# Patient Record
Sex: MALE | Race: WHITE | NOT HISPANIC OR LATINO | Employment: OTHER | URBAN - METROPOLITAN AREA
[De-identification: names, ages, dates, MRNs, and addresses within clinical notes are randomized per-mention and may not be internally consistent; named-entity substitution may affect disease eponyms.]

---

## 2018-02-19 ENCOUNTER — OFFICE VISIT (OUTPATIENT)
Dept: CARDIOLOGY | Facility: CLINIC | Age: 73
End: 2018-02-19

## 2018-02-19 VITALS
HEART RATE: 68 BPM | WEIGHT: 249 LBS | DIASTOLIC BLOOD PRESSURE: 72 MMHG | HEIGHT: 69 IN | SYSTOLIC BLOOD PRESSURE: 132 MMHG | BODY MASS INDEX: 36.88 KG/M2

## 2018-02-19 DIAGNOSIS — I45.2 BIFASCICULAR BLOCK: Primary | ICD-10-CM

## 2018-02-19 DIAGNOSIS — E78.49 OTHER HYPERLIPIDEMIA: ICD-10-CM

## 2018-02-19 DIAGNOSIS — I10 ESSENTIAL HYPERTENSION: ICD-10-CM

## 2018-02-19 PROCEDURE — 99204 OFFICE O/P NEW MOD 45 MIN: CPT | Performed by: INTERNAL MEDICINE

## 2018-02-19 PROCEDURE — 93000 ELECTROCARDIOGRAM COMPLETE: CPT | Performed by: INTERNAL MEDICINE

## 2018-02-19 RX ORDER — QUINAPRIL 40 MG/1
40 TABLET ORAL NIGHTLY
COMMUNITY
End: 2019-04-03 | Stop reason: SDUPTHER

## 2018-02-19 RX ORDER — ALENDRONATE SODIUM 70 MG/1
70 TABLET ORAL
COMMUNITY
End: 2020-08-17

## 2018-02-19 RX ORDER — HYDROCHLOROTHIAZIDE 25 MG/1
25 TABLET ORAL DAILY
COMMUNITY
End: 2019-06-04 | Stop reason: SDUPTHER

## 2018-02-19 RX ORDER — METHYLPREDNISOLONE 4 MG/1
12 TABLET ORAL DAILY
COMMUNITY
End: 2019-06-26

## 2018-02-19 RX ORDER — AMLODIPINE BESYLATE 5 MG/1
5 TABLET ORAL DAILY
COMMUNITY
End: 2019-04-03 | Stop reason: SDUPTHER

## 2018-02-19 RX ORDER — ALPRAZOLAM 0.5 MG/1
0.5 TABLET ORAL 2 TIMES DAILY PRN
COMMUNITY
End: 2019-04-08 | Stop reason: SDUPTHER

## 2018-02-19 RX ORDER — ROSUVASTATIN CALCIUM 20 MG/1
20 TABLET, COATED ORAL DAILY
COMMUNITY
End: 2019-06-03 | Stop reason: SDUPTHER

## 2018-02-19 NOTE — PROGRESS NOTES
Subjective:     Encounter Date:02/19/2018      Patient ID: Jeremi Ugarte is a 72 y.o. male.    Chief Complaint: bifascicular block, HTN, hyperlipidemia    History of Present Illness    Dear Dr. Kearney:    I had the pleasure of seeing the patient in cardiac evaluation today.  As you well know, he is a jesusita, 72-year-old man with history of orthopedic problems.  He has had his left knee replaced twice and his right knee replaced once.  He has also had hip surgery, shoulder surgery, and cataract surgery.    He follows with Dr. Caldera for his orthopedic care.  He recently discussed possible replacement with Dr. Caldera.      Because of prior conduction abnormality, he was sent to see me.    He is being treated for hypertension.  He has hyperlipidemia.  He denies any complaints of angina.  He has a prior history of right bundle branch block.  Today, he has bifascicular block.  He denies any complaints of bradycardia.      He has started walking since he had knee problems and now he is up to walking two miles a day without much knee difficulty.  He has developed some heel pain.        Review of Systems   All other systems reviewed and are negative.    Family History   Problem Relation Age of Onset   • Cardiomyopathy Mother    • Hypertension Mother    • Hyperlipidemia Sister    • Hypertension Sister    • Coronary artery disease Brother    • Hyperlipidemia Brother    • Hypertension Brother      Social History   Substance Use Topics   • Smoking status: Former Smoker     Types: Cigars   • Smokeless tobacco: Former User     Types: Chew      Comment: FOR A SHORT TIME AS A YOUNG ADULT   • Alcohol use Yes      Comment: 9-14 per week         ECG 12 Lead  Date/Time: 2/19/2018 10:22 AM  Performed by: JEREMI PRIDE  Authorized by: JEREMI PRIDE   Previous ECG: no previous ECG available  Rhythm: sinus rhythm  BPM: 68  Conduction: right bundle branch block and LAFB               Objective:     Physical Exam   Constitutional: He is  oriented to person, place, and time. He appears well-developed and well-nourished.   HENT:   Head: Normocephalic and atraumatic.   Neck: Normal range of motion. Neck supple.   Cardiovascular: Normal rate, regular rhythm and normal heart sounds.    Pulmonary/Chest: Effort normal and breath sounds normal.   Abdominal: Soft. Bowel sounds are normal.   Musculoskeletal: Normal range of motion.   Neurological: He is alert and oriented to person, place, and time.   Skin: Skin is warm and dry.   Psychiatric: He has a normal mood and affect. His behavior is normal. Thought content normal.   Vitals reviewed.      Lab Review:       Assessment:          Diagnosis Plan   1. Bifascicular block     2. Essential hypertension     3. Other hyperlipidemia            Plan:       It was a pleasure to see your patient in cardiac evaluation today.  He is a jesusita, 72-year-old man with hypertension and hyperlipidemia.  He has a pretty active lifestyle and now can walk two miles without too much difficulty.  He has no bradycardia.  He has bifascicular block.  I will recommend a yearly electrocardiogram to follow his conduction abnormality.  I have also instructed him to contact me if he has any symptomatic bradycardia.    I see no problem with him proceeding with his knee surgery if necessary but, given his functional status, he may not need it.  He will see me again in one year or sooner if symptoms warrant.

## 2018-03-13 ENCOUNTER — APPOINTMENT (OUTPATIENT)
Dept: PREADMISSION TESTING | Facility: HOSPITAL | Age: 73
End: 2018-03-13

## 2019-01-09 ENCOUNTER — OFFICE VISIT (OUTPATIENT)
Dept: CARDIOLOGY | Facility: CLINIC | Age: 74
End: 2019-01-09

## 2019-01-09 VITALS
HEART RATE: 56 BPM | SYSTOLIC BLOOD PRESSURE: 122 MMHG | WEIGHT: 212 LBS | DIASTOLIC BLOOD PRESSURE: 78 MMHG | BODY MASS INDEX: 31.4 KG/M2 | OXYGEN SATURATION: 97 % | HEIGHT: 69 IN

## 2019-01-09 DIAGNOSIS — I45.2 BIFASCICULAR BLOCK: Primary | ICD-10-CM

## 2019-01-09 PROBLEM — G50.0 TRIGEMINAL NEURALGIA OF RIGHT SIDE OF FACE: Status: ACTIVE | Noted: 2018-11-08

## 2019-01-09 PROCEDURE — 99213 OFFICE O/P EST LOW 20 MIN: CPT | Performed by: PHYSICIAN ASSISTANT

## 2019-01-09 PROCEDURE — 93000 ELECTROCARDIOGRAM COMPLETE: CPT | Performed by: PHYSICIAN ASSISTANT

## 2019-01-09 RX ORDER — LANOLIN ALCOHOL/MO/W.PET/CERES
1000 CREAM (GRAM) TOPICAL DAILY
COMMUNITY

## 2019-01-09 RX ORDER — OXCARBAZEPINE 300 MG/1
300 TABLET, FILM COATED ORAL 3 TIMES DAILY PRN
Refills: 1 | COMMUNITY
Start: 2018-11-08

## 2019-01-09 RX ORDER — CALCIUM CARBONATE 200(500)MG
1 TABLET,CHEWABLE ORAL AS NEEDED
COMMUNITY
End: 2020-09-01

## 2019-01-09 RX ORDER — CHLORAL HYDRATE 500 MG
1200 CAPSULE ORAL
COMMUNITY
End: 2023-01-12

## 2019-01-09 NOTE — PROGRESS NOTES
"  Date of Office Visit: 2019  Encounter Provider: GERSON Carpenter  Place of Service: [facility] CARDIOLOG[facility]  Patient Name: Jeremi Ugarte  :1945    Chief Complaint   Patient presents with   • Surgical Clearance     1 year follow up   • Hypertension   :     HPI: Jeremi Ugarte is a 73 y.o. male, new to me, who presents today for follow-up.  Old records have been obtained and reviewed by me.  He is a patient with a past medical history significant for multiple orthopedic issues and cataracts.  He was first evaluated by Dr. Gonzalez on 2018.  He has a history of right bundle branch block, and at his visit back in 2018 he had a bifascicular block.  He denied any symptoms of bradycardia.  At that visit he was facing upcoming knee surgery, and Dr. Gonzalez felt that he was an acceptable risk.  He recommended yearly monitoring of his ECG for his conduction abnormality.  No changes were made to his medical regimen, and it was recommended that he follow-up in one year.  He is going to be having microvascular decompression of his trigeminal nerve and possible internal neurolysis via a right retrosigmoid craniectomy by Dr. Aldridge.   Overall he's been doing really well over the past year.  He has intentionally lost almost 30 pounds with diet and exercise.  He walks a mile every day and a fast pace and also with some weights.  He can do this without any difficulty.  He denies any chest pain, shortness of breath, palpitations, edema, dizziness, or syncope.  He states that he was evidently missed diagnosed as having giant cell arteritis and treated with steroids.  He was having these terrible headaches that made him feel \"as if he could not go on\".  He then saw a neurologist and was diagnosed with trigeminal neuralgia.  He was started on a medication for it which helped significantly, however when he increased the medication he developed a rash.  He saw a neurosurgeon, and evidently his " trigeminal nerve is sitting very close to an artery and the plan is to go and put a spacer in between the nerve and artery to help relieve his headaches.      Past Medical History:   Diagnosis Date   • Anemia    • Anxiety    • Arthritis    • Depression    • GCA (giant cell arteritis) (CMS/HCC)    • Gout of foot    • High risk medication use    • Hyperlipidemia    • Hypotension    • Macrocytosis    • Muscle weakness    • Myalgia    • Myositis    • Osteoarthritis    • Polymyalgia rheumatica (CMS/HCC)    • S/P revision of total knee, left    • S/P revision of total knee, right    • Wrist pain, acute, left        Past Surgical History:   Procedure Laterality Date   • BACK SURGERY      lower back surg   • CATARACT EXTRACTION     • EXCISION BAKERS CYST KNEE  2008   • HIP SURGERY  12/08/2016    total right hip replacement   • REPLACEMENT TOTAL KNEE      1996 and 1999   • SHOULDER SURGERY     • TOTAL KNEE ARTHROPLASTY REVISION  2009    left       Social History     Socioeconomic History   • Marital status:      Spouse name: Not on file   • Number of children: Not on file   • Years of education: Not on file   • Highest education level: Not on file   Social Needs   • Financial resource strain: Not on file   • Food insecurity - worry: Not on file   • Food insecurity - inability: Not on file   • Transportation needs - medical: Not on file   • Transportation needs - non-medical: Not on file   Occupational History   • Not on file   Tobacco Use   • Smoking status: Former Smoker     Types: Cigars   • Smokeless tobacco: Former User     Types: Chew   • Tobacco comment: FOR A SHORT TIME AS A YOUNG ADULT   Substance and Sexual Activity   • Alcohol use: Yes     Alcohol/week: 0.6 oz     Types: 1 Cans of beer per week     Comment: 9-14 per week   • Drug use: No   • Sexual activity: Defer   Other Topics Concern   • Not on file   Social History Narrative   • Not on file       Family History   Problem Relation Age of Onset   •  Cardiomyopathy Mother    • Hypertension Mother    • Hyperlipidemia Sister    • Hypertension Sister    • Coronary artery disease Brother    • Hyperlipidemia Brother    • Hypertension Brother    • No Known Problems Father        Review of Systems   Constitution: Negative for chills, fever and malaise/fatigue.   Cardiovascular: Negative for chest pain, dyspnea on exertion, leg swelling, near-syncope, orthopnea, palpitations, paroxysmal nocturnal dyspnea and syncope.   Respiratory: Negative for cough and shortness of breath.    Musculoskeletal: Negative for joint pain, joint swelling and myalgias.   Gastrointestinal: Negative for abdominal pain, diarrhea, melena, nausea and vomiting.   Genitourinary: Negative for frequency and hematuria.   Neurological: Positive for headaches. Negative for light-headedness, numbness, paresthesias and seizures.   Allergic/Immunologic: Negative.    All other systems reviewed and are negative.      No Known Allergies      Current Outpatient Medications:   •  alendronate (FOSAMAX) 70 MG tablet, Take 70 mg by mouth Every 7 (Seven) Days., Disp: , Rfl:   •  ALPRAZolam (XANAX) 0.5 MG tablet, Take 0.5 mg by mouth 2 (Two) Times a Day As Needed for Anxiety., Disp: , Rfl:   •  amLODIPine (NORVASC) 5 MG tablet, Take 5 mg by mouth Daily., Disp: , Rfl:   •  calcium carbonate (TUMS) 500 MG chewable tablet, Chew 1 tablet Daily., Disp: , Rfl:   •  hydrochlorothiazide (HYDRODIURIL) 25 MG tablet, Take 25 mg by mouth Daily., Disp: , Rfl:   •  methylPREDNISolone (MEDROL) 4 MG tablet, Take 12 mg by mouth Daily., Disp: , Rfl:   •  Multiple Vitamins-Minerals (MULTIVITAMIN ADULTS 50+ PO), Take 1 tablet by mouth Daily., Disp: , Rfl:   •  Multiple Vitamins-Minerals (OCUVITE EYE HEALTH FORMULA PO), Take 1 tablet by mouth Daily., Disp: , Rfl:   •  Omega-3 Fatty Acids (FISH OIL) 1000 MG capsule capsule, Take 1,200 mg by mouth Daily With Breakfast., Disp: , Rfl:   •  OXcarbazepine (TRILEPTAL) 300 MG tablet, Takes 525  "mg daily, Disp: , Rfl: 1  •  quinapril (ACCUPRIL) 40 MG tablet, Take 40 mg by mouth Every Night., Disp: , Rfl:   •  rosuvastatin (CRESTOR) 20 MG tablet, Take 20 mg by mouth Daily., Disp: , Rfl:   •  vitamin B-12 (CYANOCOBALAMIN) 1000 MCG tablet, Take 1,000 mcg by mouth Daily., Disp: , Rfl:       Objective:     Vitals:    01/09/19 1244 01/09/19 1305   BP: 112/68 122/78   BP Location: Right arm Left arm   Pulse: 56    SpO2: 97%    Weight: 96.2 kg (212 lb)    Height: 175.3 cm (69\")      Body mass index is 31.31 kg/m².    PHYSICAL EXAM:    Physical Exam   Constitutional: He is oriented to person, place, and time. He appears well-developed and well-nourished. No distress.   HENT:   Head: Normocephalic and atraumatic.   Eyes: Pupils are equal, round, and reactive to light.   Neck: No JVD present. No thyromegaly present.   Cardiovascular: Normal rate, regular rhythm, normal heart sounds and intact distal pulses.   No murmur heard.  Pulmonary/Chest: Effort normal and breath sounds normal. No respiratory distress.   Abdominal: Soft. Bowel sounds are normal. He exhibits no distension. There is no splenomegaly or hepatomegaly. There is no tenderness.   Musculoskeletal: Normal range of motion. He exhibits no edema.   Neurological: He is alert and oriented to person, place, and time.   Skin: Skin is warm and dry. He is not diaphoretic. No erythema.   Psychiatric: He has a normal mood and affect. His behavior is normal. Judgment normal.         ECG 12 Lead  Date/Time: 1/9/2019 1:14 PM  Performed by: Alicia Mancini PA  Authorized by: Alicia Mancini PA   Comparison: compared with previous ECG from 2/19/2018  Similar to previous ECG  Rhythm: sinus rhythm  BPM: 83  Conduction: right bundle branch block and LAFB  Clinical impression: abnormal ECG  Comments: Indication:  Bifascicular block              Assessment:       Diagnosis Plan   1. Bifascicular block  ECG 12 Lead     Orders Placed This Encounter   Procedures   • ECG 12 " Lead     This order was created via procedure documentation          Plan:       Overall he's doing extremely well from a cardiac standpoint.  He is exercising and eating a good diet and has subsequently lost 30 pounds intentionally over the past year.  He can do all these activities without any difficulty.  He has no complaint of angina or heart failure.  He has bifascicular block which is unchanged.  From a cardiac standpoint, he is at an acceptable risk to undergo his proposed surgery.  According to the [employer]'s Revised Cardiac Risk Index, he is at a very low risk of 0.4% of adverse cardiac event during his surgery.  My only recommendation would be to avoid AV rajni blocking agents in the presence of his bifascicular block.  I'm not going to make any changes to his medical regimen, and he will follow-up with Dr. Gonzalez in 1 year or sooner if needed.    As always, it has been a pleasure to participate in your patient's care.      Sincerely,         Alicia Mancini PA-C

## 2019-04-03 RX ORDER — AMLODIPINE BESYLATE 5 MG/1
5 TABLET ORAL DAILY
Qty: 90 TABLET | Refills: 2 | Status: SHIPPED | OUTPATIENT
Start: 2019-04-03 | End: 2019-06-26 | Stop reason: SDUPTHER

## 2019-04-03 RX ORDER — QUINAPRIL 40 MG/1
40 TABLET ORAL NIGHTLY
Qty: 90 TABLET | Refills: 2 | Status: SHIPPED | OUTPATIENT
Start: 2019-04-03 | End: 2019-04-22 | Stop reason: SDUPTHER

## 2019-04-08 RX ORDER — ALPRAZOLAM 0.5 MG/1
0.5 TABLET ORAL 3 TIMES DAILY PRN
Qty: 90 TABLET | Refills: 0 | Status: SHIPPED | OUTPATIENT
Start: 2019-04-08 | End: 2019-05-06 | Stop reason: SDUPTHER

## 2019-04-19 ENCOUNTER — TELEPHONE (OUTPATIENT)
Dept: INTERNAL MEDICINE | Facility: CLINIC | Age: 74
End: 2019-04-19

## 2019-04-19 NOTE — TELEPHONE ENCOUNTER
Pt wife called and stated he is taking quinapril 40 mg. He has been taking it BID, but when it was refilled last the instructions were changed to take it once a day. Did you authorize this or it a mistake. Pt states he needs both pills.

## 2019-04-22 RX ORDER — QUINAPRIL 40 MG/1
40 TABLET ORAL 2 TIMES DAILY
Qty: 180 TABLET | Refills: 2 | Status: SHIPPED | OUTPATIENT
Start: 2019-04-22 | End: 2019-11-11 | Stop reason: SDUPTHER

## 2019-04-26 RX ORDER — ALPRAZOLAM 0.5 MG/1
TABLET ORAL
Qty: 90 TABLET | Refills: 0 | Status: CANCELLED | OUTPATIENT
Start: 2019-04-26

## 2019-05-06 RX ORDER — ALPRAZOLAM 0.5 MG/1
0.5 TABLET ORAL 3 TIMES DAILY PRN
Qty: 90 TABLET | Refills: 0 | Status: SHIPPED | OUTPATIENT
Start: 2019-05-06 | End: 2019-06-05 | Stop reason: SDUPTHER

## 2019-05-16 RX ORDER — ROSUVASTATIN CALCIUM 40 MG/1
TABLET, COATED ORAL
Qty: 90 TABLET | Refills: 0 | Status: SHIPPED | OUTPATIENT
Start: 2019-05-16 | End: 2019-06-03 | Stop reason: SDUPTHER

## 2019-06-03 RX ORDER — ROSUVASTATIN CALCIUM 40 MG/1
40 TABLET, COATED ORAL DAILY
Qty: 90 TABLET | Refills: 0 | Status: SHIPPED | OUTPATIENT
Start: 2019-06-03 | End: 2019-07-25 | Stop reason: SDUPTHER

## 2019-06-04 RX ORDER — HYDROCHLOROTHIAZIDE 25 MG/1
25 TABLET ORAL DAILY
Qty: 90 TABLET | Refills: 2 | Status: SHIPPED | OUTPATIENT
Start: 2019-06-04 | End: 2019-12-18 | Stop reason: SDUPTHER

## 2019-06-05 ENCOUNTER — TELEPHONE (OUTPATIENT)
Dept: INTERNAL MEDICINE | Facility: CLINIC | Age: 74
End: 2019-06-05

## 2019-06-05 RX ORDER — ALPRAZOLAM 0.5 MG/1
TABLET ORAL
Qty: 90 TABLET | Refills: 0 | Status: SHIPPED | OUTPATIENT
Start: 2019-06-05 | End: 2019-06-26 | Stop reason: SDUPTHER

## 2019-06-05 NOTE — TELEPHONE ENCOUNTER
Patient Hydrochlorothiazide 25 mg 90 day supply. [facility] [phone]. He is completely out and has none for today. Thanks

## 2019-06-26 ENCOUNTER — OFFICE VISIT (OUTPATIENT)
Dept: INTERNAL MEDICINE | Facility: CLINIC | Age: 74
End: 2019-06-26

## 2019-06-26 ENCOUNTER — RESULTS ENCOUNTER (OUTPATIENT)
Dept: INTERNAL MEDICINE | Facility: CLINIC | Age: 74
End: 2019-06-26

## 2019-06-26 VITALS
HEART RATE: 88 BPM | BODY MASS INDEX: 30.96 KG/M2 | WEIGHT: 209 LBS | RESPIRATION RATE: 16 BRPM | OXYGEN SATURATION: 98 % | HEIGHT: 69 IN

## 2019-06-26 DIAGNOSIS — F41.9 ANXIETY: Primary | ICD-10-CM

## 2019-06-26 DIAGNOSIS — E78.5 DYSLIPIDEMIA: ICD-10-CM

## 2019-06-26 DIAGNOSIS — G50.0 TRIGEMINAL NEURALGIA OF RIGHT SIDE OF FACE: ICD-10-CM

## 2019-06-26 DIAGNOSIS — I10 BENIGN ESSENTIAL HTN: ICD-10-CM

## 2019-06-26 DIAGNOSIS — Z12.11 COLON CANCER SCREENING: ICD-10-CM

## 2019-06-26 DIAGNOSIS — E53.8 VITAMIN B12 DEFICIENCY: ICD-10-CM

## 2019-06-26 PROCEDURE — 99213 OFFICE O/P EST LOW 20 MIN: CPT | Performed by: INTERNAL MEDICINE

## 2019-06-26 RX ORDER — AMLODIPINE BESYLATE 5 MG/1
5 TABLET ORAL DAILY
Qty: 90 TABLET | Refills: 2 | Status: SHIPPED | OUTPATIENT
Start: 2019-06-26 | End: 2019-12-18 | Stop reason: SDUPTHER

## 2019-06-26 RX ORDER — ALPRAZOLAM 0.5 MG/1
0.5 TABLET ORAL 3 TIMES DAILY PRN
Qty: 90 TABLET | Refills: 5 | Status: SHIPPED | OUTPATIENT
Start: 2019-06-26 | End: 2019-07-15 | Stop reason: SDUPTHER

## 2019-06-26 NOTE — PROGRESS NOTES
Assessment and Plan  Jeremi was seen today for hyperlipidemia, hypotension, osteoarthritis and med refill.    Diagnoses and all orders for this visit:    Anxiety  Comments:  stable use of alprazolam- refilled today    Dyslipidemia  Comments:  check labs  Orders:  -     Comprehensive Metabolic Panel  -     Lipid Panel With LDL / HDL Ratio    Benign essential HTN  Comments:  controlled.  Should monitor on occ.  Orders:  -     amLODIPine (NORVASC) 5 MG tablet; Take 1 tablet by mouth Daily.    Trigeminal neuralgia of right side of face  Comments:  doing great since surgery.    Vitamin B12 deficiency  -     CBC & Differential  -     Vitamin B12  -     Folate    Colon cancer screening  -     Cologuard - Stool, Per Rectum; Future    Other orders  -     ALPRAZolam (XANAX) 0.5 MG tablet; Take 1 tablet by mouth 3 (Three) Times a Day As Needed for Anxiety. for anxiety      F/U and Patient Instructions    Return in about 6 months (around 12/26/2019).  There are no Patient Instructions on file for this visit.    Subjective    Jeremi Ugarte is a 74 y.o. male being seen in our office today for Hyperlipidemia (6 MO FU); Hypotension; Osteoarthritis; and Med Refill (amalodapine, xanax, )     History of the Present Illness  HPI  He has been doing well since his brain surgery- has been released by Dr. Aldridge.  Has an occ pain in the back of his head but neurologist did not think it was related. He decided not to take the trileptal.  Anxiety still his biggest issue is anxiety- over many things, including pain coming back.  He takes alprazolam BID and only rarely 3x daily.        Patient History        Significant Past History  The following portions of the patient's history were reviewed and updated as appropriate:PMHroutine: Social history , Allergies, Current Medications, Active Problem List and Health Maintenance              Social History  He  reports that he has quit smoking. His smoking use included cigars. He has quit using  smokeless tobacco. His smokeless tobacco use included chew. He reports that he drinks about 0.6 oz of alcohol per week. He reports that he does not use drugs.                         Review of Symptoms  Review of Systems   Constitution: Negative.   Cardiovascular: Negative.    Respiratory: Negative.    Skin: Negative.    Musculoskeletal: Negative.    Gastrointestinal: Negative.    Genitourinary: Negative.    Psychiatric/Behavioral: Negative.      Objective  Vital Signs         BP Readings from Last 1 Encounters:   01/09/19 122/78     Wt Readings from Last 3 Encounters:   06/26/19 94.8 kg (209 lb)   01/09/19 96.2 kg (212 lb)   02/19/18 113 kg (249 lb)   Body mass index is 30.86 kg/m².          Physical Exam   Physical Exam   Constitutional: No distress.   Cardiovascular: Normal rate and regular rhythm.   Pulmonary/Chest: Effort normal and breath sounds normal.   Musculoskeletal: He exhibits no edema.   Psychiatric: He has a normal mood and affect. His behavior is normal.     Data Reviewed    No results found for this or any previous visit (from the past 2016 hour(s)).

## 2019-06-27 LAB
ALBUMIN SERPL-MCNC: 4.3 G/DL (ref 3.5–5.2)
ALBUMIN/GLOB SERPL: 1.6 G/DL
ALP SERPL-CCNC: 51 U/L (ref 39–117)
ALT SERPL-CCNC: 13 U/L (ref 1–41)
AST SERPL-CCNC: 17 U/L (ref 1–40)
BASOPHILS # BLD AUTO: 0.02 10*3/MM3 (ref 0–0.2)
BASOPHILS NFR BLD AUTO: 0.2 % (ref 0–1.5)
BILIRUB SERPL-MCNC: 0.3 MG/DL (ref 0.2–1.2)
BUN SERPL-MCNC: 20 MG/DL (ref 8–23)
BUN/CREAT SERPL: 16.9 (ref 7–25)
CALCIUM SERPL-MCNC: 9.9 MG/DL (ref 8.6–10.5)
CHLORIDE SERPL-SCNC: 102 MMOL/L (ref 98–107)
CHOLEST SERPL-MCNC: 179 MG/DL (ref 0–200)
CO2 SERPL-SCNC: 26.9 MMOL/L (ref 22–29)
CREAT SERPL-MCNC: 1.18 MG/DL (ref 0.76–1.27)
EOSINOPHIL # BLD AUTO: 0.33 10*3/MM3 (ref 0–0.4)
EOSINOPHIL NFR BLD AUTO: 3.8 % (ref 0.3–6.2)
ERYTHROCYTE [DISTWIDTH] IN BLOOD BY AUTOMATED COUNT: 13.5 % (ref 12.3–15.4)
FOLATE SERPL-MCNC: 14.4 NG/ML (ref 4.78–24.2)
GLOBULIN SER CALC-MCNC: 2.7 GM/DL
GLUCOSE SERPL-MCNC: 89 MG/DL (ref 65–99)
HCT VFR BLD AUTO: 44.4 % (ref 37.5–51)
HDLC SERPL-MCNC: 59 MG/DL (ref 40–60)
HGB BLD-MCNC: 14.2 G/DL (ref 13–17.7)
IMM GRANULOCYTES # BLD AUTO: 0.02 10*3/MM3 (ref 0–0.05)
IMM GRANULOCYTES NFR BLD AUTO: 0.2 % (ref 0–0.5)
LDLC SERPL CALC-MCNC: 101 MG/DL (ref 0–100)
LDLC/HDLC SERPL: 1.71 {RATIO}
LYMPHOCYTES # BLD AUTO: 2.94 10*3/MM3 (ref 0.7–3.1)
LYMPHOCYTES NFR BLD AUTO: 33.5 % (ref 19.6–45.3)
MCH RBC QN AUTO: 32.7 PG (ref 26.6–33)
MCHC RBC AUTO-ENTMCNC: 32 G/DL (ref 31.5–35.7)
MCV RBC AUTO: 102.3 FL (ref 79–97)
MONOCYTES # BLD AUTO: 0.72 10*3/MM3 (ref 0.1–0.9)
MONOCYTES NFR BLD AUTO: 8.2 % (ref 5–12)
NEUTROPHILS # BLD AUTO: 4.74 10*3/MM3 (ref 1.7–7)
NEUTROPHILS NFR BLD AUTO: 54.1 % (ref 42.7–76)
PLATELET # BLD AUTO: 320 10*3/MM3 (ref 140–450)
POTASSIUM SERPL-SCNC: 4.6 MMOL/L (ref 3.5–5.2)
PROT SERPL-MCNC: 7 G/DL (ref 6–8.5)
RBC # BLD AUTO: 4.34 10*6/MM3 (ref 4.14–5.8)
SODIUM SERPL-SCNC: 141 MMOL/L (ref 136–145)
TRIGL SERPL-MCNC: 97 MG/DL (ref 0–150)
VIT B12 SERPL-MCNC: >2000 PG/ML (ref 211–946)
VLDLC SERPL CALC-MCNC: 19.4 MG/DL
WBC # BLD AUTO: 8.77 10*3/MM3 (ref 3.4–10.8)

## 2019-07-01 DIAGNOSIS — I10 HYPERTENSION, UNSPECIFIED TYPE: Primary | ICD-10-CM

## 2019-07-12 RX ORDER — ALPRAZOLAM 0.5 MG/1
0.5 TABLET ORAL 3 TIMES DAILY PRN
Qty: 90 TABLET | Refills: 0 | OUTPATIENT
Start: 2019-07-12

## 2019-07-15 RX ORDER — ALPRAZOLAM 0.5 MG/1
0.5 TABLET ORAL 3 TIMES DAILY PRN
Qty: 90 TABLET | Refills: 0 | Status: SHIPPED | OUTPATIENT
Start: 2019-07-15 | End: 2019-12-20

## 2019-07-25 RX ORDER — ROSUVASTATIN CALCIUM 20 MG/1
20 TABLET, COATED ORAL DAILY
Qty: 90 TABLET | Refills: 1 | Status: SHIPPED | OUTPATIENT
Start: 2019-07-25 | End: 2020-04-29

## 2019-11-11 RX ORDER — QUINAPRIL 40 MG/1
TABLET ORAL
Qty: 180 TABLET | Refills: 1 | Status: SHIPPED | OUTPATIENT
Start: 2019-11-11 | End: 2020-05-21

## 2019-12-18 DIAGNOSIS — I10 BENIGN ESSENTIAL HTN: ICD-10-CM

## 2019-12-18 RX ORDER — HYDROCHLOROTHIAZIDE 25 MG/1
25 TABLET ORAL DAILY
Qty: 90 TABLET | Refills: 1 | Status: SHIPPED | OUTPATIENT
Start: 2019-12-18 | End: 2020-05-21

## 2019-12-18 RX ORDER — AMLODIPINE BESYLATE 5 MG/1
5 TABLET ORAL DAILY
Qty: 90 TABLET | Refills: 1 | Status: SHIPPED | OUTPATIENT
Start: 2019-12-18 | End: 2020-05-21

## 2019-12-20 DIAGNOSIS — F41.9 ANXIETY: Primary | ICD-10-CM

## 2019-12-22 RX ORDER — ALPRAZOLAM 0.5 MG/1
TABLET ORAL
Qty: 90 TABLET | Refills: 0 | Status: SHIPPED | OUTPATIENT
Start: 2019-12-22 | End: 2020-01-28

## 2019-12-23 ENCOUNTER — RESULTS ENCOUNTER (OUTPATIENT)
Dept: INTERNAL MEDICINE | Facility: CLINIC | Age: 74
End: 2019-12-23

## 2019-12-23 DIAGNOSIS — I10 HYPERTENSION, UNSPECIFIED TYPE: ICD-10-CM

## 2019-12-24 LAB
ALBUMIN SERPL-MCNC: 4.3 G/DL (ref 3.5–4.8)
ALBUMIN/GLOB SERPL: 1.9 {RATIO} (ref 1.2–2.2)
ALP SERPL-CCNC: 78 IU/L (ref 39–117)
ALT SERPL-CCNC: 15 IU/L (ref 0–44)
AST SERPL-CCNC: 16 IU/L (ref 0–40)
BASOPHILS # BLD AUTO: NORMAL 10*3/UL
BILIRUB SERPL-MCNC: 0.4 MG/DL (ref 0–1.2)
BUN SERPL-MCNC: 20 MG/DL (ref 8–27)
BUN/CREAT SERPL: 22 (ref 10–24)
CALCIUM SERPL-MCNC: 9.1 MG/DL (ref 8.6–10.2)
CHLORIDE SERPL-SCNC: 103 MMOL/L (ref 96–106)
CHOLEST SERPL-MCNC: 136 MG/DL (ref 100–199)
CO2 SERPL-SCNC: 18 MMOL/L (ref 20–29)
CREAT SERPL-MCNC: 0.9 MG/DL (ref 0.76–1.27)
EOSINOPHIL # BLD AUTO: NORMAL 10*3/UL
EOSINOPHIL NFR BLD AUTO: NORMAL %
GLOBULIN SER CALC-MCNC: 2.3 G/DL (ref 1.5–4.5)
GLUCOSE SERPL-MCNC: 196 MG/DL (ref 65–99)
HCT VFR BLD AUTO: NORMAL %
HDLC SERPL-MCNC: 47 MG/DL
HGB BLD-MCNC: NORMAL G/DL
LDLC SERPL CALC-MCNC: 41 MG/DL (ref 0–99)
LYMPHOCYTES # BLD AUTO: NORMAL 10*3/UL
LYMPHOCYTES NFR BLD AUTO: NORMAL %
MONOCYTES NFR BLD AUTO: NORMAL %
NEUTROPHILS NFR BLD AUTO: NORMAL %
PLATELET # BLD AUTO: NORMAL 10*3/UL
POTASSIUM SERPL-SCNC: 4.7 MMOL/L (ref 3.5–5.2)
PROT SERPL-MCNC: 6.6 G/DL (ref 6–8.5)
RBC # BLD AUTO: NORMAL 10*6/UL
REQUEST PROBLEM: NORMAL
SODIUM SERPL-SCNC: 141 MMOL/L (ref 134–144)
TRIGL SERPL-MCNC: 239 MG/DL (ref 0–149)
VLDLC SERPL CALC-MCNC: 48 MG/DL (ref 5–40)
WBC # BLD AUTO: NORMAL X10E3/UL

## 2019-12-26 ENCOUNTER — OFFICE VISIT (OUTPATIENT)
Dept: INTERNAL MEDICINE | Facility: CLINIC | Age: 74
End: 2019-12-26

## 2019-12-26 VITALS
BODY MASS INDEX: 33.47 KG/M2 | HEIGHT: 69 IN | DIASTOLIC BLOOD PRESSURE: 68 MMHG | SYSTOLIC BLOOD PRESSURE: 130 MMHG | HEART RATE: 74 BPM | WEIGHT: 226 LBS

## 2019-12-26 DIAGNOSIS — E78.5 DYSLIPIDEMIA: ICD-10-CM

## 2019-12-26 DIAGNOSIS — R73.9 HYPERGLYCEMIA: Primary | ICD-10-CM

## 2019-12-26 DIAGNOSIS — I10 BENIGN ESSENTIAL HTN: ICD-10-CM

## 2019-12-26 DIAGNOSIS — M79.673 HEEL PAIN, UNSPECIFIED LATERALITY: ICD-10-CM

## 2019-12-26 PROCEDURE — 99214 OFFICE O/P EST MOD 30 MIN: CPT | Performed by: INTERNAL MEDICINE

## 2019-12-26 NOTE — PROGRESS NOTES
Assessment and Plan  Jeremi was seen today for anxiety.    Diagnoses and all orders for this visit:    Hyperglycemia  Comments:  new finding with elevated A1C- discussed weight loss, eliminating beer at night as best treatment initially- will recheck in 6 months.   Orders:  -     Comprehensive Metabolic Panel; Future  -     Hemoglobin A1c; Future    Benign essential HTN  Comments:  Controlled, no change.    Dyslipidemia  Comments:  Triglycerides up, as expected with the BS elevation.  No change in medication at this time.     Heel pain, unspecified laterality  Comments:  Seems better- increase activity.  If worsens, to podiatry for orthotic.      F/U and Patient Instructions    Return in about 6 months (around 6/26/2020).  There are no Patient Instructions on file for this visit.    Subjective    Jeremi Ugarte is a 74 y.o. male being seen in our office today for Anxiety (6 month follow up)     History of the Present Illness  HPI  Having about the same issues- he ha some heel pain on the L - able to walk 1-2 miles/day it just is really bothering him-  He has gained weight in 6 months, admits to being too liberal with his diet!  No other complaints    Patient History        Significant Past History  The following portions of the patient's history were reviewed and updated as appropriate:PMHroutine: Social history , Allergies, Current Medications, Active Problem List and Health Maintenance              Social History  He  reports that he has quit smoking. His smoking use included cigars. He has quit using smokeless tobacco.  His smokeless tobacco use included chew. He reports that he drinks about 1.0 standard drinks of alcohol per week. He reports that he does not use drugs.                         Review of Symptoms  Review of Systems   Constitution: Positive for weight gain.   HENT: Negative.    Cardiovascular: Negative.    Respiratory: Negative.    Genitourinary: Negative.    Psychiatric/Behavioral: The patient  is nervous/anxious.      Objective  Vital Signs         BP Readings from Last 1 Encounters:   12/26/19 130/68     Wt Readings from Last 3 Encounters:   12/26/19 103 kg (226 lb)   06/26/19 94.8 kg (209 lb)   01/09/19 96.2 kg (212 lb)   Body mass index is 33.36 kg/m².          Physical Exam   Physical Exam   Constitutional: No distress.   Cardiovascular: Normal rate.   Pulmonary/Chest: Effort normal.   Musculoskeletal:   No tenderness over heel, good ROM     Data Reviewed    Recent Results (from the past 2016 hour(s))   Comprehensive metabolic panel    Collection Time: 12/23/19  9:08 AM   Result Value Ref Range    Glucose 196 (H) 65 - 99 mg/dL    BUN 20 8 - 27 mg/dL    Creatinine 0.90 0.76 - 1.27 mg/dL    eGFR Non African Am 84 >59 mL/min/1.73    eGFR African Am 97 >59 mL/min/1.73    BUN/Creatinine Ratio 22 10 - 24    Sodium 141 134 - 144 mmol/L    Potassium 4.7 3.5 - 5.2 mmol/L    Chloride 103 96 - 106 mmol/L    Total CO2 18 (L) 20 - 29 mmol/L    Calcium 9.1 8.6 - 10.2 mg/dL    Total Protein 6.6 6.0 - 8.5 g/dL    Albumin 4.3 3.5 - 4.8 g/dL    Globulin 2.3 1.5 - 4.5 g/dL    A/G Ratio 1.9 1.2 - 2.2    Total Bilirubin 0.4 0.0 - 1.2 mg/dL    Alkaline Phosphatase 78 39 - 117 IU/L    AST (SGOT) 16 0 - 40 IU/L    ALT (SGPT) 15 0 - 44 IU/L   Lipid panel    Collection Time: 12/23/19  9:08 AM   Result Value Ref Range    Total Cholesterol 136 100 - 199 mg/dL    Triglycerides 239 (H) 0 - 149 mg/dL    HDL Cholesterol 47 >39 mg/dL    VLDL Cholesterol 48 (H) 5 - 40 mg/dL    LDL Cholesterol  41 0 - 99 mg/dL   CBC & Differential    Collection Time: 12/23/19  9:08 AM   Result Value Ref Range    WBC CANCELED x10E3/uL    RBC CANCELED     Hemoglobin CANCELED     Hematocrit CANCELED     Platelets CANCELED     Neutrophil Rel % CANCELED     Lymphocyte Rel % CANCELED     Monocyte Rel % CANCELED     Eosinophil Rel % CANCELED     Lymphocytes Absolute CANCELED     Eosinophils Absolute CANCELED     Basophils Absolute CANCELED    Request Problem     Collection Time: 12/23/19  9:08 AM   Result Value Ref Range    Request Problem CANCELED

## 2020-01-16 ENCOUNTER — OFFICE VISIT (OUTPATIENT)
Dept: CARDIOLOGY | Facility: CLINIC | Age: 75
End: 2020-01-16

## 2020-01-16 VITALS
WEIGHT: 218 LBS | DIASTOLIC BLOOD PRESSURE: 76 MMHG | BODY MASS INDEX: 32.29 KG/M2 | OXYGEN SATURATION: 98 % | SYSTOLIC BLOOD PRESSURE: 128 MMHG | HEIGHT: 69 IN | HEART RATE: 98 BPM

## 2020-01-16 DIAGNOSIS — I45.2 BIFASCICULAR BLOCK: ICD-10-CM

## 2020-01-16 DIAGNOSIS — E78.5 HYPERLIPIDEMIA LDL GOAL <100: ICD-10-CM

## 2020-01-16 DIAGNOSIS — I10 BENIGN ESSENTIAL HTN: Primary | ICD-10-CM

## 2020-01-16 PROCEDURE — 93000 ELECTROCARDIOGRAM COMPLETE: CPT | Performed by: INTERNAL MEDICINE

## 2020-01-16 PROCEDURE — 99214 OFFICE O/P EST MOD 30 MIN: CPT | Performed by: INTERNAL MEDICINE

## 2020-01-16 NOTE — PROGRESS NOTES
PATIENTINFORMATION    Date of Office Visit: 2020  Encounter Provider: Antonio Mesa MD  Place of Service: [facility] CARDIOLOGY  Patient Name: Jeremi Ugarte  : 1945    Subjective:     Encounter Date:2020      Patient ID: Jeremi Ugarte is a 74 y.o. male.    Chief Complaint   Patient presents with   • Follow-up     HPI  Mr. Ugarte is a 74 years old man with past medical history of bifascicular block, hypertension and hyperlipidemia came to cardiology clinic for yearly follow-up visit.  I have obtained and reviewed prior medical record recent visits and labs myself.  Since last visit he denied having any significant new symptoms.  Specifically he denied any exertional shortness of breath, chest discomfort, palpitations, presyncope or syncope which could suggest complete heart block.  He walks about 1-2 miles most days of the week without any significant symptoms and he is also pretty active at home doing house tasks.  He admits gaining some weight since last visit from drinking more beers to relieve pains of trigeminal neuralgia but he also got surgery that has significantly helped with pain.  He is determined to lose weight now.  He reports being compliant with his his home medications including blood pressure medications quinapril, amlodipine and hydrochlorothiazide and his blood pressure runs normal.  He also takes rosuvastatin for hyperlipidemia without any significant side effects.   He had prior bilateral knee, bilateral hip replacement and left shoulder repair but he is functionally good currently with no significant pains.      ROS   All systems reviewed.  He admits having occasional headaches and anxiety.  Otherwise negative review of systems.    Past Medical History:   Diagnosis Date   • Anemia    • Ankle arthritis    • Ankle pain    • Anxiety    • Arthritis    • Depression    • GCA (giant cell arteritis) (CMS/[facility]ly [facility])    • Gout of foot    • High risk  medication use    • Hyperlipidemia    • Hypertension    • Hyponatremia    • Macrocytosis    • Muscle weakness    • Myalgia    • Myositis    • Osteoarthritis    • Osteoarthritis of both hips    • Polymyalgia rheumatica (CMS/HCC)    • Renal insufficiency    • S/p bilateral revision of total hip replacement    • S/P foot surgery    • S/P revision of total knee, left    • S/P revision of total knee, right    • Trigeminal neuralgia    • Wrist pain, acute, left        Past Surgical History:   Procedure Laterality Date   • BACK SURGERY      lower back surg   • CATARACT EXTRACTION     • CRANIOTOMY Right     Dr. Aldridge, for trigeminal neuralgia   • EXCISION BAKERS CYST KNEE  2008   • HAND SURGERY Left 1971    Left hand and wrist 1971   • HIP SURGERY  12/08/2016    total right hip replacement   • KNEE ARTHROPLASTY Bilateral     LEFT - 1996  -  RIGHT -  1999    • REPLACEMENT TOTAL KNEE      1996 and 1999   • SHOULDER SURGERY     • TOTAL KNEE ARTHROPLASTY REVISION  2009    left       Social History     Socioeconomic History   • Marital status:      Spouse name: Not on file   • Number of children: Not on file   • Years of education: Not on file   • Highest education level: Not on file   Tobacco Use   • Smoking status: Former Smoker     Types: Cigars   • Smokeless tobacco: Former User     Types: Chew   • Tobacco comment: FOR A SHORT TIME AS A YOUNG ADULT   Substance and Sexual Activity   • Alcohol use: Yes     Alcohol/week: 1.0 standard drinks     Types: 1 Cans of beer per week     Comment: 9-14 per week   • Drug use: No   • Sexual activity: Defer       Family History   Problem Relation Age of Onset   • Cardiomyopathy Mother    • Hypertension Mother    • Hyperlipidemia Sister    • Hypertension Sister    • Coronary artery disease Brother    • Hyperlipidemia Brother    • Hypertension Brother    • No Known Problems Father            ECG 12 Lead  Date/Time: 1/16/2020 12:26 PM  Performed by: Antonio Mesa MD  Authorized  by: Antonio Mesa MD   Comparison: compared with previous ECG from 1/9/2019  Similar to previous ECG  Rhythm: sinus rhythm  Rate: normal  Conduction: right bundle branch block and left anterior fascicular block  ST Segments: ST segments normal  T Waves: T waves normal  QRS axis: left  Other findings: non-specific ST-T wave changes    Clinical impression: abnormal EKG               Objective:     There were no vitals taken for this visit. There is no height or weight on file to calculate BMI.     Physical Exam   Constitutional: He is oriented to person, place, and time. He appears well-developed and well-nourished. No distress.   HENT:   Head: Normocephalic and atraumatic.   Eyes: Pupils are equal, round, and reactive to light. EOM are normal.   Neck: Normal range of motion. Neck supple. No thyromegaly present.   Cardiovascular: Normal rate, regular rhythm, normal heart sounds and intact distal pulses. Exam reveals no gallop and no friction rub.   No murmur heard.  Pulmonary/Chest: Effort normal and breath sounds normal. No respiratory distress. He has no wheezes. He has no rales. He exhibits no tenderness.   Abdominal: Soft. Bowel sounds are normal. He exhibits no distension. There is no guarding.   Musculoskeletal: Normal range of motion. He exhibits no edema or deformity.   Neurological: He is alert and oriented to person, place, and time. He has normal reflexes. No cranial nerve deficit.   Skin: Skin is warm and dry. No rash noted. He is not diaphoretic.   Psychiatric: He has a normal mood and affect. Judgment normal.       Review Of Data: I have personally obtained and reviewed prior medical records including office notes, labs and EKGs.      Assessment/Plan:         Bifascicular block    Benign essential HTN    Hyperlipidemia LDL goal <100    Most recent labs revealed normal CMP and desired lipid profile.  Patient's blood pressure is well controlled on current medication.  Right bundle branch block and  left anterior fascicular block has remained stable for several years and patient denied any intermittent symptoms suggestive of complete heart block.  No change in current medication and will continue to follow patient annually and patient advised to call clinic with any with any new symptoms.    Diagnosis and plan of care discussed with patient and verbalized understanding.           Antonio Mesa MD  01/16/20  12:28 PM

## 2020-01-28 DIAGNOSIS — F41.9 ANXIETY: ICD-10-CM

## 2020-01-28 RX ORDER — ALPRAZOLAM 0.5 MG/1
TABLET ORAL
Qty: 90 TABLET | Refills: 1 | Status: SHIPPED | OUTPATIENT
Start: 2020-01-28 | End: 2020-02-19 | Stop reason: SDUPTHER

## 2020-02-12 ENCOUNTER — TELEPHONE (OUTPATIENT)
Dept: INTERNAL MEDICINE | Facility: CLINIC | Age: 75
End: 2020-02-12

## 2020-02-12 NOTE — TELEPHONE ENCOUNTER
Vidya Ugarte called and they are leaving town on 02/20/2020 and wants to know if they can get her 's Amprazolam 0.5 refilled on 02/19/2020.  He has one refill left but the pharmacist will only fill this prescription early if they get some something from Dr. Kearney stating that it will be okay to fill it early.  She can be reached at [phone].  She said she has left two messages and was getting concerned because she has not heard anything from you yet.  Thank you

## 2020-02-19 DIAGNOSIS — F41.9 ANXIETY: ICD-10-CM

## 2020-02-19 RX ORDER — ALPRAZOLAM 0.5 MG/1
0.5 TABLET ORAL 3 TIMES DAILY PRN
Qty: 90 TABLET | Refills: 1 | Status: SHIPPED | OUTPATIENT
Start: 2020-02-19 | End: 2020-05-13

## 2020-04-29 RX ORDER — ROSUVASTATIN CALCIUM 20 MG/1
20 TABLET, COATED ORAL DAILY
Qty: 90 TABLET | Refills: 1 | Status: SHIPPED | OUTPATIENT
Start: 2020-04-29 | End: 2020-09-09

## 2020-05-12 DIAGNOSIS — F41.9 ANXIETY: ICD-10-CM

## 2020-05-13 RX ORDER — ALPRAZOLAM 0.5 MG/1
TABLET ORAL
Qty: 90 TABLET | Refills: 1 | Status: SHIPPED | OUTPATIENT
Start: 2020-05-13 | End: 2020-07-01

## 2020-05-21 DIAGNOSIS — I10 BENIGN ESSENTIAL HTN: ICD-10-CM

## 2020-05-21 RX ORDER — QUINAPRIL 40 MG/1
TABLET ORAL
Qty: 180 TABLET | Refills: 1 | Status: SHIPPED | OUTPATIENT
Start: 2020-05-21 | End: 2020-08-18

## 2020-05-21 RX ORDER — HYDROCHLOROTHIAZIDE 25 MG/1
25 TABLET ORAL DAILY
Qty: 90 TABLET | Refills: 1 | Status: SHIPPED | OUTPATIENT
Start: 2020-05-21 | End: 2020-09-08

## 2020-05-21 RX ORDER — AMLODIPINE BESYLATE 5 MG/1
5 TABLET ORAL DAILY
Qty: 90 TABLET | Refills: 1 | Status: SHIPPED | OUTPATIENT
Start: 2020-05-21 | End: 2020-09-09

## 2020-06-10 ENCOUNTER — OFFICE VISIT (OUTPATIENT)
Dept: CARDIOLOGY | Facility: CLINIC | Age: 75
End: 2020-06-10

## 2020-06-10 VITALS
HEIGHT: 69 IN | DIASTOLIC BLOOD PRESSURE: 100 MMHG | SYSTOLIC BLOOD PRESSURE: 160 MMHG | HEART RATE: 75 BPM | WEIGHT: 219 LBS | BODY MASS INDEX: 32.44 KG/M2

## 2020-06-10 DIAGNOSIS — R07.89 CHEST PAIN, ATYPICAL: Primary | ICD-10-CM

## 2020-06-10 DIAGNOSIS — I10 BENIGN ESSENTIAL HTN: ICD-10-CM

## 2020-06-10 DIAGNOSIS — E78.5 HYPERLIPIDEMIA LDL GOAL <100: ICD-10-CM

## 2020-06-10 DIAGNOSIS — I45.2 BIFASCICULAR BLOCK: ICD-10-CM

## 2020-06-10 PROCEDURE — 93000 ELECTROCARDIOGRAM COMPLETE: CPT | Performed by: INTERNAL MEDICINE

## 2020-06-10 PROCEDURE — 99213 OFFICE O/P EST LOW 20 MIN: CPT | Performed by: INTERNAL MEDICINE

## 2020-06-10 NOTE — PROGRESS NOTES
PATIENTINFORMATION    Date of Office Visit: 06/10/2020  Encounter Provider: Antonio Mesa MD  Place of Service: [facility] CARDIOLOGY  Patient Name: Jeremi Ugarte  : 1945    Subjective:     Encounter Date:06/10/2020      Patient ID: Jeremi Ugarte is a 75 y.o. male.    Chief Complaint   Patient presents with   • Hyperlipidemia     FOLLOW UP      HPI  Mr. Ugarte 75 years old man man with past medical history of bifascicular block, hypertension and hyperlipidemia came to cardiology clinic for evaluation of chest pain.  He had a sudden onset sharp pain while working on his crease localized to the left shoulder area and was fleeting and exacerbated by shoulder movement.  He had a recurrence of the pain today.  He is pretty active physically and has been working a lot on his trees, lawn doing heavy manual labor with no chest discomfort or shortness of breath other than the sharp shoulder pains he mentioned above.  He has chronic left shoulder problems and had a surgery in the past.  He denied any palpitations, presyncope or syncope, orthopnea or PND.  He reports being compliant with his medications including statin and antihypertensives and he reports blood pressure usually being within normal range as long as he takes his Xanax.  Today's blood pressure in the office is 160s/100 and patient attributes that to being anxious with shoulder pain and also not taking his Xanax.  No other significant problems.    ROS   All systems reviewed and negative except as noted in HPI.    Past Medical History:   Diagnosis Date   • Anemia    • Ankle arthritis    • Ankle pain    • Anxiety    • Arthritis    • Depression    • GCA (giant cell arteritis) (CMS/HCC)    • Gout of foot    • High risk medication use    • Hyperlipidemia    • Hypertension    • Hyponatremia    • Macrocytosis    • Muscle weakness    • Myalgia    • Myositis    • Osteoarthritis    • Osteoarthritis of both hips    •  Polymyalgia rheumatica (CMS/HCC)    • Renal insufficiency    • S/p bilateral revision of total hip replacement    • S/P foot surgery    • S/P revision of total knee, left    • S/P revision of total knee, right    • Trigeminal neuralgia    • Wrist pain, acute, left        Past Surgical History:   Procedure Laterality Date   • BACK SURGERY      lower back surg   • CATARACT EXTRACTION     • CRANIOTOMY Right     Dr. Aldridge, for trigeminal neuralgia   • EXCISION BAKERS CYST KNEE  2008   • HAND SURGERY Left 1971    Left hand and wrist 1971   • HIP SURGERY  12/08/2016    total right hip replacement   • KNEE ARTHROPLASTY Bilateral     LEFT - 1996  -  RIGHT -  1999    • REPLACEMENT TOTAL KNEE      1996 and 1999   • SHOULDER SURGERY     • TOTAL KNEE ARTHROPLASTY REVISION  2009    left       Social History     Socioeconomic History   • Marital status:      Spouse name: Not on file   • Number of children: Not on file   • Years of education: Not on file   • Highest education level: Not on file   Tobacco Use   • Smoking status: Former Smoker     Types: Cigars   • Smokeless tobacco: Former User     Types: Chew   • Tobacco comment: FOR A SHORT TIME AS A YOUNG ADULT   Substance and Sexual Activity   • Alcohol use: Yes     Alcohol/week: 1.0 standard drinks     Types: 1 Cans of beer per week     Comment: 9-14 per week   • Drug use: No   • Sexual activity: Defer       Family History   Problem Relation Age of Onset   • Cardiomyopathy Mother    • Hypertension Mother    • Hyperlipidemia Sister    • Hypertension Sister    • Coronary artery disease Brother    • Hyperlipidemia Brother    • Hypertension Brother    • No Known Problems Father            ECG 12 Lead  Date/Time: 6/10/2020 10:49 AM  Performed by: Antonio Mesa MD  Authorized by: Antonio Mesa MD   Comparison: compared with previous ECG from 1/16/2020  Similar to previous ECG  Rhythm: sinus rhythm  Rate: normal  Conduction: right bundle branch block and left  "anterior fascicular block  ST Segments: ST segments normal  T Waves: T waves normal  QRS axis: normal  Other: no other findings    Clinical impression: abnormal EKG               Objective:     /100 (BP Location: Right arm, Patient Position: Sitting, Cuff Size: Adult)   Pulse 75   Ht 175.3 cm (69\")   Wt 99.3 kg (219 lb)   BMI 32.34 kg/m²  Body mass index is 32.34 kg/m².     Physical Exam   Constitutional: He is oriented to person, place, and time. He appears well-developed and well-nourished. No distress.   HENT:   Head: Normocephalic and atraumatic.   Eyes: Pupils are equal, round, and reactive to light. EOM are normal.   Neck: Normal range of motion. Neck supple. No thyromegaly present.   Cardiovascular: Normal rate, regular rhythm, normal heart sounds and intact distal pulses. Exam reveals no gallop and no friction rub.   No murmur heard.  Pulmonary/Chest: Effort normal and breath sounds normal. No respiratory distress. He has no wheezes. He has no rales. He exhibits no tenderness.   Abdominal: Soft. Bowel sounds are normal. He exhibits no distension. There is no guarding.   Musculoskeletal: Normal range of motion. He exhibits no edema or deformity.   Some limitation of range of motion of the left shoulder that reproduces patient's pain.   Neurological: He is alert and oriented to person, place, and time. He has normal reflexes. No cranial nerve deficit.   Skin: Skin is warm and dry. No rash noted. He is not diaphoretic.   Psychiatric: He has a normal mood and affect. Judgment normal.       Review Of Data:       Assessment/Plan:         Chest pain, atypical    Benign essential HTN    Bifascicular block    Hyperlipidemia LDL goal <100     Probably patient's pain is from musculoskeletal related to shoulder problems.  No pain during exertion or manual labor.  Blood pressure above goal today the patient attributes to pain.  I have advised patient to keep an eye on pain and call clinic or go to the ER with " any pain that comes during exertion.  Also advised to keep blood pressure logs at home and call back clinic in 1-2 weeks.  Continue current care for now    Diagnosis and plan of care discussed with patient and verbalized understanding.           Antonio Mesa MD  06/10/20  10:52

## 2020-06-15 DIAGNOSIS — R73.9 HYPERGLYCEMIA: ICD-10-CM

## 2020-06-15 LAB
ALBUMIN SERPL-MCNC: 4.3 G/DL (ref 3.5–5.2)
ALBUMIN/GLOB SERPL: 2 G/DL
ALP SERPL-CCNC: 68 U/L (ref 39–117)
ALT SERPL-CCNC: 16 U/L (ref 1–41)
AST SERPL-CCNC: 16 U/L (ref 1–40)
BILIRUB SERPL-MCNC: 0.4 MG/DL (ref 0.2–1.2)
BUN SERPL-MCNC: 31 MG/DL (ref 8–23)
BUN/CREAT SERPL: 24.2 (ref 7–25)
CALCIUM SERPL-MCNC: 9.3 MG/DL (ref 8.6–10.5)
CHLORIDE SERPL-SCNC: 105 MMOL/L (ref 98–107)
CO2 SERPL-SCNC: 25 MMOL/L (ref 22–29)
CREAT SERPL-MCNC: 1.28 MG/DL (ref 0.76–1.27)
GLOBULIN SER CALC-MCNC: 2.2 GM/DL
GLUCOSE SERPL-MCNC: 93 MG/DL (ref 65–99)
HBA1C MFR BLD: 5.3 % (ref 4.8–5.6)
POTASSIUM SERPL-SCNC: 4.7 MMOL/L (ref 3.5–5.2)
PROT SERPL-MCNC: 6.5 G/DL (ref 6–8.5)
SODIUM SERPL-SCNC: 140 MMOL/L (ref 136–145)

## 2020-07-01 DIAGNOSIS — F41.9 ANXIETY: ICD-10-CM

## 2020-07-01 RX ORDER — ALPRAZOLAM 0.5 MG/1
TABLET ORAL
Qty: 90 TABLET | Refills: 1 | Status: SHIPPED | OUTPATIENT
Start: 2020-07-01 | End: 2020-08-20 | Stop reason: SDUPTHER

## 2020-07-26 ENCOUNTER — PREP FOR SURGERY (OUTPATIENT)
Dept: OTHER | Facility: HOSPITAL | Age: 75
End: 2020-07-26

## 2020-07-26 DIAGNOSIS — T84.062A: Primary | ICD-10-CM

## 2020-07-26 DIAGNOSIS — T84.022A INSTABILITY OF INTERNAL RIGHT KNEE PROSTHESIS, INITIAL ENCOUNTER (HCC): ICD-10-CM

## 2020-07-26 RX ORDER — CEFAZOLIN SODIUM 2 G/100ML
2 INJECTION, SOLUTION INTRAVENOUS ONCE
Status: CANCELLED | OUTPATIENT
Start: 2020-09-15 | End: 2020-07-26

## 2020-07-26 RX ORDER — VANCOMYCIN HYDROCHLORIDE 1 G/200ML
1000 INJECTION, SOLUTION INTRAVENOUS ONCE
Status: CANCELLED | OUTPATIENT
Start: 2020-09-15 | End: 2020-07-26

## 2020-07-26 RX ORDER — ACETAMINOPHEN 500 MG
1000 TABLET ORAL ONCE
Status: CANCELLED | OUTPATIENT
Start: 2020-09-15 | End: 2020-07-26

## 2020-07-27 PROBLEM — T84.022A INSTABILITY OF INTERNAL RIGHT KNEE PROSTHESIS: Status: ACTIVE | Noted: 2020-07-27

## 2020-07-27 NOTE — H&P
BIANCA MENJIVAR is a 75-year-old male who comes into the office today with complaints of persistent and progressively worsening pain in his RIGHT knee.  he complains of difficulty in ambulating distances, swelling of the knee, feeling of instability.  His history is significant for a bilateral total knee replacements by Dr. Davide Holt in 1996 and 98. he states that the knee replacements were done following a work related injury.  The patient is known to me from his revision left total knee arthroplasty in 2008.  Subsequently, he has done well with the left knee replacement.  he is also known to me from his bilateral total hip replacements.  This was in 2016 and 2017.  He has a remote history of infection in the right knee when he underwent right total knee arthroplasty by Dr. Holt.  This was several years back.  Since then he has not had any other history of Staphylococcus infections.  But from what he  recalls he was on vancomycin with a PICC line for the right knee infection.  He denies any history of DVT.  he is accompanied by his family member to the office visit.  Review of Systems:  Positive for: Decreased Motion and Joint Pain.    Patient denies: Abdominal Pain, Bleeding, Chest Pain, Convulsions/Seizure, Depression, Difficulty Swallowing, Easy Bruisability, Emotional Disturbances, Eyes or Vision Problems, Fecal Incontinence, Fever/Chills, Headaches, Increased Thirst, Increased Hunger, Insomnia, Nausea/Vomiting, Night Sweats, Poor Balance, Persistent Cough, Rash, Shortness of Breath, Shortness of Breath While Lying down, Skin Problems, Urinary Retention and Weakness.  Allergies:  Morphine (critical)  * percocet (critical)  Medications:  * amlodipine   oxcarbazepine tablet (oxcarbazepine tabs)   crestor tablet (rosuvastatin calcium tabs)   alprazolam tablet (alprazolam tabs)   hydrochlorothiazide tablet (hydrochlorothiazide tabs)   quinapril hcl tablet (quinapril hcl tabs)   Patient History  of:  ARTHRITIS  ANXIETY DISORDER  HIGH CHOLESTEROL  BLOOD CLOTS/EMBOLISM - NEGATIVE  HYPERTENSION  Surgical History:  trigeminal neuralgia surgery-   bilateral Hip-   Tooth Extraction-[CPT-23624]   CYST REMOVED-   Cataract removal-[CPT-26133]   left Wrist-   LT ARM-   Rotator Cuff Repair-[CPT-46471]   Total Knee Arthroplasty-[CPT-36071]   Known Family History of:  hypertension mother  heart disease-mother  Social History:  Social history taken on 07/22/2020 states OTTO URENA is a  75 year old male.  He has never used tobacco products.      Past medical, social, family histories and ROS reviewed today with the patient and changes documented in the chart (07/22/2020).  PCP Dr. KT TSANG, ROMANA FIGUEROA    Physical Exam  Height:  69 in.    Weight:  210 lbs.     BMI:  31.12      Gait: normal                     Mental/HEENT/Cardio/Skin  The patient's general appearance is well developed, well nourished, no acute distress.  Orientation is alert and oriented x 3.  The patient's mood is normal.  A head exam reveals normocephalic/atraumatic.  An eye exam reveals pupils equal.  Pulmonary exam shows normal air exchange, no labored breathing, or shortness of breath.  A skin exam shows normal temperature and color in the area of examination.      Right Knee  There is a mature midline anterior scar. incision: Clean, dry, and intact.  No signs of infection.  Varus alignment.  There is no atrophy.  Effusion is 1+.  No warmth.  No erythema.  Range of motion of the knee is 0 to 120 degrees of flexion.  There is no tenderness in the knee.  Patella tracking is normal.  Patellar crepitation is not present.  Crepitus is not present.  Anterior drawer grade 1.  Posterior drawer grade 1.  Valgus grade 1.  Varus grade 1.      Left Knee        Imaging/Diagnostic Studies  X-rays of the Right Knee [standing AP;Lateral;Merchants] were ordered and reviewed today.      X-rays of the right kneeX-rays show alignment is 5-10 degrees varus.   The total knee arthroplasty shows wear and The total knee arthroplasty shows osteolysis.    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  The patient has reached a point of disability and has failed nonoperative management.  The patient is indicated for a revision RIGHT knee arthroplasty.  Likely,  Risks and benefits of the procedure including but not limited to infection, DVT, pulmonary embolism, future loosening of the implants, possibility of injury to nerves vessels and tendons, periprosthetic fractures have been discussed in detail.  Despite the risks involved,  The patient would like to proceed.  The patient  is being scheduled for a  revision RIGHT knee arthroplasty at [facility] on September 15, 2020.   I will request for Medical and cardiac clearance from Dr. Isabel Kearney MD and Dr Shae MD , Cardiology.  Postoperative DVT prophylaxis - Patient has no high risk factors  Plan for Aspirin.   Preoperative antibiotic prophylaxis - Plan for SCIP protocol with Cephazolin weight based.  Will give Vancomycin in addition due to increased BMI.   Surgery will be scheduled for   inpatient status due to medical comorbidities.   Patient does not use tobacco.      We discussed the benefits of surgical intervention, as well as alternative treatments.  Potential surgical risks and complications include but are not limited to DVT, infection, neurovascular injury, fracture, implant wear, failure, possible need for revision surgery, loss of motion, dislocation, limb length changes.  Sufficient opportunity was given to discuss the condition and treatment plan with the doctor, and all questions were answered for the  patient.  Nonsurgical measures such as injections, medications, or physical therapy may not offer significant relief to this patient.  The discussion lasted 30 minutes.  JEREMI agreed to proceed with the surgery.      Jeremi should follow up with NORMAN VILLARREAL MD post op.

## 2020-08-11 ENCOUNTER — TELEPHONE (OUTPATIENT)
Dept: CARDIOLOGY | Facility: CLINIC | Age: 75
End: 2020-08-11

## 2020-08-11 NOTE — TELEPHONE ENCOUNTER
Pt's wife called requesting a cardiac clearance for a knee replacement. Informed her that pt will need to come in or  A pre-op exam.       Please schedule pt for a surgical clearance. You can schedule with the wife at     Thank you

## 2020-08-17 ENCOUNTER — OFFICE VISIT (OUTPATIENT)
Dept: CARDIOLOGY | Facility: CLINIC | Age: 75
End: 2020-08-17

## 2020-08-17 ENCOUNTER — LAB (OUTPATIENT)
Dept: LAB | Facility: HOSPITAL | Age: 75
End: 2020-08-17

## 2020-08-17 VITALS
SYSTOLIC BLOOD PRESSURE: 110 MMHG | HEART RATE: 74 BPM | HEIGHT: 69 IN | OXYGEN SATURATION: 98 % | WEIGHT: 220 LBS | DIASTOLIC BLOOD PRESSURE: 74 MMHG | BODY MASS INDEX: 32.58 KG/M2

## 2020-08-17 DIAGNOSIS — I10 ESSENTIAL HYPERTENSION: Primary | ICD-10-CM

## 2020-08-17 DIAGNOSIS — I10 BENIGN ESSENTIAL HTN: ICD-10-CM

## 2020-08-17 DIAGNOSIS — E78.5 HYPERLIPIDEMIA LDL GOAL <100: ICD-10-CM

## 2020-08-17 DIAGNOSIS — I10 ESSENTIAL HYPERTENSION: ICD-10-CM

## 2020-08-17 DIAGNOSIS — Z01.810 PREOPERATIVE CARDIOVASCULAR EXAMINATION: ICD-10-CM

## 2020-08-17 DIAGNOSIS — I45.2 BIFASCICULAR BLOCK: ICD-10-CM

## 2020-08-17 LAB
ANION GAP SERPL CALCULATED.3IONS-SCNC: 12.6 MMOL/L (ref 5–15)
BUN SERPL-MCNC: 30 MG/DL (ref 8–23)
BUN/CREAT SERPL: 23.1 (ref 7–25)
CALCIUM SPEC-SCNC: 9.4 MG/DL (ref 8.6–10.5)
CHLORIDE SERPL-SCNC: 100 MMOL/L (ref 98–107)
CO2 SERPL-SCNC: 22.4 MMOL/L (ref 22–29)
CREAT SERPL-MCNC: 1.3 MG/DL (ref 0.76–1.27)
GFR SERPL CREATININE-BSD FRML MDRD: 54 ML/MIN/1.73
GLUCOSE SERPL-MCNC: 92 MG/DL (ref 65–99)
POTASSIUM SERPL-SCNC: 4.6 MMOL/L (ref 3.5–5.2)
SODIUM SERPL-SCNC: 135 MMOL/L (ref 136–145)

## 2020-08-17 PROCEDURE — 93000 ELECTROCARDIOGRAM COMPLETE: CPT | Performed by: INTERNAL MEDICINE

## 2020-08-17 PROCEDURE — 80048 BASIC METABOLIC PNL TOTAL CA: CPT

## 2020-08-17 PROCEDURE — 99214 OFFICE O/P EST MOD 30 MIN: CPT | Performed by: INTERNAL MEDICINE

## 2020-08-17 PROCEDURE — 36415 COLL VENOUS BLD VENIPUNCTURE: CPT

## 2020-08-17 NOTE — PROGRESS NOTES
PATIENTINFORMATION    Date of Office Visit: 2020  Encounter Provider: Antonio Mesa MD  Place of Service: [facility] CARDIOLOGY  Patient Name: Jeremi Ugarte  : 1945    Subjective:     Encounter Date:2020      Patient ID: Jeremi Ugarte is a 75 y.o. male.    Chief Complaint   Patient presents with   • Pre-op Exam     RIGHT TOTAL KNEE REOLACEMENT- REVISION      HPI  Mr. Ugarte is a 75 years old man with past medical history of hypertension, RBBB/LAFB, hyperlipidemia and chronic degenerative joint disease with history of bilateral knee replacement, anxiety came to cardiology clinic for preoperative cardiovascular examination.  Since last visit he denied any significant new symptoms other than knee problems for which he is going to have right knee revision scheduled for September 15, 2020.  He is reasonably active at home doing several tasks including yard work and repairs without any significant complaints.  He reports he could walk more than 2 blocks without any significant chest pain or shortness of breath other than his knee bothering him.  Otherwise denied any orthopnea, PND, extremity swelling, presyncope or syncope, stroke or diabetes.  He reports he has had good blood pressure control since last visit and compliant with medications.  He had coronary angiogram after abnormal stress test in the remote past and he was told his coronaries are 'clean'    ROS   All systems reviewed and negative except as noted in HPI.    Past Medical History:   Diagnosis Date   • Anemia    • Ankle arthritis    • Ankle pain    • Anxiety    • Arthritis    • Depression    • GCA (giant cell arteritis) (CMS/HCC)    • Gout of foot    • High risk medication use    • Hyperlipidemia    • Hypertension    • Hyponatremia    • Macrocytosis    • Muscle weakness    • Myalgia    • Myositis    • Osteoarthritis    • Osteoarthritis of both hips    • Polymyalgia rheumatica (CMS/HCC)    • Renal  insufficiency    • S/p bilateral revision of total hip replacement    • S/P foot surgery    • S/P revision of total knee, left    • S/P revision of total knee, right    • Trigeminal neuralgia    • Wrist pain, acute, left        Past Surgical History:   Procedure Laterality Date   • BACK SURGERY      lower back surg   • CATARACT EXTRACTION     • CRANIOTOMY Right     Dr. Aldridge, for trigeminal neuralgia   • EXCISION BAKERS CYST KNEE  2008   • HAND SURGERY Left 1971    Left hand and wrist 1971   • HIP SURGERY  12/08/2016    total right hip replacement   • KNEE ARTHROPLASTY Bilateral     LEFT - 1996  -  RIGHT -  1999    • REPLACEMENT TOTAL KNEE      1996 and 1999   • SHOULDER SURGERY     • TOTAL KNEE ARTHROPLASTY REVISION  2009    left       Social History     Socioeconomic History   • Marital status:      Spouse name: Not on file   • Number of children: Not on file   • Years of education: Not on file   • Highest education level: Not on file   Tobacco Use   • Smoking status: Former Smoker     Types: Cigars   • Smokeless tobacco: Former User     Types: Chew   • Tobacco comment: FOR A SHORT TIME AS A YOUNG ADULT   Substance and Sexual Activity   • Alcohol use: Yes     Alcohol/week: 1.0 standard drinks     Types: 1 Cans of beer per week     Comment: 9-14 per week   • Drug use: No   • Sexual activity: Defer       Family History   Problem Relation Age of Onset   • Cardiomyopathy Mother    • Hypertension Mother    • Hyperlipidemia Sister    • Hypertension Sister    • Coronary artery disease Brother    • Hyperlipidemia Brother    • Hypertension Brother    • No Known Problems Father            ECG 12 Lead  Date/Time: 8/17/2020 12:15 PM  Performed by: Antonio Mesa MD  Authorized by: Antonio Mesa MD   Comparison: compared with previous ECG from 6/10/2020  Similar to previous ECG  Rhythm: sinus rhythm  Rate: normal  Conduction: conduction normal  Conduction: right bundle branch block, left anterior fascicular  "block and bifascicular block  ST Segments: ST segments normal  T Waves: T waves normal  QRS axis: normal  Other: no other findings    Clinical impression: abnormal EKG               Objective:     /74   Pulse 74   Ht 175.3 cm (69\")   Wt 99.8 kg (220 lb)   SpO2 98%   BMI 32.49 kg/m²  Body mass index is 32.49 kg/m².     Physical Exam   Constitutional: He is oriented to person, place, and time. He appears well-developed and well-nourished. No distress.   HENT:   Head: Normocephalic and atraumatic.   Eyes: Pupils are equal, round, and reactive to light. EOM are normal.   Neck: Normal range of motion. Neck supple. No thyromegaly present.   Cardiovascular: Normal rate, regular rhythm, normal heart sounds and intact distal pulses. Exam reveals no gallop and no friction rub.   No murmur heard.  Pulmonary/Chest: Effort normal and breath sounds normal. No respiratory distress. He has no wheezes. He has no rales. He exhibits no tenderness.   Abdominal: Soft. Bowel sounds are normal. He exhibits no distension. There is no guarding.   Musculoskeletal: Normal range of motion. He exhibits no edema or deformity.   Neurological: He is alert and oriented to person, place, and time. He has normal reflexes. No cranial nerve deficit.   Skin: Skin is warm and dry. No rash noted. He is not diaphoretic.   Psychiatric: He has a normal mood and affect. Judgment normal.       Review Of Data: I have reviewed labs and documents since last visit.      Assessment/Plan:         Preoperative cardiovascular examination  -Patient's revised cardiac risk score index is 0 putting patient at low risk for perioperative cardiovascular complications for a low to moderate risk surgery.  He has reasonable functional status with METS exceeding 4.  He can go and get any necessary noncardiac surgery without any further testing.  Continue statin in the perioperative period.    LENORA-based on BMP done in June  -Repeat BMP today    Bifascicular " block-unchanged and stable on EKG today    Hyperlipidemia LDL goal <100-on statin    Essential hypertension-controlled        I got repeat BMP today.  His creatinine is still elevated and concerned too much blood pressure reduction or basis contributing.  Reduced quinapril to 20 mg p.o. daily and repeat another BMP in 2-4 weeks.  Diagnosis and plan of care discussed with patient and verbalized understanding.           Antonio Mesa MD  08/17/20  12:46

## 2020-08-18 DIAGNOSIS — I10 ESSENTIAL HYPERTENSION: Primary | ICD-10-CM

## 2020-08-18 RX ORDER — QUINAPRIL 20 MG/1
40 TABLET ORAL 2 TIMES DAILY
Qty: 90 TABLET | Refills: 3
Start: 2020-08-18 | End: 2021-02-17

## 2020-08-18 NOTE — PROGRESS NOTES
I called patient and left a voicemail.  Please call them again and tell him his creatinine is still on the higher side but no significant change compared to the 1 year 2 months ago.  So I am going to cut down on quinapril by half so you need to start taking 20 mg daily as that may help with the kidney function.  Please advise him to continue checking his blood pressure at home.  And repeat BMP in about 2 weeks.  Let me know if you have any questions.

## 2020-08-19 ENCOUNTER — OFFICE VISIT (OUTPATIENT)
Dept: INTERNAL MEDICINE | Facility: CLINIC | Age: 75
End: 2020-08-19

## 2020-08-19 VITALS
HEART RATE: 74 BPM | WEIGHT: 220 LBS | HEIGHT: 69 IN | TEMPERATURE: 97.6 F | SYSTOLIC BLOOD PRESSURE: 118 MMHG | BODY MASS INDEX: 32.58 KG/M2 | DIASTOLIC BLOOD PRESSURE: 68 MMHG

## 2020-08-19 DIAGNOSIS — E78.5 HYPERLIPIDEMIA LDL GOAL <100: ICD-10-CM

## 2020-08-19 DIAGNOSIS — N28.9 MILD RENAL INSUFFICIENCY: ICD-10-CM

## 2020-08-19 DIAGNOSIS — T84.022D INSTABILITY OF INTERNAL RIGHT KNEE PROSTHESIS, SUBSEQUENT ENCOUNTER: ICD-10-CM

## 2020-08-19 DIAGNOSIS — F41.9 ANXIETY: ICD-10-CM

## 2020-08-19 DIAGNOSIS — I10 BENIGN ESSENTIAL HTN: Primary | ICD-10-CM

## 2020-08-19 DIAGNOSIS — Z01.818 PREOPERATIVE CLEARANCE: ICD-10-CM

## 2020-08-19 PROCEDURE — 99214 OFFICE O/P EST MOD 30 MIN: CPT | Performed by: INTERNAL MEDICINE

## 2020-08-19 NOTE — PROGRESS NOTES
Assessment and Plan  Jeremi was seen today for hypertension.    Diagnoses and all orders for this visit:    Benign essential HTN  Comments:  fam controlled    Instability of internal right knee prosthesis, subsequent encounter  Comments:  Agree with plan for repair as scheduled  Reviewed cardiologists notes.     Hyperlipidemia LDL goal <100  Comments:  check at f/u- has been excellent.   Orders:  -     Comprehensive Metabolic Panel; Future  -     Lipid Panel With / Chol / HDL Ratio; Future    Preoperative clearance  Comments:  pt is at low risk for cardiopulmonary complications and may proceed with planned procedure. Will review labs from preop clearance     Anxiety  Comments:  Long discussion with pt and wife about his anxiety- he really lives in fear of another headache- I reassured him as best I could.  Discussed other coping mech.    Mild renal insufficiency  Comments:  new finding- agree with plan by cardiologist- follow labs from preop.    office visit lasted 34 minutes - over half in counseling and discussions about his anxiety.   F/U and Patient Instructions    Return in about 6 months (around 2/19/2021) for Medicare Wellness, Lab Before FUP.  There are no Patient Instructions on file for this visit.    Subjective    Jeremi Ugarte is a 75 y.o. male being seen in our office today for Hypertension     History of the Present Illness  HPI Here for f/u-plans to have revision of R knee replacement Sept 15, 2020.  He saw cardiology earlier this week, rechecked his creatinine- now 1.3  Mr. Ugarte thinks it's related to his oxcarbazepine he does take it prn headache.  Gets better in warm weather-  He hasn't taken any in 2 weeks- usually 1/2 tablet BID.  He does this because of his anxiety about getting a headache.  He feels better in lots of ways. Cardiology cut back quinapril- following BP.  He stays active, walking the neighborhood- doesn't do steps very well.  BP checks at home have been good.  Taking  alprazolam BID always and most days in the middle of the day.  He has done this for years.   He is very anxious- really kind of lives in fear of his headaches coming back- even with excellent treatment. He has been tried on SSRIs in the past with side effects.    Patient History        Significant Past History  The following portions of the patient's history were reviewed and updated as appropriate:PMHroutine: Social history , Allergies, Current Medications, Active Problem List and Health Maintenance              Social History  He  reports that he has quit smoking. His smoking use included cigars. He has quit using smokeless tobacco.  His smokeless tobacco use included chew. He reports that he drinks about 1.0 standard drinks of alcohol per week. He reports that he does not use drugs.                         Review of Symptoms  Review of Systems   Constitution: Negative.   Cardiovascular: Negative.    Respiratory: Negative.    Musculoskeletal: Positive for arthritis.   Gastrointestinal: Negative.    Genitourinary: Negative.      Objective  Vital Signs         BP Readings from Last 1 Encounters:   08/19/20 118/68     Wt Readings from Last 3 Encounters:   08/19/20 99.8 kg (220 lb)   08/17/20 99.8 kg (220 lb)   06/10/20 99.3 kg (219 lb)   Body mass index is 32.49 kg/m².          Physical Exam   Physical Exam   Constitutional: No distress.   Cardiovascular: Normal rate and regular rhythm.   Pulmonary/Chest: Effort normal and breath sounds normal.   Abdominal: Bowel sounds are normal.   Musculoskeletal: He exhibits no edema.   Skin: Skin is warm.   Psychiatric:   Mild anxiety     Data Reviewed    Recent Results (from the past 2016 hour(s))   Comprehensive Metabolic Panel    Collection Time: 06/15/20  9:24 AM   Result Value Ref Range    Glucose 93 65 - 99 mg/dL    BUN 31 (H) 8 - 23 mg/dL    Creatinine 1.28 (H) 0.76 - 1.27 mg/dL    eGFR Non African Am 55 (L) >60 mL/min/1.73    eGFR African Am 66 >60 mL/min/1.73     BUN/Creatinine Ratio 24.2 7.0 - 25.0    Sodium 140 136 - 145 mmol/L    Potassium 4.7 3.5 - 5.2 mmol/L    Chloride 105 98 - 107 mmol/L    Total CO2 25.0 22.0 - 29.0 mmol/L    Calcium 9.3 8.6 - 10.5 mg/dL    Total Protein 6.5 6.0 - 8.5 g/dL    Albumin 4.30 3.50 - 5.20 g/dL    Globulin 2.2 gm/dL    A/G Ratio 2.0 g/dL    Total Bilirubin 0.4 0.2 - 1.2 mg/dL    Alkaline Phosphatase 68 39 - 117 U/L    AST (SGOT) 16 1 - 40 U/L    ALT (SGPT) 16 1 - 41 U/L   Hemoglobin A1c    Collection Time: 06/15/20  9:24 AM   Result Value Ref Range    Hemoglobin A1C 5.30 4.80 - 5.60 %   Basic Metabolic Panel    Collection Time: 08/17/20 12:59 PM   Result Value Ref Range    Glucose 92 65 - 99 mg/dL    BUN 30 (H) 8 - 23 mg/dL    Creatinine 1.30 (H) 0.76 - 1.27 mg/dL    Sodium 135 (L) 136 - 145 mmol/L    Potassium 4.6 3.5 - 5.2 mmol/L    Chloride 100 98 - 107 mmol/L    CO2 22.4 22.0 - 29.0 mmol/L    Calcium 9.4 8.6 - 10.5 mg/dL    eGFR Non African Amer 54 (L) >60 mL/min/1.73    BUN/Creatinine Ratio 23.1 7.0 - 25.0    Anion Gap 12.6 5.0 - 15.0 mmol/L

## 2020-08-20 RX ORDER — ALPRAZOLAM 0.5 MG/1
0.5 TABLET ORAL 3 TIMES DAILY PRN
Qty: 90 TABLET | Refills: 1 | Status: SHIPPED | OUTPATIENT
Start: 2020-08-20 | End: 2020-11-04

## 2020-08-27 ENCOUNTER — TRANSCRIBE ORDERS (OUTPATIENT)
Dept: PREADMISSION TESTING | Facility: HOSPITAL | Age: 75
End: 2020-08-27

## 2020-08-27 DIAGNOSIS — Z01.818 OTHER SPECIFIED PRE-OPERATIVE EXAMINATION: Primary | ICD-10-CM

## 2020-09-01 ENCOUNTER — HOSPITAL ENCOUNTER (OUTPATIENT)
Dept: GENERAL RADIOLOGY | Facility: HOSPITAL | Age: 75
Discharge: HOME | End: 2020-09-01
Admitting: ORTHOPAEDIC SURGERY

## 2020-09-01 ENCOUNTER — HOSPITAL ENCOUNTER (OUTPATIENT)
Dept: GENERAL RADIOLOGY | Facility: HOSPITAL | Age: 75
Discharge: HOME | End: 2020-09-01

## 2020-09-01 ENCOUNTER — APPOINTMENT (OUTPATIENT)
Dept: PREADMISSION TESTING | Facility: HOSPITAL | Age: 75
End: 2020-09-01

## 2020-09-01 VITALS
SYSTOLIC BLOOD PRESSURE: 139 MMHG | WEIGHT: 222.38 LBS | RESPIRATION RATE: 16 BRPM | HEIGHT: 69 IN | BODY MASS INDEX: 32.94 KG/M2 | OXYGEN SATURATION: 96 % | HEART RATE: 66 BPM | TEMPERATURE: 97.9 F | DIASTOLIC BLOOD PRESSURE: 85 MMHG

## 2020-09-01 DIAGNOSIS — T84.062A: ICD-10-CM

## 2020-09-01 DIAGNOSIS — T84.022A INSTABILITY OF INTERNAL RIGHT KNEE PROSTHESIS, INITIAL ENCOUNTER (HCC): ICD-10-CM

## 2020-09-01 LAB
ALBUMIN SERPL-MCNC: 4.1 G/DL (ref 3.5–5.2)
ALBUMIN/GLOB SERPL: 1.6 G/DL
ALP SERPL-CCNC: 58 U/L (ref 39–117)
ALT SERPL W P-5'-P-CCNC: 19 U/L (ref 1–41)
ANION GAP SERPL CALCULATED.3IONS-SCNC: 9.9 MMOL/L (ref 5–15)
APTT PPP: 28.8 SECONDS (ref 22.7–35.4)
AST SERPL-CCNC: 18 U/L (ref 1–40)
BASOPHILS # BLD AUTO: 0.04 10*3/MM3 (ref 0–0.2)
BASOPHILS NFR BLD AUTO: 0.5 % (ref 0–1.5)
BILIRUB SERPL-MCNC: 0.3 MG/DL (ref 0–1.2)
BUN SERPL-MCNC: 21 MG/DL (ref 8–23)
BUN/CREAT SERPL: 18.6 (ref 7–25)
CALCIUM SPEC-SCNC: 9.4 MG/DL (ref 8.6–10.5)
CHLORIDE SERPL-SCNC: 105 MMOL/L (ref 98–107)
CO2 SERPL-SCNC: 25.1 MMOL/L (ref 22–29)
CREAT SERPL-MCNC: 1.13 MG/DL (ref 0.76–1.27)
DEPRECATED RDW RBC AUTO: 46.6 FL (ref 37–54)
EOSINOPHIL # BLD AUTO: 0.26 10*3/MM3 (ref 0–0.4)
EOSINOPHIL NFR BLD AUTO: 3.5 % (ref 0.3–6.2)
ERYTHROCYTE [DISTWIDTH] IN BLOOD BY AUTOMATED COUNT: 12.7 % (ref 12.3–15.4)
GFR SERPL CREATININE-BSD FRML MDRD: 63 ML/MIN/1.73
GLOBULIN UR ELPH-MCNC: 2.5 GM/DL
GLUCOSE SERPL-MCNC: 83 MG/DL (ref 65–99)
HCT VFR BLD AUTO: 41 % (ref 37.5–51)
HGB BLD-MCNC: 13.9 G/DL (ref 13–17.7)
IMM GRANULOCYTES # BLD AUTO: 0.01 10*3/MM3 (ref 0–0.05)
IMM GRANULOCYTES NFR BLD AUTO: 0.1 % (ref 0–0.5)
INR PPP: 0.98 (ref 0.9–1.1)
LYMPHOCYTES # BLD AUTO: 2.17 10*3/MM3 (ref 0.7–3.1)
LYMPHOCYTES NFR BLD AUTO: 29.2 % (ref 19.6–45.3)
MCH RBC QN AUTO: 33.5 PG (ref 26.6–33)
MCHC RBC AUTO-ENTMCNC: 33.9 G/DL (ref 31.5–35.7)
MCV RBC AUTO: 98.8 FL (ref 79–97)
MONOCYTES # BLD AUTO: 0.62 10*3/MM3 (ref 0.1–0.9)
MONOCYTES NFR BLD AUTO: 8.4 % (ref 5–12)
NEUTROPHILS NFR BLD AUTO: 4.32 10*3/MM3 (ref 1.7–7)
NEUTROPHILS NFR BLD AUTO: 58.3 % (ref 42.7–76)
NRBC BLD AUTO-RTO: 0 /100 WBC (ref 0–0.2)
PLATELET # BLD AUTO: 264 10*3/MM3 (ref 140–450)
PMV BLD AUTO: 10.3 FL (ref 6–12)
POTASSIUM SERPL-SCNC: 4.1 MMOL/L (ref 3.5–5.2)
PROT SERPL-MCNC: 6.6 G/DL (ref 6–8.5)
PROTHROMBIN TIME: 12.9 SECONDS (ref 11.7–14.2)
RBC # BLD AUTO: 4.15 10*6/MM3 (ref 4.14–5.8)
SODIUM SERPL-SCNC: 140 MMOL/L (ref 136–145)
WBC # BLD AUTO: 7.42 10*3/MM3 (ref 3.4–10.8)

## 2020-09-01 PROCEDURE — 80053 COMPREHEN METABOLIC PANEL: CPT | Performed by: ORTHOPAEDIC SURGERY

## 2020-09-01 PROCEDURE — 71046 X-RAY EXAM CHEST 2 VIEWS: CPT

## 2020-09-01 PROCEDURE — 63710000001 MUPIROCIN 2 % OINTMENT: Performed by: ORTHOPAEDIC SURGERY

## 2020-09-01 PROCEDURE — 85730 THROMBOPLASTIN TIME PARTIAL: CPT | Performed by: ORTHOPAEDIC SURGERY

## 2020-09-01 PROCEDURE — 85610 PROTHROMBIN TIME: CPT | Performed by: ORTHOPAEDIC SURGERY

## 2020-09-01 PROCEDURE — 85025 COMPLETE CBC W/AUTO DIFF WBC: CPT | Performed by: ORTHOPAEDIC SURGERY

## 2020-09-01 PROCEDURE — 36415 COLL VENOUS BLD VENIPUNCTURE: CPT

## 2020-09-01 PROCEDURE — 73560 X-RAY EXAM OF KNEE 1 OR 2: CPT

## 2020-09-01 PROCEDURE — A9270 NON-COVERED ITEM OR SERVICE: HCPCS | Performed by: ORTHOPAEDIC SURGERY

## 2020-09-01 RX ORDER — CHLORHEXIDINE GLUCONATE 500 MG/1
CLOTH TOPICAL
Status: ON HOLD | COMMUNITY
End: 2020-09-15

## 2020-09-01 RX ORDER — UBIDECARENONE 100 MG
100 CAPSULE ORAL DAILY
COMMUNITY
End: 2022-05-21 | Stop reason: HOSPADM

## 2020-09-01 RX ORDER — ACETAMINOPHEN,DIPHENHYDRAMINE HCL 500; 25 MG/1; MG/1
1 TABLET, FILM COATED ORAL NIGHTLY PRN
COMMUNITY
End: 2021-02-17

## 2020-09-01 ASSESSMENT — KOOS JR
KOOS JR SCORE: 8
KOOS JR SCORE: 65.994

## 2020-09-01 NOTE — DISCHARGE INSTRUCTIONS
Take the following medications the morning of surgery:    XANAX, AMLODIPINE       TRILEPTAL IF NEEDED      If you are on prescription narcotic pain medication to control your pain you may also take that medication the morning of surgery.    General Instructions:  • Do not eat solid food after midnight the night before surgery.  • You may drink clear liquids day of surgery but must stop at least one hour before your hospital arrival time.  • It is beneficial for you to have a clear drink that contains carbohydrates the day of surgery.  We suggest a 12 to 20 ounce bottle of Gatorade or Powerade for non-diabetic patients     Clear liquids are liquids you can see through.  Nothing red in color.     Plain water                               Sports drinks  Sodas                                   Gelatin (Jell-O)  Fruit juices without pulp such as white grape juice and apple juice  Popsicles that contain no fruit or yogurt  Tea or coffee (no cream or milk added)  Gatorade / Powerade  G2 / Powerade Zero      • Patients who avoid  alcohol for 4 weeks prior to surgery have a reduced risk of post-operative complications.    • Do not  drink alcohol the day of surgery. .  • Bring any papers given to you in the doctor’s office.  • Wear clean comfortable clothes.  • Do not wear contact lenses, false eyelashes or make-up.  Bring a case for your glasses.   • Remove all piercings.  Leave jewelry and any other valuables at home.  • The Pre-Admission Testing nurse will instruct you to bring medications if unable to obtain an accurate list in Pre-Admission Testing.    • REPORT TO MAIN SURGERY ON 9- AT 0730 PER MD          Preventing a Surgical Site Infection:  • For 2 to 3 days before surgery, avoid shaving with a razor because the razor can irritate skin and make it easier to develop an infection.    • Any areas of open skin can increase the risk of a post-operative wound infection by allowing bacteria to enter and travel  throughout the body.  Notify your surgeon if you have any skin wounds / rashes even if it is not near the expected surgical site.  The area will need assessed to determine if surgery should be delayed until it is healed.  • The night prior to surgery shower using a fresh bar of anti-bacterial soap (such as Dial) and clean washcloth.  Sleep in a clean bed with clean clothing.  Do not allow pets to sleep with you.  • Shower on the morning of surgery using a fresh bar of anti-bacterial soap (such as Dial) and clean washcloth.  Dry with a clean towel and dress in clean clothing.  • Ask your surgeon if you will be receiving antibiotics prior to surgery.  • Make sure you, your family, and all healthcare providers clean their hands with soap and water or an alcohol based hand  before caring for you or your wound.    Day of surgery:  Your arrival time is approximately two hours before your scheduled surgery time.  Upon arrival, a Pre-op nurse and Anesthesiologist will review your health history, obtain vital signs, and answer questions you may have.  The only belongings needed at this time will be a list of your home medications and if applicable your C-PAP/BI-PAP machine.  If you are staying overnight your family can leave the rest of your belongings in the car and bring them to your room later.  A Pre-op nurse will start an IV and you may receive medication in preparation for surgery, including something to help you relax.  Your family will be able to see you in the Pre-op area.  Two visitors at a time will be allowed in the Pre-op room.  While you are in surgery your family should notify the waiting room  if they leave the waiting room area and provide a contact phone number.    Please be aware that surgery does come with discomfort.  We want to make every effort to control your discomfort so please discuss any uncontrolled symptoms with your nurse.   Your doctor will most likely have prescribed pain  medications.      CHLORHEXIDINE CLOTH INSTRUCTIONS  The morning of surgery follow these instructions using the Chlorhexidine cloths you've been given.  These steps reduce bacteria on the body.  Do not use the cloths near your eyes, ears mouth, genitalia or on open wounds.  Throw the cloths away after use but do not try to flush them down a toilet.      • Open and remove one cloth at a time from the package.    • Leave the cloth unfolded and begin the bathing.  • Massage the skin with the cloths using gentle pressure to remove bacteria.  Do not scrub harshly.   • Follow the steps below with one 2% CHG cloth per area (6 total cloths).  • One cloth for neck, shoulders and chest.  • One cloth for both arms, hands, fingers and underarms (do underarms last).  • One cloth for the abdomen followed by groin.  • One cloth for right leg and foot including between the toes.  • One cloth for left leg and foot including between the toes.  • The last cloth is to be used for the back of the neck, back and buttocks.    Allow the CHG to air dry 3 minutes on the skin which will give it time to work and decrease the chance of irritation.  The skin may feel sticky until it is dry.  Do not rinse with water or any other liquid or you will lose the beneficial effects of the CHG.  If mild skin irritation occurs, do rinse the skin to remove the CHG.  Report this to the nurse at time of admission.  Do not apply lotions, creams, ointments, deodorants or perfumes after using the clothes. Dress in clean clothes before coming to the hospital.    BACTROBAN NASAL OINTMENT   X 3  There are many germs normally in your nose. Bactroban is an ointment that will help reduce these germs. Please follow these instructions for Bactroban use:      _1___The day before surgery in the morning  [id number]_______    _2___The day before surgery in the evening              [id number]_______    _3___The day of surgery in the morning     [id number]______    **Squirt ½ package of Bactroban Ointment onto a cotton applicator and apply to inside of 1st nostril.  Squirt the remaining Bactroban and apply to the inside of the other nostril.        If you are staying overnight following surgery, you will be transported to your hospital room following the recovery period.  [facility] has all private rooms.    If you have any questions please call Pre-Admission Testing at [phone].  Deductibles and co-payments are collected on the day of service. Please be prepared to pay the required co-pay, deductible or deposit on the day of service as defined by your plan.    Patient Education for Self-Quarantine Process    Following your COVID testing, we strongly recommend that you do not leave your home after you have been tested for COVID except to get medical care. This includes not going to work, school or to public areas.  If this is not possible for you to do please limit your activities to only required outings.  Be sure to wear a mask when you are with other people, practice social distancing and wash your hands frequently.      The following items provide additional details to keep you safe.  • Wash your hands with soap and water frequently for at least 20 seconds.   • Avoid touching your eyes, nose and mouth with unwashed hands.  • Do not share anything - utensils, towels, food from the same bowl.   • Have your own utensils, drinking glass, dishes, towels and bedding.   • Do not have visitors.   • Do use FaceTime to stay in touch with family and friends.  • You should stay in a specific room away from others if possible.   • Stay at least 6 feet away from others in the home if you cannot have a dedicated room to yourself.   • Do not snuggle with your pet. While the CDC says there is no evidence that pets can spread COVID-19 or be infected from humans, it is probably best to avoid “petting, snuggling, being kissed or licked and  sharing food (during self-quarantine)”, according to the CDC.   • Sanitize household surfaces daily. Include all high touch areas (door handles, light switches, phones, countertops, etc.)  • Do not share a bathroom with others, if possible.   • Wear a mask around others in your home if you are unable to stay in a separate room or 6 feet apart. If  you are unable to wear a mask, have your family member wear a mask if they must be within 6 feet of you.   Call your surgeon immediately if you experience any of the following symptoms:  • Sore Throat  • Shortness of Breath or difficulty breathing  • Cough  • Chills  • Body soreness or muscle pain  • Headache  • Fever  • New loss of taste or smell  • Do not arrive for your surgery ill.  Your procedure will need to be rescheduled to another time.  You will need to call your physician before the day of surgery to avoid any unnecessary exposure to hospital staff as well as other patients.

## 2020-09-03 ENCOUNTER — TELEPHONE (OUTPATIENT)
Dept: INTERNAL MEDICINE | Facility: CLINIC | Age: 75
End: 2020-09-03

## 2020-09-04 ENCOUNTER — TELEPHONE (OUTPATIENT)
Dept: CARDIOLOGY | Facility: CLINIC | Age: 75
End: 2020-09-04

## 2020-09-04 NOTE — TELEPHONE ENCOUNTER
Called pt's spouse and left a message to notify her that ortho surgeon is able to see cardiac note in epic and if additional documentation is required, we will be more than happy to sent it as well.

## 2020-09-08 RX ORDER — HYDROCHLOROTHIAZIDE 25 MG/1
25 TABLET ORAL DAILY
Qty: 90 TABLET | Refills: 1 | Status: SHIPPED | OUTPATIENT
Start: 2020-09-08 | End: 2021-03-04

## 2020-09-08 RX ORDER — QUINAPRIL 40 MG/1
TABLET ORAL
Qty: 180 TABLET | Refills: 1 | OUTPATIENT
Start: 2020-09-08

## 2020-09-09 DIAGNOSIS — I10 BENIGN ESSENTIAL HTN: ICD-10-CM

## 2020-09-09 RX ORDER — ROSUVASTATIN CALCIUM 20 MG/1
20 TABLET, COATED ORAL DAILY
Qty: 90 TABLET | Refills: 1 | Status: SHIPPED | OUTPATIENT
Start: 2020-09-09 | End: 2021-03-04

## 2020-09-09 RX ORDER — AMLODIPINE BESYLATE 5 MG/1
5 TABLET ORAL DAILY
Qty: 90 TABLET | Refills: 1 | Status: SHIPPED | OUTPATIENT
Start: 2020-09-09 | End: 2021-03-04

## 2020-09-09 RX ORDER — QUINAPRIL 40 MG/1
TABLET ORAL
Qty: 180 TABLET | Refills: 1 | Status: SHIPPED | OUTPATIENT
Start: 2020-09-09 | End: 2021-03-04

## 2020-09-12 ENCOUNTER — LAB (OUTPATIENT)
Dept: LAB | Facility: HOSPITAL | Age: 75
End: 2020-09-12

## 2020-09-12 DIAGNOSIS — Z01.818 OTHER SPECIFIED PRE-OPERATIVE EXAMINATION: ICD-10-CM

## 2020-09-12 PROCEDURE — U0004 COV-19 TEST NON-CDC HGH THRU: HCPCS

## 2020-09-12 PROCEDURE — C9803 HOPD COVID-19 SPEC COLLECT: HCPCS

## 2020-09-14 LAB — SARS-COV-2 RNA RESP QL NAA+PROBE: NOT DETECTED

## 2020-09-14 NOTE — H&P
BIANCA MENJIVAR is a 75-year-old male who comes into the office today with complaints of persistent and progressively worsening pain in his RIGHT knee.  he complains of difficulty in ambulating distances, swelling of the knee, feeling of instability.  His history is significant for a bilateral total knee replacements by Dr. Davide Holt in 1996 and 98. he states that the knee replacements were done following a work related injury.  The patient is known to me from his revision left total knee arthroplasty in 2008.  Subsequently, he has done well with the left knee replacement.  he is also known to me from his bilateral total hip replacements.  This was in 2016 and 2017.  He has a remote history of infection in the right knee when he underwent right total knee arthroplasty by Dr. Holt.  This was several years back.  Since then he has not had any other history of Staphylococcus infections.  But from what he  recalls he was on vancomycin with a PICC line for the right knee infection.  He denies any history of DVT.  he is accompanied by his family member to the office visit.  Review of Systems:  Positive for: Decreased Motion and Joint Pain.     Patient denies: Abdominal Pain, Bleeding, Chest Pain, Convulsions/Seizure, Depression, Difficulty Swallowing, Easy Bruisability, Emotional Disturbances, Eyes or Vision Problems, Fecal Incontinence, Fever/Chills, Headaches, Increased Thirst, Increased Hunger, Insomnia, Nausea/Vomiting, Night Sweats, Poor Balance, Persistent Cough, Rash, Shortness of Breath, Shortness of Breath While Lying down, Skin Problems, Urinary Retention and Weakness.  Allergies:  Morphine (critical)  * percocet (critical)  Medications:  * amlodipine   oxcarbazepine tablet (oxcarbazepine tabs)   crestor tablet (rosuvastatin calcium tabs)   alprazolam tablet (alprazolam tabs)   hydrochlorothiazide tablet (hydrochlorothiazide tabs)   quinapril hcl tablet (quinapril hcl tabs)   Patient History  of:  ARTHRITIS  ANXIETY DISORDER  HIGH CHOLESTEROL  BLOOD CLOTS/EMBOLISM - NEGATIVE  HYPERTENSION  Surgical History:  trigeminal neuralgia surgery-   bilateral Hip-   Tooth Extraction-[CPT-43361]   CYST REMOVED-   Cataract removal-[CPT-33351]   left Wrist-   LT ARM-   Rotator Cuff Repair-[CPT-89299]   Total Knee Arthroplasty-[CPT-87752]   Known Family History of:  hypertension mother  heart disease-mother  Social History:  Social history taken on 07/22/2020 states OTTO URENA is a  75 year old male.  He has never used tobacco products.       Past medical, social, family histories and ROS reviewed today with the patient and changes documented in the chart (07/22/2020).  PCP Dr. KT TSANG, ROMANA FIGUEROA     Physical Exam  Height:  69 in.    Weight:  210 lbs.     BMI:  31.12       Gait: normal                       Mental/HEENT/Cardio/Skin  The patient's general appearance is well developed, well nourished, no acute distress.  Orientation is alert and oriented x 3.  The patient's mood is normal.  A head exam reveals normocephalic/atraumatic.  An eye exam reveals pupils equal.  Pulmonary exam shows normal air exchange, no labored breathing, or shortness of breath.  A skin exam shows normal temperature and color in the area of examination.       Right Knee  There is a mature midline anterior scar. incision: Clean, dry, and intact.  No signs of infection.  Varus alignment.  There is no atrophy.  Effusion is 1+.  No warmth.  No erythema.  Range of motion of the knee is 0 to 120 degrees of flexion.  There is no tenderness in the knee.  Patella tracking is normal.  Patellar crepitation is not present.  Crepitus is not present.  Anterior drawer grade 1.  Posterior drawer grade 1.  Valgus grade 1.  Varus grade 1.       Left Knee           Imaging/Diagnostic Studies  X-rays of the Right Knee [standing AP;Lateral;Merchants] were ordered and reviewed today.       X-rays of the right kneeX-rays show alignment is 5-10 degrees  varus.  The total knee arthroplasty shows wear and The total knee arthroplasty shows osteolysis.    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  The patient has reached a point of disability and has failed nonoperative management.  The patient is indicated for a revision RIGHT knee arthroplasty.  Likely,  Risks and benefits of the procedure including but not limited to infection, DVT, pulmonary embolism, future loosening of the implants, possibility of injury to nerves vessels and tendons, periprosthetic fractures have been discussed in detail.  Despite the risks involved,  The patient would like to proceed.  The patient  is being scheduled for a  revision RIGHT knee arthroplasty at [facility] on September 15, 2020.   I will request for Medical and cardiac clearance from Dr. Isabel Kearney MD and Dr Shae MD , Cardiology.  Postoperative DVT prophylaxis - Patient has no high risk factors  Plan for Aspirin.   Preoperative antibiotic prophylaxis - Plan for SCIP protocol with Cephazolin weight based.  Will give Vancomycin in addition due to increased BMI.   Surgery will be scheduled for   inpatient status due to medical comorbidities.   Patient does not use tobacco.       We discussed the benefits of surgical intervention, as well as alternative treatments.  Potential surgical risks and complications include but are not limited to DVT, infection, neurovascular injury, fracture, implant wear, failure, possible need for revision surgery, loss of motion, dislocation, limb length changes.  Sufficient opportunity was given to discuss the condition and treatment plan with the doctor, and all questions were answered  for the patient.  Nonsurgical measures such as injections, medications, or physical therapy may not offer significant relief to this patient.  The discussion lasted 30 minutes.  JEREMI agreed to proceed with the surgery.       Jeremi should follow up with NORMAN VILLARREAL MD post op.

## 2020-09-15 ENCOUNTER — HOSPITAL ENCOUNTER (INPATIENT)
Facility: HOSPITAL | Age: 75
LOS: 1 days | Discharge: HOME HEALTH | End: 2020-09-16
Attending: ORTHOPAEDIC SURGERY | Admitting: ORTHOPAEDIC SURGERY

## 2020-09-15 ENCOUNTER — ANESTHESIA EVENT (OUTPATIENT)
Dept: PERIOP | Facility: HOSPITAL | Age: 75
End: 2020-09-15

## 2020-09-15 ENCOUNTER — ANESTHESIA (OUTPATIENT)
Dept: PERIOP | Facility: HOSPITAL | Age: 75
End: 2020-09-15

## 2020-09-15 ENCOUNTER — APPOINTMENT (OUTPATIENT)
Dept: GENERAL RADIOLOGY | Facility: HOSPITAL | Age: 75
End: 2020-09-15

## 2020-09-15 DIAGNOSIS — T84.022A INSTABILITY OF INTERNAL RIGHT KNEE PROSTHESIS, INITIAL ENCOUNTER (HCC): ICD-10-CM

## 2020-09-15 DIAGNOSIS — Z96.651 STATUS POST REVISION OF TOTAL REPLACEMENT OF RIGHT KNEE: Primary | ICD-10-CM

## 2020-09-15 DIAGNOSIS — T84.062A: ICD-10-CM

## 2020-09-15 PROCEDURE — 25010000003 BUPIVACAINE LIPOSOME 1.3 % SUSPENSION: Performed by: ORTHOPAEDIC SURGERY

## 2020-09-15 PROCEDURE — 87205 SMEAR GRAM STAIN: CPT | Performed by: ORTHOPAEDIC SURGERY

## 2020-09-15 PROCEDURE — C9290 INJ, BUPIVACAINE LIPOSOME: HCPCS | Performed by: ORTHOPAEDIC SURGERY

## 2020-09-15 PROCEDURE — L1830 KO IMMOB CANVAS LONG PRE OTS: HCPCS | Performed by: ORTHOPAEDIC SURGERY

## 2020-09-15 PROCEDURE — 73560 X-RAY EXAM OF KNEE 1 OR 2: CPT

## 2020-09-15 PROCEDURE — 25010000003 CEFAZOLIN IN DEXTROSE 2-4 GM/100ML-% SOLUTION: Performed by: ORTHOPAEDIC SURGERY

## 2020-09-15 PROCEDURE — C1776 JOINT DEVICE (IMPLANTABLE): HCPCS | Performed by: ORTHOPAEDIC SURGERY

## 2020-09-15 PROCEDURE — 97162 PT EVAL MOD COMPLEX 30 MIN: CPT

## 2020-09-15 PROCEDURE — 97110 THERAPEUTIC EXERCISES: CPT

## 2020-09-15 PROCEDURE — 87015 SPECIMEN INFECT AGNT CONCNTJ: CPT | Performed by: ORTHOPAEDIC SURGERY

## 2020-09-15 PROCEDURE — 87070 CULTURE OTHR SPECIMN AEROBIC: CPT | Performed by: ORTHOPAEDIC SURGERY

## 2020-09-15 PROCEDURE — 25010000002 MIDAZOLAM PER 1 MG: Performed by: ANESTHESIOLOGY

## 2020-09-15 PROCEDURE — 25010000002 DIPHENHYDRAMINE PER 50 MG: Performed by: ORTHOPAEDIC SURGERY

## 2020-09-15 PROCEDURE — 25010000002 PROPOFOL 10 MG/ML EMULSION: Performed by: NURSE ANESTHETIST, CERTIFIED REGISTERED

## 2020-09-15 PROCEDURE — 25010000002 ROPIVACAINE PER 1 MG: Performed by: ANESTHESIOLOGY

## 2020-09-15 PROCEDURE — 0SRC0J9 REPLACEMENT OF RIGHT KNEE JOINT WITH SYNTHETIC SUBSTITUTE, CEMENTED, OPEN APPROACH: ICD-10-PCS | Performed by: ORTHOPAEDIC SURGERY

## 2020-09-15 PROCEDURE — 87075 CULTR BACTERIA EXCEPT BLOOD: CPT | Performed by: ORTHOPAEDIC SURGERY

## 2020-09-15 PROCEDURE — 87176 TISSUE HOMOGENIZATION CULTR: CPT | Performed by: ORTHOPAEDIC SURGERY

## 2020-09-15 PROCEDURE — 25010000002 FENTANYL CITRATE (PF) 100 MCG/2ML SOLUTION: Performed by: ANESTHESIOLOGY

## 2020-09-15 PROCEDURE — 25010000002 HYDROMORPHONE PER 4 MG: Performed by: NURSE ANESTHETIST, CERTIFIED REGISTERED

## 2020-09-15 PROCEDURE — 25010000002 FENTANYL CITRATE (PF) 100 MCG/2ML SOLUTION: Performed by: NURSE ANESTHETIST, CERTIFIED REGISTERED

## 2020-09-15 PROCEDURE — 0SBC0ZZ EXCISION OF RIGHT KNEE JOINT, OPEN APPROACH: ICD-10-PCS | Performed by: ORTHOPAEDIC SURGERY

## 2020-09-15 PROCEDURE — C1713 ANCHOR/SCREW BN/BN,TIS/BN: HCPCS | Performed by: ORTHOPAEDIC SURGERY

## 2020-09-15 PROCEDURE — 0SPC0JZ REMOVAL OF SYNTHETIC SUBSTITUTE FROM RIGHT KNEE JOINT, OPEN APPROACH: ICD-10-PCS | Performed by: ORTHOPAEDIC SURGERY

## 2020-09-15 PROCEDURE — 25010000002 VANCOMYCIN PER 500 MG: Performed by: ORTHOPAEDIC SURGERY

## 2020-09-15 DEVICE — P.F.C. SIGMA FEMORAL ADAPTER 5 DEGREE
Type: IMPLANTABLE DEVICE | Site: KNEE | Status: FUNCTIONAL
Brand: P.F.C. SIGMA

## 2020-09-15 DEVICE — DEV CONTRL TISS STRATAFIX SYMM PDS PLUS VIL CT-1 60CM: Type: IMPLANTABLE DEVICE | Site: KNEE | Status: FUNCTIONAL

## 2020-09-15 DEVICE — UNIVERSAL STEM FLUTED 75MM X 18MM: Type: IMPLANTABLE DEVICE | Site: KNEE | Status: FUNCTIONAL

## 2020-09-15 DEVICE — UNIVERSAL STEM FLUTED 75MM X 14MM: Type: IMPLANTABLE DEVICE | Site: KNEE | Status: FUNCTIONAL

## 2020-09-15 DEVICE — TIBIAL TRAY ROTATING PLATFORM M.B.T. REVISION SIZE 4 CEMENTED: Type: IMPLANTABLE DEVICE | Site: KNEE | Status: FUNCTIONAL

## 2020-09-15 DEVICE — M.B.T. REVISION METAPHYSEAL SLEEVE POROUS 53MM: Type: IMPLANTABLE DEVICE | Site: KNEE | Status: FUNCTIONAL

## 2020-09-15 DEVICE — P.F.C. SIGMA FEMORAL ADAPTER BOLT +2/-2
Type: IMPLANTABLE DEVICE | Site: KNEE | Status: FUNCTIONAL
Brand: P.F.C. SIGMA

## 2020-09-15 DEVICE — SIGMA FEMORAL TC3 CEMENTED 5 RIGHT
Type: IMPLANTABLE DEVICE | Site: KNEE | Status: FUNCTIONAL
Brand: SIGMA

## 2020-09-15 DEVICE — CMT BONE SIMPLEX/P FULL DOSE 10/PK: Type: IMPLANTABLE DEVICE | Site: KNEE | Status: FUNCTIONAL

## 2020-09-15 DEVICE — UNIVERSAL FEMORAL SLEEVE FULL POROUS 46MM: Type: IMPLANTABLE DEVICE | Site: KNEE | Status: FUNCTIONAL

## 2020-09-15 DEVICE — SIGMA TIBIAL INSERT ROTATING PLATFORM STABILIZED AOX SIZE 5 17.5MM
Type: IMPLANTABLE DEVICE | Site: KNEE | Status: FUNCTIONAL
Brand: SIGMA AOX

## 2020-09-15 RX ORDER — SODIUM CHLORIDE, SODIUM LACTATE, POTASSIUM CHLORIDE, CALCIUM CHLORIDE 600; 310; 30; 20 MG/100ML; MG/100ML; MG/100ML; MG/100ML
9 INJECTION, SOLUTION INTRAVENOUS CONTINUOUS
Status: DISCONTINUED | OUTPATIENT
Start: 2020-09-15 | End: 2020-09-16

## 2020-09-15 RX ORDER — EPHEDRINE SULFATE 50 MG/ML
5 INJECTION, SOLUTION INTRAVENOUS ONCE AS NEEDED
Status: DISCONTINUED | OUTPATIENT
Start: 2020-09-15 | End: 2020-09-15

## 2020-09-15 RX ORDER — DIPHENHYDRAMINE HCL 25 MG
25 CAPSULE ORAL
Status: DISCONTINUED | OUTPATIENT
Start: 2020-09-15 | End: 2020-09-15

## 2020-09-15 RX ORDER — BISACODYL 5 MG/1
10 TABLET, DELAYED RELEASE ORAL DAILY PRN
Status: DISCONTINUED | OUTPATIENT
Start: 2020-09-16 | End: 2020-09-16 | Stop reason: HOSPADM

## 2020-09-15 RX ORDER — SODIUM CHLORIDE 0.9 % (FLUSH) 0.9 %
3-10 SYRINGE (ML) INJECTION AS NEEDED
Status: DISCONTINUED | OUTPATIENT
Start: 2020-09-15 | End: 2020-09-15 | Stop reason: HOSPADM

## 2020-09-15 RX ORDER — HYDROCODONE BITARTRATE AND ACETAMINOPHEN 7.5; 325 MG/1; MG/1
1 TABLET ORAL ONCE AS NEEDED
Status: DISCONTINUED | OUTPATIENT
Start: 2020-09-15 | End: 2020-09-15

## 2020-09-15 RX ORDER — OXYCODONE AND ACETAMINOPHEN 7.5; 325 MG/1; MG/1
1 TABLET ORAL ONCE AS NEEDED
Status: DISCONTINUED | OUTPATIENT
Start: 2020-09-15 | End: 2020-09-15

## 2020-09-15 RX ORDER — MAGNESIUM HYDROXIDE 1200 MG/15ML
LIQUID ORAL AS NEEDED
Status: DISCONTINUED | OUTPATIENT
Start: 2020-09-15 | End: 2020-09-15 | Stop reason: HOSPADM

## 2020-09-15 RX ORDER — AMLODIPINE BESYLATE 5 MG/1
5 TABLET ORAL DAILY
Status: DISCONTINUED | OUTPATIENT
Start: 2020-09-16 | End: 2020-09-16 | Stop reason: HOSPADM

## 2020-09-15 RX ORDER — UREA 10 %
1 LOTION (ML) TOPICAL NIGHTLY PRN
Status: DISCONTINUED | OUTPATIENT
Start: 2020-09-15 | End: 2020-09-16 | Stop reason: HOSPADM

## 2020-09-15 RX ORDER — TRANEXAMIC ACID 100 MG/ML
INJECTION, SOLUTION INTRAVENOUS AS NEEDED
Status: DISCONTINUED | OUTPATIENT
Start: 2020-09-15 | End: 2020-09-15 | Stop reason: SURG

## 2020-09-15 RX ORDER — PANTOPRAZOLE SODIUM 40 MG/1
40 TABLET, DELAYED RELEASE ORAL
Status: DISCONTINUED | OUTPATIENT
Start: 2020-09-16 | End: 2020-09-16 | Stop reason: HOSPADM

## 2020-09-15 RX ORDER — HYDRALAZINE HYDROCHLORIDE 20 MG/ML
5 INJECTION INTRAMUSCULAR; INTRAVENOUS
Status: DISCONTINUED | OUTPATIENT
Start: 2020-09-15 | End: 2020-09-15

## 2020-09-15 RX ORDER — FENTANYL CITRATE 50 UG/ML
INJECTION, SOLUTION INTRAMUSCULAR; INTRAVENOUS AS NEEDED
Status: DISCONTINUED | OUTPATIENT
Start: 2020-09-15 | End: 2020-09-15 | Stop reason: SURG

## 2020-09-15 RX ORDER — ASPIRIN 81 MG/1
81 TABLET ORAL EVERY 12 HOURS SCHEDULED
Status: DISCONTINUED | OUTPATIENT
Start: 2020-09-15 | End: 2020-09-16 | Stop reason: HOSPADM

## 2020-09-15 RX ORDER — HYDROMORPHONE HYDROCHLORIDE 1 MG/ML
0.5 INJECTION, SOLUTION INTRAMUSCULAR; INTRAVENOUS; SUBCUTANEOUS
Status: DISCONTINUED | OUTPATIENT
Start: 2020-09-15 | End: 2020-09-16 | Stop reason: HOSPADM

## 2020-09-15 RX ORDER — LIDOCAINE HYDROCHLORIDE 10 MG/ML
0.5 INJECTION, SOLUTION EPIDURAL; INFILTRATION; INTRACAUDAL; PERINEURAL ONCE AS NEEDED
Status: DISCONTINUED | OUTPATIENT
Start: 2020-09-15 | End: 2020-09-15 | Stop reason: HOSPADM

## 2020-09-15 RX ORDER — OXCARBAZEPINE 300 MG/1
300 TABLET, FILM COATED ORAL DAILY PRN
Status: DISCONTINUED | OUTPATIENT
Start: 2020-09-15 | End: 2020-09-16 | Stop reason: HOSPADM

## 2020-09-15 RX ORDER — HYDROCHLOROTHIAZIDE 25 MG/1
25 TABLET ORAL DAILY
Status: DISCONTINUED | OUTPATIENT
Start: 2020-09-15 | End: 2020-09-15

## 2020-09-15 RX ORDER — PROMETHAZINE HYDROCHLORIDE 25 MG/1
25 TABLET ORAL ONCE AS NEEDED
Status: DISCONTINUED | OUTPATIENT
Start: 2020-09-15 | End: 2020-09-15

## 2020-09-15 RX ORDER — ONDANSETRON 2 MG/ML
4 INJECTION INTRAMUSCULAR; INTRAVENOUS EVERY 6 HOURS PRN
Status: DISCONTINUED | OUTPATIENT
Start: 2020-09-15 | End: 2020-09-16 | Stop reason: HOSPADM

## 2020-09-15 RX ORDER — DIPHENHYDRAMINE HYDROCHLORIDE 50 MG/ML
25 INJECTION INTRAMUSCULAR; INTRAVENOUS ONCE
Status: COMPLETED | OUTPATIENT
Start: 2020-09-15 | End: 2020-09-15

## 2020-09-15 RX ORDER — BUPIVACAINE HYDROCHLORIDE 5 MG/ML
INJECTION, SOLUTION EPIDURAL; INTRACAUDAL AS NEEDED
Status: DISCONTINUED | OUTPATIENT
Start: 2020-09-15 | End: 2020-09-15 | Stop reason: HOSPADM

## 2020-09-15 RX ORDER — ALPRAZOLAM 0.5 MG/1
0.5 TABLET ORAL 3 TIMES DAILY PRN
Status: DISCONTINUED | OUTPATIENT
Start: 2020-09-15 | End: 2020-09-16 | Stop reason: HOSPADM

## 2020-09-15 RX ORDER — LISINOPRIL 40 MG/1
40 TABLET ORAL EVERY 12 HOURS SCHEDULED
Status: DISCONTINUED | OUTPATIENT
Start: 2020-09-16 | End: 2020-09-16 | Stop reason: HOSPADM

## 2020-09-15 RX ORDER — LIDOCAINE HYDROCHLORIDE AND EPINEPHRINE BITARTRATE 20; .01 MG/ML; MG/ML
INJECTION, SOLUTION SUBCUTANEOUS
Status: COMPLETED | OUTPATIENT
Start: 2020-09-15 | End: 2020-09-15

## 2020-09-15 RX ORDER — PROPOFOL 10 MG/ML
VIAL (ML) INTRAVENOUS AS NEEDED
Status: DISCONTINUED | OUTPATIENT
Start: 2020-09-15 | End: 2020-09-15 | Stop reason: SURG

## 2020-09-15 RX ORDER — ROPIVACAINE HYDROCHLORIDE 5 MG/ML
INJECTION, SOLUTION EPIDURAL; INFILTRATION; PERINEURAL
Status: COMPLETED | OUTPATIENT
Start: 2020-09-15 | End: 2020-09-15

## 2020-09-15 RX ORDER — HYDROCODONE BITARTRATE AND ACETAMINOPHEN 7.5; 325 MG/1; MG/1
2 TABLET ORAL EVERY 4 HOURS PRN
Status: DISCONTINUED | OUTPATIENT
Start: 2020-09-15 | End: 2020-09-16 | Stop reason: HOSPADM

## 2020-09-15 RX ORDER — NALOXONE HCL 0.4 MG/ML
0.2 VIAL (ML) INJECTION AS NEEDED
Status: DISCONTINUED | OUTPATIENT
Start: 2020-09-15 | End: 2020-09-15

## 2020-09-15 RX ORDER — SODIUM CHLORIDE 0.9 % (FLUSH) 0.9 %
3 SYRINGE (ML) INJECTION EVERY 12 HOURS SCHEDULED
Status: DISCONTINUED | OUTPATIENT
Start: 2020-09-15 | End: 2020-09-15 | Stop reason: HOSPADM

## 2020-09-15 RX ORDER — CEFAZOLIN SODIUM 2 G/100ML
2 INJECTION, SOLUTION INTRAVENOUS EVERY 8 HOURS
Status: COMPLETED | OUTPATIENT
Start: 2020-09-15 | End: 2020-09-16

## 2020-09-15 RX ORDER — FLUMAZENIL 0.1 MG/ML
0.2 INJECTION INTRAVENOUS AS NEEDED
Status: DISCONTINUED | OUTPATIENT
Start: 2020-09-15 | End: 2020-09-15

## 2020-09-15 RX ORDER — DOCUSATE SODIUM 100 MG/1
100 CAPSULE, LIQUID FILLED ORAL 2 TIMES DAILY PRN
Status: DISCONTINUED | OUTPATIENT
Start: 2020-09-15 | End: 2020-09-16 | Stop reason: HOSPADM

## 2020-09-15 RX ORDER — VANCOMYCIN HYDROCHLORIDE 1 G/200ML
1000 INJECTION, SOLUTION INTRAVENOUS ONCE
Status: COMPLETED | OUTPATIENT
Start: 2020-09-15 | End: 2020-09-15

## 2020-09-15 RX ORDER — SODIUM CHLORIDE 9 MG/ML
75 INJECTION, SOLUTION INTRAVENOUS CONTINUOUS
Status: DISCONTINUED | OUTPATIENT
Start: 2020-09-15 | End: 2020-09-16

## 2020-09-15 RX ORDER — NALOXONE HCL 0.4 MG/ML
0.1 VIAL (ML) INJECTION
Status: DISCONTINUED | OUTPATIENT
Start: 2020-09-15 | End: 2020-09-16 | Stop reason: HOSPADM

## 2020-09-15 RX ORDER — MIDAZOLAM HYDROCHLORIDE 1 MG/ML
1 INJECTION INTRAMUSCULAR; INTRAVENOUS
Status: DISCONTINUED | OUTPATIENT
Start: 2020-09-15 | End: 2020-09-15 | Stop reason: HOSPADM

## 2020-09-15 RX ORDER — LISINOPRIL 5 MG/1
5 TABLET ORAL EVERY 12 HOURS SCHEDULED
Status: DISCONTINUED | OUTPATIENT
Start: 2020-09-15 | End: 2020-09-15

## 2020-09-15 RX ORDER — FAMOTIDINE 10 MG/ML
20 INJECTION, SOLUTION INTRAVENOUS ONCE
Status: COMPLETED | OUTPATIENT
Start: 2020-09-15 | End: 2020-09-15

## 2020-09-15 RX ORDER — CEFAZOLIN SODIUM 2 G/100ML
2 INJECTION, SOLUTION INTRAVENOUS ONCE
Status: COMPLETED | OUTPATIENT
Start: 2020-09-15 | End: 2020-09-15

## 2020-09-15 RX ORDER — ACETAMINOPHEN 500 MG
1000 TABLET ORAL ONCE
Status: COMPLETED | OUTPATIENT
Start: 2020-09-15 | End: 2020-09-15

## 2020-09-15 RX ORDER — PROMETHAZINE HYDROCHLORIDE 25 MG/1
25 SUPPOSITORY RECTAL ONCE AS NEEDED
Status: DISCONTINUED | OUTPATIENT
Start: 2020-09-15 | End: 2020-09-15

## 2020-09-15 RX ORDER — ROCURONIUM BROMIDE 10 MG/ML
INJECTION, SOLUTION INTRAVENOUS AS NEEDED
Status: DISCONTINUED | OUTPATIENT
Start: 2020-09-15 | End: 2020-09-15 | Stop reason: SURG

## 2020-09-15 RX ORDER — LIDOCAINE HYDROCHLORIDE 20 MG/ML
INJECTION, SOLUTION INFILTRATION; PERINEURAL AS NEEDED
Status: DISCONTINUED | OUTPATIENT
Start: 2020-09-15 | End: 2020-09-15 | Stop reason: SURG

## 2020-09-15 RX ORDER — FENTANYL CITRATE 50 UG/ML
50 INJECTION, SOLUTION INTRAMUSCULAR; INTRAVENOUS
Status: DISCONTINUED | OUTPATIENT
Start: 2020-09-15 | End: 2020-09-15

## 2020-09-15 RX ORDER — LABETALOL HYDROCHLORIDE 5 MG/ML
5 INJECTION, SOLUTION INTRAVENOUS
Status: DISCONTINUED | OUTPATIENT
Start: 2020-09-15 | End: 2020-09-15

## 2020-09-15 RX ORDER — ONDANSETRON 4 MG/1
4 TABLET, FILM COATED ORAL EVERY 6 HOURS PRN
Status: DISCONTINUED | OUTPATIENT
Start: 2020-09-15 | End: 2020-09-16 | Stop reason: HOSPADM

## 2020-09-15 RX ORDER — DIPHENHYDRAMINE HYDROCHLORIDE 50 MG/ML
12.5 INJECTION INTRAMUSCULAR; INTRAVENOUS
Status: DISCONTINUED | OUTPATIENT
Start: 2020-09-15 | End: 2020-09-15

## 2020-09-15 RX ORDER — HYDROCODONE BITARTRATE AND ACETAMINOPHEN 7.5; 325 MG/1; MG/1
1 TABLET ORAL EVERY 4 HOURS PRN
Status: DISCONTINUED | OUTPATIENT
Start: 2020-09-15 | End: 2020-09-16 | Stop reason: HOSPADM

## 2020-09-15 RX ORDER — ONDANSETRON 2 MG/ML
4 INJECTION INTRAMUSCULAR; INTRAVENOUS ONCE AS NEEDED
Status: DISCONTINUED | OUTPATIENT
Start: 2020-09-15 | End: 2020-09-15

## 2020-09-15 RX ORDER — FENTANYL CITRATE 50 UG/ML
50 INJECTION, SOLUTION INTRAMUSCULAR; INTRAVENOUS
Status: DISCONTINUED | OUTPATIENT
Start: 2020-09-15 | End: 2020-09-15 | Stop reason: HOSPADM

## 2020-09-15 RX ORDER — HYDROMORPHONE HYDROCHLORIDE 1 MG/ML
0.5 INJECTION, SOLUTION INTRAMUSCULAR; INTRAVENOUS; SUBCUTANEOUS
Status: DISCONTINUED | OUTPATIENT
Start: 2020-09-15 | End: 2020-09-15

## 2020-09-15 RX ADMIN — CEFAZOLIN SODIUM 2 G: 2 INJECTION, SOLUTION INTRAVENOUS at 08:31

## 2020-09-15 RX ADMIN — LISINOPRIL 5 MG: 5 TABLET ORAL at 13:48

## 2020-09-15 RX ADMIN — HYDROMORPHONE HYDROCHLORIDE 0.5 MG: 1 INJECTION, SOLUTION INTRAMUSCULAR; INTRAVENOUS; SUBCUTANEOUS at 11:28

## 2020-09-15 RX ADMIN — FAMOTIDINE 20 MG: 10 INJECTION INTRAVENOUS at 07:53

## 2020-09-15 RX ADMIN — CEFAZOLIN SODIUM 2 G: 2 INJECTION, SOLUTION INTRAVENOUS at 16:04

## 2020-09-15 RX ADMIN — FENTANYL CITRATE 50 MCG: 50 INJECTION, SOLUTION INTRAMUSCULAR; INTRAVENOUS at 11:05

## 2020-09-15 RX ADMIN — HYDROCODONE BITARTRATE AND ACETAMINOPHEN 1 TABLET: 7.5; 325 TABLET ORAL at 23:30

## 2020-09-15 RX ADMIN — LISINOPRIL 40 MG: 40 TABLET ORAL at 23:29

## 2020-09-15 RX ADMIN — FENTANYL CITRATE 50 MCG: 50 INJECTION, SOLUTION INTRAMUSCULAR; INTRAVENOUS at 11:21

## 2020-09-15 RX ADMIN — ACETAMINOPHEN 1000 MG: 500 TABLET ORAL at 07:27

## 2020-09-15 RX ADMIN — MIDAZOLAM 1 MG: 1 INJECTION INTRAMUSCULAR; INTRAVENOUS at 08:10

## 2020-09-15 RX ADMIN — PROPOFOL 150 MG: 10 INJECTION, EMULSION INTRAVENOUS at 08:35

## 2020-09-15 RX ADMIN — TRANEXAMIC ACID 1000 MG: 100 INJECTION, SOLUTION INTRAVENOUS at 08:43

## 2020-09-15 RX ADMIN — HYDROMORPHONE HYDROCHLORIDE 0.5 MG: 1 INJECTION, SOLUTION INTRAMUSCULAR; INTRAVENOUS; SUBCUTANEOUS at 11:34

## 2020-09-15 RX ADMIN — FENTANYL CITRATE 50 MCG: 50 INJECTION INTRAMUSCULAR; INTRAVENOUS at 08:35

## 2020-09-15 RX ADMIN — HYDROCODONE BITARTRATE AND ACETAMINOPHEN 2 TABLET: 7.5; 325 TABLET ORAL at 13:48

## 2020-09-15 RX ADMIN — ALPRAZOLAM 0.5 MG: 0.5 TABLET ORAL at 16:04

## 2020-09-15 RX ADMIN — VANCOMYCIN HYDROCHLORIDE 1000 MG: 1 INJECTION, SOLUTION INTRAVENOUS at 07:27

## 2020-09-15 RX ADMIN — HYDROMORPHONE HYDROCHLORIDE 0.5 MG: 1 INJECTION, SOLUTION INTRAMUSCULAR; INTRAVENOUS; SUBCUTANEOUS at 11:12

## 2020-09-15 RX ADMIN — ASPIRIN 81 MG: 81 TABLET, COATED ORAL at 21:54

## 2020-09-15 RX ADMIN — HYDROCODONE BITARTRATE AND ACETAMINOPHEN 1 TABLET: 7.5; 325 TABLET ORAL at 22:32

## 2020-09-15 RX ADMIN — FENTANYL CITRATE 50 MCG: 50 INJECTION, SOLUTION INTRAMUSCULAR; INTRAVENOUS at 08:10

## 2020-09-15 RX ADMIN — SODIUM CHLORIDE, POTASSIUM CHLORIDE, SODIUM LACTATE AND CALCIUM CHLORIDE: 600; 310; 30; 20 INJECTION, SOLUTION INTRAVENOUS at 10:39

## 2020-09-15 RX ADMIN — LIDOCAINE HYDROCHLORIDE 60 MG: 20 INJECTION, SOLUTION INFILTRATION; PERINEURAL at 08:35

## 2020-09-15 RX ADMIN — DIPHENHYDRAMINE HYDROCHLORIDE 25 MG: 50 INJECTION INTRAMUSCULAR; INTRAVENOUS at 07:54

## 2020-09-15 RX ADMIN — CEFAZOLIN SODIUM 2 G: 2 INJECTION, SOLUTION INTRAVENOUS at 23:34

## 2020-09-15 RX ADMIN — ASPIRIN 81 MG: 81 TABLET, COATED ORAL at 13:48

## 2020-09-15 RX ADMIN — ALPRAZOLAM 0.5 MG: 0.5 TABLET ORAL at 23:30

## 2020-09-15 RX ADMIN — SODIUM CHLORIDE, POTASSIUM CHLORIDE, SODIUM LACTATE AND CALCIUM CHLORIDE: 600; 310; 30; 20 INJECTION, SOLUTION INTRAVENOUS at 08:35

## 2020-09-15 RX ADMIN — SODIUM CHLORIDE, POTASSIUM CHLORIDE, SODIUM LACTATE AND CALCIUM CHLORIDE 9 ML/HR: 600; 310; 30; 20 INJECTION, SOLUTION INTRAVENOUS at 07:27

## 2020-09-15 RX ADMIN — ROCURONIUM BROMIDE 30 MG: 10 INJECTION INTRAVENOUS at 08:35

## 2020-09-15 RX ADMIN — LIDOCAINE HYDROCHLORIDE AND EPINEPHRINE 20 ML: 20; 10 INJECTION, SOLUTION INFILTRATION; PERINEURAL at 08:20

## 2020-09-15 RX ADMIN — ROPIVACAINE HYDROCHLORIDE 30 ML: 5 INJECTION, SOLUTION EPIDURAL; INFILTRATION; PERINEURAL at 08:20

## 2020-09-15 RX ADMIN — HYDROCHLOROTHIAZIDE 25 MG: 25 TABLET ORAL at 13:48

## 2020-09-15 RX ADMIN — HYDROCODONE BITARTRATE AND ACETAMINOPHEN 2 TABLET: 7.5; 325 TABLET ORAL at 18:07

## 2020-09-15 NOTE — PLAN OF CARE
See below.    Problem: Adult Inpatient Plan of Care  Goal: Plan of Care Review  Outcome: Ongoing, Progressing  Flowsheets (Taken 9/15/2020 5727)  Progress: improving  Plan of Care Reviewed With:   patient   spouse  Outcome Summary: 75/M POD#0 right TKA revision.  ALOx4, RA, lungs clear but diminished, BS hypoactive but improving, voiding independently.  Up x1 BRP with walker/gait belt.  2+ pedal pulses noted bilaterally, no c/o numbness/tingling in operative extremity, dressing CDI.  Pain well-controlled with PO pain meds only.  PIV infusing NS @ 100 cc/hr per MD orders with intermittent ABX infusions.  D/C planning in progress, plans to D/C home with HH tomorrow when medically ready.

## 2020-09-15 NOTE — ANESTHESIA PROCEDURE NOTES
Airway  Urgency: elective    Date/Time: 9/15/2020 8:40 AM  Airway not difficult    General Information and Staff    Patient location during procedure: OR  Anesthesiologist: Madan Ambrocio MD  CRNA: Nida Nichols CRNA    Indications and Patient Condition  Indications for airway management: airway protection    Preoxygenated: yes  MILS not maintained throughout  Mask difficulty assessment: 2 - vent by mask + OA or adjuvant +/- NMBA    Final Airway Details  Final airway type: endotracheal airway      Successful airway: ETT  Cuffed: yes   Successful intubation technique: direct laryngoscopy  Endotracheal tube insertion site: oral  Blade: Katy  Blade size: 3  ETT size (mm): 7.5  Cormack-Lehane Classification: grade I - full view of glottis  Placement verified by: chest auscultation and capnometry   Measured from: lips  ETT/EBT  to lips (cm): 22  Number of attempts at approach: 1  Assessment: lips, teeth, and gum same as pre-op and atraumatic intubation    Additional Comments  PreO2 100%, FEO2 >85, SIVI, easy BMV, sniff position, ETT placed/ confirmed, attraumatic, teeth and lips as preop.

## 2020-09-15 NOTE — OP NOTE
ORTHOPAEDIC OPERATIVE NOTE    Patient: Jeremi Ugarte       YOB: 1945    Medical Record Number: [id number]    Attending Physician: Patricia Caldera,*    Primary Care Physician: Charity Kearney MD    Date of Service: 9/15/2020    Surgeon: Patricia Caldera MD        DATE OF PROCEDURE: 9/15/2020    PREOPERATIVE DIAGNOSIS: 1. Failed total knee arthroplasty RIGHT knee - liner wear, mechanical instability.    POSTOPERATIVE DIAGNOSIS: 1. Failed total knee arthroplasty RIGHT knee - liner wear, mechanical instability. Metallosis    PROCEDURE PERFORMED: RIGHT TOTAL KNEE ARTHROPLASTY REVISION  1. Revision RIGHT total knee arthroplasty by utilizing the  [employer] PFC Sigma revision system     TIBIA  MBT revision tray size 4 - tibial tray - rotating platform,   MBT Tibia metaphyseal sleeve- 53 mm,  Universal stem  Pressfit fluted 75 mm × 14 mm for tibia,   FEMUR  Sigma femoral TC 3- cemented size 5 femur,  + 2 femoral adapter bolt and a 5° femoral adapter.  Universal femoral sleeve fully porous - 46 mm,   Universal stem Pressfit fluted 75 mm ×18 mm for the femur,      Sigma tibial insert- rotating platform stabilized- size 5- 17.5 mm thickness .     Implant Name Type Inv. Item Serial No.  Lot No. LRB No. Used Action   SUT CONTRL TISS STRATAFIX SYMM PDS PLUS VANDANA CT-1 60CM - [id number] Implant SUT CONTRL TISS STRATAFIX SYMM PDS PLUS VANDANA CT-1 60CM  [employer]  DIV OF J AND J [id number] Right 1 Implanted   CMT BONE SIMPLEX/P FULL DOSE 10/PK - [id number] Implant CMT BONE SIMPLEX/P FULL DOSE 10/PK  Jeeves [id number] Right 2 Implanted   TRY TIB REV MBT CMT SZ4 - [id number] Implant TRY TIB REV MBT CMT SZ4  DEPUY [id number] Right 1 Implanted   ADAPT BOLT FEM/KN SIGMA OFFST PLS/MIN2 - [id number] Implant ADAPT BOLT FEM/KN SIGMA OFFST PLS/MIN2  DEPUY [id number] Right 1 Implanted   COMP FEM SIGMA NONPOR TC3 SZ5 69X73 RT - [id number] Implant COMP FEM SIGMA NONPOR TC3 SZ5 69X73 RT   DEPUY [id number] Right 1 Implanted   STEM FEM FLUT UNIV [id number] - [id number] Implant STEM FEM FLUT UNIV [id number]  DEPUY [id number] Right 1 Implanted   STEM FEM FLUT UNIV 46Q52AS - [id number] Implant STEM FEM FLUT UNIV [id number]  DEPUY [id number] Right 1 Implanted   SLV TRY TIB MBT REV POR 53MM - [id number] Implant SLV TRY TIB MBT REV POR 53MM  DEPUY [id number] Right 1 Implanted   SLV FEM UNIV POR/FULL 46MM - [id number] Implant SLV FEM UNIV POR/FULL 46MM  DEPUY [id number] Right 1 Implanted   ADAPT FEM SIGMA 5D - [id number] Implant ADAPT FEM SIGMA 5D  DEPUY [id number] Right 1 Implanted   INSRT KN TIB SIGMA SHAE RP SZ5 17.5MM - [id number] Implant INSRT KN TIB SIGMA SHAE RP SZ5 17.5MM  DEPUY [id number] Right 1 Implanted       SURGEON: Patricia Caldera MD     ASSISTANT: Marco Knight DO Fellow     [facility]     The services of a skilled  first assist were necessary for performing the procedure safely and expeditiously.  The first assist was present for the entire duration of the case and helped with positioning, retraction and closure of the incision.      ANESTHESIA: General anaesthesia with a regional block  and intraoperative periarticular  Exparel injection.    ESTIMATED BLOOD LOSS: 100 mL    SPECIMENS: 1.Tissue from  knee for culture sensitivity aerobic and anaerobic.      COMPLICATIONS: Nil.     DRAINS: A 10 Georgian Hemovac drain.     INDICATIONS: The patient is a 75 y.o. male  who presented to my office with complaints of progressively worsening pain in  RIGHT knee. Patient  has reached a point of disability. Patient has a history of  total knee arthroplasty about 22 years back by Dr Davide Holt. Evaluation of  x-rays and three-phase bone scan confirmed extensive osteolysis, liner wear. The patient has been evaluated for infection. Inflammatory Markers were negative. Knee aspiration fluid was negative for suspicion of infection.     Treatment options and alternatives were discussed in detail with the patient who is  indicated for a revision total knee arthroplasty.     Likely risks and benefits of the procedure, including but not limited to infection, DVT, pulmonary embolism, future loosening of the implants, possibility of injury to tendons, ligaments, nerves or vessels and periprosthetic fractures, loss of extensor mechanism, stiffness, future above-the-knee amputation have been discussed in detail. Despite the risks involved, the patient elected to proceed and informed consent was obtained and the patient was scheduled for surgery. The patient was seen in the preoperative holding area and the operative site was marked.       DESCRIPTION OF PROCEDURE:   The patient was transferred to [facility] operating room.     Preoperative antibiotics in the form of  Vancomycin and  Kefzol  intravenously was infused prior to the incision and prior to the tourniquet placement according to the SCIP protocol.  He did have some mild itching in the preoperative area after vancomycin was started.  This resolved with Benadryl.    A surgical time out was done with the team and the correct patient, surgical side and site were identified.     After achieving adequate general anesthesia, a well-padded tourniquet was placed over the proximal aspect of the operative thigh. The operative leg was prepped and draped in the usual sterile fashion. Tourniquet was elevated to a pressure of 250 mm Hg.     Trannexamic acid was given intravenously prior to incision.      A skin incision was made vertically oriented centering over the patella anteriorly incorporating previous surgical scar. Skin and subcutaneous tissue were incised, full thickness flaps were raised and a medial parapatellar approach was developed. There was a no effusion. The patella was subluxed and the knee was inspected.      There was extensive synovitis and a large effusion.  There was metallosis.  Partial synovectomy was done.  Tissue was sent for culture sensitivity.  The  patella was inspected.  Patella was found to be well fixed there was some ostial lysis but the implant itself was well fixed to the underlying bone.    This was a Depuy PFC Sigma implant.  I removed the polyethylene liner with the help of an osteotome.  There was significant amount of laxity of the knee medial laterally and anteroposteriorly prior to removal of the liner.  The liner revealed presence of extensive amount of wear especially posterior laterally.  This was a cruciate retaining knee.  The femur implant had extensive ostial lysis under the lateral femoral condyle.  I elected to remove the femur implant.  I used a core sagittal saw and flexible osteotome and I was able to remove the femur without any difficulty and without any bone loss.  However there was extensive amount of bone loss in the lateral femoral condyle secondary to his ostial lysis.    Attention was then directed to the tibia.  There was again loss of bone on the medial tibia secondary to ostial lysis and there was a large ganglion cyst in the pes anserinus area.  I elected to remove the well fixed tibial implant.  Implant was loosened from the underlying cement with the help of a core sagittal saw and it was easily tamped out without any bone loss.  Previous cement was removed.    As the knee tissue was looking very benign  and as the  inflammatory markers were negative it was planned to proceed with reimplantation.  The knee joint was thoroughly irrigated with saline.  All traces of previous cement was removed.  Debridement was completed.    Attention was then directed to the proximal tibia.  Intramedullary reamer was utilized progressively.  A  stem was utilized with a conical proximal reamer.  Progressive broaching was done to achieve adequate metaphyseal stability.  A skim cut was done with an oscillating saw along the proximal end of the tibia.  The proximal tibial cut was found to be satisfactory.     Attention was  directed to  the distal femur.  Intramedullary reamer was utilized for the medullary canal followed by a conical reamer followed by serial broaching.  Adequate stability was found with a metaphyseal sleeve broach.  4-in-1 cutting block was utilized followed by cutting the box cut for the TC3 component.  The cuts were completed maintaining correct rotational alignment based upon epicondylar axis.      Proximal tibia was sized and a trial reduction was performed. Reduction was found to be satisfactory with range of motion from 0° to 120° with the patella tracking well.     Having been satisfied with the trials, the bony surfaces irrigated with saline.  I have elected to proceed with press-fit components. Exparel was infiltrated into the posterior capsule medially and laterally and into periarticular tissue and subcutaneously. Followed by this, the tibial component was seated into position followed by the femoral component, followed by seating of the 17.5 mm thick trial liner.  The metaphyseal sleeve was first placed to obtain the orientation of broaching followed by temporarily fixing the tibial tray to the sleeve in situ to maintain tibial rotation.  Followed by this the tibial component and was removed and impacted on the back table.  The femoral component was assembled based upon the trial implant for orientation and the sleeve and femur were impacted on the back table.  I used Simplex cement  and added 1 g of vancomycin powder for obtaining some support of the component to the bone secondary to bone loss.  The cement was allowed to set excess cement was removed.  The trial liner was replaced with a 17.5 mm rotating platform posteriorly stabilized liner.     Again, range of motion was checked and the range was satisfactory from 0° to 120° with excellent stability throughout the range of motion. Good medial and lateral tissue tension, and soft tissue balancing. The patella was tracking well. Having been satisfied with this, the  joint was thoroughly irrigated with saline. Soft tissue hemostasis was secured. A 10-Albanian Hemovac drain was placed.      The sponge and needle count was found to be correct.  Arthrotomy was closed with Ethibond sutures followed by closure of the incision in layers with Vicryl sutures and staples. Sterile dressings were placed and the patient was transferred to the recovery room in stable condition. The patient was given a knee immobilizer. The patient tolerated the procedure well and is being admitted for postoperative antibiotics according to the SCIP protocol for 2 more doses /  until I see culture result.     DVT Prophylaxis -  Mechanical - TEDS and venous foot plexipulses and aspirin 81 mg twice daily daily will be started daily on postoperative day #1.     The patient will be mobilized in am with physical therapy. WBAT and no restrictions for ROM.     I discussed the satisfactory performance of the procedure with the patient's family and discussed with them The postoperative management.      CPT code: 97007    Patricia Caldera M.D.    9/15/2020    CC: Charity Kearney MD; MD Verenice Peter Madhusudhan R,*

## 2020-09-15 NOTE — ANESTHESIA POSTPROCEDURE EVALUATION
"Patient: Jeremi Ugarte    Procedure Summary     Date: 09/15/20 Room / Location: [facility] OR [address] [place] [address] MAIN OR    Anesthesia Start: 0828 Anesthesia Stop: 1047    Procedure: RIGHT TOTAL KNEE ARTHROPLASTY REVISION (Right Knee) Diagnosis:       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, initial encounter (CMS/[facility])      Instability of internal right knee prosthesis, initial encounter (CMS/[facility])      (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, initial encounter (CMS/[facility]) [T84.062A])      (Instability of internal right knee prosthesis, initial encounter (CMS/[facility]) [T84.022A])    Surgeon: Patricia Caldera MD Provider: Madan Ambrocio MD    Anesthesia Type: general ASA Status: 3          Anesthesia Type: general    Vitals  Vitals Value Taken Time   /85 09/15/20 1120   Temp 36.3 °C (97.4 °F) 09/15/20 1042   Pulse 71 09/15/20 1127   Resp 16 09/15/20 1120   SpO2 97 % 09/15/20 1127   Vitals shown include unvalidated device data.        Post Anesthesia Care and Evaluation    Patient location during evaluation: PACU  Patient participation: complete - patient participated  Level of consciousness: awake and alert  Pain management: adequate  Airway patency: patent  Anesthetic complications: No anesthetic complications    Cardiovascular status: acceptable  Respiratory status: acceptable  Hydration status: acceptable    Comments: /85   Pulse 71   Temp 36.3 °C (97.4 °F) (Oral)   Resp 16   Ht 175.3 cm (69\")   Wt 103 kg (226 lb 13.7 oz)   SpO2 98%   BMI 33.50 kg/m²         "

## 2020-09-15 NOTE — CONSULTS
"Internal medicine consult    Referring physician  Dr. VILLARREAL    Chief complaint  Right total knee arthroplasty revision    Reason for consult  Follow medical problems    History of present illness  75-year-old white male with history of hypertension hyperlipidemia anxiety disorder and severe osteoarthritis electively admitted to orthopedic service for right total knee arthroplasty revision.  Patient underwent surgery this morning and I am asked to follow the patient for medical problem.  At the time of entry is hurting at the surgical site with mild nausea but no other complaint.  Patient is very pleasant and gave me a detailed history.  Wife is also present at the bedside.  Patient has no fever chills chest pain shortness of breath nausea vomiting diarrhea.  Patient also denies any night sweats weight loss or weight gain.    Past medical history  Hypertension  Hyperlipidemia  Anxiety disorder  Osteoarthritis    Past surgical history  Status post bilateral hip replacement  Status post total knee arthroplasty  Cataract surgery  Trigeminal neuralgia surgery    Social history  Denies any tobacco alcohol drug abuse    Family history  Not contributory    Medications  Allergic to morphine and Percocet  Home medications reviewed    Review of systems  As above    Physical examination  Blood pressure 159/79, pulse 84, temperature 97.4 °F (36.3 °C), temperature source Skin, resp. rate 16, height 175.3 cm (69\"), weight 105 kg (231 lb 7.7 oz), SpO2 96 %.    HEENT unremarkable  Neck supple  Lungs decreased breath sound bilateral  Heart S1-S2 no murmur gallop or rub  Abdomen soft nontender bowel sounds positive  Extremities no edema  Neuro no focal deficit  Psych normal mood and behavior    LABS  Lab Results (last 24 hours)     Procedure Component Value Units Date/Time    Tissue / Bone Culture - Tissue, Knee, Right [[id number]] Collected: 09/15/20 0905    Specimen: Tissue from Knee, Right Updated: 09/15/20 1417     Gram Stain No " WBCs or organisms seen    Anaerobic Culture - Tissue, Knee, Right [[id number]] Collected: 09/15/20 0905    Specimen: Tissue from Knee, Right Updated: 09/15/20 0926        Imaging Results (Last 24 Hours)     Procedure Component Value Units Date/Time    XR Knee 1 or 2 View Right [[id number]] Collected: 09/15/20 1158     Updated: 09/15/20 1556    Narrative:      TWO VIEWS RIGHT KNEE     HISTORY: 75-year-old male undergoing right knee replacement.     FINDINGS: AP and lateral radiographs of the right knee were obtained and  demonstrate evidence of total right knee arthroplasty with patellar  resurfacing. Gas is seen within the surrounding soft tissues. Alignment  is within normal limits. There is no evidence for fracture. Overlying  skin staples are noted.       Impression:      Status post total right knee arthroplasty without evidence  for an acute abnormality.     This report was finalized on 9/15/2020 3:52 PM by Dr. Davon Lagos M.D.             Current Facility-Administered Medications:   •  ALPRAZolam (XANAX) tablet 0.5 mg, 0.5 mg, Oral, TID PRN, Patricia Caldera MD, 0.5 mg at 09/15/20 1604  •  [START ON 9/16/2020] amLODIPine (NORVASC) tablet 5 mg, 5 mg, Oral, Daily, Patricia Caldera MD  •  aspirin EC tablet 81 mg, 81 mg, Oral, Q12H, Patricia Caldera MD, 81 mg at 09/15/20 1348  •  [START ON 9/16/2020] bisacodyl (DULCOLAX) EC tablet 10 mg, 10 mg, Oral, Daily PRN, Patricia Caldera MD  •  ceFAZolin in dextrose (ANCEF) IVPB solution 2 g, 2 g, Intravenous, Q8H, Patricia Caldera MD, Stopped at 09/15/20 1634  •  docusate sodium (COLACE) capsule 100 mg, 100 mg, Oral, BID PRN, Patricia Caldera MD  •  hydroCHLOROthiazide (HYDRODIURIL) tablet 25 mg, 25 mg, Oral, Daily, Patricia Caldera MD, 25 mg at 09/15/20 1348  •  HYDROcodone-acetaminophen (NORCO) 7.5-325 MG per tablet 1 tablet, 1 tablet, Oral, Q4H PRN, Patricia Caldera MD  •  HYDROcodone-acetaminophen  (NORCO) 7.5-325 MG per tablet 2 tablet, 2 tablet, Oral, Q4H PRN, Patricia Caldera MD, 2 tablet at 09/15/20 1348  •  HYDROmorphone (DILAUDID) injection 0.5 mg, 0.5 mg, Intravenous, Q2H PRN **AND** naloxone (NARCAN) injection 0.1 mg, 0.1 mg, Intravenous, Q5 Min PRN, Patricia Caldera MD  •  lactated ringers infusion, 9 mL/hr, Intravenous, Continuous, Madan Ambrocio MD, Last Rate: 9 mL/hr at 09/15/20 0727  •  lisinopril (PRINIVIL,ZESTRIL) tablet 5 mg, 5 mg, Oral, Q12H, Patricia Caldera MD, 5 mg at 09/15/20 1348  •  melatonin tablet 1 mg, 1 mg, Oral, Nightly PRN, Patricia Caldera MD  •  ondansetron (ZOFRAN) tablet 4 mg, 4 mg, Oral, Q6H PRN **OR** ondansetron (ZOFRAN) injection 4 mg, 4 mg, Intravenous, Q6H PRN, Patricia Caldera MD  •  OXcarbazepine (TRILEPTAL) tablet 300 mg, 300 mg, Oral, Daily PRN, Patricia Caldera MD  •  sodium chloride 0.9 % infusion, 100 mL/hr, Intravenous, Continuous, Patricia Caldera MD, Last Rate: 100 mL/hr at 09/15/20 1220, 100 mL/hr at 09/15/20 1220     ASSESSMENT  Status post right total knee arthroplasty revision  Hypertension  Hyperlipidemia  Osteoarthritis  Gastroesophageal reflux disease    PLAN  Agree with current care  Postop care  Adjust home medications  Stress ulcer DVT prophylaxis  PT OT  Supportive care  Repeat labs in a.m.  Patient is full code  Discussed with nursing staff and wife  Will follow with  and further recommendation according to hospital course    RAKAN LORENZANA MD

## 2020-09-15 NOTE — PERIOPERATIVE NURSING NOTE
notified of pt c/o itching post starting IV Vancomyocin. Order for benadryl and discontinue vancomyocin.

## 2020-09-15 NOTE — PLAN OF CARE
Problem: Adult Inpatient Plan of Care  Goal: Plan of Care Review  Outcome: Ongoing, Progressing  Flowsheets  Taken 9/15/2020 1272  Outcome Summary: Pt is a 74 yo M POD0 R TKA. Pt [place] with 2-3 steps and no rails with L post to enter. Pt owns FWW and BSC, no DME needs for DC. Pt with severe 10/10 knee pain and high levels of anxiety during evaluation, unable to ambulate at this time. Pt was min/modA for bed mobility,CGA/[place] for initial sit<>stand transfer with FWW. Obvious tremors in upright sitting and standing with severity increasing with WB. Pt able to perform 2 standing marches but declined gait training due to above impairments. Pt completed TKA protocol demonstrating ~10-70deg AAROM. Skilled PT needed to address above impairments. DC recs include home with HHPT pending functional progression.  Taken 9/15/2020 8864  Plan of Care Reviewed With: patient

## 2020-09-15 NOTE — THERAPY EVALUATION
Patient Name: Jeremi Ugarte  : 1945    MRN: [id number]                              Today's Date: 9/15/2020       Admit Date: 9/15/2020    Visit Dx:     ICD-10-CM ICD-9-CM   1. 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, initial encounter (CMS/HCC)  T84.062A 996.46     V43.65   2. Instability of internal right knee prosthesis, initial encounter (CMS/HCC)  T84.022A 996.42     V43.65     Patient Active Problem List   Diagnosis   • Trigeminal neuralgia of right side of face   • Bifascicular block   • Dyslipidemia   • Benign essential HTN   • Anxiety   • Hyperlipidemia LDL goal <100   • Status post revision of total replacement of right knee     Past Medical History:   Diagnosis Date   • Anemia    • Ankle arthritis    • Ankle pain    • Anxiety    • Arthritis    • Depression    • GCA (giant cell arteritis) (CMS/HCC)    • Gout of foot    • High risk medication use    • Hyperlipidemia    • Hypertension    • Hyponatremia    • Macrocytosis    • Muscle weakness    • Myalgia    • Myositis    • Osteoarthritis    • Osteoarthritis of both hips    • Polymyalgia rheumatica (CMS/HCC)    • Renal insufficiency    • S/p bilateral revision of total hip replacement    • S/P foot surgery    • S/P revision of total knee, left    • S/P revision of total knee, right    • Trigeminal neuralgia      Past Surgical History:   Procedure Laterality Date   • ANKLE ARTHROPLASTY Left    • BACK SURGERY      lower back surg   • CATARACT EXTRACTION     • CRANIOTOMY Right 2019    Dr. Aldridge, for trigeminal neuralgia   • EXCISION BAKERS CYST KNEE  2008   • HAND SURGERY Left     Left hand and wrist    • HIP SURGERY Right 2016    total right hip replacement   • KNEE ARTHROPLASTY Bilateral     LEFT -   -  RIGHT -      • REPLACEMENT TOTAL KNEE       and    • SHOULDER SURGERY     • TOTAL HIP ARTHROPLASTY Left 2017   • TOTAL KNEE ARTHROPLASTY REVISION      left     General  Information     Row Name 09/15/20 1547          Physical Therapy Time and Intention    Document Type  evaluation  -AE     Mode of Treatment  individual therapy;physical therapy  -AE     Row Name 09/15/20 1547          General Information    Patient Profile Reviewed  yes  -AE     Prior Level of Function  independent:  -AE     Existing Precautions/Restrictions  fall  -AE     Row Name 09/15/20 1547          Living Environment    Lives With  spouse  -AE     Row Name 09/15/20 1547          Home Main Entrance    Number of Stairs, Main Entrance  three  -AE     Stair Railings, Main Entrance  none;other (see comments) post on L side  -AE     Row Name 09/15/20 1547          Stairs Within Home, Primary    Number of Stairs, Within Home, Primary  none  -AE     Row Name 09/15/20 1547          Cognition    Orientation Status (Cognition)  oriented x 3  -AE     Row Name 09/15/20 1547          Safety Issues, Functional Mobility    Safety Issues Affecting Function (Mobility)  insight into deficits/self-awareness  -AE     Impairments Affecting Function (Mobility)  coordination;range of motion (ROM);strength;motor control;endurance/activity tolerance  -AE       User Key  (r) = Recorded By, (t) = Taken By, (c) = Cosigned By    Initials Name Provider Type    AE Noemi Caal PT Physical Therapist        Mobility     Row Name 09/15/20 1547          Bed Mobility    Bed Mobility  bed mobility (all) activities  -AE     All Activities, [place] (Bed Mobility)  minimum assist (75% patient effort)  -AE     Assistive Device (Bed Mobility)  bed rails;head of bed elevated  -AE     Comment (Bed Mobility)  [place] for bed mobility  -AE     Row Name 09/15/20 1547          Transfers    Comment (Transfers)  CGA/[place] for sit<>Stand with FWW  -AE     Row Name 09/15/20 1547          Sit-Stand Transfer    Sit-Stand [place] (Transfers)  contact guard;minimum assist (75% patient effort);1 person assist  -AE     Assistive Device (Sit-Stand  Transfers)  walker, front-wheeled  -AE     Row Name 09/15/20 1547          Gait/Stairs (Locomotion)    [place] Level (Gait)  minimum assist (75% patient effort);1 person assist;1 person to manage equipment  -AE     Assistive Device (Gait)  walker, front-wheeled  -AE     Distance in Feet (Gait)  2-3 standing marches in place 2/2 anxiety and pain  -AE     Deviations/Abnormal Patterns (Gait)  antalgic  -AE     Bilateral Gait Deviations  forward flexed posture;heel strike decreased  -AE       User Key  (r) = Recorded By, (t) = Taken By, (c) = Cosigned By    Initials Name Provider Type    AE Noemi Caal, PT Physical Therapist        Obj/Interventions     Row Name 09/15/20 1548          Range of Motion Comprehensive    General Range of Motion  lower extremity range of motion deficits identified  -AE     Comment, General Range of Motion  R knee ~15-70AROM  -AE     Row Name 09/15/20 1548          Strength Comprehensive (MMT)    General Manual Muscle Testing (MMT) Assessment  lower extremity strength deficits identified  -AE     Comment, General Manual Muscle Testing (MMT) Assessment  generalized weakness 2/2 pain  -AE     Row Name 09/15/20 1548          Motor Skills    Therapeutic Exercise  knee  -AE     Row Name 09/15/20 1548          Knee (Therapeutic Exercise)    Knee (Therapeutic Exercise)  AROM (active range of motion)  -AE     Knee AROM (Therapeutic Exercise)  right TKA x 5  -AE       User Key  (r) = Recorded By, (t) = Taken By, (c) = Cosigned By    Initials Name Provider Type    AE Noemi Caal, PT Physical Therapist        Goals/Plan     Row Name 09/15/20 2086          Transfer Goal 1 (PT)    Activity/Assistive Device (Transfer Goal 1, PT)  transfers, all  -AE     [place] Level/Cues Needed (Transfer Goal 1, PT)  standby assist  -AE     Time Frame (Transfer Goal 1, PT)  3 days  -AE     Progress/Outcome (Transfer Goal 1, PT)  continuing progress toward goal  -AE     Row Name 09/15/20 1421           Gait Training Goal 1 (PT)    Activity/Assistive Device (Gait Training Goal 1, PT)  gait (walking locomotion);assistive device use  -AE     [place] Level (Gait Training Goal 1, PT)  contact guard assist  -AE     Distance (Gait Training Goal 1, PT)  50ft  -AE     Time Frame (Gait Training Goal 1, PT)  3 days  -AE     Progress/Outcome (Gait Training Goal 1, PT)  continuing progress toward goal  -AE     Row Name 09/15/20 2850          Stairs Goal 1 (PT)    Activity/Assistive Device (Stairs Goal 1, PT)  stairs, all skills;ascending stairs  -AE     [place] Level/Cues Needed (Stairs Goal 1, PT)  minimum assist (75% or more patient effort);1 person assist  -AE     Number of Stairs (Stairs Goal 1, PT)  3 steps no rails  -AE     Time Frame (Stairs Goal 1, PT)  3 days  -AE     Progress/Outcome (Stairs Goal 1, PT)  continuing progress toward goal  -AE       User Key  (r) = Recorded By, (t) = Taken By, (c) = Cosigned By    Initials Name Provider Type    AE Noemi Caal, PT Physical Therapist        Clinical Impression     Row Name 09/15/20 8390          Pain    Additional Documentation  Pain Scale: Numbers Pre/Post-Treatment (Group)  -AE     Row Name 09/15/20 4712          Pain Scale: Numbers Pre/Post-Treatment    Pretreatment Pain Rating  10/10  -AE     Posttreatment Pain Rating  10/10  -AE     Pain Location - Side  Right  -AE     Pain Location - Orientation  incisional  -AE     Pain Location  knee  -AE     Pain Intervention(s)  Repositioned;Ambulation/increased activity;Rest  -AE     Row Name 09/15/20 0175          Plan of Care Review    Plan of Care Reviewed With  patient  -AE     Row Name 09/15/20 8582          Therapy Assessment/Plan (PT)    Rehab Potential (PT)  good, to achieve stated therapy goals  -AE     Criteria for Skilled Interventions Met (PT)  yes;skilled treatment is necessary  -AE     Row Name 09/15/20 8959          Positioning and Restraints    Pre-Treatment Position  in bed  -AE     Post  Treatment Position  bed  -AE     In Bed  supine;call light within reach;encouraged to call for assist;exit alarm on;with family/caregiver  -AE       User Key  (r) = Recorded By, (t) = Taken By, (c) = Cosigned By    Initials Name Provider Type    Noemi Dwyer PT Physical Therapist        Outcome Measures     Row Name 09/15/20 1551          How much help from another person do you currently need...    Turning from your back to your side while in flat bed without using bedrails?  3  -AE     Moving from lying on back to sitting on the side of a flat bed without bedrails?  2  -AE     Moving to and from a bed to a chair (including a wheelchair)?  1  -AE     Standing up from a chair using your arms (e.g., wheelchair, bedside chair)?  3  -AE     Climbing 3-5 steps with a railing?  1  -AE     To walk in hospital room?  2  -AE     AM-PAC 6 Clicks Score (PT)  12  -AE     Row Name 09/15/20 1551          Functional Assessment    Outcome Measure Options  AM-PAC 6 Clicks Basic Mobility (PT)  -AE       User Key  (r) = Recorded By, (t) = Taken By, (c) = Cosigned By    Initials Name Provider Type    Noemi Dwyer PT Physical Therapist        Physical Therapy Education                 Title: PT OT SLP Therapies (Done)     Topic: Physical Therapy (Done)     Point: Mobility training (Done)     Learning Progress Summary           Patient Acceptance, E,TB, VU,DU by AE at 9/15/2020 1551   Significant Other Acceptance, E,TB, VU,DU by AE at 9/15/2020 1551                   Point: Home exercise program (Done)     Learning Progress Summary           Patient Acceptance, E,TB, VU,DU by AE at 9/15/2020 1551   Significant Other Acceptance, E,TB, VU,DU by AE at 9/15/2020 1551                   Point: Body mechanics (Done)     Learning Progress Summary           Patient Acceptance, E,TB, VU,DU by AE at 9/15/2020 1551   Significant Other Acceptance, E,TB, VU,DU by AE at 9/15/2020 1551                   Point: Precautions (Done)      Learning Progress Summary           Patient Acceptance, E,TB, VU,DU by AE at 9/15/2020 1551   Significant Other Acceptance, E,TB, VU,DU by AE at 9/15/2020 1551                               User Key     Initials Effective Dates Name Provider Type Discipline    AE 09/04/19 -  Noemi Caal PT Physical Therapist PT              PT Recommendation and Plan  Planned Therapy Interventions (PT): balance training, bed mobility training, gait training, home exercise program, patient/family education, postural re-education, neuromuscular re-education, motor coordination training, ROM (range of motion), stair training, strengthening, transfer training, stretching  Plan of Care Reviewed With: patient  Outcome Summary: Pt is a 74 yo M POD0 R TKA. Pt lives with spouse in [facility] with 2-3 steps and no rails with L post to enter. Pt owns FWW and BSC, no DME needs for DC. Pt with severe 10/10 knee pain and high levels of anxiety during evaluation, unable to ambulate at this time. Pt was min/modA for bed mobility,CGA/[place] for initial sit<>stand transfer with FWW. Obvious tremors in upright sitting and standing with severity increasing with WB. Pt able to perform 2 standing marches but declined gait training due to above impairments. Pt completed TKA protocol demonstrating ~10-70deg AAROM. Skilled PT needed to address above impairments. DC recs include home with HHPT pending functional progression.     Time Calculation:   PT Charges     Row Name 09/15/20 1556             Time Calculation    Start Time  1445  -AE      Stop Time  1515  -AE      Time Calculation (min)  30 min  -AE      PT Received On  09/15/20  -AE      PT - Next Appointment  09/16/20  -AE      PT Goal Re-Cert Due Date  09/18/20  -AE         Time Calculation- PT    Total Timed Code Minutes- PT  20 minute(s)  -AE        User Key  (r) = Recorded By, (t) = Taken By, (c) = Cosigned By    Initials Name Provider Type    AE Noemi Caal PT Physical Therapist         Therapy Charges for Today     Code Description Service Date Service Provider Modifiers Qty    08241313866 HC PT EVAL MOD COMPLEXITY 2 9/15/2020 Noemi Caal, PT GP 1    99828278909 HC PT THER SUPP EA 15 MIN 9/15/2020 Noemi Caal, PT GP 2    68634848293 HC PT THER PROC EA 15 MIN 9/15/2020 Noemi Caal, PT GP 1          PT G-Codes  Outcome Measure Options: AM-PAC 6 Clicks Basic Mobility (PT)  AM-PAC 6 Clicks Score (PT): 12    Noemi Caal, PT  9/15/2020

## 2020-09-15 NOTE — PROGRESS NOTES
Discharge Planning Assessment  [facility]     Patient Name: Jeremi Ugarte  MRN: [id number]  Today's Date: 9/15/2020    Admit Date: 9/15/2020    Discharge Needs Assessment     Row Name 09/15/20 1422       Living Environment    Lives With  spouse    Name(s) of Who Lives With Patient  Vidya Ugarte [phone] spouse    Current Living Arrangements  home/apartment/condo    Primary Care Provided by  self    Provides Primary Care For  no one    Quality of Family Relationships  supportive;involved;helpful    Able to Return to Prior Arrangements  yes       Discharge Needs Assessment    Current Outpatient/Agency/Support Group  homecare agency        Discharge Plan     Row Name 09/15/20 1424       Plan    Plan  Home with wife    Plan Comments  Spoke with pt and his wife for screening of DCP/needs.  Pt does live at [facility] address with sposue and long splan to return home at [place].  Pt's wife stated that this hosptialization is covered under workmans comp [facility] claim# [id number]   Maia Simms [phone]. Pt did state that original WC claim is from 9/28/89  VM left for Maia Simms to return call to [facility] x [phone] to maykel pt wanting HH upon D/C. [facility] will need to follow up on WC and HH.        Continued Care and Services - Admitted Since 9/15/2020    Coordination has not been started for this encounter.         Demographic Summary     Row Name 09/15/20 1422       General Information    Admission Type  inpatient    Arrived From  PACU/recovery room    Referral Source  admission list    Reason for Consult  discharge planning    Preferred Language  English     Used During This Interaction  no        Functional Status     Row Name 09/15/20 1422       Functional Status    Usual Activity Tolerance  moderate    Current Activity Tolerance  fair       Functional Status, IADL    Medications  independent    Meal Preparation  independent    Housekeeping  independent    Laundry  independent    Shopping  independent        Mental Status Summary    Recent Changes in Mental Status/Cognitive Functioning  no changes        Psychosocial    No documentation.       Abuse/Neglect    No documentation.       Legal    No documentation.       Substance Abuse    No documentation.       Patient Forms    No documentation.           Mary Blankenship MSW

## 2020-09-15 NOTE — ANESTHESIA PROCEDURE NOTES
Peripheral Block      Patient reassessed immediately prior to procedure    Patient location during procedure: holding area  Start time: 9/15/2020 8:00 AM  Stop time: 9/15/2020 8:20 AM  Reason for block: at surgeon's request and post-op pain management  Performed by  Anesthesiologist: Madan Ambrocio MD  Preanesthetic Checklist  Completed: patient identified, site marked, surgical consent, pre-op evaluation, timeout performed, IV checked, risks and benefits discussed and monitors and equipment checked  Prep:  Pt Position: supine  Sterile barriers:gloves and cap  Prep: ChloraPrep  Patient monitoring: blood pressure monitoring, continuous pulse oximetry and EKG  Procedure  Sedation:yes    Guidance:ultrasound guided  ULTRASOUND INTERPRETATION. Using ultrasound guidance a 22 G gauge needle was placed in close proximity to the nerve, at which point, under ultrasound guidance anesthetic was injected in the area of the nerve and spread of the anesthesia was seen on ultrasound in close proximity thereto.  There were no abnormalities seen on ultrasound; a digital image was taken; and the patient tolerated the procedure with no complications. Images:still images obtained    Laterality:right  Block Type:adductor canal block and iPack  Injection Technique:single-shot  Needle Type:echogenic  Needle Gauge:21 G  Resistance on Injection: none    Medications Used: lidocaine-EPINEPHrine (XYLOCAINE W/EPI) 2 %-1:[id number] injection, 20 mL  ropivacaine (NAROPIN) 0.5 % injection, 30 mL  Med admintered at 9/15/2020 8:20 AM      Medications  Comment:Lidocaine is 1/200,000 ep    Post Assessment  Injection Assessment: incremental injection, no paresthesia on injection and negative aspiration for heme  Patient Tolerance:comfortable throughout block  Complications:no

## 2020-09-15 NOTE — ADDENDUM NOTE
Addendum  created 09/15/20 1816 by Madan Ambrocio MD    Attestation recorded in Intraprocedure, Intraprocedure Attestations filed

## 2020-09-16 ENCOUNTER — READMISSION MANAGEMENT (OUTPATIENT)
Dept: CALL CENTER | Facility: HOSPITAL | Age: 75
End: 2020-09-16

## 2020-09-16 VITALS
OXYGEN SATURATION: 97 % | BODY MASS INDEX: 34.29 KG/M2 | WEIGHT: 231.48 LBS | HEIGHT: 69 IN | RESPIRATION RATE: 16 BRPM | HEART RATE: 84 BPM | TEMPERATURE: 97.6 F | DIASTOLIC BLOOD PRESSURE: 77 MMHG | SYSTOLIC BLOOD PRESSURE: 137 MMHG

## 2020-09-16 DIAGNOSIS — Z96.651 STATUS POST REVISION OF TOTAL REPLACEMENT OF RIGHT KNEE: Primary | ICD-10-CM

## 2020-09-16 LAB
ALBUMIN SERPL-MCNC: 3.7 G/DL (ref 3.5–5.2)
ALBUMIN/GLOB SERPL: 1.5 G/DL
ALP SERPL-CCNC: 54 U/L (ref 39–117)
ALT SERPL W P-5'-P-CCNC: 18 U/L (ref 1–41)
ANION GAP SERPL CALCULATED.3IONS-SCNC: 11.6 MMOL/L (ref 5–15)
AST SERPL-CCNC: 18 U/L (ref 1–40)
BASOPHILS # BLD AUTO: 0.01 10*3/MM3 (ref 0–0.2)
BASOPHILS NFR BLD AUTO: 0.1 % (ref 0–1.5)
BILIRUB SERPL-MCNC: 0.2 MG/DL (ref 0–1.2)
BUN SERPL-MCNC: 20 MG/DL (ref 8–23)
BUN/CREAT SERPL: 18.3 (ref 7–25)
CALCIUM SPEC-SCNC: 8.3 MG/DL (ref 8.6–10.5)
CHLORIDE SERPL-SCNC: 96 MMOL/L (ref 98–107)
CHOLEST SERPL-MCNC: 142 MG/DL (ref 0–200)
CO2 SERPL-SCNC: 24.4 MMOL/L (ref 22–29)
CREAT SERPL-MCNC: 1.09 MG/DL (ref 0.76–1.27)
DEPRECATED RDW RBC AUTO: 42.9 FL (ref 37–54)
EOSINOPHIL # BLD AUTO: 0 10*3/MM3 (ref 0–0.4)
EOSINOPHIL NFR BLD AUTO: 0 % (ref 0.3–6.2)
ERYTHROCYTE [DISTWIDTH] IN BLOOD BY AUTOMATED COUNT: 12.2 % (ref 12.3–15.4)
GFR SERPL CREATININE-BSD FRML MDRD: 66 ML/MIN/1.73
GLOBULIN UR ELPH-MCNC: 2.4 GM/DL
GLUCOSE SERPL-MCNC: 137 MG/DL (ref 65–99)
HBA1C MFR BLD: 5 % (ref 4.8–5.6)
HCT VFR BLD AUTO: 35.7 % (ref 37.5–51)
HDLC SERPL-MCNC: 46 MG/DL (ref 40–60)
HGB BLD-MCNC: 12.2 G/DL (ref 13–17.7)
IMM GRANULOCYTES # BLD AUTO: 0.14 10*3/MM3 (ref 0–0.05)
IMM GRANULOCYTES NFR BLD AUTO: 0.9 % (ref 0–0.5)
LDLC SERPL CALC-MCNC: 70 MG/DL (ref 0–100)
LDLC/HDLC SERPL: 1.53 {RATIO}
LYMPHOCYTES # BLD AUTO: 1.5 10*3/MM3 (ref 0.7–3.1)
LYMPHOCYTES NFR BLD AUTO: 9.8 % (ref 19.6–45.3)
MCH RBC QN AUTO: 33.1 PG (ref 26.6–33)
MCHC RBC AUTO-ENTMCNC: 34.2 G/DL (ref 31.5–35.7)
MCV RBC AUTO: 96.7 FL (ref 79–97)
MONOCYTES # BLD AUTO: 1.51 10*3/MM3 (ref 0.1–0.9)
MONOCYTES NFR BLD AUTO: 9.9 % (ref 5–12)
NEUTROPHILS NFR BLD AUTO: 12.16 10*3/MM3 (ref 1.7–7)
NEUTROPHILS NFR BLD AUTO: 79.3 % (ref 42.7–76)
NRBC BLD AUTO-RTO: 0 /100 WBC (ref 0–0.2)
NT-PROBNP SERPL-MCNC: 423.9 PG/ML (ref 0–1800)
PLATELET # BLD AUTO: 267 10*3/MM3 (ref 140–450)
PMV BLD AUTO: 10 FL (ref 6–12)
POTASSIUM SERPL-SCNC: 4.3 MMOL/L (ref 3.5–5.2)
PROT SERPL-MCNC: 6.1 G/DL (ref 6–8.5)
RBC # BLD AUTO: 3.69 10*6/MM3 (ref 4.14–5.8)
SODIUM SERPL-SCNC: 132 MMOL/L (ref 136–145)
TRIGL SERPL-MCNC: 129 MG/DL (ref 0–150)
TSH SERPL DL<=0.05 MIU/L-ACNC: 1.84 UIU/ML (ref 0.27–4.2)
VLDLC SERPL-MCNC: 25.8 MG/DL (ref 5–40)
WBC # BLD AUTO: 15.32 10*3/MM3 (ref 3.4–10.8)

## 2020-09-16 PROCEDURE — 63710000001 ONDANSETRON PER 8 MG: Performed by: ORTHOPAEDIC SURGERY

## 2020-09-16 PROCEDURE — 80061 LIPID PANEL: CPT | Performed by: HOSPITALIST

## 2020-09-16 PROCEDURE — 85025 COMPLETE CBC W/AUTO DIFF WBC: CPT | Performed by: ORTHOPAEDIC SURGERY

## 2020-09-16 PROCEDURE — 84443 ASSAY THYROID STIM HORMONE: CPT | Performed by: HOSPITALIST

## 2020-09-16 PROCEDURE — 97110 THERAPEUTIC EXERCISES: CPT

## 2020-09-16 PROCEDURE — 83880 ASSAY OF NATRIURETIC PEPTIDE: CPT | Performed by: HOSPITALIST

## 2020-09-16 PROCEDURE — 80053 COMPREHEN METABOLIC PANEL: CPT | Performed by: HOSPITALIST

## 2020-09-16 PROCEDURE — 83036 HEMOGLOBIN GLYCOSYLATED A1C: CPT | Performed by: HOSPITALIST

## 2020-09-16 RX ORDER — HYDROCODONE BITARTRATE AND ACETAMINOPHEN 7.5; 325 MG/1; MG/1
1 TABLET ORAL EVERY 4 HOURS PRN
Qty: 42 TABLET | Refills: 0 | Status: SHIPPED | OUTPATIENT
Start: 2020-09-16 | End: 2020-09-23

## 2020-09-16 RX ORDER — ONDANSETRON 4 MG/1
4 TABLET, FILM COATED ORAL EVERY 6 HOURS PRN
Qty: 30 TABLET | Refills: 0 | Status: SHIPPED | OUTPATIENT
Start: 2020-09-16 | End: 2021-02-17

## 2020-09-16 RX ORDER — PSEUDOEPHEDRINE HCL 30 MG
100 TABLET ORAL 2 TIMES DAILY PRN
Qty: 30 EACH | Refills: 0 | Status: SHIPPED | OUTPATIENT
Start: 2020-09-16 | End: 2020-10-01

## 2020-09-16 RX ORDER — ASPIRIN 81 MG/1
81 TABLET ORAL EVERY 12 HOURS SCHEDULED
Qty: 60 TABLET | Refills: 0 | Status: SHIPPED | OUTPATIENT
Start: 2020-09-16 | End: 2020-10-16

## 2020-09-16 RX ORDER — BISACODYL 5 MG/1
10 TABLET, DELAYED RELEASE ORAL DAILY PRN
Qty: 20 TABLET | Refills: 0 | Status: SHIPPED | OUTPATIENT
Start: 2020-09-16 | End: 2020-09-26

## 2020-09-16 RX ORDER — HYDROCODONE BITARTRATE AND ACETAMINOPHEN 7.5; 325 MG/1; MG/1
1 TABLET ORAL EVERY 6 HOURS PRN
Qty: 42 TABLET | Refills: 0 | Status: SHIPPED | OUTPATIENT
Start: 2020-09-16 | End: 2020-10-01 | Stop reason: SDUPTHER

## 2020-09-16 RX ADMIN — HYDROCODONE BITARTRATE AND ACETAMINOPHEN 2 TABLET: 7.5; 325 TABLET ORAL at 03:51

## 2020-09-16 RX ADMIN — Medication 1 MG: at 01:13

## 2020-09-16 RX ADMIN — LISINOPRIL 40 MG: 40 TABLET ORAL at 07:59

## 2020-09-16 RX ADMIN — AMLODIPINE BESYLATE 5 MG: 5 TABLET ORAL at 07:59

## 2020-09-16 RX ADMIN — ASPIRIN 81 MG: 81 TABLET, COATED ORAL at 07:59

## 2020-09-16 RX ADMIN — ONDANSETRON HYDROCHLORIDE 4 MG: 4 TABLET, FILM COATED ORAL at 12:56

## 2020-09-16 RX ADMIN — ALPRAZOLAM 0.5 MG: 0.5 TABLET ORAL at 07:58

## 2020-09-16 RX ADMIN — HYDROCODONE BITARTRATE AND ACETAMINOPHEN 2 TABLET: 7.5; 325 TABLET ORAL at 07:59

## 2020-09-16 RX ADMIN — PANTOPRAZOLE SODIUM 40 MG: 40 TABLET, DELAYED RELEASE ORAL at 06:37

## 2020-09-16 RX ADMIN — HYDROCODONE BITARTRATE AND ACETAMINOPHEN 1 TABLET: 7.5; 325 TABLET ORAL at 12:46

## 2020-09-16 NOTE — PROGRESS NOTES
"Daily progress note    Chief complaint  Doing better  No new complaints  Wants to go home  Family at bedside    History of present illness  75-year-old white male with history of hypertension hyperlipidemia anxiety disorder and severe osteoarthritis electively admitted to orthopedic service for right total knee arthroplasty revision.  Patient underwent surgery this morning and I am asked to follow the patient for medical problem.  At the time of entry is hurting at the surgical site with mild nausea but no other complaint.  Patient is very pleasant and gave me a detailed history.  Wife is also present at the bedside.  Patient has no fever chills chest pain shortness of breath nausea vomiting diarrhea.  Patient also denies any night sweats weight loss or weight gain.    Review of systems  As above    Physical examination  Blood pressure 137/77, pulse 84, temperature 97.6 °F (36.4 °C), temperature source Skin, resp. rate 16, height 175.3 cm (69\"), weight 105 kg (231 lb 7.7 oz), SpO2 97 %.    HEENT unremarkable  Neck supple  Lungs decreased breath sound bilateral  Heart S1-S2 no murmur gallop or rub  Abdomen soft nontender bowel sounds positive  Extremities no edema  Neuro no focal deficit  Psych normal mood and behavior    LABS  Lab Results (last 24 hours)     Procedure Component Value Units Date/Time    Tissue / Bone Culture - Tissue, Knee, Right [[id number]] Collected: 09/15/20 0905    Specimen: Tissue from Knee, Right Updated: 09/16/20 1037     Tissue Culture No growth     Gram Stain No WBCs or organisms seen    BNP [[id number]]  (Normal) Collected: 09/16/20 0826    Specimen: Blood Updated: 09/16/20 0939     proBNP 423.9 pg/mL     Narrative:      Among patients with dyspnea, NT-proBNP is highly sensitive for the detection of acute congestive heart failure. In addition NT-proBNP of <300 pg/ml effectively rules out acute congestive heart failure with 99% negative predictive value.    Results may be falsely decreased if " patient taking Biotin.      TSH [[id number]]  (Normal) Collected: 09/16/20 0826    Specimen: Blood Updated: 09/16/20 0939     TSH 1.840 uIU/mL     CBC & Differential [[id number]]  (Abnormal) Collected: 09/16/20 0826    Specimen: Blood Updated: 09/16/20 0930    Narrative:      The following orders were created for panel order CBC & Differential.  Procedure                               Abnormality         Status                     ---------                               -----------         ------                     CBC Auto Differential[[id number]]        Abnormal            Final result                 Please view results for these tests on the individual orders.    CBC Auto Differential [[id number]]  (Abnormal) Collected: 09/16/20 0826    Specimen: Blood Updated: 09/16/20 0930     WBC 15.32 10*3/mm3      RBC 3.69 10*6/mm3      Hemoglobin 12.2 g/dL      Hematocrit 35.7 %      MCV 96.7 fL      MCH 33.1 pg      MCHC 34.2 g/dL      RDW 12.2 %      RDW-SD 42.9 fl      MPV 10.0 fL      Platelets 267 10*3/mm3      Neutrophil % 79.3 %      Lymphocyte % 9.8 %      Monocyte % 9.9 %      Eosinophil % 0.0 %      Basophil % 0.1 %      Immature Grans % 0.9 %      Neutrophils, Absolute 12.16 10*3/mm3      Lymphocytes, Absolute 1.50 10*3/mm3      Monocytes, Absolute 1.51 10*3/mm3      Eosinophils, Absolute 0.00 10*3/mm3      Basophils, Absolute 0.01 10*3/mm3      Immature Grans, Absolute 0.14 10*3/mm3      nRBC 0.0 /100 WBC     Comprehensive Metabolic Panel [[id number]]  (Abnormal) Collected: 09/16/20 0826    Specimen: Blood Updated: 09/16/20 0925     Glucose 137 mg/dL      BUN 20 mg/dL      Creatinine 1.09 mg/dL      Sodium 132 mmol/L      Potassium 4.3 mmol/L      Chloride 96 mmol/L      CO2 24.4 mmol/L      Calcium 8.3 mg/dL      Total Protein 6.1 g/dL      Albumin 3.70 g/dL      ALT (SGPT) 18 U/L      AST (SGOT) 18 U/L      Alkaline Phosphatase 54 U/L      Total Bilirubin 0.2 mg/dL      eGFR Non African Amer 66 mL/min/1.73       Globulin 2.4 gm/dL      A/G Ratio 1.5 g/dL      BUN/Creatinine Ratio 18.3     Anion Gap 11.6 mmol/L     Narrative:      GFR Normal >60  Chronic Kidney Disease <60  Kidney Failure <15      Lipid Panel [[id number]] Collected: 09/16/20 0826    Specimen: Blood Updated: 09/16/20 0925     Total Cholesterol 142 mg/dL      Triglycerides 129 mg/dL      HDL Cholesterol 46 mg/dL      LDL Cholesterol  70 mg/dL      VLDL Cholesterol 25.8 mg/dL      LDL/HDL Ratio 1.53    Narrative:      Cholesterol Reference Ranges  ([place] Department of Health and Human Services ATP III Classifications)    Desirable          <200 mg/dL  Borderline High    200-239 mg/dL  High Risk          >240 mg/dL      Triglyceride Reference Ranges  ([place].S. Department of Health and Human Services ATP III Classifications)    Normal           <150 mg/dL  Borderline High  150-199 mg/dL  High             200-499 mg/dL  Very High        >500 mg/dL    HDL Reference Ranges  ([place].S. Department of Health and Human Services ATP III Classifcations)    Low     <40 mg/dl (major risk factor for CHD)  High    >60 mg/dl ('negative' risk factor for CHD)        LDL Reference Ranges  ([place].S. Department of Health and Human Services ATP III Classifcations)    Optimal          <100 mg/dL  Near Optimal     100-129 mg/dL  Borderline High  130-159 mg/dL  High             160-189 mg/dL  Very High        >189 mg/dL    Hemoglobin A1c [[id number]]  (Normal) Collected: 09/16/20 0826    Specimen: Blood Updated: 09/16/20 0909     Hemoglobin A1C 5.00 %     Narrative:      Hemoglobin A1C Ranges:    Increased Risk for Diabetes  5.7% to 6.4%  Diabetes                     >= 6.5%  Diabetic Goal                < 7.0%        Imaging Results (Last 24 Hours)     Procedure Component Value Units Date/Time    XR Knee 1 or 2 View Right [[id number]] Collected: 09/15/20 1158     Updated: 09/15/20 1555    Narrative:      TWO VIEWS RIGHT KNEE     HISTORY: 75-year-old male undergoing right knee replacement.        FINDINGS: AP and lateral radiographs of the right knee were obtained and  demonstrate evidence of total right knee arthroplasty with patellar  resurfacing. Gas is seen within the surrounding soft tissues. Alignment  is within normal limits. There is no evidence for fracture. Overlying  skin staples are noted.       Impression:      Status post total right knee arthroplasty without evidence  for an acute abnormality.     This report was finalized on 9/15/2020 3:52 PM by Dr. Davon Lagos M.D.             Current Facility-Administered Medications:   •  ALPRAZolam (XANAX) tablet 0.5 mg, 0.5 mg, Oral, TID PRN, Patricia Caldera MD, 0.5 mg at 09/16/20 0758  •  amLODIPine (NORVASC) tablet 5 mg, 5 mg, Oral, Daily, Patricia Caldera MD, 5 mg at 09/16/20 0759  •  aspirin EC tablet 81 mg, 81 mg, Oral, Q12H, Patricia Caldera MD, 81 mg at 09/16/20 0759  •  bisacodyl (DULCOLAX) EC tablet 10 mg, 10 mg, Oral, Daily PRN, Patricia Caldera MD  •  docusate sodium (COLACE) capsule 100 mg, 100 mg, Oral, BID PRN, Patricia Caldera MD  •  HYDROcodone-acetaminophen (NORCO) 7.5-325 MG per tablet 1 tablet, 1 tablet, Oral, Q4H PRN, Patricia Caldera MD, 1 tablet at 09/16/20 1246  •  HYDROcodone-acetaminophen (NORCO) 7.5-325 MG per tablet 2 tablet, 2 tablet, Oral, Q4H PRN, Patricia Caldera MD, 2 tablet at 09/16/20 0759  •  HYDROmorphone (DILAUDID) injection 0.5 mg, 0.5 mg, Intravenous, Q2H PRN **AND** naloxone (NARCAN) injection 0.1 mg, 0.1 mg, Intravenous, Q5 Min PRN, Patricia Caldera MD  •  lactated ringers infusion, 9 mL/hr, Intravenous, Continuous, Madan Ambrocio MD, Last Rate: 9 mL/hr at 09/15/20 0727  •  lisinopril (PRINIVIL,ZESTRIL) tablet 40 mg, 40 mg, Oral, Q12H, Demetrio Miller MD, 40 mg at 09/16/20 0759  •  melatonin tablet 1 mg, 1 mg, Oral, Nightly PRN, Patricia Caldera MD, 1 mg at 09/16/20 0113  •  ondansetron (ZOFRAN) tablet 4 mg, 4 mg, Oral, Q6H PRN,  4 mg at 09/16/20 1256 **OR** ondansetron (ZOFRAN) injection 4 mg, 4 mg, Intravenous, Q6H PRN, Patricia Caldera MD  •  OXcarbazepine (TRILEPTAL) tablet 300 mg, 300 mg, Oral, Daily PRN, Patricia Caldera MD  •  pantoprazole (PROTONIX) EC tablet 40 mg, 40 mg, Oral, Q AM, Rakan Miller MD, 40 mg at 09/16/20 0637  •  sodium chloride 0.9 % infusion, 75 mL/hr, Intravenous, Continuous, Rakan Miller MD, Stopped at 09/16/20 0620     ASSESSMENT  Status post right total knee arthroplasty revision  Hypertension  Hyperlipidemia  Osteoarthritis  Gastroesophageal reflux disease    PLAN  CPM  Postop care  Adjust home medications  Stress ulcer DVT prophylaxis  PT OT  Supportive care  Medically stable  Discussed with nursing staff and wife  Will follow with  and further recommendation according to hospital course    RAKAN MILLER MD

## 2020-09-16 NOTE — DISCHARGE SUMMARY
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, initial encounter (CMS/[facility]) [T84.062A]  Instability of internal right knee prosthesis, initial encounter (CMS/[facility]) [T84.022A]  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, initial encounter (CMS/[facility]) [T84.062A]    Procedures Performed  Procedure(s):  RIGHT TOTAL KNEE ARTHROPLASTY REVISION       Patient Active Problem List   Diagnosis   • Trigeminal neuralgia of right side of face   • Bifascicular block   • Dyslipidemia   • Benign essential HTN   • Anxiety   • Hyperlipidemia LDL goal <100   • Status post revision of total replacement of right knee          Allergies   Allergen Reactions   • Oxycodone-Acetaminophen Nausea And Vomiting   • Morphine Hallucinations     N & V   • Vancomycin Itching          Discharge Medications      New Medications      Instructions Start Date   aspirin 81 MG EC tablet   81 mg, Oral, Every 12 Hours Scheduled      bisacodyl 5 MG EC tablet  Commonly known as: DULCOLAX   10 mg, Oral, Daily PRN      docusate sodium 100 MG capsule   100 mg, Oral, 2 Times Daily PRN      HYDROcodone-acetaminophen 7.5-325 MG per tablet  Commonly known as: NORCO   1 tablet, Oral, Every 4 Hours PRN      ondansetron 4 MG tablet  Commonly known as: ZOFRAN   4 mg, Oral, Every 6 Hours PRN         Continue These Medications      Instructions Start Date   ALPRAZolam 0.5 MG tablet  Commonly known as: XANAX   0.5 mg, Oral, 3 Times Daily PRN, for anxiety      amLODIPine 5 MG tablet  Commonly known as: NORVASC    5 mg, Oral, Daily      CALCIUM 1200 PO   1,200 mg, Oral, Every Morning      coenzyme Q10 100 MG capsule   100 mg, Oral, Daily      fish oil 1000 MG capsule capsule   1,200 mg, Oral, Daily With Breakfast      hydroCHLOROthiazide 25 MG tablet  Commonly known as: HYDRODIURIL   25 mg, Oral, Daily      MULTIVITAMIN ADULTS 50+ PO   1 tablet, Oral, Daily      OCUVITE EYE HEALTH FORMULA PO   1 tablet, Oral, Daily      OXcarbazepine 300 MG tablet  Commonly known as: TRILEPTAL   300 mg, Oral, Daily PRN      quinapril 20 MG tablet  Commonly known as: ACCUPRIL   40 mg, Oral, 2 Times Daily      quinapril 40 MG tablet  Commonly known as: ACCUPRIL   TAKE 1 TABLET BY MOUTH TWICE DAILY      rosuvastatin 20 MG tablet  Commonly known as: CRESTOR   20 mg, Oral, Daily      Tylenol PM Extra Strength  MG tablet per tablet  Generic drug: diphenhydrAMINE-acetaminophen   1 tablet, Oral, Nightly PRN      vitamin B-12 1000 MCG tablet  Commonly known as: CYANOCOBALAMIN   1,000 mcg, Oral, Daily                Past Medical History:   Diagnosis Date   • Anemia    • Ankle arthritis    • Ankle pain    • Anxiety    • Arthritis    • Depression    • GCA (giant cell arteritis) (CMS/HCC)    • Gout of foot    • High risk medication use    • Hyperlipidemia    • Hypertension    • Hyponatremia    • Macrocytosis    • Muscle weakness    • Myalgia    • Myositis    • Osteoarthritis    • Osteoarthritis of both hips    • Polymyalgia rheumatica (CMS/HCC)    • Renal insufficiency    • S/p bilateral revision of total hip replacement    • S/P foot surgery    • S/P revision of total knee, left    • S/P revision of total knee, right    • Trigeminal neuralgia         Past Surgical History:   Procedure Laterality Date   • ANKLE ARTHROPLASTY Left 2018   • BACK SURGERY      lower back surg   • CATARACT EXTRACTION     • CRANIOTOMY Right 02/14/2019    Dr. Aldridge, for trigeminal neuralgia   • EXCISION BAKERS CYST KNEE  2008   • HAND SURGERY Left 1971    Left hand and wrist  1971   • HIP SURGERY Right 12/08/2016    total right hip replacement   • KNEE ARTHROPLASTY Bilateral     LEFT - 1996  -  RIGHT -  1999    • REPLACEMENT TOTAL KNEE      1996 and 1999   • SHOULDER SURGERY     • TOTAL HIP ARTHROPLASTY Left 2017   • TOTAL KNEE ARTHROPLASTY REVISION  2009    left        Social History     Occupational History   • Not on file   Tobacco Use   • Smoking status: Former Smoker     Types: Cigars   • Smokeless tobacco: Former User     Types: Chew   • Tobacco comment: FOR A SHORT TIME AS A YOUNG ADULT   Substance and Sexual Activity   • Alcohol use: Yes     Alcohol/week: 1.0 standard drinks     Types: 1 Cans of beer per week     Comment: OCCAS   • Drug use: No   • Sexual activity: Defer      Social History     Social History Narrative   • Not on file        Family History   Problem Relation Age of Onset   • Cardiomyopathy Mother    • Hypertension Mother    • Hyperlipidemia Sister    • Hypertension Sister    • Coronary artery disease Brother    • Hyperlipidemia Brother    • Hypertension Brother    • No Known Problems Father    • Malig Hyperthermia Neg Hx        Physical Exam: 75 y.o. male  General Appearance:    Alert, cooperative, in no acute distress                      Vitals:    09/15/20 1312 09/15/20 1849 09/15/20 2259 09/16/20 0345   BP: 159/79 172/86 163/86 143/82   BP Location: Right arm Right arm Right arm Right arm   Patient Position: Lying Lying Lying Lying   Pulse: 84 96 101 91   Resp: 16 16 16 14   Temp: 97.4 °F (36.3 °C) 98.6 °F (37 °C) 98.4 °F (36.9 °C) 97.3 °F (36.3 °C)   TempSrc: Skin Skin Skin Skin   SpO2: 96% 94% 94% 95%   Weight:       Height:            Hospital Course:  75 y.o. 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 Antibiotic and VTE prophylaxis were per SCIP protocols  and included Kefzol  every 8 hours and Aspirin daily . Post-operatively the patient transferred to the post-operative floor where the patient underwent mobilization therapy that included active as well as passive ROM exercises. Opioids were titrated to achieve appropriate pain management to allow for participation in mobilization exercises. Vital signs are now stable. The incision is intact without signs or symptoms of infection. Operative extremity neurovascular status remains intact.     Appropriate education re: incision care, activity levels, medications, and follow up visits was completed and all questions were answered. The patient is now deemed stable for discharge.      DISCHARGE DISPOSITION AND PLAN:  The  Patient is being discharged home with home health for PT  2-3 X per week for 2-3 weeks and nursing care as needed.     DIAGNOSTIC TESTS:     Admission on 09/15/2020   Component Date Value Ref Range Status   • Gram Stain 09/15/2020 No WBCs or organisms seen   Preliminary       No results found for: URICACID  No results found for: CRYSTAL  Microbiology Results (last 10 days)     Procedure Component Value - Date/Time    Tissue / Bone Culture - Tissue, Knee, Right [[id number]] Collected: 09/15/20 0905    Lab Status: Preliminary result Specimen: Tissue from Knee, Right Updated: 09/15/20 1417     Gram Stain No WBCs or organisms seen    COVID PRE-OP / PRE-PROCEDURE SCREENING ORDER (NO ISOLATION) - Swab, Nasopharynx [[id number]] Collected: 09/12/20 1003    Lab Status: Final result Specimen: Swab from Nasopharynx Updated: 09/14/20 1230    Narrative:      The following orders were created for panel order COVID PRE-OP / PRE-PROCEDURE SCREENING ORDER (NO ISOLATION) - Swab, Nasopharynx.  Procedure                               Abnormality         Status                     ---------                               -----------         ------                     COVID-19,BIOTAP, NP/OP S...[[id number]]                       Final result                 Please view results for these tests on the individual orders.    COVID-19,BIOTAP, NP/OP SWAB IN TRANSPORT MEDIA OR SALINE 24-36 HR TAT - Swab, Nasopharynx [[id number]] Collected: 09/12/20 1003    Lab Status: Final result Specimen: Swab from Nasopharynx Updated: 09/14/20 1230     SARS-CoV-2 PCR Not Detected     Comment: Nucleic acid specific to SARS-CoV-2 (COVID-19) virus was not detected inthis sample by the TaqPath (TM) COVID-19 Combo Kit.SARS-CoV-2 (COVID-19) nucleic acid testing performed using [employer] Aptima (R) SARS-CoV-2 Assay or [employer] TaqPath   (TM)COVID-19 Combo Kit as indicated above under Emergency Use Authorization (EUA) from the FDA. Aptima (R) and TaqPath (TM) test performancecharacteristics verified by [facility] in accordance with the FDAs Guidance Document (Policy for Diagnostic   Tests for Coronavirus Disease-2019during the Public Health Emergency) issued on March 16, 2020. The laboratory is regulated under CLIA as qualified to perform high-complexity testingUnless otherwise noted, all testing was performed at [facility],   CLIA No. [id number], [place] State Licensee No. [id number]           Xr Knee 1 Or 2 View Right    Result Date: 9/15/2020  Status post total right knee arthroplasty without evidence for an acute abnormality.  This report was finalized on 9/15/2020 3:52 PM by Dr. Davon Lagos M.D.        Discharge and Follow up Instructions:     Total Knee Joint Replacement Discharge Instructions:    I. ACTIVITIES:  1. Exercises:  ? Complete exercise program as taught by the hospital physical therapist 2 times per day  ? Exercise program will be advanced by your home health physical therapist  ? During the day be up ambulating every 2 hours (while awake) for short distances  ? Complete the ankle pump exercises at least 10 times per hour (while awake)  ? Elevate legs most of the day the first week post operatively and thereafter elevate legs when in  bed and for at least 30 minutes during the day.   ? Caution must be taken to avoid pillow placement under the bend of the knee as this can led to flexion contractures of the knee. Pillow placement under the heel is encouraged.  ? Use cold packs 20-30 minutes approximately 5 times per day. This should be done before and after completing your exercises and at any time you are experiencing pain/ stiffness in your operative extremity.      2. Activities of Daily Living:  ? No tub baths, hot tubs, or swimming pools for 4 weeks  ? May shower and let water run over the incision on post-operative day #5 if no drainage. Do not scrub or rub the incision. Simply let the water run over the incision and pat dry.    II. Restrictions  ? Do not cross legs or kneel  ? Your surgeon will discuss with you when you will be able to drive again. Usual guidelines are you are to be off pain medications prior to driving.  ? Weight bearing is as tolerated  ? First week stay inside on even terrain. May go up and down stairs one stair at a time utilizing the hand rail.  ? After one week, you may venture outside.    III. Precautions:  ? Everyone that comes near you should wash their hands  ? No elective dental, genital-urinary, or colon procedures or surgical procedures for 12 weeks after surgery unless absolutely necessary.  ?  If dental work or surgical procedure is deemed absolutely necessary, you will need to contact your surgeon as you will need to take antibiotics 1 hour prior to any dental work (including teeth cleanings).  ? Please discuss with your surgeon prophylactic antibiotics as the length of time this intervention will be necessary for you varies with each patient’s health history and situation.  ? Avoid sick people. If you must be around someone who is ill, they should wear a mask.  ? Avoid visits to the Emergency Room or Urgent Care. If you feel you need to go to the emergency room, please notify your surgeon.    ? Stockings are  to be worn for one week after surgery and are to be placed on in the morning and removed at night. Observe your skin when stocking is removed for any problems. Monitor the stockings to ensure that any swelling is not causing the stockings to become too tight. In this case, remove stockings immediately.    IV. INCISION CARE:  ? Wash your hands prior to dressing changes  ? Change the dressing as needed to keep incision clean and dry. Utilize dry gauze and paper tape. Avoid touching the side of the gauze that goes against the incision with your hands.  ? No creams or ointments to the incision  ? May remove dressing once the incision is free of drainage  ? Do not touch or pick at the incision  ? Check incision every day and notify surgeon immediately if any of the following signs or symptoms are noted:  o Increase in redness  o Increase in swelling around the incision and of the entire extremity  o Increase in pain  o Drainage oozing from the incision  o Pulling apart of the edges of the incision  o Increase in overall body temperature (greater than 100.5 degrees)    ? Your  Staples will be removed between 10-14 days postoperation.  This may be done by either the home health nurse, rehabilitation nurse or during your return visit to Dr. Caldera's office.  You will then be instructed on how to care for the incision.  (Please call the office if your staples have not been removed within 14 days after surgery).      V. Medications:   1. Anticoagulants: You will be discharged on an anticoagulant. This is a prophylactic medication that helps prevent blood clots during your post-operative period.  You will be on Aspirin  81 mg twice daily for 30 days. If you were on Aspirin 81 mg prior to surgery you can go back to home dose once the 30 days are completed.     ? While taking the anticoagulant, you should avoid taking any additional aspirin, ibuprofen (Advil or Motrin), Aleve (Naprosyn) or other non-steroidal anti-inflammatory  medications.   ? Notify surgeon immediately if any sohan bleeding is noted in the urine, stool, emesis, or from the nose or the incision. Blood in the stool will often appear as black rather than red. Blood in urine may appear as pink. Blood in emesis may appear as brown/black like coffee grounds.  ? You will need to apply pressure for longer periods of time to any cuts or abrasions to stop bleeding  ? Avoid alcohol while taking anticoagulants    2. Stool Softeners: You will be at greater risk of constipation after surgery due to being less mobile and the pain medications.   ? Take stool softeners as instructed by your surgeon while on pain medications. Over the counter Colace 100 mg 1-2 capsules twice daily.   ? If stools become too loose or too frequent, please decreases the dosage or stop the stool softener.  ? If constipation occurs despite use of stool softeners, you are to continue the stool softeners and add a laxative (Milk of Magnesia 1 ounce daily as needed).  ? Dulcolax oral tabs or suppository, or a fleets enema can also be utilized for constipation and can be obtained over the counter.   ? If above interventions are unsuccessful in inducing bowel movements, please contact your surgeon's office / family physician's office.  ? Drink plenty of fluids, and eat fruits and vegetables during your recovery time    3. Pain Medications utilized after surgery are narcotics and the law requires that the following information be given to all patients that are prescribed narcotics:  ? CLASSIFICATION: Pain medications are called Opioids and are narcotics  ? LEGALITIES: It is illegal to share narcotics with others and to drive within 24 hours of taking narcotics  ? POTENTIAL SIDE EFFECTS: Potential side effects of opioids include: nausea, vomiting, itching, dizziness, drowsiness, dry mouth, constipation, and difficulty urinating.  ? POTENTIAL ADVERSE EFFECTS:   o Opioid tolerance can develop with use of pain  medications and this simply means that it requires more and more of the medication to control pain; however, this is seen more in patients that use opioids for longer periods of time.  o Opioid dependence can develop with use of Opioids and this simply means that to stop the medication can cause withdrawal symptoms; however, this is seen with patients that use Opioids for longer periods of time.  o Opioid addiction can develop with use of Opioids and the incidence of this is very unlikely in patients who take the medications as ordered and stop the medications as instructed.  o Opioid overdose can be dangerous, but is unlikely when the medication is taken as ordered and stopped when ordered. It is important not to mix opioids with alcohol or with and type of sedative such as Benadryl as this can lead to over sedation and respiratory difficulty.  ? DOSAGE:   o Pain medications will need to be taken consistently for the first week to decrease pain and promote adequate pain relief and participation in physical therapy.  o After the initial surgical pain begins to resolve, you may begin to decrease the pain medication. By the end of 6 weeks, you should be off of pain medications.  o Refills will not be given by the office during evening hours, on weekends, or after 6 weeks post-op.  o To seek refills on pain medications during the initial 6 week post-operative period, you must call the office 48 hours in advance to request the refill. The office will then notify you when to  the prescription. DO NOT wait until you are out of the medication to request a refill.    V. FOLLOW-UP VISITS:  ? You will need to follow up in the office with your surgeon on October 1, 2020.  Please call this number [phone] to schedule this appointment.  ? If you have any concerns or suspected complications prior to your follow up visit, please call your surgeons office. Do not wait until your appointment time if you suspect  complications. These will need to be addressed in the office promptly.      Date:     GREY Rai    CC: Charity Kearney MD; GREY Rai Madhusudhan R,*

## 2020-09-16 NOTE — DISCHARGE PLACEMENT REQUEST
"Jeremi Ugarte (75 y.o. Male)     Date of Birth Social Security Number Address Home Phone MRN    1945  [id number] [address] [phone] [id number]    [employer] Marital Status          Latter day        Admission Date Admission Type Admitting Provider Attending Provider Department, Room/Bed    9/15/20 Elective Patricia Caldera MD Yakkanti, Madhusudhan R, MD [address], P787/1    Discharge Date Discharge Disposition Discharge Destination         Home-Health Care [facility]              Attending Provider: Patricia Caldera MD    Allergies: Oxycodone-acetaminophen, Morphine, Vancomycin    Isolation: None   Infection: None   Code Status: CPR    Ht: 175.3 cm (69\")   Wt: 105 kg (231 lb 7.7 oz)    Admission Cmt: None   Principal Problem: 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 (CMS/[facility]) [T84.062A]                 Active Insurance as of 9/15/2020     Primary Coverage     Payor Plan Insurance Group Employer/Plan Group    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

## 2020-09-16 NOTE — PLAN OF CARE
Goal Outcome Evaluation:  Plan of Care Reviewed With: patient, spouse  Progress: improving  Patient stable. Voiding function intact. Pain controlled. Zofran for nausea. Medications sent to patient's pharmacy. Education provided. F/u with Dr. Caldera on 10/1/20. D/C home with [facility]

## 2020-09-16 NOTE — PROGRESS NOTES
Patient: Jeremi Ugarte  YOB: 1945     Date of Admission: 9/15/2020  6:40 AM Medical Record Number: [id number]     Attending Physician: Patricia Caldera,Sukhjinder    Procedure(s):  RIGHT TOTAL KNEE ARTHROPLASTY REVISION Post Operative Day Number: 1    Subjective : No new orthopaedic complaints     Pain Relief: some relief with present medication.     Systemic Complaints: No Complaints  Vitals:    09/15/20 1312 09/15/20 1849 09/15/20 2259 09/16/20 0345   BP: 159/79 172/86 163/86 143/82   BP Location: Right arm Right arm Right arm Right arm   Patient Position: Lying Lying Lying Lying   Pulse: 84 96 101 91   Resp: 16 16 16 14   Temp: 97.4 °F (36.3 °C) 98.6 °F (37 °C) 98.4 °F (36.9 °C) 97.3 °F (36.3 °C)   TempSrc: Skin Skin Skin Skin   SpO2: 96% 94% 94% 95%   Weight:       Height:           Physical Exam: 75 y.o. male    General Appearance:       Alert, cooperative, in no acute distress                  Extremities:    Dressing Clean, Dry and Intact         Incision healthy without signs or symptoms of infections         No clinical sign of DVT        Able to do good movements of digits    Pulses:   Pulses palpable and equal bilaterally           Diagnostic Tests:                 No results found for: CRP  No results found for: SEDRATE  No results found for: URICACID  No results found for: CRYSTAL  Microbiology Results (last 10 days)     Procedure Component Value - Date/Time    Tissue / Bone Culture - Tissue, Knee, Right [[id number]] Collected: 09/15/20 0905    Lab Status: Preliminary result Specimen: Tissue from Knee, Right Updated: 09/15/20 1417     Gram Stain No WBCs or organisms seen    COVID PRE-OP / PRE-PROCEDURE SCREENING ORDER (NO ISOLATION) - Swab, Nasopharynx [[id number]] Collected: 09/12/20 1003    Lab Status: Final result Specimen: Swab from Nasopharynx Updated: 09/14/20 1230    Narrative:      The following orders were created for panel order COVID PRE-OP / PRE-PROCEDURE SCREENING ORDER  (NO ISOLATION) - Swab, Nasopharynx.  Procedure                               Abnormality         Status                     ---------                               -----------         ------                     COVID-19,BIOTAP, NP/OP S...[[id number]]                      Final result                 Please view results for these tests on the individual orders.    COVID-19,BIOTAP, NP/OP SWAB IN TRANSPORT MEDIA OR SALINE 24-36 HR TAT - Swab, Nasopharynx [[id number]] Collected: 09/12/20 1003    Lab Status: Final result Specimen: Swab from Nasopharynx Updated: 09/14/20 1230     SARS-CoV-2 PCR Not Detected     Comment: Nucleic acid specific to SARS-CoV-2 (COVID-19) virus was not detected inthis sample by the TaqPath (TM) COVID-19 Combo Kit.SARS-CoV-2 (COVID-19) nucleic acid testing performed using [employer] Aptima (R) SARS-CoV-2 Assay or [employer] TaqPath   (TM)COVID-19 Combo Kit as indicated above under Emergency Use Authorization (EUA) from the FDA. Aptima (R) and TaqPath (TM) test performancecharacteristics verified by [facility] in accordance with the FDAs Guidance Document (Policy for Diagnostic   Tests for Coronavirus Disease-2019during the Public Health Emergency) issued on March 16, 2020. The laboratory is regulated under CLIA as qualified to perform high-complexity testingUnless otherwise noted, all testing was performed at [facility],   CLIA No. [id number], [place] State Licensee No. [id number]           Xr Knee 1 Or 2 View Right    Result Date: 9/15/2020  Status post total right knee arthroplasty without evidence for an acute abnormality.  This report was finalized on 9/15/2020 3:52 PM by Dr. Davon Lagos M.D.              Current Medications:  Scheduled Meds:amLODIPine, 5 mg, Oral, Daily  aspirin, 81 mg, Oral, Q12H  lisinopril, 40 mg, Oral, Q12H  pantoprazole, 40 mg, Oral, Q AM      Continuous Infusions:lactated ringers, 9 mL/hr, Last Rate: 9 mL/hr (09/15/20 0766)  Pharmacy Consult,   sodium  chloride, 75 mL/hr, Last Rate: Stopped (09/16/20 0620)      PRN Meds:.•  ALPRAZolam  •  bisacodyl  •  docusate sodium  •  HYDROcodone-acetaminophen  •  HYDROcodone-acetaminophen  •  HYDROmorphone **AND** naloxone  •  melatonin  •  ondansetron **OR** ondansetron  •  OXcarbazepine  •  Pharmacy Consult    Assessment:    Procedure(s):  RIGHT TOTAL KNEE ARTHROPLASTY REVISION    Patient Active Problem List   Diagnosis   • Trigeminal neuralgia of right side of face   • Bifascicular block   • Dyslipidemia   • Benign essential HTN   • Anxiety   • Hyperlipidemia LDL goal <100   • Status post revision of total replacement of right knee       PLAN:   Continues current post-op course  Anticoagulation: Aspirin started  Hemovac Drain to be removed today  Dressing Change prn  Mobilize with PT as tolerated per protocol  Labs pending    Weight Bearing: WBAT  Discharge Plan: OK to plan for discharge in  today to home and home health  from orthopadic perspective.      Patricia Caldera MD    Date: 9/16/2020    Time: 07:13 EDT

## 2020-09-16 NOTE — PLAN OF CARE
Goal Outcome Evaluation:  Plan of Care Reviewed With: patient, spouse  Progress: improving  Outcome Summary: vss, dsg c/d/i, neurovascular status wnl, voiding well, reports adequate pain control, patient very anxious because [facility] had to substitue b/p med, discharge plan is home today if medically stable, educated on monitoring b/p due to hypertension hx, will cont to monitor

## 2020-09-16 NOTE — OUTREACH NOTE
Prep Survey      Responses   [facility] facility patient discharged from?  [place]   Is LACE score < 7 ?  Yes   Eligibility  [facility]   Date of Admission  09/08/20   Date of Discharge  09/16/20   Discharge Disposition  Home-Health Care Sv   Discharge diagnosis  Right total knee arthro   COVID-19 Test Status  Negative   Does the patient have one of the following disease processes/diagnoses(primary or secondary)?  Total Joint Replacement   Does the patient have Home health ordered?  Yes   What is the Home health agency?   [facility]    Is there a DME ordered?  No   Prep survey completed?  Yes          Dia Camacho RN

## 2020-09-16 NOTE — THERAPY TREATMENT NOTE
Patient Name: Jeremi Ugarte  : 1945    MRN: [id number]                              Today's Date: 2020       Admit Date: 9/15/2020    Visit Dx:     ICD-10-CM ICD-9-CM   1. Status post revision of total replacement of right knee  Z96.651 V43.65   2. 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, initial encounter (CMS/HCC)  T84.062A 996.46     V43.65   3. Instability of internal right knee prosthesis, initial encounter (CMS/HCC)  T84.022A 996.42     V43.65     Patient Active Problem List   Diagnosis   • Trigeminal neuralgia of right side of face   • Bifascicular block   • Dyslipidemia   • Benign essential HTN   • Anxiety   • Hyperlipidemia LDL goal <100   • Status post revision of total replacement of right knee     Past Medical History:   Diagnosis Date   • Anemia    • Ankle arthritis    • Ankle pain    • Anxiety    • Arthritis    • Depression    • GCA (giant cell arteritis) (CMS/HCC)    • Gout of foot    • High risk medication use    • Hyperlipidemia    • Hypertension    • Hyponatremia    • Macrocytosis    • Muscle weakness    • Myalgia    • Myositis    • Osteoarthritis    • Osteoarthritis of both hips    • Polymyalgia rheumatica (CMS/[facility])    • Renal insufficiency    • S/p bilateral revision of total hip replacement    • S/P foot surgery    • S/P revision of total knee, left    • S/P revision of total knee, right    • Trigeminal neuralgia      Past Surgical History:   Procedure Laterality Date   • ANKLE ARTHROPLASTY Left 2018   • BACK SURGERY      lower back surg   • CATARACT EXTRACTION     • CRANIOTOMY Right 2019    Dr. Aldridge, for trigeminal neuralgia   • EXCISION BAKERS CYST KNEE     • HAND SURGERY Left     Left hand and wrist    • HIP SURGERY Right 2016    total right hip replacement   • KNEE ARTHROPLASTY Bilateral     LEFT -   -  RIGHT -      • REPLACEMENT TOTAL KNEE       and    • SHOULDER SURGERY     • TOTAL HIP ARTHROPLASTY  Left 2017   • TOTAL KNEE ARTHROPLASTY REVISION  2009    left   • TOTAL KNEE ARTHROPLASTY REVISION Right 9/15/2020    Procedure: RIGHT TOTAL KNEE ARTHROPLASTY REVISION;  Surgeon: Patricia Caldera MD;  Location: [facility];  Service: Orthopedics;  Laterality: Right;     General Information     Row Name 09/16/20 1205          Physical Therapy Time and Intention    Document Type  therapy note (daily note)  -PH     Mode of Treatment  physical therapy  -PH     Row Name 09/16/20 1205          Safety Issues, Functional Mobility    Impairments Affecting Function (Mobility)  endurance/activity tolerance;range of motion (ROM);postural/trunk control;strength  -PH       User Key  (r) = Recorded By, (t) = Taken By, (c) = Cosigned By    Initials Name Provider Type    PH Sheryl Morgan PTA Physical Therapy Assistant        Mobility     Row Name 09/16/20 1205          Bed Mobility    Comment (Bed Mobility)  UI  -PH     Row Name 09/16/20 1205          Sit-Stand Transfer    Sit-Stand [place] (Transfers)  contact guard;verbal cues;nonverbal cues (demo/gesture)  -PH     Assistive Device (Sit-Stand Transfers)  walker, front-wheeled  -PH     Row Name 09/16/20 1205          Gait/Stairs (Locomotion)    [place] Level (Gait)  contact guard;verbal cues;nonverbal cues (demo/gesture)  -PH     Assistive Device (Gait)  walker, front-wheeled  -PH     Distance in Feet (Gait)  200'  -PH     Deviations/Abnormal Patterns (Gait)  antalgic;gatito decreased;gait speed decreased;stride length decreased  -PH     Bilateral Gait Deviations  forward flexed posture;heel strike decreased  -PH     Right Sided Gait Deviations  weight shift ability decreased  -PH     [place] Level (Stairs)  contact guard  -PH     Assistive Device (Stairs)  walker, front-wheeled 1 step  -PH     Handrail Location (Stairs)  right side (ascending);right side (descending)  -PH     Number of Steps (Stairs)  4 asc/dsc then 2 steps asc/dsc w/ fww on R  side  -PH     Ascending Technique (Stairs)  step-to-step  -PH     Descending Technique (Stairs)  step-to-step  -PH     Comment (Gait/Stairs)  Educ for seq on stairs  -PH       User Key  (r) = Recorded By, (t) = Taken By, (c) = Cosigned By    Initials Name Provider Type     Sheryl Morgan PTA Physical Therapy Assistant        Obj/Interventions     Row Name 09/16/20 1207          Range of Motion Comprehensive    Comment, General Range of Motion  R knee - 15-70 AROM  -PH     Row Name 09/16/20 1207          Knee (Therapeutic Exercise)    Knee (Therapeutic Exercise)  AROM (active range of motion)  -PH     Knee AROM (Therapeutic Exercise)  right;other (see comments) R TKA protocol x 15 reps - very reduced ROM for SLR/heel slides  -PH       User Key  (r) = Recorded By, (t) = Taken By, (c) = Cosigned By    Initials Name Provider Type     Sheryl Morgan PTA Physical Therapy Assistant        Goals/Plan    No documentation.       Clinical Impression     Row Name 09/16/20 1209          Pain    Additional Documentation  Pain Scale: Numbers Pre/Post-Treatment (Group)  -PH     Row Name 09/16/20 1209          Pain Scale: Numbers Pre/Post-Treatment    Pretreatment Pain Rating  0/10 - no pain  -PH     Posttreatment Pain Rating  5/10  -PH     Pain Location - Side  Right  -PH     Pain Location - Orientation  incisional  -PH     Pain Location  knee  -PH     Pain Intervention(s)  Cold pack;Repositioned;Rest  -PH     Row Name 09/16/20 1209          Plan of Care Review    Plan of Care Reviewed With  patient;spouse  -PH     Progress  improving  -PH     Outcome Summary  Pt doing better today w/ PT. Pt amb 200' req CGA/fww w/ mild unsteadiness noted - no LOB. Pt was educ on and asc/dsc 4 stairs then 2 stairs using fww. Pt would benefit from  PT after dc w/ wife stating she would contact workman's comp rep if she had questions.  -PH     Row Name 09/16/20 1203          Positioning and Restraints    Pre-Treatment Position   sitting in chair/recliner  -PH     Post Treatment Position  chair  -PH     In Chair  reclined;call light within reach;encouraged to call for assist;exit alarm on;with family/caregiver  -PH       User Key  (r) = Recorded By, (t) = Taken By, (c) = Cosigned By    Initials Name Provider Type     Sheryl Morgan PTA Physical Therapy Assistant        Outcome Measures     Row Name 09/16/20 1212          How much help from another person do you currently need...    Turning from your back to your side while in flat bed without using bedrails?  3  -PH     Moving from lying on back to sitting on the side of a flat bed without bedrails?  3  -PH     Moving to and from a bed to a chair (including a wheelchair)?  3  -PH     Standing up from a chair using your arms (e.g., wheelchair, bedside chair)?  3  -PH     Climbing 3-5 steps with a railing?  3  -PH     To walk in hospital room?  3  -PH     AM-PAC 6 Clicks Score (PT)  18  -PH     Row Name 09/16/20 1212          Functional Assessment    Outcome Measure Options  AM-PAC 6 Clicks Basic Mobility (PT)  -PH       User Key  (r) = Recorded By, (t) = Taken By, (c) = Cosigned By    Initials Name Provider Type     Sheryl Morgan PTA Physical Therapy Assistant        Physical Therapy Education                 Title: PT OT SLP Therapies (Done)     Topic: Physical Therapy (Done)     Point: Mobility training (Done)     Learning Progress Summary           Patient Acceptance, E,D, VU,DU by PH at 9/16/2020 1212    Acceptance, E,TB, VU,DU by AE at 9/15/2020 1551   Significant Other Acceptance, E,TB, VU,DU by AE at 9/15/2020 1551                   Point: Home exercise program (Done)     Learning Progress Summary           Patient Acceptance, E,D, VU,DU by PH at 9/16/2020 1212    Acceptance, E,TB, VU,DU by AE at 9/15/2020 1551   Significant Other Acceptance, E,TB, VU,DU by AE at 9/15/2020 1551                   Point: Body mechanics (Done)     Learning Progress Summary            Patient Acceptance, E,D, VU,DU by PH at 9/16/2020 1212    Acceptance, E,TB, VU,DU by AE at 9/15/2020 1551   Significant Other Acceptance, E,TB, VU,DU by AE at 9/15/2020 1551                   Point: Precautions (Done)     Learning Progress Summary           Patient Acceptance, E,D, VU,DU by PH at 9/16/2020 1212    Acceptance, E,TB, VU,DU by AE at 9/15/2020 1551   Significant Other Acceptance, E,TB, VU,DU by AE at 9/15/2020 1551                               User Key     Initials Effective Dates Name Provider Type Discipline     08/20/19 -  Sheryl Morgan PTA Physical Therapy Assistant PT    AE 09/04/19 -  Noemi Caal PT Physical Therapist PT              PT Recommendation and Plan     Plan of Care Reviewed With: patient, spouse  Progress: improving  Outcome Summary: Pt doing better today w/ PT. Pt amb 200' req CGA/fww w/ mild unsteadiness noted - no LOB. Pt was educ on and asc/dsc 4 stairs then 2 stairs using fww. Pt would benefit from  PT after dc w/ wife stating she would contact [employer]'s comp rep if she had questions.     Time Calculation:   PT Charges     Row Name 09/16/20 1213             Time Calculation    Start Time  0942  -PH      Stop Time  1014  -PH      Time Calculation (min)  32 min  -PH      PT Received On  09/16/20  -PH      PT - Next Appointment  09/17/20  -PH        User Key  (r) = Recorded By, (t) = Taken By, (c) = Cosigned By    Initials Name Provider Type     Sheryl Morgan PTA Physical Therapy Assistant        Therapy Charges for Today     Code Description Service Date Service Provider Modifiers Qty    [id number] HC PT THER PROC EA 15 MIN 9/16/2020 Sheryl Morgan PTA GP 2          PT G-Codes  Outcome Measure Options: AM-PAC 6 Clicks Basic Mobility (PT)  AM-PAC 6 Clicks Score (PT): 18    Sheryl Morgan PTA  9/16/2020

## 2020-09-16 NOTE — PROGRESS NOTES
Pharmacy Consult to Dose - Lisinopril    Pharmacy has been consulted by Dr. Miller to determine an equivalent dose of lisinopril to patient's home medication, quinapril. Patient reports taking quinapril 80mg daily. Previous order entered for lisinopril 5mg BID. Last dose given at 1348 - BP since this dose has been 172/86 and 163/86. Per [employer] policy the interchange is 1:1 however I would rather break the dose into BID dosing and see how the patient's BP responds. Will change Quinapril 80mg PO every 24 hours to Lisinopril 40mg PO every 12 hours.     Thank you for this consult, please call with any questions.    Jared Moreno, PharmD

## 2020-09-16 NOTE — SIGNIFICANT NOTE
Patient became very upset and anxious when nurse attempted to adm lisinopril ( equivalent to his home dose of accupril) stating he takes it different than is ordered. Rn attempted to explain that she would call MD for clarification-pt began to yell and called his wife at home on cell phone.After talking to his wife, pt apologetic and verbalized understanding that nurse was working on med clarification

## 2020-09-16 NOTE — DISCHARGE INSTRUCTIONS
Discharge and Follow up Instructions:      Total Knee Joint Replacement Discharge Instructions:     I. ACTIVITIES:  1. Exercises:  · Complete exercise program as taught by the hospital physical therapist 2 times per day  · Exercise program will be advanced by your home health physical therapist  · During the day be up ambulating every 2 hours (while awake) for short distances  · Complete the ankle pump exercises at least 10 times per hour (while awake)  · Elevate legs most of the day the first week post operatively and thereafter elevate legs when in bed and for at least 30 minutes during the day.   · Caution must be taken to avoid pillow placement under the bend of the knee as this can led to flexion contractures of the knee. Pillow placement under the heel is encouraged.  · Use cold packs 20-30 minutes approximately 5 times per day. This should be done before and after completing your exercises and at any time you are experiencing pain/ stiffness in your operative extremity.        2. Activities of Daily Living:  · No tub baths, hot tubs, or swimming pools for 4 weeks  · May shower and let water run over the incision on post-operative day #5 if no drainage. Do not scrub or rub the incision. Simply let the water run over the incision and pat dry.     II. Restrictions  · Do not cross legs or kneel  · Your surgeon will discuss with you when you will be able to drive again. Usual guidelines are you are to be off pain medications prior to driving.  · Weight bearing is as tolerated  · First week stay inside on even terrain. May go up and down stairs one stair at a time utilizing the hand rail.  · After one week, you may venture outside.     III. Precautions:  · Everyone that comes near you should wash their hands  · No elective dental, genital-urinary, or colon procedures or surgical procedures for 12 weeks after surgery unless absolutely necessary.  ·  If dental work or surgical procedure is deemed absolutely  necessary, you will need to contact your surgeon as you will need to take antibiotics 1 hour prior to any dental work (including teeth cleanings).  · Please discuss with your surgeon prophylactic antibiotics as the length of time this intervention will be necessary for you varies with each patient’s health history and situation.  · Avoid sick people. If you must be around someone who is ill, they should wear a mask.  · Avoid visits to the Emergency Room or Urgent Care. If you feel you need to go to the emergency room, please notify your surgeon.    · Stockings are to be worn for one week after surgery and are to be placed on in the morning and removed at night. Observe your skin when stocking is removed for any problems. Monitor the stockings to ensure that any swelling is not causing the stockings to become too tight. In this case, remove stockings immediately.     IV. INCISION CARE:  · Wash your hands prior to dressing changes  · Change the dressing as needed to keep incision clean and dry. Utilize dry gauze and paper tape. Avoid touching the side of the gauze that goes against the incision with your hands.  · No creams or ointments to the incision  · May remove dressing once the incision is free of drainage  · Do not touch or pick at the incision  · Check incision every day and notify surgeon immediately if any of the following signs or symptoms are noted:  ? Increase in redness  ? Increase in swelling around the incision and of the entire extremity  ? Increase in pain  ? Drainage oozing from the incision  ? Pulling apart of the edges of the incision  ? Increase in overall body temperature (greater than 100.5 degrees)     · Your  Staples will be removed between 10-14 days postoperation.  This may be done by either the home health nurse, rehabilitation nurse or during your return visit to Dr. Caldera's office.  You will then be instructed on how to care for the incision.  (Please call the office if your staples  have not been removed within 14 days after surgery).        V. Medications:   1. Anticoagulants: You will be discharged on an anticoagulant. This is a prophylactic medication that helps prevent blood clots during your post-operative period.  You will be on Aspirin  81 mg twice daily for 30 days. If you were on Aspirin 81 mg prior to surgery you can go back to home dose once the 30 days are completed.      · While taking the anticoagulant, you should avoid taking any additional aspirin, ibuprofen (Advil or Motrin), Aleve (Naprosyn) or other non-steroidal anti-inflammatory medications.   · Notify surgeon immediately if any sohan bleeding is noted in the urine, stool, emesis, or from the nose or the incision. Blood in the stool will often appear as black rather than red. Blood in urine may appear as pink. Blood in emesis may appear as brown/black like coffee grounds.  · You will need to apply pressure for longer periods of time to any cuts or abrasions to stop bleeding  · Avoid alcohol while taking anticoagulants     2. Stool Softeners: You will be at greater risk of constipation after surgery due to being less mobile and the pain medications.   · Take stool softeners as instructed by your surgeon while on pain medications. Over the counter Colace 100 mg 1-2 capsules twice daily.   · If stools become too loose or too frequent, please decreases the dosage or stop the stool softener.  · If constipation occurs despite use of stool softeners, you are to continue the stool softeners and add a laxative (Milk of Magnesia 1 ounce daily as needed).  · Dulcolax oral tabs or suppository, or a fleets enema can also be utilized for constipation and can be obtained over the counter.   · If above interventions are unsuccessful in inducing bowel movements, please contact your surgeon's office / family physician's office.  · Drink plenty of fluids, and eat fruits and vegetables during your recovery time     3. Pain Medications utilized  after surgery are narcotics and the law requires that the following information be given to all patients that are prescribed narcotics:  · CLASSIFICATION: Pain medications are called Opioids and are narcotics  · LEGALITIES: It is illegal to share narcotics with others and to drive within 24 hours of taking narcotics  · POTENTIAL SIDE EFFECTS: Potential side effects of opioids include: nausea, vomiting, itching, dizziness, drowsiness, dry mouth, constipation, and difficulty urinating.  · POTENTIAL ADVERSE EFFECTS:   ? Opioid tolerance can develop with use of pain medications and this simply means that it requires more and more of the medication to control pain; however, this is seen more in patients that use opioids for longer periods of time.  ? Opioid dependence can develop with use of Opioids and this simply means that to stop the medication can cause withdrawal symptoms; however, this is seen with patients that use Opioids for longer periods of time.  ? Opioid addiction can develop with use of Opioids and the incidence of this is very unlikely in patients who take the medications as ordered and stop the medications as instructed.  ? Opioid overdose can be dangerous, but is unlikely when the medication is taken as ordered and stopped when ordered. It is important not to mix opioids with alcohol or with and type of sedative such as Benadryl as this can lead to over sedation and respiratory difficulty.  · DOSAGE:   ? Pain medications will need to be taken consistently for the first week to decrease pain and promote adequate pain relief and participation in physical therapy.  ? After the initial surgical pain begins to resolve, you may begin to decrease the pain medication. By the end of 6 weeks, you should be off of pain medications.  ? Refills will not be given by the office during evening hours, on weekends, or after 6 weeks post-op.  ? To seek refills on pain medications during the initial 6 week post-operative  period, you must call the office 48 hours in advance to request the refill. The office will then notify you when to  the prescription. DO NOT wait until you are out of the medication to request a refill.     V. FOLLOW-UP VISITS:  · You will need to follow up in the office with your surgeon on October 1, 2020.  Please call this number [phone] to schedule this appointment.  · If you have any concerns or suspected complications prior to your follow up visit, please call your surgeons office. Do not wait until your appointment time if you suspect complications. These will need to be addressed in the office promptly.

## 2020-09-16 NOTE — PROGRESS NOTES
Continued Stay Note  [facility]     Patient Name: Jeremi Ugarte  MRN: [id number]  Today's Date: 9/16/2020    Admit Date: 9/15/2020    Discharge Plan     Row Name 09/16/20 0946       Plan    Plan  home with wife and HH; VM Left for workers comp  to set up HH    Plan Comments   Left for [facility] [phone] with workers comp to arrange HH. FELICIANO Stafford        Discharge Codes    No documentation.       Expected Discharge Date and Time     Expected Discharge Date Expected Discharge Time    Sep 16, 2020             NAM Schwartz

## 2020-09-17 ENCOUNTER — TRANSITIONAL CARE MANAGEMENT TELEPHONE ENCOUNTER (OUTPATIENT)
Dept: CALL CENTER | Facility: HOSPITAL | Age: 75
End: 2020-09-17

## 2020-09-17 NOTE — PROGRESS NOTES
Case Management Discharge Note      Final Note: pt discharged home with [facility]         Selected Continued Care - Discharged on 9/16/2020 Admission date: 9/15/2020 - Discharge disposition: Home-Health Care Svc    Destination    No services have been selected for the patient.              Durable Medical Equipment    No services have been selected for the patient.              Dialysis/Infusion    No services have been selected for the patient.              Home Medical Care Coordination complete    Service Provider Selected Services Address Phone Fax    [facility]  Home Health Services [address] [place] [id number] [phone] --          Therapy    No services have been selected for the patient.              Community Resources    No services have been selected for the patient.                       Final Discharge Disposition Code: 06 - home with home health care

## 2020-09-17 NOTE — OUTREACH NOTE
Call Center TCM Note      Responses   [facility] patient discharged from?  [facility]   Does the patient have one of the following disease processes/diagnoses(primary or secondary)?  Total Joint Replacement   Joint surgery performed?  Knee   TCM attempt successful?  Yes   Call start time  1740   Call end time  1742   Has the patient been back in either the hospital or Emergency Department since discharge?  No   Discharge diagnosis  Right total knee arthro   Is patient permission given to speak with other caregiver?  Yes   List who call center can speak with  spouse- Vidya   Person spoke with today (if not patient) and relationship  spouseParker Dasilva   Does the patient have all medications related to this admission filled (includes all antibiotics, pain medications, etc.)  Yes   Is the patient taking all medications as directed (includes completed medication regime)?  Yes   Is the patient able to teach back alternate methods of pain control?  Knee-elevation/no pillow under knee, Ice, Reposition, Correct alignment, Short, frequent activity   Does the patient have a follow up appointment with their surgeon?  Yes   Has the patient kept scheduled appointments due by today?  N/A   What is the Home health agency?   [facility]    Psychosocial issues?  No   Did the patient receive a copy of their discharge instructions?  Yes   Nursing interventions  Reviewed instructions with patient   What is the patient's perception of their functional status since discharge?  Improving   Is the patient able to teach back signs and symptoms of infection?  Incisional drainage, Temp >100.4 for 24h or longer, Blisters around incision, Increased swelling or redness around incision (not associated with surgical edema), Severe discomfort or pain, Changes in mobility, Shortness of breath or chest pain   Is the patient/caregiver able to teach back the hierarchy of who to call/visit for symptoms/problems? PCP, Specialist, Home health nurse, Urgent  Care, ED, 911  Yes   TCM call completed?  Yes   Wrap up additional comments  Per spouse, pt is doing well, they are following discharge instructions closely, no questions or concerns at this time.          Magalys Claire RN    9/17/2020, 17:42 EDT

## 2020-09-18 LAB
BACTERIA SPEC AEROBE CULT: NORMAL
GRAM STN SPEC: NORMAL

## 2020-09-20 LAB — BACTERIA SPEC ANAEROBE CULT: NORMAL

## 2020-11-04 DIAGNOSIS — F41.9 ANXIETY: ICD-10-CM

## 2020-11-04 RX ORDER — ALPRAZOLAM 0.5 MG/1
TABLET ORAL
Qty: 90 TABLET | Refills: 0 | Status: SHIPPED | OUTPATIENT
Start: 2020-11-04 | End: 2020-12-01

## 2020-12-01 DIAGNOSIS — F41.9 ANXIETY: ICD-10-CM

## 2020-12-01 RX ORDER — ALPRAZOLAM 0.5 MG/1
TABLET ORAL
Qty: 90 TABLET | Refills: 0 | Status: SHIPPED | OUTPATIENT
Start: 2020-12-01 | End: 2020-12-04 | Stop reason: SDUPTHER

## 2020-12-04 DIAGNOSIS — F41.9 ANXIETY: ICD-10-CM

## 2020-12-04 NOTE — TELEPHONE ENCOUNTER
Katya from [facility] is asking if someone can resend in the order for Alprazolam for patient. They were having issues with their computers when it was first sent. Fax: [phone]

## 2020-12-06 RX ORDER — ALPRAZOLAM 0.5 MG/1
TABLET ORAL
Qty: 90 TABLET | Refills: 0 | Status: SHIPPED | OUTPATIENT
Start: 2020-12-06 | End: 2020-12-07 | Stop reason: SDUPTHER

## 2020-12-07 ENCOUNTER — TELEPHONE (OUTPATIENT)
Dept: INTERNAL MEDICINE | Facility: CLINIC | Age: 75
End: 2020-12-07

## 2020-12-07 DIAGNOSIS — F41.9 ANXIETY: ICD-10-CM

## 2020-12-07 RX ORDER — ALPRAZOLAM 0.5 MG/1
TABLET ORAL
Qty: 90 TABLET | OUTPATIENT
Start: 2020-12-07

## 2020-12-07 RX ORDER — ALPRAZOLAM 0.5 MG/1
TABLET ORAL
Qty: 90 TABLET | Refills: 0 | Status: SHIPPED | OUTPATIENT
Start: 2020-12-07 | End: 2021-01-05

## 2020-12-07 NOTE — TELEPHONE ENCOUNTER
PATIENT'S WIFE CALLED AND STATED SHE WAS RETURNING A PHONE CALL TO [facility] REGARDING THE PATIENT'S PRESCRIPTION.     PLEASE CALL PATIENT'S WIFE BACK AT: [phone]

## 2021-01-05 DIAGNOSIS — F41.9 ANXIETY: ICD-10-CM

## 2021-01-05 RX ORDER — ALPRAZOLAM 0.5 MG/1
TABLET ORAL
Qty: 90 TABLET | Refills: 1 | Status: SHIPPED | OUTPATIENT
Start: 2021-01-05 | End: 2021-03-01

## 2021-02-17 ENCOUNTER — OFFICE VISIT (OUTPATIENT)
Dept: CARDIOLOGY | Facility: CLINIC | Age: 76
End: 2021-02-17

## 2021-02-17 VITALS
WEIGHT: 235 LBS | OXYGEN SATURATION: 98 % | BODY MASS INDEX: 34.8 KG/M2 | HEART RATE: 75 BPM | HEIGHT: 69 IN | SYSTOLIC BLOOD PRESSURE: 150 MMHG | DIASTOLIC BLOOD PRESSURE: 100 MMHG

## 2021-02-17 DIAGNOSIS — F41.9 ANXIETY: ICD-10-CM

## 2021-02-17 DIAGNOSIS — E78.5 DYSLIPIDEMIA: ICD-10-CM

## 2021-02-17 DIAGNOSIS — I45.2 RBBB WITH LEFT ANTERIOR FASCICULAR BLOCK: Primary | ICD-10-CM

## 2021-02-17 DIAGNOSIS — I45.2 BIFASCICULAR BLOCK: ICD-10-CM

## 2021-02-17 DIAGNOSIS — I10 BENIGN ESSENTIAL HTN: ICD-10-CM

## 2021-02-17 PROCEDURE — 99214 OFFICE O/P EST MOD 30 MIN: CPT | Performed by: INTERNAL MEDICINE

## 2021-02-17 PROCEDURE — 93000 ELECTROCARDIOGRAM COMPLETE: CPT | Performed by: INTERNAL MEDICINE

## 2021-02-17 NOTE — PROGRESS NOTES
PATIENTINFORMATION    Date of Office Visit: 2021  Encounter Provider: Antonio Mesa MD  Place of Service: [facility] CARDIOLOGY  Patient Name: Jeremi Ugarte  : 1945    Subjective:     Encounter Date:2021      Patient ID: Jeremi Ugarte is a 75 y.o. male.    Chief Complaint   Patient presents with   • Hypertension     follow up    • Chest Pain   • Bifascicular Block     HPI  Mr. Ugarte is 75 years old man with past medical history of bifascicular block, hyperlipidemia and hypertension came to cardiology clinic for follow-up visit.  Patient denied any significant new symptoms and denied any palpitations, presyncope or syncope, chest pain, significant shortness of breath.  He has mild bilateral lower extremity swelling.  Because of the weather  he admits he has not been walking regularly as he does 1 to 2 miles every day but he plans to resume soon after completing his second Covid vaccine.  He has already received his first COVID-19 vaccine.   Blood pressure runs normal at home.  He admits every time he gets anxious blood pressure goals high and he has to take Xanax 2-3 times a day to control his anxiety and he has skipped a dose this morning.  Otherwise he denied any other significant new symptoms or ER visits or hospitalizations since her last time in clinic.      ROS   All systems reviewed and negative except as noted in HPI.    Past Medical History:   Diagnosis Date   • Anemia    • Ankle arthritis    • Ankle pain    • Anxiety    • Arthritis    • Depression    • GCA (giant cell arteritis) (CMS/HCC)    • Gout of foot    • High risk medication use    • Hyperlipidemia    • Hypertension    • Hyponatremia    • Macrocytosis    • Muscle weakness    • Myalgia    • Myositis    • Osteoarthritis    • Osteoarthritis of both hips    • Polymyalgia rheumatica (CMS/HCC)    • Renal insufficiency    • S/p bilateral revision of total hip replacement    • S/P foot surgery    • S/P  revision of total knee, left    • S/P revision of total knee, right    • Trigeminal neuralgia        Past Surgical History:   Procedure Laterality Date   • ANKLE ARTHROPLASTY Left 2018   • BACK SURGERY      lower back surg   • CATARACT EXTRACTION     • CRANIOTOMY Right 02/14/2019    Dr. Aldridge, for trigeminal neuralgia   • EXCISION BAKERS CYST KNEE  2008   • HAND SURGERY Left 1971    Left hand and wrist 1971   • HIP SURGERY Right 12/08/2016    total right hip replacement   • KNEE ARTHROPLASTY Bilateral     LEFT - 1996  -  RIGHT -  1999    • REPLACEMENT TOTAL KNEE      1996 and 1999   • SHOULDER SURGERY     • TOTAL HIP ARTHROPLASTY Left 2017   • TOTAL KNEE ARTHROPLASTY REVISION  2009    left   • TOTAL KNEE ARTHROPLASTY REVISION Right 9/15/2020    Procedure: RIGHT TOTAL KNEE ARTHROPLASTY REVISION;  Surgeon: Patricia Caldera MD;  Location: [facility] OR;  Service: Orthopedics;  Laterality: Right;       Social History     Socioeconomic History   • Marital status:      Spouse name: Not on file   • Number of children: Not on file   • Years of education: Not on file   • Highest education level: Not on file   Tobacco Use   • Smoking status: Former Smoker     Types: Cigars   • Smokeless tobacco: Former User     Types: Chew   • Tobacco comment: FOR A SHORT TIME AS A YOUNG ADULT   Substance and Sexual Activity   • Alcohol use: Yes     Alcohol/week: 1.0 standard drinks     Types: 1 Cans of beer per week     Comment: OCCAS   • Drug use: No   • Sexual activity: Defer       Family History   Problem Relation Age of Onset   • Cardiomyopathy Mother    • Hypertension Mother    • Hyperlipidemia Sister    • Hypertension Sister    • Coronary artery disease Brother    • Hyperlipidemia Brother    • Hypertension Brother    • No Known Problems Father    • Malig Hyperthermia Neg Hx            ECG 12 Lead    Date/Time: 2/17/2021 11:09 AM  Performed by: Antonio Mesa MD  Authorized by: Antonio Mesa MD   Comparison:  "compared with previous ECG from 6/10/2020  Rhythm: sinus rhythm  Rate: normal  Conduction: conduction normal  Conduction: right bundle branch block and left anterior fascicular block  ST Segments: ST segments normal  T Waves: T waves normal  QRS axis: normal  Other: no other findings    Clinical impression: abnormal EKG               Objective:     /100   Pulse 75   Ht 175.3 cm (69\")   Wt 107 kg (235 lb)   SpO2 98%   BMI 34.70 kg/m²  Body mass index is 34.7 kg/m².     Constitutional:       General: Not in acute distress.     Appearance: Well-developed. Not diaphoretic.   Eyes:      Pupils: Pupils are equal, round, and reactive to light.   HENT:      Head: Normocephalic and atraumatic.   Neck:      Musculoskeletal: Normal range of motion and neck supple.      Thyroid: No thyromegaly.   Pulmonary:      Effort: Pulmonary effort is normal. No respiratory distress.      Breath sounds: Normal breath sounds. No wheezing. No rales.   Chest:      Chest wall: Not tender to palpatation.   Cardiovascular:      Normal rate. Regular rhythm.      No gallop.   Pulses:     Intact distal pulses.   Abdominal:      General: Bowel sounds are normal. There is no distension.      Palpations: Abdomen is soft.      Tenderness: There is no guarding.   Musculoskeletal: Normal range of motion.         General: No deformity.   Skin:     General: Skin is warm and dry.      Findings: No rash.   Neurological:      Mental Status: Alert and oriented to person, place, and time.      Cranial Nerves: No cranial nerve deficit.      Deep Tendon Reflexes: Reflexes are normal and symmetric.   Psychiatric:         Judgment: Judgment normal.         Review Of Data: I have reviewed labs      Assessment/Plan:         Bifascicular block with right bundle branch block and left anterior fascicular block-unchanged on today's EKG    Essential hypertension-elevated blood pressure of 150/100 that patient attributes to anxiety and has not taken Xanax today. "  Pressure runs normal at home  -Continue current care with amlodipine 5 mg daily, hydrochlorothiazide 25 mg p.o. daily, quinapril 40 mg p.o. twice daily,    CKD-stable BUN and creatinine    Hyperlipidemia  on statin and last lipid panel at goal.    General anxiety disorder on Xanax-recommend alternate if course of treatment anxiety like using BuSpar or SSRI-I will leave it up to his PCP    Trigeminal neuralgia-with history craniectomy with  microvascular decompression-patient treated ports improved symptoms and currently uses oxcarbazepine only as needed and following up with neurology.    Return to clinic after 1 year    Diagnosis and plan of care discussed with patient and verbalized understanding.           Antonio Mesa MD  02/17/21  11:33 EST

## 2021-02-25 ENCOUNTER — OFFICE VISIT (OUTPATIENT)
Dept: INTERNAL MEDICINE | Facility: CLINIC | Age: 76
End: 2021-02-25

## 2021-02-25 VITALS — WEIGHT: 234 LBS | TEMPERATURE: 97.6 F | HEIGHT: 69 IN | BODY MASS INDEX: 34.66 KG/M2

## 2021-02-25 DIAGNOSIS — Z79.899 LONG-TERM USE OF HIGH-RISK MEDICATION: ICD-10-CM

## 2021-02-25 DIAGNOSIS — E78.5 HYPERLIPIDEMIA LDL GOAL <100: Primary | ICD-10-CM

## 2021-02-25 DIAGNOSIS — I10 BENIGN ESSENTIAL HTN: ICD-10-CM

## 2021-02-25 DIAGNOSIS — F41.9 ANXIETY: ICD-10-CM

## 2021-02-25 PROCEDURE — 99213 OFFICE O/P EST LOW 20 MIN: CPT | Performed by: INTERNAL MEDICINE

## 2021-02-25 NOTE — PROGRESS NOTES
The ABCs of the Annual Wellness Visit  Subsequent Medicare Wellness Visit    Chief Complaint   Patient presents with   • Medicare Wellness-subsequent       Subjective   History of Present Illness:  Jeremi Ugarte is a 75 y.o. male who presents for a Subsequent Medicare Wellness Visit.  Has gained some weight since knee replacement, Covid not going to the gym, etc.  He has gotten back on track and is starting to lose some again.   He continues to take alprazolam 2-3 times daily (usually 3)- this hasn't changed for years.   Quit drinking alcohol about 10 days ago- he thought it would help with weight loss and having a few too many beers.   Take Trileptal prn only- usually once every few weeks, months- 5-6 per year currently.  The fear of his headaches are a big  of his anxiety.       HEALTH RISK ASSESSMENT    Recent Hospitalizations:  Recently treated at the following:  [facility]    Current Medical Providers:  Patient Care Team:  Charity Kearney MD as PCP - General (Internal Medicine)  Aldo Hudson DO as Consulting Physician (Neurology)    Smoking Status:  Social History     Tobacco Use   Smoking Status Former Smoker   • Types: Cigars   Smokeless Tobacco Former User   • Types: Chew   Tobacco Comment    FOR A SHORT TIME AS A YOUNG ADULT       Alcohol Consumption:  Social History     Substance and Sexual Activity   Alcohol Use Yes   • Alcohol/week: 1.0 standard drinks   • Types: 1 Cans of beer per week    Comment: OCCAS       Depression Screen:   PHQ-2/PHQ-9 Depression Screening 2/25/2021   Little interest or pleasure in doing things 0   Feeling down, depressed, or hopeless 0   Trouble falling or staying asleep, or sleeping too much 0   Feeling tired or having little energy 0   Poor appetite or overeating 0   Feeling bad about yourself - or that you are a failure or have let yourself or your family down 0   Trouble concentrating on things, such as reading the newspaper or watching  television 0   Moving or speaking so slowly that other people could have noticed. Or the opposite - being so fidgety or restless that you have been moving around a lot more than usual 0   Thoughts that you would be better off dead, or of hurting yourself in some way 0   Total Score 0       Fall Risk Screen:  JOY Fall Risk Assessment was completed, and patient is at LOW risk for falls.Assessment completed on:2/25/2021    Health Habits and Functional and Cognitive Screening:  Functional & Cognitive Status 2/25/2021   Do you have difficulty preparing food and eating? No   Do you have difficulty bathing yourself, getting dressed or grooming yourself? No   Do you have difficulty using the toilet? No   Do you have difficulty moving around from place to place? No   Do you have trouble with steps or getting out of a bed or a chair? No   Current Diet Limited Junk Food   Dental Exam Up to date   Eye Exam Not up to date   Exercise (times per week) 0 times per week   Current Exercises Include No Regular Exercise   Do you need help using the phone?  No   Are you deaf or do you have serious difficulty hearing?  No   Do you need help with transportation? No   Do you need help shopping? No   Do you need help preparing meals?  No   Do you need help with housework?  No   Do you need help with laundry? No   Do you need help taking your medications? No   Do you need help managing money? No   Do you ever drive or ride in a car without wearing a seat belt? No   Have you felt unusual stress, anger or loneliness in the last month? No   Who do you live with? Spouse   If you need help, do you have trouble finding someone available to you? No   Have you been bothered in the last four weeks by sexual problems? No   Do you have difficulty concentrating, remembering or making decisions? No         Does the patient have evidence of cognitive impairment? No    Asprin use counseling:Does not need ASA (and currently is not on  it)    Age-appropriate Screening Schedule:  Refer to the list below for future screening recommendations based on patient's age, sex and/or medical conditions. Orders for these recommended tests are listed in the plan section. The patient has been provided with a written plan.    Health Maintenance   Topic Date Due   • COLONOSCOPY  1945   • TDAP/TD VACCINES (1 - Tdap) 04/26/1964   • ZOSTER VACCINE (1 of 2) 04/26/1995   • LIPID PANEL  02/17/2022   • INFLUENZA VACCINE  Completed          The following portions of the patient's history were reviewed and updated as appropriate: allergies, current medications, past family history, past medical history, past social history, past surgical history and problem list.    Outpatient Medications Prior to Visit   Medication Sig Dispense Refill   • ALPRAZolam (XANAX) 0.5 MG tablet TABLET 1 TABLET BY MOUTH THREE TIMES DAILY 90 tablet 1   • amLODIPine (NORVASC) 5 MG tablet TAKE 1 TABLET BY MOUTH DAILY 90 tablet 1   • Calcium Carbonate-Vit D-Min (CALCIUM 1200 PO) Take 1,200 mg by mouth Every Morning.     • coenzyme Q10 100 MG capsule Take 100 mg by mouth Daily.     • hydroCHLOROthiazide (HYDRODIURIL) 25 MG tablet TAKE 1 TABLET BY MOUTH DAILY 90 tablet 1   • Multiple Vitamins-Minerals (MULTIVITAMIN ADULTS 50+ PO) Take 1 tablet by mouth Daily.     • Multiple Vitamins-Minerals (OCUVITE EYE HEALTH FORMULA PO) Take 1 tablet by mouth Daily.     • Omega-3 Fatty Acids (FISH OIL) 1000 MG capsule capsule Take 1,200 mg by mouth Daily With Breakfast.     • OXcarbazepine (TRILEPTAL) 300 MG tablet Take 300 mg by mouth Daily As Needed (FOR HEADACHE).  1   • quinapril (ACCUPRIL) 40 MG tablet TAKE 1 TABLET BY MOUTH TWICE DAILY 180 tablet 1   • rosuvastatin (CRESTOR) 20 MG tablet TAKE 1 TABLET BY MOUTH DAILY 90 tablet 1   • vitamin B-12 (CYANOCOBALAMIN) 1000 MCG tablet Take 1,000 mcg by mouth Daily.       No facility-administered medications prior to visit.        Patient Active Problem List  "  Diagnosis   • Trigeminal neuralgia of right side of face   • Bifascicular block   • Dyslipidemia   • Benign essential HTN   • Anxiety   • Hyperlipidemia LDL goal <100   • Status post revision of total replacement of right knee       Advanced Care Planning:  ACP discussion was held with the patient during this visit. Patient has an advance directive (not in EMR), copy requested.    Review of Systems    Compared to one year ago, the patient feels his physical health is the same.  Compared to one year ago, the patient feels his mental health is the same.    Reviewed chart for potential of high risk medication in the elderly: yes  Reviewed chart for potential of harmful drug interactions in the elderly:yes    Objective         Vitals:    02/25/21 0954   Temp: 97.6 °F (36.4 °C)   Weight: 106 kg (234 lb)   Height: 175.3 cm (69\")       Body mass index is 34.56 kg/m².  Discussed the patient's BMI with him. The BMI is above average; BMI management plan is completed.    Physical Exam    Lab Results   Component Value Date    GLU 89 02/17/2021    CHLPL 164 02/17/2021    TRIG 84 02/17/2021    HDL 44 02/17/2021     (H) 02/17/2021    VLDL 16 02/17/2021        Assessment/Plan   Medicare Risks and Personalized Health Plan  CMS Preventative Services Quick Reference  Immunizations Discussed/Encouraged (specific immunizations; Pneumococcal 23 )    The above risks/problems have been discussed with the patient.  Pertinent information has been shared with the patient in the After Visit Summary.  Follow up plans and orders are seen below in the Assessment/Plan Section.    Diagnoses and all orders for this visit:    1. Hyperlipidemia LDL goal <100 (Primary)  Comments:  at goal  Orders:  -     Comprehensive Metabolic Panel; Future  -     Lipid Panel With / Chol / HDL Ratio; Future    2. Long-term use of high-risk medication  Comments:  check UDS today- refills have been appropriate- always discuss decreasing dose.   Orders:  -     " Compliance Drug Analysis, Ur - Urine, Clean Catch; Future    3. Benign essential HTN  Comments:  controlled, no change.     4. Anxiety  Comments:  as above      Follow Up:  Return in about 6 months (around 8/25/2021) for Recheck, Lab Before FUP.     An After Visit Summary and PPPS were given to the patient.

## 2021-03-01 DIAGNOSIS — F41.9 ANXIETY: ICD-10-CM

## 2021-03-01 RX ORDER — ALPRAZOLAM 0.5 MG/1
TABLET ORAL
Qty: 90 TABLET | Refills: 0 | Status: SHIPPED | OUTPATIENT
Start: 2021-03-01 | End: 2021-03-29 | Stop reason: SDUPTHER

## 2021-03-02 LAB — DRUGS UR: NORMAL

## 2021-03-04 DIAGNOSIS — I10 BENIGN ESSENTIAL HTN: ICD-10-CM

## 2021-03-04 RX ORDER — ROSUVASTATIN CALCIUM 20 MG/1
20 TABLET, COATED ORAL DAILY
Qty: 90 TABLET | Refills: 1 | Status: SHIPPED | OUTPATIENT
Start: 2021-03-04 | End: 2021-08-31

## 2021-03-04 RX ORDER — AMLODIPINE BESYLATE 5 MG/1
5 TABLET ORAL DAILY
Qty: 90 TABLET | Refills: 1 | Status: SHIPPED | OUTPATIENT
Start: 2021-03-04 | End: 2021-08-31

## 2021-03-04 RX ORDER — HYDROCHLOROTHIAZIDE 25 MG/1
25 TABLET ORAL DAILY
Qty: 90 TABLET | Refills: 1 | Status: SHIPPED | OUTPATIENT
Start: 2021-03-04 | End: 2021-11-01

## 2021-03-04 RX ORDER — QUINAPRIL 40 MG/1
TABLET ORAL
Qty: 180 TABLET | Refills: 1 | Status: SHIPPED | OUTPATIENT
Start: 2021-03-04 | End: 2021-12-27

## 2021-03-22 DIAGNOSIS — F41.9 ANXIETY: ICD-10-CM

## 2021-03-22 NOTE — TELEPHONE ENCOUNTER
Caller: Vidya Ugarte    Relationship: Emergency Contact    Best call back number: [phone]    Medication needed:   Requested Prescriptions     Pending Prescriptions Disp Refills   • ALPRAZolam (XANAX) 0.5 MG tablet 90 tablet 0     Sig: TAKE 1 TABLET BY MOUTH THREE TIMES DAILY       When do you need the refill by: 03/31/21    What additional details did the patient provide when requesting the medication: THE PATIENT HAS ENOUGH PILLS TO COVER HIM UNTIL 04/02/21 HOWEVER THE PATIENT STATES THAT THEY WILL BE GOING OUT OF TOWN AND WOULD LIKE TO BE ABLE TO PICK THE MEDICATION UP BY 03/31/21.     Does the patient have less than a 3 day supply:  [] Yes  [x] No    What is the patient's preferred pharmacy: [facility] #[id number] [facility] [address] [facility] [address]  - [phone] [facility] [phone] FX

## 2021-03-24 RX ORDER — ALPRAZOLAM 0.5 MG/1
TABLET ORAL
Qty: 90 TABLET | Refills: 0 | OUTPATIENT
Start: 2021-03-24

## 2021-03-29 DIAGNOSIS — F41.9 ANXIETY: ICD-10-CM

## 2021-03-29 RX ORDER — ALPRAZOLAM 0.5 MG/1
TABLET ORAL
Qty: 90 TABLET | Refills: 0 | Status: SHIPPED | OUTPATIENT
Start: 2021-03-29 | End: 2021-04-30 | Stop reason: SDUPTHER

## 2021-03-30 ENCOUNTER — TELEPHONE (OUTPATIENT)
Dept: INTERNAL MEDICINE | Facility: CLINIC | Age: 76
End: 2021-03-30

## 2021-03-30 DIAGNOSIS — F41.9 ANXIETY: ICD-10-CM

## 2021-03-30 RX ORDER — ALPRAZOLAM 0.5 MG/1
TABLET ORAL
Qty: 90 TABLET | Refills: 0 | Status: CANCELLED | OUTPATIENT
Start: 2021-03-30

## 2021-03-30 NOTE — TELEPHONE ENCOUNTER
Caller: Vidya Ugarte    Relationship to patient: Emergency Contact    Best call back number: [phone]     Patient is needing: PATIENT IS CALLING TO STATE THE PHARMACY STATED THEY NEED DR. MERAZ TO CALL IN THE RX FOR   ALPRAZolam (XANAX) 0.5 MG tablet     IT LOOKS LIKE DR MERAZ SENT IT IN ON 03/29/21, BUT SHE STATES THE PHARMACY WANTS A CALL.    PLEASE ADVISE.      [facility] #[id number] [place] [id number] AT [facility] [address]  - [phone] [facility] [phone]   [phone]

## 2021-03-31 NOTE — TELEPHONE ENCOUNTER
PATIENT IS GOING OUT OF TOWN TOMORROW SO IS REALLY NEEDING THIS FILLED AS SOON AS POSSIBLE SO THEY HAVE TIME TO GO . THEY ARE REQUESTING SOMEONE CALL THEM ONCE IT IS CALLED IN.    PLEASE ADVISE  [phone]

## 2021-04-30 ENCOUNTER — TELEPHONE (OUTPATIENT)
Dept: INTERNAL MEDICINE | Facility: CLINIC | Age: 76
End: 2021-04-30

## 2021-04-30 DIAGNOSIS — F41.9 ANXIETY: ICD-10-CM

## 2021-04-30 RX ORDER — ALPRAZOLAM 0.5 MG/1
TABLET ORAL
Qty: 90 TABLET | Refills: 0 | Status: SHIPPED | OUTPATIENT
Start: 2021-04-30 | End: 2021-05-28

## 2021-04-30 NOTE — TELEPHONE ENCOUNTER
Caller: Vidya Ugarte    Relationship: Spouse    Best call back number: [phone]    Medication needed:   Requested Prescriptions     Pending Prescriptions Disp Refills   • ALPRAZolam (XANAX) 0.5 MG tablet 90 tablet 0     Sig: TAKE 1 TABLET BY MOUTH THREE TIMES DAILY       When do you need the refill by: 5/2/21    What additional details did the patient provide when requesting the medication: PATIENT'S WIFE STATED THAT THE PATIENT CURRENTLY HAS ENOUGH OF THIS MEDICATION LEFT FOR ANOTHER DAY OR 2.    Does the patient have less than a 3 day supply:  [x] Yes  [] No    What is the patient's preferred pharmacy: [facility] #[id number] [facility] [address] [facility] [address]  - [phone] [facility] [phone] FX

## 2021-05-28 DIAGNOSIS — F41.9 ANXIETY: ICD-10-CM

## 2021-05-28 RX ORDER — ALPRAZOLAM 0.5 MG/1
TABLET ORAL
Qty: 100 TABLET | Refills: 0 | Status: SHIPPED | OUTPATIENT
Start: 2021-05-28 | End: 2021-06-28 | Stop reason: SDUPTHER

## 2021-06-28 DIAGNOSIS — F41.9 ANXIETY: ICD-10-CM

## 2021-06-28 RX ORDER — ALPRAZOLAM 0.5 MG/1
0.5 TABLET ORAL 3 TIMES DAILY
Qty: 100 TABLET | Refills: 0 | Status: SHIPPED | OUTPATIENT
Start: 2021-06-28 | End: 2021-07-27

## 2021-07-27 DIAGNOSIS — F41.9 ANXIETY: ICD-10-CM

## 2021-07-27 RX ORDER — ALPRAZOLAM 0.5 MG/1
TABLET ORAL
Qty: 100 TABLET | Refills: 1 | Status: SHIPPED | OUTPATIENT
Start: 2021-07-27 | End: 2021-08-27 | Stop reason: SDUPTHER

## 2021-08-25 ENCOUNTER — OFFICE VISIT (OUTPATIENT)
Dept: INTERNAL MEDICINE | Facility: CLINIC | Age: 76
End: 2021-08-25

## 2021-08-25 VITALS
WEIGHT: 220 LBS | DIASTOLIC BLOOD PRESSURE: 74 MMHG | TEMPERATURE: 97.5 F | BODY MASS INDEX: 32.58 KG/M2 | HEART RATE: 80 BPM | SYSTOLIC BLOOD PRESSURE: 128 MMHG | HEIGHT: 69 IN

## 2021-08-25 DIAGNOSIS — E87.1 HYPONATREMIA: Primary | ICD-10-CM

## 2021-08-25 DIAGNOSIS — I10 BENIGN ESSENTIAL HTN: ICD-10-CM

## 2021-08-25 DIAGNOSIS — F41.9 ANXIETY: ICD-10-CM

## 2021-08-25 DIAGNOSIS — G50.0 TRIGEMINAL NEURALGIA OF RIGHT SIDE OF FACE: ICD-10-CM

## 2021-08-25 DIAGNOSIS — E78.5 HYPERLIPIDEMIA LDL GOAL <100: ICD-10-CM

## 2021-08-25 PROCEDURE — 99214 OFFICE O/P EST MOD 30 MIN: CPT | Performed by: INTERNAL MEDICINE

## 2021-08-25 NOTE — PROGRESS NOTES
"Chief Complaint  Hypertension and Depression    Subjective          Jeremi Ugarte presents to [facility]  History of Present Illness  Had an episode of depression on Monday- discussed with his wife- decided he had a lot to live for and eventually felt better.  He has taken alprazolam TID most days for years, this has been very helpful.  Wife feels like he has some degree of depression for a day every week or two.   He does not take an antidepressant daily- thinks he has had reactions (hives)- he does take the Trileptal TID.  He has cut back on sweet and is not eating between meals.    Dr. Hudson tried to reduce his Trileptal because pain was controlled but he says he was having \"symptoms\" so he went back to 3 daily.  The symptoms are a slight headache- he had one terrible headache a few weeks ago but that is clear.  He saw his labs showing low sodium again- started drinking Propel.    Objective   Vital Signs:   /74   Pulse 80   Temp 97.5 °F (36.4 °C)   Ht 175.3 cm (69\")   Wt 99.8 kg (220 lb)   BMI 32.49 kg/m²     Physical Exam  Constitutional:       General: He is not in acute distress.     Appearance: Normal appearance.   Pulmonary:      Effort: Pulmonary effort is normal.   Neurological:      General: No focal deficit present.   Psychiatric:         Mood and Affect: Mood normal.        Result Review :   The following data was reviewed by: Charity Kearney MD on 08/25/2021:  CBC    CBC 9/1/20 9/16/20   WBC 7.42 15.32 (A)   RBC 4.15 3.69 (A)   Hemoglobin 13.9 12.2 (A)   Hematocrit 41.0 35.7 (A)   MCV 98.8 (A) 96.7   MCH 33.5 (A) 33.1 (A)   MCHC 33.9 34.2   RDW 12.7 12.2 (A)   Platelets 264 267   (A) Abnormal value            Lipid Panel    Lipid Panel 9/16/20 2/17/21 8/18/21   Total Cholesterol 142     Total Cholesterol  164 181   Triglycerides 129 84 123   HDL Cholesterol 46 44 56   VLDL Cholesterol 25.8 16 22   LDL Cholesterol  70 104 (A) 103 (A)   LDL/HDL Ratio 1.53   "   (A) Abnormal value       Comments are available for some flowsheets but are not being displayed.           Data reviewed: Dr. Hudson          Assessment and Plan    Diagnoses and all orders for this visit:    1. Hyponatremia (Primary)  Comments:  repeat today- wean Trileptal down as tolerated, explained he may have some dull headaches but that does not nec mean he will have the major one    Orders:  -     Basic Metabolic Panel    2. Hyperlipidemia LDL goal <100  Comments:  at goal  Orders:  -     Comprehensive Metabolic Panel; Future  -     Lipid Panel With / Chol / HDL Ratio; Future    3. Anxiety  Comments:  no change in alprazolam dosing for nw.     4. Benign essential HTN  Comments:  Well controlled    5. Trigeminal neuralgia of right side of face  Comments:  needs to wean donw on the Trileptal- discussed slow wean, understanding he only takes it for severe headaches, not a dull, more common type.         Follow Up   Return in about 6 months (around 2/25/2022) for Medicare Wellness, Lab Before FUP, Lab Today.  Patient was given instructions and counseling regarding his condition or for health maintenance advice. Please see specific information pulled into the AVS if appropriate.

## 2021-08-26 LAB — REQUEST PROBLEM: NORMAL

## 2021-08-27 DIAGNOSIS — F41.9 ANXIETY: ICD-10-CM

## 2021-08-27 RX ORDER — ALPRAZOLAM 0.5 MG/1
0.5 TABLET ORAL 3 TIMES DAILY
Qty: 100 TABLET | Refills: 1 | Status: SHIPPED | OUTPATIENT
Start: 2021-08-27 | End: 2021-10-01 | Stop reason: SDUPTHER

## 2021-08-31 DIAGNOSIS — I10 BENIGN ESSENTIAL HTN: ICD-10-CM

## 2021-08-31 LAB
BUN SERPL-MCNC: 15 MG/DL (ref 8–23)
BUN/CREAT SERPL: 17.6 (ref 7–25)
CALCIUM SERPL-MCNC: 9.4 MG/DL (ref 8.6–10.5)
CHLORIDE SERPL-SCNC: 90 MMOL/L (ref 98–107)
CO2 SERPL-SCNC: 22.3 MMOL/L (ref 22–29)
CREAT SERPL-MCNC: 0.85 MG/DL (ref 0.76–1.27)
GLUCOSE SERPL-MCNC: 84 MG/DL (ref 65–99)
POTASSIUM SERPL-SCNC: 4.7 MMOL/L (ref 3.5–5.2)
SODIUM SERPL-SCNC: 126 MMOL/L (ref 136–145)

## 2021-08-31 RX ORDER — AMLODIPINE BESYLATE 5 MG/1
5 TABLET ORAL DAILY
Qty: 90 TABLET | Refills: 1 | Status: SHIPPED | OUTPATIENT
Start: 2021-08-31 | End: 2021-11-01

## 2021-08-31 RX ORDER — ROSUVASTATIN CALCIUM 20 MG/1
20 TABLET, COATED ORAL DAILY
Qty: 90 TABLET | Refills: 1 | Status: SHIPPED | OUTPATIENT
Start: 2021-08-31 | End: 2021-09-02

## 2021-09-01 RX ORDER — DULOXETIN HYDROCHLORIDE 30 MG/1
30 CAPSULE, DELAYED RELEASE ORAL DAILY
Qty: 90 CAPSULE | Refills: 1 | Status: SHIPPED | OUTPATIENT
Start: 2021-09-01 | End: 2021-09-14 | Stop reason: SINTOL

## 2021-09-02 RX ORDER — ROSUVASTATIN CALCIUM 20 MG/1
20 TABLET, COATED ORAL DAILY
Qty: 90 TABLET | Refills: 1 | Status: SHIPPED | OUTPATIENT
Start: 2021-09-02 | End: 2022-03-18

## 2021-09-13 DIAGNOSIS — E87.1 HYPONATREMIA: Primary | ICD-10-CM

## 2021-09-14 ENCOUNTER — HOSPITAL ENCOUNTER (OUTPATIENT)
Dept: GENERAL RADIOLOGY | Facility: HOSPITAL | Age: 76
Discharge: HOME | End: 2021-09-14

## 2021-09-14 ENCOUNTER — OFFICE VISIT (OUTPATIENT)
Dept: INTERNAL MEDICINE | Facility: CLINIC | Age: 76
End: 2021-09-14

## 2021-09-14 ENCOUNTER — LAB (OUTPATIENT)
Dept: LAB | Facility: HOSPITAL | Age: 76
End: 2021-09-14

## 2021-09-14 ENCOUNTER — TELEPHONE (OUTPATIENT)
Dept: INTERNAL MEDICINE | Facility: CLINIC | Age: 76
End: 2021-09-14

## 2021-09-14 ENCOUNTER — HOSPITAL ENCOUNTER (OUTPATIENT)
Dept: MRI IMAGING | Facility: HOSPITAL | Age: 76
Discharge: HOME | End: 2021-09-14

## 2021-09-14 VITALS — WEIGHT: 217 LBS | BODY MASS INDEX: 32.14 KG/M2 | HEIGHT: 69 IN | TEMPERATURE: 97.3 F

## 2021-09-14 DIAGNOSIS — R41.0 DISORIENTATION: ICD-10-CM

## 2021-09-14 DIAGNOSIS — E78.5 HYPERLIPIDEMIA LDL GOAL <100: ICD-10-CM

## 2021-09-14 DIAGNOSIS — R41.0 DISORIENTATION: Primary | ICD-10-CM

## 2021-09-14 LAB
ALBUMIN SERPL-MCNC: 4.5 G/DL (ref 3.5–5.2)
ALBUMIN/GLOB SERPL: 2 G/DL
ALP SERPL-CCNC: 65 U/L (ref 39–117)
ALT SERPL W P-5'-P-CCNC: 25 U/L (ref 1–41)
ANION GAP SERPL CALCULATED.3IONS-SCNC: 13.3 MMOL/L (ref 5–15)
AST SERPL-CCNC: 18 U/L (ref 1–40)
BASOPHILS # BLD AUTO: 0.05 10*3/MM3 (ref 0–0.2)
BASOPHILS NFR BLD AUTO: 0.5 % (ref 0–1.5)
BILIRUB BLD-MCNC: NEGATIVE MG/DL
BILIRUB SERPL-MCNC: 0.2 MG/DL (ref 0–1.2)
BUN SERPL-MCNC: 19 MG/DL (ref 8–23)
BUN SERPL-MCNC: 22 MG/DL (ref 8–23)
BUN/CREAT SERPL: 18.1 (ref 7–25)
BUN/CREAT SERPL: 18.2 (ref 7–25)
CALCIUM SERPL-MCNC: 9.4 MG/DL (ref 8.6–10.5)
CALCIUM SPEC-SCNC: 10.1 MG/DL (ref 8.6–10.5)
CHLORIDE SERPL-SCNC: 102 MMOL/L (ref 98–107)
CHLORIDE SERPL-SCNC: 97 MMOL/L (ref 98–107)
CHOLEST SERPL-MCNC: 128 MG/DL (ref 0–200)
CLARITY, POC: CLEAR
CO2 SERPL-SCNC: 22.7 MMOL/L (ref 22–29)
CO2 SERPL-SCNC: 23.7 MMOL/L (ref 22–29)
COLOR UR: NORMAL
CREAT SERPL-MCNC: 1.05 MG/DL (ref 0.76–1.27)
CREAT SERPL-MCNC: 1.21 MG/DL (ref 0.76–1.27)
DEPRECATED RDW RBC AUTO: 47.8 FL (ref 37–54)
EOSINOPHIL # BLD AUTO: 0.13 10*3/MM3 (ref 0–0.4)
EOSINOPHIL NFR BLD AUTO: 1.4 % (ref 0.3–6.2)
ERYTHROCYTE [DISTWIDTH] IN BLOOD BY AUTOMATED COUNT: 12.6 % (ref 12.3–15.4)
GFR SERPL CREATININE-BSD FRML MDRD: 69 ML/MIN/1.73
GLOBULIN UR ELPH-MCNC: 2.2 GM/DL
GLUCOSE SERPL-MCNC: 100 MG/DL (ref 65–99)
GLUCOSE SERPL-MCNC: 102 MG/DL (ref 65–99)
GLUCOSE UR STRIP-MCNC: NEGATIVE MG/DL
HCT VFR BLD AUTO: 44.2 % (ref 37.5–51)
HDLC SERPL QL: 2.67
HDLC SERPL-MCNC: 48 MG/DL (ref 40–60)
HGB BLD-MCNC: 14.3 G/DL (ref 13–17.7)
IMM GRANULOCYTES # BLD AUTO: 0.03 10*3/MM3 (ref 0–0.05)
IMM GRANULOCYTES NFR BLD AUTO: 0.3 % (ref 0–0.5)
KETONES UR QL: NEGATIVE
LDLC SERPL CALC-MCNC: 62 MG/DL (ref 0–100)
LEUKOCYTE EST, POC: NEGATIVE
LYMPHOCYTES # BLD AUTO: 2.36 10*3/MM3 (ref 0.7–3.1)
LYMPHOCYTES NFR BLD AUTO: 24.8 % (ref 19.6–45.3)
MCH RBC QN AUTO: 32.8 PG (ref 26.6–33)
MCHC RBC AUTO-ENTMCNC: 32.4 G/DL (ref 31.5–35.7)
MCV RBC AUTO: 101.4 FL (ref 79–97)
MONOCYTES # BLD AUTO: 0.73 10*3/MM3 (ref 0.1–0.9)
MONOCYTES NFR BLD AUTO: 7.7 % (ref 5–12)
NEUTROPHILS NFR BLD AUTO: 6.23 10*3/MM3 (ref 1.7–7)
NEUTROPHILS NFR BLD AUTO: 65.3 % (ref 42.7–76)
NITRITE UR-MCNC: NEGATIVE MG/ML
NRBC BLD AUTO-RTO: 0 /100 WBC (ref 0–0.2)
OSMOLALITY SERPL: 281 MOSMOL/KG (ref 280–301)
OSMOLALITY UR: 746 MOSM/KG
PH UR: 5.5 [PH] (ref 5–8)
PLATELET # BLD AUTO: 428 10*3/MM3 (ref 140–450)
PMV BLD AUTO: 8.8 FL (ref 6–12)
POTASSIUM SERPL-SCNC: 4 MMOL/L (ref 3.5–5.2)
POTASSIUM SERPL-SCNC: 4.3 MMOL/L (ref 3.5–5.2)
PROT SERPL-MCNC: 6.7 G/DL (ref 6–8.5)
PROT UR STRIP-MCNC: NEGATIVE MG/DL
RBC # BLD AUTO: 4.36 10*6/MM3 (ref 4.14–5.8)
RBC # UR STRIP: NEGATIVE /UL
SODIUM SERPL-SCNC: 136 MMOL/L (ref 136–145)
SODIUM SERPL-SCNC: 139 MMOL/L (ref 136–145)
SODIUM UR-SCNC: 116 MMOL/L
SP GR UR: 1.03 (ref 1–1.03)
TRIGL SERPL-MCNC: 93 MG/DL (ref 0–150)
TSH SERPL DL<=0.05 MIU/L-ACNC: 2.1 UIU/ML (ref 0.27–4.2)
UROBILINOGEN UR QL: NORMAL
VIT B12 BLD-MCNC: >2000 PG/ML (ref 211–946)
VLDLC SERPL-MCNC: 18 MG/DL (ref 5–40)
WBC # BLD AUTO: 9.53 10*3/MM3 (ref 3.4–10.8)

## 2021-09-14 PROCEDURE — 83935 ASSAY OF URINE OSMOLALITY: CPT | Performed by: INTERNAL MEDICINE

## 2021-09-14 PROCEDURE — 80061 LIPID PANEL: CPT

## 2021-09-14 PROCEDURE — 36415 COLL VENOUS BLD VENIPUNCTURE: CPT

## 2021-09-14 PROCEDURE — 80053 COMPREHEN METABOLIC PANEL: CPT

## 2021-09-14 PROCEDURE — 99214 OFFICE O/P EST MOD 30 MIN: CPT | Performed by: INTERNAL MEDICINE

## 2021-09-14 PROCEDURE — 84443 ASSAY THYROID STIM HORMONE: CPT | Performed by: INTERNAL MEDICINE

## 2021-09-14 PROCEDURE — 84300 ASSAY OF URINE SODIUM: CPT | Performed by: INTERNAL MEDICINE

## 2021-09-14 PROCEDURE — 85025 COMPLETE CBC W/AUTO DIFF WBC: CPT | Performed by: INTERNAL MEDICINE

## 2021-09-14 PROCEDURE — 71046 X-RAY EXAM CHEST 2 VIEWS: CPT

## 2021-09-14 PROCEDURE — 82607 VITAMIN B-12: CPT | Performed by: INTERNAL MEDICINE

## 2021-09-14 PROCEDURE — 81003 URINALYSIS AUTO W/O SCOPE: CPT | Performed by: INTERNAL MEDICINE

## 2021-09-14 NOTE — PROGRESS NOTES
"Chief Complaint  Depression    Subjective          Jeremi Ugarte presents to [facility]  History of Present Illness  Here urgently with his wife for some altered mental status- he hasn't been doing well.  They stopped the duloxetine thinking it may have been culprit but no difference in mental status since ( 4 days).  He has cut back the Trileptal to BID, no change in headaches.  He is taking alprazolam only once in the am.   Up until Sept 16, he was at his baseline.  Then the next am, he was \"different\" very blank, not following conversation, not worried about his headaches, which is usually something he worries a lot about.  He is not eating well.     Objective   Vital Signs:   Temp 97.3 °F (36.3 °C)   Ht 175.3 cm (69\")   Wt 98.4 kg (217 lb)   BMI 32.05 kg/m²     Physical Exam  Constitutional:       General: He is not in acute distress.  Cardiovascular:      Rate and Rhythm: Normal rate and regular rhythm.   Pulmonary:      Effort: Pulmonary effort is normal.      Breath sounds: Normal breath sounds.   Abdominal:      Palpations: Abdomen is soft.   Musculoskeletal:      Right lower leg: No edema.      Left lower leg: No edema.   Psychiatric:         Attention and Perception: He is inattentive.         Mood and Affect: Mood is not anxious. Affect is blunt.         Speech: Speech normal.         Behavior: Behavior is withdrawn. Behavior is cooperative.         Cognition and Memory: Cognition is impaired.      Comments: Unaware of month, year, building name, wife's birthday  Hasn't mentioned his anxiety or headaches which is very abnormal for him        Result Review :                 Assessment and Plan    Diagnoses and all orders for this visit:    1. Disorientation (Primary)  Comments:  needs acute eval- check labs,u/s, CXR and MRI brain. If alll negative, would refer to psych- discussed with wife and she understands.  Further MDM pending  Orders:  -     MRI Brain With & " Without Contrast; Future  -     XR Chest PA & Lateral; Future  -     CBC & Differential  -     Vitamin B12  -     TSH  -     POCT urinalysis dipstick, automated        Follow Up   No follow-ups on file.  Patient was given instructions and counseling regarding his condition or for health maintenance advice. Please see specific information pulled into the AVS if appropriate.

## 2021-09-14 NOTE — TELEPHONE ENCOUNTER
Call wife, tell her I got the message. All blood work, CXR and urine are negative  I think if it persists, they should think about taking him to the ER for psych eval.

## 2021-09-14 NOTE — TELEPHONE ENCOUNTER
Nancy/states patient was at [facility], I.V was started, but declined to finish procedure and demanded to stop. She states he was agitated. He did have a family member with him, but was not able to talk him into staying.

## 2021-09-15 ENCOUNTER — TELEPHONE (OUTPATIENT)
Dept: INTERNAL MEDICINE | Facility: CLINIC | Age: 76
End: 2021-09-15

## 2021-09-16 ENCOUNTER — TELEPHONE (OUTPATIENT)
Dept: INTERNAL MEDICINE | Facility: CLINIC | Age: 76
End: 2021-09-16

## 2021-09-16 NOTE — TELEPHONE ENCOUNTER
Deborah can you get his MRI rescheduled?  Still looks like it is pending in the computer.  PT went the other day but got upset and left befor the test was done.    If I need to put in a new order I can do that for you     Thank you :)

## 2021-09-17 ENCOUNTER — HOSPITAL ENCOUNTER (EMERGENCY)
Facility: HOSPITAL | Age: 76
Discharge: HOME | End: 2021-09-17
Attending: EMERGENCY MEDICINE | Admitting: EMERGENCY MEDICINE

## 2021-09-17 VITALS
WEIGHT: 210 LBS | SYSTOLIC BLOOD PRESSURE: 140 MMHG | DIASTOLIC BLOOD PRESSURE: 74 MMHG | RESPIRATION RATE: 16 BRPM | HEIGHT: 69 IN | TEMPERATURE: 97.7 F | HEART RATE: 71 BPM | OXYGEN SATURATION: 97 % | BODY MASS INDEX: 31.1 KG/M2

## 2021-09-17 DIAGNOSIS — R21 RASH: Primary | ICD-10-CM

## 2021-09-17 PROCEDURE — 25010000002 DIPHENHYDRAMINE PER 50 MG: Performed by: PHYSICIAN ASSISTANT

## 2021-09-17 PROCEDURE — 25010000002 METHYLPREDNISOLONE PER 125 MG: Performed by: PHYSICIAN ASSISTANT

## 2021-09-17 PROCEDURE — 96375 TX/PRO/DX INJ NEW DRUG ADDON: CPT

## 2021-09-17 PROCEDURE — 96374 THER/PROPH/DIAG INJ IV PUSH: CPT

## 2021-09-17 PROCEDURE — 99282 EMERGENCY DEPT VISIT SF MDM: CPT

## 2021-09-17 RX ORDER — CETIRIZINE HYDROCHLORIDE 10 MG/1
10 TABLET ORAL 3 TIMES DAILY PRN
Qty: 21 TABLET | Refills: 0 | Status: SHIPPED | OUTPATIENT
Start: 2021-09-17 | End: 2021-09-21

## 2021-09-17 RX ORDER — PREDNISONE 20 MG/1
40 TABLET ORAL DAILY
Qty: 10 TABLET | Refills: 0 | OUTPATIENT
Start: 2021-09-17 | End: 2021-09-21

## 2021-09-17 RX ORDER — FAMOTIDINE 10 MG/ML
20 INJECTION, SOLUTION INTRAVENOUS ONCE
Status: COMPLETED | OUTPATIENT
Start: 2021-09-17 | End: 2021-09-17

## 2021-09-17 RX ORDER — DIPHENHYDRAMINE HYDROCHLORIDE 50 MG/ML
25 INJECTION INTRAMUSCULAR; INTRAVENOUS ONCE
Status: COMPLETED | OUTPATIENT
Start: 2021-09-17 | End: 2021-09-17

## 2021-09-17 RX ORDER — METHYLPREDNISOLONE SODIUM SUCCINATE 125 MG/2ML
125 INJECTION, POWDER, LYOPHILIZED, FOR SOLUTION INTRAMUSCULAR; INTRAVENOUS ONCE
Status: COMPLETED | OUTPATIENT
Start: 2021-09-17 | End: 2021-09-17

## 2021-09-17 RX ADMIN — FAMOTIDINE 20 MG: 10 INJECTION INTRAVENOUS at 07:02

## 2021-09-17 RX ADMIN — DIPHENHYDRAMINE HYDROCHLORIDE 25 MG: 50 INJECTION, SOLUTION INTRAMUSCULAR; INTRAVENOUS at 07:03

## 2021-09-17 RX ADMIN — METHYLPREDNISOLONE SODIUM SUCCINATE 125 MG: 125 INJECTION, POWDER, FOR SOLUTION INTRAMUSCULAR; INTRAVENOUS at 07:03

## 2021-09-17 NOTE — ED TRIAGE NOTES
"Pt to ED from home via PV, reports widespread rash, itching, believes it to be poison ivy. Pt anxious and restless in triage, stated \"it's eating me up, I can't stand this.\" Pt alert, answering questions appropriately, ambulating unassisted in triage. Pt masked, triage staff wearing appropriate PPE.  "

## 2021-09-17 NOTE — ED PROVIDER NOTES
"EMERGENCY DEPARTMENT ENCOUNTER    Room Number: 14/14  Date seen: 9/17/2021  Time seen: 06:26 EDT  PCP: Charity Kearney MD    Spoken Language:  English  Language interpretation services not needed     CHIEF COMPLAINT: rash    HPI: Jeremi Ugarte is a 76 y.o. male presenting to the ED for evaluation of a rash. The history is being obtained by the patient and by review of the medical chart. He presents complaining of a rash \"all over\" his body which began several days ago.  The rash itches.  He is unsure if he was around poison ivy outdoors, but does admit to being outside in the woods.  The rash initially started in the left leg and has since spread.  He states that every time he scratches it, it spreads.  He denies fever, cough, sore throat, chest pain, shortness of breath, abdominal pain, nausea, vomiting or diarrhea.  The rash does not hurt, only itches.  He denies any relieving factors.    MEDICAL RECORD REVIEW:  Reviewed in Bundle.     PAST MEDICAL HISTORY  Past Medical History:   Diagnosis Date   • Anemia    • Ankle arthritis    • Ankle pain    • Anxiety    • Arthritis    • Depression    • GCA (giant cell arteritis) (CMS/HCC)    • Gout of foot    • High risk medication use    • Hyperlipidemia    • Hypertension    • Hyponatremia    • Macrocytosis    • Muscle weakness    • Myalgia    • Myositis    • Osteoarthritis    • Osteoarthritis of both hips    • Polymyalgia rheumatica (CMS/HCC)    • Renal insufficiency    • S/p bilateral revision of total hip replacement    • S/P foot surgery    • S/P revision of total knee, left    • S/P revision of total knee, right    • Trigeminal neuralgia        PAST SURGICAL HISTORY  Past Surgical History:   Procedure Laterality Date   • ANKLE ARTHROPLASTY Left 2018   • BACK SURGERY      lower back surg   • CATARACT EXTRACTION     • CRANIOTOMY Right 02/14/2019    Dr. Aldridge, for trigeminal neuralgia   • EXCISION BAKERS CYST KNEE  2008   • HAND SURGERY Left 1971    Left hand and wrist 1971 "   • HIP SURGERY Right 12/08/2016    total right hip replacement   • KNEE ARTHROPLASTY Bilateral     LEFT - 1996  -  RIGHT -  1999    • REPLACEMENT TOTAL KNEE      1996 and 1999   • SHOULDER SURGERY     • TOTAL HIP ARTHROPLASTY Left 2017   • TOTAL KNEE ARTHROPLASTY REVISION  2009    left   • TOTAL KNEE ARTHROPLASTY REVISION Right 9/15/2020    Procedure: RIGHT TOTAL KNEE ARTHROPLASTY REVISION;  Surgeon: Patricia Caldera MD;  Location: [facility] OR;  Service: Orthopedics;  Laterality: Right;       FAMILY HISTORY  Family History   Problem Relation Age of Onset   • Cardiomyopathy Mother    • Hypertension Mother    • Hyperlipidemia Sister    • Hypertension Sister    • Coronary artery disease Brother    • Hyperlipidemia Brother    • Hypertension Brother    • No Known Problems Father    • Malig Hyperthermia Neg Hx        SOCIAL HISTORY  Social History     Socioeconomic History   • Marital status:      Spouse name: Not on file   • Number of children: Not on file   • Years of education: Not on file   • Highest education level: Not on file   Tobacco Use   • Smoking status: Former Smoker     Types: Cigars   • Smokeless tobacco: Former User     Types: Chew   • Tobacco comment: FOR A SHORT TIME AS A YOUNG ADULT   Vaping Use   • Vaping Use: Never used   Substance and Sexual Activity   • Alcohol use: Yes     Alcohol/week: 1.0 standard drinks     Types: 1 Cans of beer per week     Comment: OCCAS   • Drug use: No   • Sexual activity: Defer       CURRENT MEDICATIONS  Prior to Admission medications    Medication Sig Start Date End Date Taking? Authorizing Provider   ALPRAZolam (XANAX) 0.5 MG tablet Take 1 tablet by mouth 3 (Three) Times a Day. 8/27/21   Charity Kearney MD   amLODIPine (NORVASC) 5 MG tablet TAKE 1 TABLET BY MOUTH DAILY 8/31/21   Charity Kearney MD   Calcium Carbonate-Vit D-Min (CALCIUM 1200 PO) Take 1,200 mg by mouth Every Morning.    Provider, MD Carley   coenzyme Q10 100 MG capsule Take 100 mg by  mouth Daily.    Carley Molina MD   hydroCHLOROthiazide (HYDRODIURIL) 25 MG tablet TAKE 1 TABLET BY MOUTH DAILY 3/4/21   Charity Kearney MD   Multiple Vitamins-Minerals (MULTIVITAMIN ADULTS 50+ PO) Take 1 tablet by mouth Daily.    Carley Molina MD   Multiple Vitamins-Minerals (OCUVITE EYE HEALTH FORMULA PO) Take 1 tablet by mouth Daily.    Carley Molina MD   Omega-3 Fatty Acids (FISH OIL) 1000 MG capsule capsule Take 1,200 mg by mouth Daily With Breakfast.    Carley Molina MD   OXcarbazepine (TRILEPTAL) 300 MG tablet Take 300 mg by mouth 3 (Three) Times a Day As Needed (FOR HEADACHE). 11/8/18   Carley Molina MD   quinapril (ACCUPRIL) 40 MG tablet TAKE 1 TABLET BY MOUTH TWICE DAILY 3/4/21   Charity Kearney MD   rosuvastatin (CRESTOR) 20 MG tablet TAKE 1 TABLET BY MOUTH DAILY 9/2/21   Charity Kearney MD   vitamin B-12 (CYANOCOBALAMIN) 1000 MCG tablet Take 1,000 mcg by mouth Daily.    Carley Molina MD       ALLERGIES  Oxycodone-acetaminophen, Morphine, and Vancomycin    REVIEW OF SYSTEMS  All systems reviewed and negative except for those discussed in HPI.     PHYSICAL EXAM  ED Triage Vitals [09/17/21 0533]   Temp Heart Rate Resp BP SpO2   97.7 °F (36.5 °C) 90 18 -- 95 %      Temp src Heart Rate Source Patient Position BP Location FiO2 (%)   Tympanic -- -- -- --       Physical Exam  Constitutional:       Appearance: Normal appearance.   HENT:      Head: Normocephalic and atraumatic.      Mouth/Throat:      Mouth: Mucous membranes are moist.   Eyes:      Extraocular Movements: Extraocular movements intact.      Pupils: Pupils are equal, round, and reactive to light.   Cardiovascular:      Rate and Rhythm: Normal rate and regular rhythm.   Pulmonary:      Effort: Pulmonary effort is normal.      Breath sounds: Normal breath sounds.   Abdominal:      General: There is no distension.      Palpations: Abdomen is soft.      Tenderness: There is no abdominal tenderness.    Musculoskeletal:      Cervical back: Normal range of motion.   Skin:     General: Skin is warm and dry.      Comments: Pruritic rash noted to bilateral legs, bilateral arms, abdomen and chest.  Small papules seen.   Neurological:      General: No focal deficit present.      Mental Status: He is alert and oriented to person, place, and time.   Psychiatric:         Mood and Affect: Mood normal.         Behavior: Behavior normal.         Thought Content: Thought content normal.         Judgment: Judgment normal.         PROCEDURES  Procedures  None    LABS  No results found for this or any previous visit (from the past 24 hour(s)).    RADIOLOGY  No orders to display       MEDICATIONS GIVEN IN THE ER  Medications   diphenhydrAMINE (BENADRYL) injection 25 mg (25 mg Intravenous Given 9/17/21 0703)   methylPREDNISolone sodium succinate (SOLU-Medrol) injection 125 mg (125 mg Intravenous Given 9/17/21 0703)   famotidine (PEPCID) injection 20 mg (20 mg Intravenous Given 9/17/21 0702)       MEDICAL DECISION MAKING, CONSULTS AND PROGRESS NOTES  All labs and all radiology studies were have been viewed and interpreted by me.   Discussion below represents my analysis of pertinent findings related to patient's condition, differential diagnosis, treatment plan and final disposition.    Differential diagnosis includes but is not limited to:  -Poison ivy  -Poison oak  -Contact dermatitis of another source    PPE: The patient was placed in a face mask in first look. Patient was wearing facemask when I entered the room and throughout our encounter. I wore full protective equipment throughout this patient encounter including a face mask, eye protection and gloves. Hand hygiene was performed before donning protective equipment and after removal when leaving the room.    AS OF 07:42 EDT VITALS:    BP - 146/91  HR - 74  TEMP - 97.7 °F (36.5 °C) (Tympanic)  O2 SATS - 92%    ED Course as of Sep 17 0742   Fri Sep 17, 2021   0726 Rash most  consistent with scabies.  Patient has no alarm signs or symptoms.  Patient is well-appearing.  Discussed probable diagnosis, discussed return for treatment with permethrin, steroids and antihistamines.  Discussed need for follow-up with primary care as well as dermatology for further evaluation.  Patient given extensive discussion return precautions.  Discharging.    [JG]   4948 The patient was reexamined.  They have had symptomatic improvement during their ED stay.  I discussed today's findings with the patient, explaining the pertinent positives and negatives from today's visit, and the plan of care.  Discussed plan for discharge as there is no emergent indication for admission.  Discussed limitation of the ED work-up and that this is to rule out life-threatening emergencies but that they could require further testing as determined by their primary care and or any referred specialist patient is agreeable and understands need for follow-up and repeat exam/testing.  Patient is aware that discharge does not mean there is nothing wrong, indicates no emergency is present, and that they must continue their care with their primary care physician and/or any referred specialist.  They were given appropriate follow-up with their primary care physician and/or specialist.  I had an extensive discussion on the expected clinical course and return precautions.  Patient understands to return to the emergency department for continuation, worsening, or new symptoms.  I answered any of the patient's questions. Patient was discharged home in a stable condition.        [JG]      ED Course User Index  [JG] Andrew Fowler MD     The patient's history, physical exam, and lab findings were discussed with the physician, who also performed a face to face history and physical exam.  I discussed all results and noted any abnormalities with patient.  Discussed absoute need to recheck abnormalities with their family physician.  I answered  any of the patient's questions.  Discussed plan for discharge, as there is no emergent indication for admission.  Pt is agreeable and understands need for follow up and repeat testing.  Pt is aware that discharge does not mean that nothing is wrong but it indicates no emergency is present and they must continue care with their family physician.  Pt is discharged with instructions to follow up with primary care doctor to have their blood pressure rechecked.     DIAGNOSIS   Diagnosis Plan   1. Rash         DISPOSITION  ED Disposition     ED Disposition Condition Comment    Discharge Stable           FOLLOW UP  Charity Kearney MD  [address]  [place] [address]  [phone]    Schedule an appointment as soon as possible for a visit in 2 days      [facility]  [address] [address]  [phone]  Schedule an appointment as soon as possible for a visit in 2 days        RX  Medications   diphenhydrAMINE (BENADRYL) injection 25 mg (25 mg Intravenous Given 9/17/21 0703)   methylPREDNISolone sodium succinate (SOLU-Medrol) injection 125 mg (125 mg Intravenous Given 9/17/21 0703)   famotidine (PEPCID) injection 20 mg (20 mg Intravenous Given 9/17/21 0702)          Medication List      New Prescriptions    cetirizine 10 MG tablet  Commonly known as: zyrTEC  Take 1 tablet by mouth 3 (Three) Times a Day As Needed (rash/itching).     predniSONE 20 MG tablet  Commonly known as: DELTASONE  Take 2 tablets by mouth Daily for 5 days.     pyrethrins-piperonyl butoxide 0.5 % bottle  Apply  topically to the appropriate area as directed 1 (One) Time for 1 dose. Can repeat in 2 weeks if rash continues           Where to Get Your Medications      These medications were sent to [id number] - [place], [place] - [address] AT [facility] - [phone]  - [phone] [address] [address]    Hours: 24-hours Phone: [phone]    · cetirizine 10 MG tablet  · predniSONE 20 MG tablet  · pyrethrins-piperonyl butoxide 0.5 % bottle       Provider Attestation:  I personally reviewed the past medical history, past surgical history, social history, family history, current medications and allergies as they appear in the chart. I reviewed the patient's history, physical, lab/imaging results and overall care with Dr. Fowler who is in agreement with the patient's treatment plan.    EMR Dragon/Transcription disclaimer:  Some of this encounter note is an electronic transcription/translation of spoken language to printed text. The electronic translation of spoken language may permit erroneous, or at times, nonsensical words or phrases to be inadvertently transcribed; Although I have reviewed the note for such errors, some may still exist.    Provider note signed by:         Mikaela Curry PA  09/17/21 0742

## 2021-09-17 NOTE — ED PROVIDER NOTES
MD ATTESTATION NOTE  Patient was placed in face mask in first look and the following protective measures were taken unless documented below in the ED course. Patient was wearing facemask when I entered the room and throughout our encounter. I wore full protective equipment throughout this patient encounter including a N95 face mask, googles, gown and gloves. Hand hygiene was performed before donning protective equipment and after removal when leaving the room.    The MILAD and I have discussed this patient's history, physical exam, and treatment plan. I have reviewed the documentation and personally had a face to face interaction with the patient. I affirm the MILAD documentation and agree with their diagnostics, findings, treatment, plan, and disposition.  The attached note describes my personal findings.    Jeremi Ugarte is a 76 y.o. male who presents to the ED c/o rash.  History supplied by patient and patient's wife.  Patient reports rash began approximately 3 days ago after walking [place]  Rash began on his left leg and slowly spread upwards.  Rash is pruritic in nature.  Patient denies any intraoral lesions, no swelling of lips tongue or throat, no difficulty swallowing or difficulty breathing, no nausea or vomiting.  Patient denies any eye involvement.  Patient reports concern for possible poison ivy.    On exam:  General: NAD.  Head: NCAT.  ENT: EOMI, PERRLA, MMM.  Oropharynx clear.  Neck: Supple, trachea midline.  No stridor  Cardiac: regular rate and rhythm.  Lungs: CTAB.  Abdomen: Soft, NTTP, no rebound tenderness/guarding/rigidity.   : Deferred to MILAD.  Extremities: Moves all extremities well, no peripheral edema  Skin: Warm, dry.  Rash most significant on the left foot and left lower leg with several scattered lesions in the groin and on the abdomen.  Rashes papular in nature, extremely small lesions, lesions appear to form lines, trace intertriginous involvement left foot.    LAB  RESULTS  No results found for this or any previous visit (from the past 24 hour(s)).    I ordered the above labs and reviewed the results.    RADIOLOGY  No Radiology Exams Resulted Within Past 24 Hours    I ordered the above noted radiological studies. I reviewed the images and results. I agree with the radiologist interpretation.    PROCEDURES  Procedures    MEDICATIONS GIVEN IN ER  Medications   diphenhydrAMINE (BENADRYL) injection 25 mg (25 mg Intravenous Given 9/17/21 0703)   methylPREDNISolone sodium succinate (SOLU-Medrol) injection 125 mg (125 mg Intravenous Given 9/17/21 0703)   famotidine (PEPCID) injection 20 mg (20 mg Intravenous Given 9/17/21 0702)       PROGRESS, DATA ANALYSIS, CONSULTS, AND MEDICAL DECISION MAKING  A complete history and physical exam have been performed.  All available laboratory and imaging results have been reviewed by myself prior to disposition.  Face mask and gloves were worn throughout the patient encounter, unless additional PPE was worn and specified below. Hand hygiene was performed before entering and after leaving the patient room.  MDM    ED Course as of Sep 17 0732   Fri Sep 17, 2021   0726 Rash most consistent with scabies.  Patient has no alarm signs or symptoms.  Patient is well-appearing.  Discussed probable diagnosis, discussed return for treatment with permethrin, steroids and antihistamines.  Discussed need for follow-up with primary care as well as dermatology for further evaluation.  Patient given extensive discussion return precautions.  Discharging.    [JG]   0732 The patient was reexamined.  They have had symptomatic improvement during their ED stay.  I discussed today's findings with the patient, explaining the pertinent positives and negatives from today's visit, and the plan of care.  Discussed plan for discharge as there is no emergent indication for admission.  Discussed limitation of the ED work-up and that this is to rule out life-threatening  emergencies but that they could require further testing as determined by their primary care and or any referred specialist patient is agreeable and understands need for follow-up and repeat exam/testing.  Patient is aware that discharge does not mean there is nothing wrong, indicates no emergency is present, and that they must continue their care with their primary care physician and/or any referred specialist.  They were given appropriate follow-up with their primary care physician and/or specialist.  I had an extensive discussion on the expected clinical course and return precautions.  Patient understands to return to the emergency department for continuation, worsening, or new symptoms.  I answered any of the patient's questions. Patient was discharged home in a stable condition.        [JG]      ED Course User Index  [JG] Andrew Fowler MD       AS OF 07:32 EDT VITALS:    BP - 146/91  HR - 74  TEMP - 97.7 °F (36.5 °C) (Tympanic)  O2 SATS - 92%    DIAGNOSIS  Final diagnoses:   Rash         DISPOSITION  DISCHARGE    Patient discharged in stable condition.    Reviewed implications of results, diagnosis, meds, responsibility to follow up, warning signs and symptoms of possible worsening, potential complications and reasons to return to ER.    Patient/Family voiced understanding of above instructions.    Discussed plan for discharge, as there is no emergent indication for admission. Patient referred to primary care provider for BP management due to today's BP. Pt/family is agreeable and understands need for follow up and repeat testing.  Pt is aware that discharge does not mean that nothing is wrong but it indicates no emergency is present that requires admission and they must continue care with follow-up as given below or physician of their choice.     FOLLOW-UP  Charity Kearney MD  [address] [id number] [phone]    Schedule an appointment as soon as possible for a visit in 2  days      [facility]  [address] [facility] [address] [phone]  Schedule an appointment as soon as possible for a visit in 2 days           Medication List      New Prescriptions    cetirizine 10 MG tablet  Commonly known as: zyrTEC  Take 1 tablet by mouth 3 (Three) Times a Day As Needed (rash/itching).     predniSONE 20 MG tablet  Commonly known as: DELTASONE  Take 2 tablets by mouth Daily for 5 days.     pyrethrins-piperonyl butoxide 0.5 % bottle  Apply  topically to the appropriate area as directed 1 (One) Time for 1 dose. Can repeat in 2 weeks if rash continues           Where to Get Your Medications      These medications were sent to [id number] - [place], [place] - [address] [facility] AT Replaced by [facility] - [phone]  - [phone]   [address] [address]    Hours: 24-hours Phone: [phone]   · cetirizine 10 MG tablet  · predniSONE 20 MG tablet  · pyrethrins-piperonyl butoxide 0.5 % bottle            Andrew Fowler MD  09/17/21 7647

## 2021-09-20 ENCOUNTER — HOSPITAL ENCOUNTER (EMERGENCY)
Facility: HOSPITAL | Age: 76
Discharge: HOME | End: 2021-09-21
Attending: EMERGENCY MEDICINE | Admitting: EMERGENCY MEDICINE

## 2021-09-20 ENCOUNTER — APPOINTMENT (OUTPATIENT)
Dept: CT IMAGING | Facility: HOSPITAL | Age: 76
End: 2021-09-20

## 2021-09-20 DIAGNOSIS — F03.91 DEMENTIA WITH BEHAVIORAL DISTURBANCE, UNSPECIFIED DEMENTIA TYPE: Primary | ICD-10-CM

## 2021-09-20 DIAGNOSIS — F29 PSYCHOSIS, UNSPECIFIED PSYCHOSIS TYPE (HCC): ICD-10-CM

## 2021-09-20 LAB — SARS-COV-2 RNA PNL SPEC NAA+PROBE: NOT DETECTED

## 2021-09-20 PROCEDURE — 80307 DRUG TEST PRSMV CHEM ANLYZR: CPT | Performed by: PHYSICIAN ASSISTANT

## 2021-09-20 PROCEDURE — 96376 TX/PRO/DX INJ SAME DRUG ADON: CPT

## 2021-09-20 PROCEDURE — 82077 ASSAY SPEC XCP UR&BREATH IA: CPT | Performed by: PHYSICIAN ASSISTANT

## 2021-09-20 PROCEDURE — 93005 ELECTROCARDIOGRAM TRACING: CPT | Performed by: PHYSICIAN ASSISTANT

## 2021-09-20 PROCEDURE — 81001 URINALYSIS AUTO W/SCOPE: CPT | Performed by: PHYSICIAN ASSISTANT

## 2021-09-20 PROCEDURE — 84484 ASSAY OF TROPONIN QUANT: CPT | Performed by: PHYSICIAN ASSISTANT

## 2021-09-20 PROCEDURE — 99283 EMERGENCY DEPT VISIT LOW MDM: CPT

## 2021-09-20 PROCEDURE — 80053 COMPREHEN METABOLIC PANEL: CPT | Performed by: PHYSICIAN ASSISTANT

## 2021-09-20 PROCEDURE — 93010 ELECTROCARDIOGRAM REPORT: CPT | Performed by: INTERNAL MEDICINE

## 2021-09-20 PROCEDURE — 87635 SARS-COV-2 COVID-19 AMP PRB: CPT | Performed by: PHYSICIAN ASSISTANT

## 2021-09-20 PROCEDURE — 70450 CT HEAD/BRAIN W/O DYE: CPT

## 2021-09-20 PROCEDURE — 85025 COMPLETE CBC W/AUTO DIFF WBC: CPT | Performed by: PHYSICIAN ASSISTANT

## 2021-09-20 PROCEDURE — 25010000002 HALOPERIDOL LACTATE PER 5 MG: Performed by: PHYSICIAN ASSISTANT

## 2021-09-20 PROCEDURE — 25010000002 HALOPERIDOL LACTATE PER 5 MG: Performed by: EMERGENCY MEDICINE

## 2021-09-20 PROCEDURE — 96374 THER/PROPH/DIAG INJ IV PUSH: CPT

## 2021-09-20 RX ORDER — HALOPERIDOL 5 MG/ML
10 INJECTION INTRAMUSCULAR ONCE
Status: DISCONTINUED | OUTPATIENT
Start: 2021-09-20 | End: 2021-09-20

## 2021-09-20 RX ORDER — SODIUM CHLORIDE 0.9 % (FLUSH) 0.9 %
10 SYRINGE (ML) INJECTION AS NEEDED
Status: DISCONTINUED | OUTPATIENT
Start: 2021-09-20 | End: 2021-09-21 | Stop reason: HOSPADM

## 2021-09-20 RX ORDER — HALOPERIDOL 5 MG/ML
2 INJECTION INTRAMUSCULAR ONCE
Status: COMPLETED | OUTPATIENT
Start: 2021-09-20 | End: 2021-09-20

## 2021-09-20 RX ADMIN — HALOPERIDOL LACTATE 2 MG: 5 INJECTION, SOLUTION INTRAMUSCULAR at 21:48

## 2021-09-20 RX ADMIN — HALOPERIDOL LACTATE 2 MG: 5 INJECTION, SOLUTION INTRAMUSCULAR at 22:50

## 2021-09-21 VITALS
HEIGHT: 69 IN | HEART RATE: 81 BPM | WEIGHT: 210 LBS | SYSTOLIC BLOOD PRESSURE: 159 MMHG | TEMPERATURE: 98.2 F | BODY MASS INDEX: 31.1 KG/M2 | OXYGEN SATURATION: 94 % | RESPIRATION RATE: 18 BRPM | DIASTOLIC BLOOD PRESSURE: 104 MMHG

## 2021-09-21 LAB
ALBUMIN SERPL-MCNC: 4.4 G/DL (ref 3.5–5.2)
ALBUMIN/GLOB SERPL: 2.2 G/DL
ALP SERPL-CCNC: 62 U/L (ref 39–117)
ALT SERPL W P-5'-P-CCNC: 23 U/L (ref 1–41)
AMPHET+METHAMPHET UR QL: NEGATIVE
ANION GAP SERPL CALCULATED.3IONS-SCNC: 14.9 MMOL/L (ref 5–15)
AST SERPL-CCNC: 15 U/L (ref 1–40)
BACTERIA UR QL AUTO: ABNORMAL /HPF
BARBITURATES UR QL SCN: NEGATIVE
BASOPHILS # BLD AUTO: 0.03 10*3/MM3 (ref 0–0.2)
BASOPHILS NFR BLD AUTO: 0.2 % (ref 0–1.5)
BENZODIAZ UR QL SCN: POSITIVE
BILIRUB SERPL-MCNC: <0.2 MG/DL (ref 0–1.2)
BILIRUB UR QL STRIP: NEGATIVE
BUN SERPL-MCNC: 28 MG/DL (ref 8–23)
BUN/CREAT SERPL: 24.6 (ref 7–25)
CALCIUM SPEC-SCNC: 9.1 MG/DL (ref 8.6–10.5)
CANNABINOIDS SERPL QL: NEGATIVE
CHLORIDE SERPL-SCNC: 102 MMOL/L (ref 98–107)
CLARITY UR: CLEAR
CO2 SERPL-SCNC: 24.1 MMOL/L (ref 22–29)
COCAINE UR QL: NEGATIVE
COLOR UR: YELLOW
CREAT SERPL-MCNC: 1.14 MG/DL (ref 0.76–1.27)
DEPRECATED RDW RBC AUTO: 46 FL (ref 37–54)
EOSINOPHIL # BLD AUTO: 0.01 10*3/MM3 (ref 0–0.4)
EOSINOPHIL NFR BLD AUTO: 0.1 % (ref 0.3–6.2)
ERYTHROCYTE [DISTWIDTH] IN BLOOD BY AUTOMATED COUNT: 12.4 % (ref 12.3–15.4)
ETHANOL BLD-MCNC: <10 MG/DL (ref 0–10)
ETHANOL UR QL: <0.01 %
GFR SERPL CREATININE-BSD FRML MDRD: 62 ML/MIN/1.73
GLOBULIN UR ELPH-MCNC: 2 GM/DL
GLUCOSE SERPL-MCNC: 121 MG/DL (ref 65–99)
GLUCOSE UR STRIP-MCNC: NEGATIVE MG/DL
HCT VFR BLD AUTO: 42.3 % (ref 37.5–51)
HGB BLD-MCNC: 14.1 G/DL (ref 13–17.7)
HGB UR QL STRIP.AUTO: NEGATIVE
HYALINE CASTS UR QL AUTO: ABNORMAL /LPF
IMM GRANULOCYTES # BLD AUTO: 0.04 10*3/MM3 (ref 0–0.05)
IMM GRANULOCYTES NFR BLD AUTO: 0.3 % (ref 0–0.5)
KETONES UR QL STRIP: ABNORMAL
LEUKOCYTE ESTERASE UR QL STRIP.AUTO: NEGATIVE
LYMPHOCYTES # BLD AUTO: 2.65 10*3/MM3 (ref 0.7–3.1)
LYMPHOCYTES NFR BLD AUTO: 21.7 % (ref 19.6–45.3)
MCH RBC QN AUTO: 33.4 PG (ref 26.6–33)
MCHC RBC AUTO-ENTMCNC: 33.3 G/DL (ref 31.5–35.7)
MCV RBC AUTO: 100.2 FL (ref 79–97)
METHADONE UR QL SCN: NEGATIVE
MONOCYTES # BLD AUTO: 0.8 10*3/MM3 (ref 0.1–0.9)
MONOCYTES NFR BLD AUTO: 6.5 % (ref 5–12)
MUCOUS THREADS URNS QL MICRO: ABNORMAL /HPF
NEUTROPHILS NFR BLD AUTO: 71.2 % (ref 42.7–76)
NEUTROPHILS NFR BLD AUTO: 8.69 10*3/MM3 (ref 1.7–7)
NITRITE UR QL STRIP: NEGATIVE
NRBC BLD AUTO-RTO: 0 /100 WBC (ref 0–0.2)
OPIATES UR QL: NEGATIVE
OXYCODONE UR QL SCN: NEGATIVE
PH UR STRIP.AUTO: 5.5 [PH] (ref 5–8)
PLATELET # BLD AUTO: 407 10*3/MM3 (ref 140–450)
PMV BLD AUTO: 9.7 FL (ref 6–12)
POTASSIUM SERPL-SCNC: 3.8 MMOL/L (ref 3.5–5.2)
PROT SERPL-MCNC: 6.4 G/DL (ref 6–8.5)
PROT UR QL STRIP: ABNORMAL
QT INTERVAL: 447 MS
RBC # BLD AUTO: 4.22 10*6/MM3 (ref 4.14–5.8)
RBC # UR: ABNORMAL /HPF
REF LAB TEST METHOD: ABNORMAL
SODIUM SERPL-SCNC: 141 MMOL/L (ref 136–145)
SP GR UR STRIP: 1.02 (ref 1–1.03)
SQUAMOUS #/AREA URNS HPF: ABNORMAL /HPF
TROPONIN T SERPL-MCNC: <0.01 NG/ML (ref 0–0.03)
UROBILINOGEN UR QL STRIP: ABNORMAL
WBC # BLD AUTO: 12.22 10*3/MM3 (ref 3.4–10.8)
WBC UR QL AUTO: ABNORMAL /HPF

## 2021-09-21 PROCEDURE — 90791 PSYCH DIAGNOSTIC EVALUATION: CPT

## 2021-09-21 RX ORDER — OLANZAPINE 5 MG/1
5 TABLET ORAL 2 TIMES DAILY
Qty: 60 TABLET | Refills: 0 | Status: SHIPPED | OUTPATIENT
Start: 2021-09-21 | End: 2021-09-22 | Stop reason: SDUPTHER

## 2021-09-21 RX ORDER — OLANZAPINE 5 MG/1
5 TABLET ORAL 2 TIMES DAILY
Qty: 28 TABLET | Refills: 0 | Status: SHIPPED | OUTPATIENT
Start: 2021-09-21 | End: 2021-09-21 | Stop reason: SDUPTHER

## 2021-09-21 NOTE — ED NOTES
"This RN attempted to get a covid swab on pt and pt agreed, but then became agitated.  This RN asked for a ERT to assist.  Pt once again agreed and said he was ready. Pt then became combative and tried to \"throw himself\" from the bed.    Staff assist was called and other staff members came to help. Pt's glasses moved up his face and left a small abrasion on his forehead. This RN cleaned and assessed the site.        Fany Sadler RN  09/20/21 2820    "

## 2021-09-21 NOTE — ED NOTES
"Pt presents to ED via POV from home w/cc psych eval.  Pt SO states pt \"has not been acting right\" at all for approx 2 weeks and has worsened tonight.  SO denies any hallucinations, SI/HI, self harm or violent behavior.  Pt recently placed on Cymbalta on 9/1, wife has since taken him off.  SO denies any recent falls.  PT acting erratically and loud, incoherent thought pattern.  Pt keeps having outbursts stating \"Is this where we interrogate people?  How will we know who the fruitcakes are?\"    Appropriate PPE worn at all times during pt care and interactions.         Annmarie Valencia, RN  09/20/21 2037    "

## 2021-09-21 NOTE — CONSULTS
"Pt able to state full name, , location, year, states month \"July\".     Pt's wife in ED, Vidya Ugarte, [phone], and pt's niece, Jean-Claude [phone] also in ED.     Pt reports he was brought her for \"emergency checkup, check the x-rays, CT scan, urine and the blood tests\". Asked if he knows what was going on that lead family to check him out. Pt repeating that he came in for a check up, does not appear able to state what motivated family to seek this, reiterating above and then stating, \"well that's what the tests are supposed to show\".     Pt reports he has never had contact with a mental health professional before tonight. Denies hx of inpatient psych admission.     Denies hx of self harm. Denies ever thinking about suicide in his life. Denies thoughts of harming others, \"oh, no, never\". Denies any recent aggression.     Volunteers, \"I got 2 dogs and 3 cats at home, that tells you what kind of jarek I am\".     Asked how he got the abrasion on his fore head, \"a little excursion with the people\". Asked what happened, \"we just settled down and went through with the exam\". Reports he got hurt, \"when we had the excursion I told you earlier\". Asked what that word means, \"I tried to keep the jarek from doing the exam\".     Niece reports that pt was also agitated when they came in. Niece stepped out when pt giving urine sample, and when she came back realized that something had happened.     Wife reports pt started on cymbalta on 21. GERSON Valerio also reports to me that pt started on steroids on 21. Family reports that there was no particular change after pt started on steroids on Friday.     Niece reports that when they got here, he was insisting he was 29 years old. Pt then stated that he and wife had been  for 26 years, and then reiterated he was 29 years old.     Insisting wife was \"a fruitcake\" and that it was all her fault. Was telling niece about his maids (he has no maids). Pt has not cats, does " "have 2 dogs. Wife reports that pt was \"walking me to the door ... he was going to put me on the porch ... that's the first time he has ever done that\". Family reports pt got haldol twice in ED (consistent with documentation).     Wife reports that pt went to PCP on 9/14/21, pt was incoherent. PCP recommended that if pt doesn't get better to take to ED. Wife reports that he pt \"had lost it\" tonight with trying to put her out of the home, with bizarre speech and change in behavior.     I asked family if pt was at the point where he was no longer manageable at home, and wife replied that she doesn't think that pt is at that point. Both family indicated they want to take pt home tonight. I educated family about dementia with behavioral disturbance, my acting clinical impression. I also encouraged them to set up a plan for a possible future in which pt can no longer be managed at home, family receptive to this education.     Plan to d/c with OP referrals, GERSON Valerio agrees with plan.       "

## 2021-09-21 NOTE — ED PROVIDER NOTES
EMERGENCY DEPARTMENT ENCOUNTER    Room Number:  06/06  Date of encounter:  9/21/2021  PCP: Charity Kearney MD  Historian: Patient      HPI:  Chief Complaint: psych eval   A complete HPI/ROS/PMH/PSH/SH/FH are unobtainable due to: Patient condition    Context: Jeremi Ugarte is a 76 y.o. male with past medical history of HTN, HLD, previous episodes of hyponatremia, migraine headaches/trigeminal neuralgia who presents to the ED c/o altered mental status.  History obtained from family as patient is unable to provide reliable history or answer questions appropriately.  Family states that over the past several weeks patient's behavior has been becoming increasingly erratic and bizarre, having issues with loss of memory, and inappropriate behavior.  Patient's family is concerned regarding possibility of dementia, stating his behavior appears manic and has gotten much worse over the past couple days.      MEDICAL RECORD REVIEW    Patient was seen in the ER on 09/17/2021 for evaluation of a rash and was placed on steroids.    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     Resolved Ambulatory Problems     Diagnosis Date Noted   • 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 (CMS/Formerly [facility]) 07/26/2020   • Instability of internal right knee prosthesis (CMS/Formerly [facility]) 07/27/2020     Past Medical History:   Diagnosis Date   • Anemia    • Ankle arthritis    • Ankle pain    • Arthritis    • Depression    • GCA (giant cell arteritis) (CMS/Formerly [facility])    • Gout of foot    • High risk medication use    • Hyperlipidemia    • Hypertension    • Hyponatremia    • Macrocytosis    • Muscle weakness    •  Myalgia    • Myositis    • Osteoarthritis    • Osteoarthritis of both hips    • Polymyalgia rheumatica (CMS/HCC)    • Renal insufficiency    • S/p bilateral revision of total hip replacement    • S/P foot surgery    • S/P revision of total knee, left    • S/P revision of total knee, right    • Trigeminal neuralgia          PAST SURGICAL HISTORY  Past Surgical History:   Procedure Laterality Date   • ANKLE ARTHROPLASTY Left 2018   • BACK SURGERY      lower back surg   • CATARACT EXTRACTION     • CRANIOTOMY Right 02/14/2019    Dr. Aldridge, for trigeminal neuralgia   • EXCISION BAKERS CYST KNEE  2008   • HAND SURGERY Left 1971    Left hand and wrist 1971   • HIP SURGERY Right 12/08/2016    total right hip replacement   • KNEE ARTHROPLASTY Bilateral     LEFT - 1996  -  RIGHT -  1999    • REPLACEMENT TOTAL KNEE      1996 and 1999   • SHOULDER SURGERY     • TOTAL HIP ARTHROPLASTY Left 2017   • TOTAL KNEE ARTHROPLASTY REVISION  2009    left   • TOTAL KNEE ARTHROPLASTY REVISION Right 9/15/2020    Procedure: RIGHT TOTAL KNEE ARTHROPLASTY REVISION;  Surgeon: Patricia Caldera MD;  Location: [facility];  Service: Orthopedics;  Laterality: Right;         FAMILY HISTORY  Family History   Problem Relation Age of Onset   • Cardiomyopathy Mother    • Hypertension Mother    • Hyperlipidemia Sister    • Hypertension Sister    • Coronary artery disease Brother    • Hyperlipidemia Brother    • Hypertension Brother    • No Known Problems Father    • Malig Hyperthermia Neg Hx          SOCIAL HISTORY  Social History     Socioeconomic History   • Marital status:      Spouse name: Not on file   • Number of children: Not on file   • Years of education: Not on file   • Highest education level: Not on file   Tobacco Use   • Smoking status: Former Smoker     Types: Cigars   • Smokeless tobacco: Former User     Types: Chew   • Tobacco comment: FOR A SHORT TIME AS A YOUNG ADULT   Vaping Use   • Vaping Use: Never used   Substance and  Sexual Activity   • Alcohol use: Yes     Alcohol/week: 1.0 standard drinks     Types: 1 Cans of beer per week     Comment: OCCAS   • Drug use: No   • Sexual activity: Defer         ALLERGIES  Oxycodone-acetaminophen, Morphine, and Vancomycin        REVIEW OF SYSTEMS  Review of Systems     All systems reviewed and negative except for those discussed in HPI.       PHYSICAL EXAM    I have reviewed the triage vital signs and nursing notes.    ED Triage Vitals [09/20/21 2036]   Temp Heart Rate Resp BP SpO2   98.2 °F (36.8 °C) 95 18 134/94 95 %      Temp src Heart Rate Source Patient Position BP Location FiO2 (%)   -- Monitor Lying -- --       Physical Exam  GENERAL: Alert and oriented to self, does not answer questions appropriately, not distressed  HENT: nares patent  EYES: no scleral icterus  CV: regular rhythm, regular rate, no murmurs, rubs, or gallops  RESPIRATORY: normal effort, clear to auscultate bilaterally  ABDOMEN: soft/nontender  MUSCULOSKELETAL: no deformity  NEURO: alert, moves all extremities, no focal neuro deficits, follows commands  SKIN: warm, dry        LAB RESULTS  Recent Results (from the past 24 hour(s))   Comprehensive Metabolic Panel    Collection Time: 09/20/21  9:09 PM    Specimen: Blood   Result Value Ref Range    Glucose 121 (H) 65 - 99 mg/dL    BUN 28 (H) 8 - 23 mg/dL    Creatinine 1.14 0.76 - 1.27 mg/dL    Sodium 141 136 - 145 mmol/L    Potassium 3.8 3.5 - 5.2 mmol/L    Chloride 102 98 - 107 mmol/L    CO2 24.1 22.0 - 29.0 mmol/L    Calcium 9.1 8.6 - 10.5 mg/dL    Total Protein 6.4 6.0 - 8.5 g/dL    Albumin 4.40 3.50 - 5.20 g/dL    ALT (SGPT) 23 1 - 41 U/L    AST (SGOT) 15 1 - 40 U/L    Alkaline Phosphatase 62 39 - 117 U/L    Total Bilirubin <0.2 0.0 - 1.2 mg/dL    eGFR Non African Amer 62 >60 mL/min/1.73    Globulin 2.0 gm/dL    A/G Ratio 2.2 g/dL    BUN/Creatinine Ratio 24.6 7.0 - 25.0    Anion Gap 14.9 5.0 - 15.0 mmol/L   Ethanol    Collection Time: 09/20/21  9:09 PM    Specimen: Blood    Result Value Ref Range    Ethanol <10 0 - 10 mg/dL    Ethanol % <0.010 %   CBC Auto Differential    Collection Time: 09/20/21  9:09 PM    Specimen: Blood   Result Value Ref Range    WBC 12.22 (H) 3.40 - 10.80 10*3/mm3    RBC 4.22 4.14 - 5.80 10*6/mm3    Hemoglobin 14.1 13.0 - 17.7 g/dL    Hematocrit 42.3 37.5 - 51.0 %    .2 (H) 79.0 - 97.0 fL    MCH 33.4 (H) 26.6 - 33.0 pg    MCHC 33.3 31.5 - 35.7 g/dL    RDW 12.4 12.3 - 15.4 %    RDW-SD 46.0 37.0 - 54.0 fl    MPV 9.7 6.0 - 12.0 fL    Platelets 407 140 - 450 10*3/mm3    Neutrophil % 71.2 42.7 - 76.0 %    Lymphocyte % 21.7 19.6 - 45.3 %    Monocyte % 6.5 5.0 - 12.0 %    Eosinophil % 0.1 (L) 0.3 - 6.2 %    Basophil % 0.2 0.0 - 1.5 %    Immature Grans % 0.3 0.0 - 0.5 %    Neutrophils, Absolute 8.69 (H) 1.70 - 7.00 10*3/mm3    Lymphocytes, Absolute 2.65 0.70 - 3.10 10*3/mm3    Monocytes, Absolute 0.80 0.10 - 0.90 10*3/mm3    Eosinophils, Absolute 0.01 0.00 - 0.40 10*3/mm3    Basophils, Absolute 0.03 0.00 - 0.20 10*3/mm3    Immature Grans, Absolute 0.04 0.00 - 0.05 10*3/mm3    nRBC 0.0 0.0 - 0.2 /100 WBC   Troponin    Collection Time: 09/20/21  9:09 PM    Specimen: Blood   Result Value Ref Range    Troponin T <0.010 0.000 - 0.030 ng/mL   [facility] NP SWAB IN TRANSPORT MEDIA 8-12 HR TAT - Swab, Nasopharynx    Collection Time: 09/20/21  9:25 PM    Specimen: Nasopharynx; Swab   Result Value Ref Range    COVID19 Not Detected Not Detected - Ref. Range   Urinalysis With Microscopic If Indicated (No Culture) - Urine, Clean Catch    Collection Time: 09/20/21 11:43 PM    Specimen: Urine, Clean Catch   Result Value Ref Range    Color, UA Yellow Yellow, Straw    Appearance, UA Clear Clear    pH, UA 5.5 5.0 - 8.0    Specific Gravity, UA 1.025 1.005 - 1.030    Glucose, UA Negative Negative    Ketones, UA Trace (A) Negative    Bilirubin, UA Negative Negative    Blood, UA Negative Negative    Protein, UA 30 mg/dL (1+) (A) Negative    Leuk Esterase,  UA Negative Negative    Nitrite, UA Negative Negative    Urobilinogen, UA 1.0 E.U./dL 0.2 - 1.0 E.U./dL   Urine Drug Screen - Urine, Clean Catch    Collection Time: 09/20/21 11:43 PM    Specimen: Urine, Clean Catch   Result Value Ref Range    Amphet/Methamphet, Screen Negative Negative    Barbiturates Screen, Urine Negative Negative    Benzodiazepine Screen, Urine Positive (A) Negative    Cocaine Screen, Urine Negative Negative    Opiate Screen Negative Negative    THC, Screen, Urine Negative Negative    Methadone Screen, Urine Negative Negative    Oxycodone Screen, Urine Negative Negative   Urinalysis, Microscopic Only - Urine, Clean Catch    Collection Time: 09/20/21 11:43 PM    Specimen: Urine, Clean Catch   Result Value Ref Range    RBC, UA 0-2 None Seen, 0-2 /HPF    WBC, UA 0-2 None Seen, 0-2 /HPF    Bacteria, UA Trace (A) None Seen /HPF    Squamous Epithelial Cells, UA 0-2 None Seen, 0-2 /HPF    Hyaline Casts, UA 0-2 None Seen /LPF    Mucus, UA Moderate/2+ (A) None Seen, Trace /HPF    Methodology Manual Light Microscopy    ECG 12 Lead    Collection Time: 09/20/21 11:47 PM   Result Value Ref Range    QT Interval 447 ms       Ordered the above labs and independently reviewed the results.        RADIOLOGY  CT Head Without Contrast    Result Date: 9/20/2021  CT HEAD WITHOUT CONTRAST  HISTORY: Headache and confusion  COMPARISON: None available.  TECHNIQUE: Axial CT imaging was obtained through the brain. No IV contrast was administered.  FINDINGS: No acute intracranial hemorrhage is seen. There is diffuse atrophy. There is periventricular and deep white matter microangiopathic change. There is no midline shift or mass effect. There is a lucency which is traversing the right occipital bone, immediately posterior to the right mastoid air cells. Clinical significance is uncertain.. There is no adjacent soft tissue swelling. Again, no hemorrhage is seen. It may represent sequela of prior trauma, although correlation  with operative history is suggested. This is likely a chronic finding, although correlation with history of trauma and physical exam findings is suggested. There is some mild partial opacification of the right mastoid air cells. Mucosal thickening is noted within the ethmoid sinuses, and there are mucous retention cysts within the right maxillary sinus. There is a densely calcified by lesion which is seen adjacent to the right side of the ilene, which may represent a meningioma. It can be further assessed with MRI. It measures approximately 8 mm.       1. No acute intracranial findings. 2. Lucency seen traversing the right occipital bone posterior to the right mastoid air cells. This is favored to be a chronic finding, related to prior trauma or surgery, given lack of overlying soft tissue swelling and no adjacent hemorrhage, but correlation with physical exam findings is suggested. 3. There is a densely calcified lesion seen adjacent to the right side of the ilene, favored to represent a meningioma, although it would be better assessed with MRI.  Radiation dose reduction techniques were utilized, including automated exposure control and exposure modulation based on body size.  This report was finalized on 9/20/2021 11:44 PM by Dr. Doris Huang M.D.        I ordered the above noted radiological studies. Reviewed by me and discussed with radiologist.  See dictation for official radiology interpretation.      PROCEDURES    Procedures      MEDICATIONS GIVEN IN ER    Medications   sodium chloride 0.9 % flush 10 mL (has no administration in time range)   haloperidol lactate (HALDOL) injection 2 mg (2 mg Intravenous Given 9/20/21 2861)   haloperidol lactate (HALDOL) injection 2 mg (2 mg Intravenous Given 9/20/21 2250)         PROGRESS, DATA ANALYSIS, CONSULTS, AND MEDICAL DECISION MAKING    All labs have been independently reviewed by me.  All radiology studies have been reviewed by me and discussed with radiologist  dictating the report.   EKG's independently viewed and interpreted by me.  Discussion below represents my analysis of pertinent findings related to patient's condition, differential diagnosis, treatment plan and final disposition.    DDx: Includes but not limited to dementia, UTI, hyponatremia, psychosis, steroid-induced psychosis    ED Course as of Sep 21 0335   Mon Sep 20, 2021   2135 Patient became acutely agitated, jumped out of bed after the nurse and struck his left eyebrow causing a small laceration.  Will give patient a small dose of Haldol.    [CORETTA]   Tue Sep 21, 2021   0023 WBC(!): 12.22 [CORETTA]   0023 Hemoglobin: 14.1 [CORETTA]   0023 Platelets: 407 [CORETTA]   0055 Patient has been medically cleared and will be evaluated by access.    [CORETTA]   0143 Blas RN, with access, at bedside for evaluation of patient.    [CORETTA]   0153 Patient has been seen and evaluated by Blas COBB, with access.  Feels like that this is most likely dementia with behavioral disturbance that has been exacerbated by steroid psychosis.  Family is willing to take patient home, will stop the steroids, and try a short course of antipsychotics.    [CORETTA]   0310 Rechecked, sitting in bed, no focal neuro deficits, and behavior is improved.  Discussed results with family and their discussions with access.  They are willing to take patient home and we will try a short course of antipsychotics.  Patient family are comfortable with plan for discharge.    [CORETTA]      ED Course User Index  [CORETTA] Charlie Valerio PA       MDM: Patient's evaluation does not show any acute medical abnormality with normal CT scan of the head, CBC CMP, and urine.  It seems most likely that patient had some pre-existing underlying dementia that was exacerbated by steroid use and the patient became more agitated.  He has been seen and evaluated by psych and family feels comfortable taking the patient home.  We will stop the steroids and try a short course of antipsychotics as well as  follow-up with [facility] psych.  Patient's vital signs are stable, patient is safe for discharge home.    PPE: The patient wore a surgical mask throughout the entire patient encounter. I wore an N95.    AS OF 03:35 EDT VITALS:    BP - (!) 159/104  HR - 77  TEMP - 98.2 °F (36.8 °C)  O2 SATS - 93%        DIAGNOSIS  Final diagnoses:   Dementia with behavioral disturbance, unspecified dementia type (CMS/HCC)   Psychosis, unspecified psychosis type (CMS/HCC)         DISPOSITION  DISCHARGE    Patient discharged in stable condition.    Reviewed implications of results, diagnosis, meds, responsibility to follow up, warning signs and symptoms of possible worsening, potential complications and reasons to return to ER.    Patient/Family voiced understanding of above instructions.    Discussed plan for discharge, as there is no emergent indication for admission. Patient referred to primary care provider for BP management due to today's BP. Pt/family is agreeable and understands need for follow up and repeat testing.  Pt is aware that discharge does not mean that nothing is wrong but it indicates no emergency is present that requires admission and they must continue care with follow-up as given below or physician of their choice.     FOLLOW-UP  Charity Kearney MD  [address] [address]  [phone]    Schedule an appointment as soon as possible for a visit            Medication List      New Prescriptions    OLANZapine 5 MG tablet  Commonly known as: zyPREXA  Take 1 tablet by mouth 2 (two) times a day.        Changed    ALPRAZolam 0.5 MG tablet  Commonly known as: XANAX  Take 1 tablet by mouth 3 (Three) Times a Day.  What changed:   · when to take this  · reasons to take this     quinapril 40 MG tablet  Commonly known as: ACCUPRIL  TAKE 1 TABLET BY MOUTH TWICE DAILY  What changed:   · how to take this  · when to take this        Stop    predniSONE 20 MG tablet  Commonly known as: DELTASONE           Where to  Get Your Medications      These medications were sent to [id number] - [place], [place] - [address] AT [facility] - [phone]  - [phone]   [address], [facility] [address]    Hours: 24-hours Phone: [phone]   · OLANZapine 5 MG tablet            Charlie Valerio PA  09/21/21 0335

## 2021-09-21 NOTE — DISCHARGE INSTRUCTIONS
Home, rest, medicine as directed, home medicine as prescribed, follow up with the resources given to you by the access center and PCP for recheck. Return to care with further concerns.

## 2021-09-21 NOTE — ED NOTES
"This RN attempted to get a urine sample from pt and he held urinal on the outside of his pants and pretended to urinate.  Pt then stated \"I went a lot, didn't I?\" This RN then helped pt remove pants and attempt again.  Pt states he can't go anymore because he has already urinated. Pt did not urinate at all.    Pt then asked \"Are you going to check out the fruitcake?\" This RN asked who/what the fruitcake was.  Pt stated \"My wife.  She is a fruitcake and needs looked at.\"  Pt in mask & this RN in N95, gloves, & eye shield during care.   Will continue to follow.       Fany Sadler, RN  09/20/21 2118    "

## 2021-09-21 NOTE — ED NOTES
This RN received a phone call from CT.  Pt was agitated and tried to get off the table.  Orders received.   Will continue to follow.      Fany Sadler, RN  09/20/21 6813

## 2021-09-22 RX ORDER — OLANZAPINE 5 MG/1
5 TABLET ORAL 2 TIMES DAILY
Qty: 180 TABLET | Refills: 0 | Status: SHIPPED | OUTPATIENT
Start: 2021-09-22 | End: 2021-09-23 | Stop reason: SDUPTHER

## 2021-09-23 RX ORDER — OLANZAPINE 5 MG/1
5 TABLET ORAL NIGHTLY
Qty: 90 TABLET | Refills: 1 | Status: SHIPPED | OUTPATIENT
Start: 2021-09-23 | End: 2022-03-27 | Stop reason: HOSPADM

## 2021-09-24 ENCOUNTER — TELEPHONE (OUTPATIENT)
Dept: INTERNAL MEDICINE | Facility: CLINIC | Age: 76
End: 2021-09-24

## 2021-09-24 NOTE — TELEPHONE ENCOUNTER
I spoke with patient and made sure this could wait. I informed her that Dr Kearney is out on Friday's. She asked that you all call her Monday.

## 2021-09-24 NOTE — TELEPHONE ENCOUNTER
Caller: Roger Ugarteon    Relationship: Emergency Contact    Best call back number: [phone] (H)    What is the best time to reach you: ANYTIME    Who are you requesting to speak with (clinical staff, provider,  specific staff member): DR. MERAZ    Do you know the name of the person who called:     What was the call regarding: PATIENT'S MENTAL HEALTH ISSUES. PATIENT'S WIFE CALLED TO ADVISE THAT SHE IS NEEDING TO SPEAK TO DR. MERAZ ABOUT PATIENT'S MENTAL HEALTH ISSUES. VIDYA STATES THAT THIS CAN NOT WAIT UNTIL 10/06/21 (THAT'S THE FIRST AVAILABLE APPOINTMENT WITH DR. MERAZ). VIDYA IS REQUESTING A CALLBACK FROM DR. MERAZ.    Do you require a callback: YES        THANKS

## 2021-09-27 ENCOUNTER — OFFICE VISIT (OUTPATIENT)
Dept: INTERNAL MEDICINE | Facility: CLINIC | Age: 76
End: 2021-09-27

## 2021-09-27 VITALS — WEIGHT: 221 LBS | HEIGHT: 69 IN | BODY MASS INDEX: 32.73 KG/M2 | TEMPERATURE: 97.3 F

## 2021-09-27 DIAGNOSIS — F03.92 DEMENTIA WITH PSYCHOSIS (HCC): Primary | ICD-10-CM

## 2021-09-27 PROBLEM — G44.009 CLUSTER HEADACHES: Status: ACTIVE | Noted: 2021-09-27

## 2021-09-27 PROBLEM — I10 HYPERTENSION: Status: ACTIVE | Noted: 2021-09-27

## 2021-09-27 PROCEDURE — 99215 OFFICE O/P EST HI 40 MIN: CPT | Performed by: INTERNAL MEDICINE

## 2021-09-27 NOTE — PROGRESS NOTES
"Chief Complaint  Altered Mental Status    Subjective          Jeremi Ugarte presents to [facility]  History of Present Illness  Here with daughter and wife, ongoing issues with altered mental status- reviewed chart- has had increased problems with delusions, agitation.  Started late August, put on steroid mid Sept for rash/itch- didn't complete pack.  Went back to ER on the 4th day due to worsening psychosis- told not to take 5th days dosage.  Daughter came into down to observe- he has been more lucid but also has times where he does things as described in note from them in the chart.  Went to [facility] and [facility] on Thursday, no input yet from them.  He had one episode of aggression towards wife and daughter, they did call 911.  They started the olanzapine 1/2 BID for the last few days.    Stopped drinking beer about a month ago= not really sure what was going on that made he want to stop- was drinking about 4 beers/night for years and more on the weekends.  Currently he is also taking alprazolam one a day and oxcarbazepine one a day as well.     Objective   Vital Signs:   Temp 97.3 °F (36.3 °C)   Ht 175.3 cm (69\")   Wt 100 kg (221 lb)   BMI 32.64 kg/m²     Physical Exam  Constitutional:       Appearance: Normal appearance.   Cardiovascular:      Rate and Rhythm: Normal rate and regular rhythm.   Neurological:      Cranial Nerves: No cranial nerve deficit.      Comments: Thinks it is Sept 17  Knows building, patient  Agrees to plam        Result Review :   The following data was reviewed by: Charity Kearney MD on 09/27/2021:  Common labs    Common Labsle 9/13/21 9/14/21 9/14/21 9/14/21 9/20/21 9/20/21     1248 1248 1248 2109 2109   Glucose   102 (A)   121 (A)   Glucose 100 (A)        BUN 22  19   28 (A)   Creatinine 1.21  1.05   1.14   eGFR Non  Am 58 (A)  69   62   eGFR African Am 71        Sodium 136  139   141   Potassium 4.3  4.0   3.8   Chloride 97 (A)  102   102 "   Calcium 9.4  10.1   9.1   Albumin   4.50   4.40   Total Bilirubin   0.2   <0.2   Alkaline Phosphatase   65   62   AST (SGOT)   18   15   ALT (SGPT)   25   23   WBC  9.53   12.22 (A)    Hemoglobin  14.3   14.1    Hematocrit  44.2   42.3    Platelets  428   407    Total Cholesterol    128     Triglycerides    93     HDL Cholesterol    48     LDL Cholesterol     62     (A) Abnormal value       Comments are available for some flowsheets but are not being displayed.           Data reviewed: ER notes          Assessment and Plan    Diagnoses and all orders for this visit:    1. Dementia with psychosis (CMS/HCC) (Primary)  Comments:  presumptve dx- await results from neuropsych, Zyprex for now as he's doing. Avoid alprazolam. Family understands.  Recheck 3-4 weeks.   Orders:  -     Ambulatory Referral to Psychiatry  -     Ambulatory Referral to Neurology      I spent 42 minutes caring for Jeremi on this date of service. This time includes time spent by me in the following activities:preparing for the visit, reviewing tests and counseling and educating the patient/family/caregiver  Follow Up   Return in about 4 weeks (around 10/25/2021) for Recheck.  Patient was given instructions and counseling regarding his condition or for health maintenance advice. Please see specific information pulled into the AVS if appropriate.

## 2021-10-01 DIAGNOSIS — F41.9 ANXIETY: ICD-10-CM

## 2021-10-01 RX ORDER — ALPRAZOLAM 0.5 MG/1
0.5 TABLET ORAL 3 TIMES DAILY
Qty: 100 TABLET | Refills: 1 | Status: SHIPPED | OUTPATIENT
Start: 2021-10-01 | End: 2021-11-29

## 2021-10-06 ENCOUNTER — APPOINTMENT (OUTPATIENT)
Dept: OTHER | Facility: HOSPITAL | Age: 76
End: 2021-10-06

## 2021-10-06 ENCOUNTER — HOSPITAL ENCOUNTER (OUTPATIENT)
Dept: MRI IMAGING | Facility: HOSPITAL | Age: 76
Discharge: HOME | End: 2021-10-06

## 2021-10-06 DIAGNOSIS — Z09 FOLLOW UP: ICD-10-CM

## 2021-10-06 LAB — CREAT BLDA-MCNC: 1 MG/DL (ref 0.6–1.3)

## 2021-10-06 PROCEDURE — 0 GADOBENATE DIMEGLUMINE 529 MG/ML SOLUTION: Performed by: INTERNAL MEDICINE

## 2021-10-06 PROCEDURE — 82565 ASSAY OF CREATININE: CPT

## 2021-10-06 PROCEDURE — 70553 MRI BRAIN STEM W/O & W/DYE: CPT

## 2021-10-06 PROCEDURE — A9577 INJ MULTIHANCE: HCPCS | Performed by: INTERNAL MEDICINE

## 2021-10-06 RX ADMIN — GADOBENATE DIMEGLUMINE 20 ML: 529 INJECTION, SOLUTION INTRAVENOUS at 08:55

## 2021-10-20 ENCOUNTER — TRANSCRIBE ORDERS (OUTPATIENT)
Dept: ADMINISTRATIVE | Facility: HOSPITAL | Age: 76
End: 2021-10-20

## 2021-10-20 DIAGNOSIS — M25.562 LEFT KNEE PAIN, UNSPECIFIED CHRONICITY: Primary | ICD-10-CM

## 2021-11-01 ENCOUNTER — OFFICE VISIT (OUTPATIENT)
Dept: INTERNAL MEDICINE | Facility: CLINIC | Age: 76
End: 2021-11-01

## 2021-11-01 VITALS
HEART RATE: 76 BPM | BODY MASS INDEX: 34.66 KG/M2 | TEMPERATURE: 97.3 F | HEIGHT: 69 IN | SYSTOLIC BLOOD PRESSURE: 130 MMHG | DIASTOLIC BLOOD PRESSURE: 74 MMHG | WEIGHT: 234 LBS

## 2021-11-01 DIAGNOSIS — F41.9 ANXIETY: Primary | ICD-10-CM

## 2021-11-01 DIAGNOSIS — I45.2 BIFASCICULAR BLOCK: ICD-10-CM

## 2021-11-01 DIAGNOSIS — I10 BENIGN ESSENTIAL HTN: ICD-10-CM

## 2021-11-01 DIAGNOSIS — Z96.651 STATUS POST REVISION OF TOTAL REPLACEMENT OF RIGHT KNEE: ICD-10-CM

## 2021-11-01 PROCEDURE — 99214 OFFICE O/P EST MOD 30 MIN: CPT | Performed by: INTERNAL MEDICINE

## 2021-11-01 RX ORDER — HYDROCHLOROTHIAZIDE 25 MG/1
25 TABLET ORAL DAILY
Qty: 90 TABLET | Refills: 1 | Status: SHIPPED | OUTPATIENT
Start: 2021-11-01 | End: 2022-03-27 | Stop reason: HOSPADM

## 2021-11-01 RX ORDER — AMLODIPINE BESYLATE 5 MG/1
5 TABLET ORAL DAILY
Qty: 90 TABLET | Refills: 1 | Status: SHIPPED | OUTPATIENT
Start: 2021-11-01 | End: 2022-03-07 | Stop reason: SDUPTHER

## 2021-11-01 NOTE — PROGRESS NOTES
"Chief Complaint  Dementia    Subjective          Jeremi Ugarte presents to [facility]  History of Present Illness  Here to f/u his altered mental status- He thinks he got \"off track\" on his headache medications.  He takes the oxcarbazepine every 8 hours and that seems to keep it under control.  Wife is unsure if he ever got mixed up on meds.   They also think that maybe the Cymbalta made things worse.  Wife gives him Zyprex 2.5 mg at hs with great success.  His wife thinks it may be increasing his appetite.  His weight had gotten down to 210 but baseline in @230.   Has to have revision of L total knee.  He needs clearance today.  Sees Dr. Orosco annually- RBBB all stable.  He feels good, without CP, SOB.  Plans to be able to increase physical activity after knee surgery.  He has been successful losing weight in the past with WW and plan to start again.    Saw psychologist at [facility]- thinks he has early Alzheimer's and thinks he may need to give up driving.  He was altered at that point and doesn't agree with the assessment.  He feels much more clear now.    He is not drinking any alcohol currently.    Objective   Vital Signs:   /74   Pulse 76   Temp 97.3 °F (36.3 °C)   Ht 175.3 cm (69\")   Wt 106 kg (234 lb)   BMI 34.56 kg/m²     Physical Exam  Constitutional:       Appearance: Normal appearance. He is obese.   Cardiovascular:      Rate and Rhythm: Normal rate and regular rhythm.   Pulmonary:      Effort: Pulmonary effort is normal.   Musculoskeletal:      Right lower leg: No edema.      Left lower leg: No edema.   Neurological:      General: No focal deficit present.        Result Review :   The following data was reviewed by: Charity Kearney MD on 11/01/2021:  Common labs    Common Labsle 9/14/21 9/14/21 9/14/21 9/20/21 9/20/21 10/6/21    1248 1248 1248 2109 2109    Glucose  102 (A)   121 (A)    BUN  19   28 (A)    Creatinine  1.05   1.14 1.00   eGFR Non "  Am  69   62    Sodium  139   141    Potassium  4.0   3.8    Chloride  102   102    Calcium  10.1   9.1    Albumin  4.50   4.40    Total Bilirubin  0.2   <0.2    Alkaline Phosphatase  65   62    AST (SGOT)  18   15    ALT (SGPT)  25   23    WBC 9.53   12.22 (A)     Hemoglobin 14.3   14.1     Hematocrit 44.2   42.3     Platelets 428   407     Total Cholesterol   128      Triglycerides   93      HDL Cholesterol   48      LDL Cholesterol    62      (A) Abnormal value       Comments are available for some flowsheets but are not being displayed.           Data reviewed: Radiologic studies MRI          Assessment and Plan    Diagnoses and all orders for this visit:    1. Anxiety (Primary)  Comments:  he is much closer to baseline today, I agree may be drug induced.  Will follow closely- seeing Dr. Hudson.  Still think psych is not a bad idea.      2. Benign essential HTN  Comments:  controlled.     3. Bifascicular block  Comments:  asymptomatic, has been stable.     4. Status post revision of total replacement of right knee  Comments:  needs preop clearance today- I see no reason he can't have the planned procedure.  Will follow pre-op labs.        Follow Up   Return in about 4 months (around 3/1/2022) for Medicare Wellness, Lab Before FUP.  Patient was given instructions and counseling regarding his condition or for health maintenance advice. Please see specific information pulled into the AVS if appropriate.

## 2021-11-02 ENCOUNTER — HOSPITAL ENCOUNTER (OUTPATIENT)
Dept: GENERAL RADIOLOGY | Facility: HOSPITAL | Age: 76
Discharge: HOME | End: 2021-11-02

## 2021-11-02 ENCOUNTER — PRE-ADMISSION TESTING (OUTPATIENT)
Dept: PREADMISSION TESTING | Facility: HOSPITAL | Age: 76
End: 2021-11-02

## 2021-11-02 VITALS
RESPIRATION RATE: 16 BRPM | HEART RATE: 84 BPM | WEIGHT: 235 LBS | BODY MASS INDEX: 34.8 KG/M2 | OXYGEN SATURATION: 95 % | TEMPERATURE: 97.4 F | SYSTOLIC BLOOD PRESSURE: 144 MMHG | DIASTOLIC BLOOD PRESSURE: 83 MMHG | HEIGHT: 69 IN

## 2021-11-02 LAB
ALBUMIN SERPL-MCNC: 4.2 G/DL (ref 3.5–5.2)
ALBUMIN/GLOB SERPL: 1.8 G/DL
ALP SERPL-CCNC: 80 U/L (ref 39–117)
ALT SERPL W P-5'-P-CCNC: 13 U/L (ref 1–41)
ANION GAP SERPL CALCULATED.3IONS-SCNC: 10.6 MMOL/L (ref 5–15)
APTT PPP: 29.9 SECONDS (ref 22.7–35.4)
AST SERPL-CCNC: 17 U/L (ref 1–40)
BACTERIA UR QL AUTO: NORMAL /HPF
BASOPHILS # BLD AUTO: 0.03 10*3/MM3 (ref 0–0.2)
BASOPHILS NFR BLD AUTO: 0.4 % (ref 0–1.5)
BILIRUB SERPL-MCNC: <0.2 MG/DL (ref 0–1.2)
BILIRUB UR QL STRIP: NEGATIVE
BUN SERPL-MCNC: 17 MG/DL (ref 8–23)
BUN/CREAT SERPL: 17.5 (ref 7–25)
CALCIUM SPEC-SCNC: 8.9 MG/DL (ref 8.6–10.5)
CHLORIDE SERPL-SCNC: 97 MMOL/L (ref 98–107)
CLARITY UR: CLEAR
CO2 SERPL-SCNC: 25.4 MMOL/L (ref 22–29)
COLOR UR: YELLOW
CREAT SERPL-MCNC: 0.97 MG/DL (ref 0.76–1.27)
CRP SERPL-MCNC: 0.71 MG/DL (ref 0–0.5)
DEPRECATED RDW RBC AUTO: 46.3 FL (ref 37–54)
EOSINOPHIL # BLD AUTO: 0.23 10*3/MM3 (ref 0–0.4)
EOSINOPHIL NFR BLD AUTO: 3.1 % (ref 0.3–6.2)
ERYTHROCYTE [DISTWIDTH] IN BLOOD BY AUTOMATED COUNT: 12.6 % (ref 12.3–15.4)
ERYTHROCYTE [SEDIMENTATION RATE] IN BLOOD: 13 MM/HR (ref 0–20)
GFR SERPL CREATININE-BSD FRML MDRD: 75 ML/MIN/1.73
GLOBULIN UR ELPH-MCNC: 2.3 GM/DL
GLUCOSE SERPL-MCNC: 92 MG/DL (ref 65–99)
GLUCOSE UR STRIP-MCNC: NEGATIVE MG/DL
HCT VFR BLD AUTO: 39.6 % (ref 37.5–51)
HGB BLD-MCNC: 13.3 G/DL (ref 13–17.7)
HGB UR QL STRIP.AUTO: NEGATIVE
HYALINE CASTS UR QL AUTO: NORMAL /LPF
IMM GRANULOCYTES # BLD AUTO: 0.04 10*3/MM3 (ref 0–0.05)
IMM GRANULOCYTES NFR BLD AUTO: 0.5 % (ref 0–0.5)
INR PPP: 1.02 (ref 0.9–1.1)
KETONES UR QL STRIP: NEGATIVE
LEUKOCYTE ESTERASE UR QL STRIP.AUTO: NEGATIVE
LYMPHOCYTES # BLD AUTO: 2.22 10*3/MM3 (ref 0.7–3.1)
LYMPHOCYTES NFR BLD AUTO: 29.7 % (ref 19.6–45.3)
MCH RBC QN AUTO: 33.7 PG (ref 26.6–33)
MCHC RBC AUTO-ENTMCNC: 33.6 G/DL (ref 31.5–35.7)
MCV RBC AUTO: 100.3 FL (ref 79–97)
MONOCYTES # BLD AUTO: 0.78 10*3/MM3 (ref 0.1–0.9)
MONOCYTES NFR BLD AUTO: 10.4 % (ref 5–12)
NEUTROPHILS NFR BLD AUTO: 4.17 10*3/MM3 (ref 1.7–7)
NEUTROPHILS NFR BLD AUTO: 55.9 % (ref 42.7–76)
NITRITE UR QL STRIP: NEGATIVE
NRBC BLD AUTO-RTO: 0 /100 WBC (ref 0–0.2)
PH UR STRIP.AUTO: 6 [PH] (ref 5–8)
PLATELET # BLD AUTO: 319 10*3/MM3 (ref 140–450)
PMV BLD AUTO: 9.2 FL (ref 6–12)
POTASSIUM SERPL-SCNC: 4.1 MMOL/L (ref 3.5–5.2)
PROT SERPL-MCNC: 6.5 G/DL (ref 6–8.5)
PROT UR QL STRIP: NEGATIVE
PROTHROMBIN TIME: 13.2 SECONDS (ref 11.7–14.2)
RBC # BLD AUTO: 3.95 10*6/MM3 (ref 4.14–5.8)
RBC # UR: NORMAL /HPF
REF LAB TEST METHOD: NORMAL
SODIUM SERPL-SCNC: 133 MMOL/L (ref 136–145)
SP GR UR STRIP: 1.02 (ref 1–1.03)
SQUAMOUS #/AREA URNS HPF: NORMAL /HPF
UROBILINOGEN UR QL STRIP: NORMAL
WBC # BLD AUTO: 7.47 10*3/MM3 (ref 3.4–10.8)
WBC UR QL AUTO: NORMAL /HPF

## 2021-11-02 PROCEDURE — 81001 URINALYSIS AUTO W/SCOPE: CPT

## 2021-11-02 PROCEDURE — 85025 COMPLETE CBC W/AUTO DIFF WBC: CPT

## 2021-11-02 PROCEDURE — 71046 X-RAY EXAM CHEST 2 VIEWS: CPT

## 2021-11-02 PROCEDURE — 85730 THROMBOPLASTIN TIME PARTIAL: CPT

## 2021-11-02 PROCEDURE — 86140 C-REACTIVE PROTEIN: CPT

## 2021-11-02 PROCEDURE — 85652 RBC SED RATE AUTOMATED: CPT

## 2021-11-02 PROCEDURE — 73560 X-RAY EXAM OF KNEE 1 OR 2: CPT

## 2021-11-02 PROCEDURE — 80053 COMPREHEN METABOLIC PANEL: CPT

## 2021-11-02 PROCEDURE — 36415 COLL VENOUS BLD VENIPUNCTURE: CPT

## 2021-11-02 PROCEDURE — 85610 PROTHROMBIN TIME: CPT

## 2021-11-02 NOTE — DISCHARGE INSTRUCTIONS
Take the following medications the morning of surgery:  AMLODIPINE, TRILEPTAL, AND XANAX IF NEEDED  HOLD ALL VITAMINS UNTIL AFTER SURGERY    ARRIVE 7:30 AM  11/16      If you are on prescription narcotic pain medication to control your pain you may also take that medication the morning of surgery.    General Instructions:  • Do not eat solid food after midnight the night before surgery.  • You may drink clear liquids day of surgery but must stop at least one hour before your hospital arrival time.  • It is beneficial for you to have a clear drink that contains carbohydrates the day of surgery.  We suggest a 12 to 20 ounce bottle of Gatorade or Powerade for non-diabetic patients or a 12 to 20 ounce bottle of G2 or Powerade Zero for diabetic patients. (Pediatric patients, are not advised to drink a 12 to 20 ounce carbohydrate drink)    Clear liquids are liquids you can see through.  Nothing red in color.     Plain water                               Sports drinks  Sodas                                   Gelatin (Jell-O)  Fruit juices without pulp such as white grape juice and apple juice  Popsicles that contain no fruit or yogurt  Tea or coffee (no cream or milk added)  Gatorade / Powerade  G2 / Powerade Zero    • Infants may have breast milk up to four hours before surgery.  • Infants drinking formula may drink formula up to six hours before surgery.   • Patients who avoid smoking, chewing tobacco and alcohol for 4 weeks prior to surgery have a reduced risk of post-operative complications.  Quit smoking as many days before surgery as you can.  • Do not smoke, use chewing tobacco or drink alcohol the day of surgery.   • If applicable bring your C-PAP/ BI-PAP machine.  • Bring any papers given to you in the doctor’s office.  • Wear clean comfortable clothes.  • Do not wear contact lenses, false eyelashes or make-up.  Bring a case for your glasses.   • Bring crutches or walker if applicable.  • Remove all piercings.   Leave jewelry and any other valuables at home.  • Hair extensions with metal clips must be removed prior to surgery.  • The Pre-Admission Testing nurse will instruct you to bring medications if unable to obtain an accurate list in Pre-Admission Testing.        If you were given a blood bank ID arm band remember to bring it with you the day of surgery.    Preventing a Surgical Site Infection:  • For 2 to 3 days before surgery, avoid shaving with a razor because the razor can irritate skin and make it easier to develop an infection.    • Any areas of open skin can increase the risk of a post-operative wound infection by allowing bacteria to enter and travel throughout the body.  Notify your surgeon if you have any skin wounds / rashes even if it is not near the expected surgical site.  The area will need assessed to determine if surgery should be delayed until it is healed.  • The night prior to surgery shower using a fresh bar of anti-bacterial soap (such as Dial) and clean washcloth.  Sleep in a clean bed with clean clothing.  Do not allow pets to sleep with you.  • Shower on the morning of surgery using a fresh bar of anti-bacterial soap (such as Dial) and clean washcloth.  Dry with a clean towel and dress in clean clothing.  • Ask your surgeon if you will be receiving antibiotics prior to surgery.  • Make sure you, your family, and all healthcare providers clean their hands with soap and water or an alcohol based hand  before caring for you or your wound.    Day of surgery:  Your arrival time is approximately two hours before your scheduled surgery time.  Upon arrival, a Pre-op nurse and Anesthesiologist will review your health history, obtain vital signs, and answer questions you may have.  The only belongings needed at this time will be a list of your home medications and if applicable your C-PAP/BI-PAP machine.  A Pre-op nurse will start an IV and you may receive medication in preparation for surgery,  including something to help you relax.     Please be aware that surgery does come with discomfort.  We want to make every effort to control your discomfort so please discuss any uncontrolled symptoms with your nurse.   Your doctor will most likely have prescribed pain medications.      If you are going home after surgery you will receive individualized written care instructions before being discharged.  A responsible adult must drive you to and from the hospital on the day of your surgery and stay with you for 24 hours.  Discharge prescriptions can be filled by the hospital pharmacy during regular pharmacy hours.  If you are having surgery late in the day/evening your prescription may be e-prescribed to your pharmacy.  Please verify your pharmacy hours or chose a 24 hour pharmacy to avoid not having access to your prescription because your pharmacy has closed for the day.    If you are staying overnight following surgery, you will be transported to your hospital room following the recovery period.  [facility] has all private rooms.    If you have any questions please call Pre-Admission Testing at [phone].  Deductibles and co-payments are collected on the day of service. Please be prepared to pay the required co-pay, deductible or deposit on the day of service as defined by your plan.    Patient Education for Self-Quarantine Process    • Following your COVID testing, we strongly recommend that you wear a mask when you are with other people and practice social distancing.   • Limit your activities to only required outings.  • Wash your hands with soap and water frequently for at least 20 seconds.   • Avoid touching your eyes, nose and mouth with unwashed hands.  • Do not share anything - utensils, drinking glasses, food from the same bowl.   • Sanitize household surfaces daily. Include all high touch areas (door handles, light switches, phones, countertops, etc.)    Call your surgeon immediately  if you experience any of the following symptoms:  • Sore Throat  • Shortness of Breath or difficulty breathing  • Cough  • Chills  • Body soreness or muscle pain  • Headache  • Fever  • New loss of taste or smell  • Do not arrive for your surgery ill.  Your procedure will need to be rescheduled to another time.  You will need to call your physician before the day of surgery to avoid any unnecessary exposure to hospital staff as well as other patients.    CHLORHEXIDINE CLOTH INSTRUCTIONS  The morning of surgery follow these instructions using the Chlorhexidine cloths you've been given.  These steps reduce bacteria on the body.  Do not use the cloths near your eyes, ears mouth, genitalia or on open wounds.  Throw the cloths away after use but do not try to flush them down a toilet.      • Open and remove one cloth at a time from the package.    • Leave the cloth unfolded and begin the bathing.  • Massage the skin with the cloths using gentle pressure to remove bacteria.  Do not scrub harshly.   • Follow the steps below with one 2% CHG cloth per area (6 total cloths).  • One cloth for neck, shoulders and chest.  • One cloth for both arms, hands, fingers and underarms (do underarms last).  • One cloth for the abdomen followed by groin.  • One cloth for right leg and foot including between the toes.  • One cloth for left leg and foot including between the toes.  • The last cloth is to be used for the back of the neck, back and buttocks.    Allow the CHG to air dry 3 minutes on the skin which will give it time to work and decrease the chance of irritation.  The skin may feel sticky until it is dry.  Do not rinse with water or any other liquid or you will lose the beneficial effects of the CHG.  If mild skin irritation occurs, do rinse the skin to remove the CHG.  Report this to the nurse at time of admission.  Do not apply lotions, creams, ointments, deodorants or perfumes after using the clothes. Dress in clean clothes  before coming to the hospital.    BACTROBAN NASAL OINTMENT  There are many germs normally in your nose. Bactroban is an ointment that will help reduce these germs. Please follow these instructions for Bactroban use:      ____The day before surgery in the morning  Date________    ____The day before surgery in the evening              Date________    ____The day of surgery in the morning    Date________    **Squirt ½ package of Bactroban Ointment onto a cotton applicator and apply to inside of 1st nostril.  Squirt the remaining Bactroban and apply to the inside of the other nostril.

## 2021-11-05 ENCOUNTER — HOSPITAL ENCOUNTER (OUTPATIENT)
Dept: NUCLEAR MEDICINE | Facility: HOSPITAL | Age: 76
Discharge: HOME | End: 2021-11-05

## 2021-11-05 DIAGNOSIS — M25.562 LEFT KNEE PAIN, UNSPECIFIED CHRONICITY: ICD-10-CM

## 2021-11-05 PROCEDURE — A9500 TC99M SESTAMIBI: HCPCS | Performed by: ORTHOPAEDIC SURGERY

## 2021-11-05 PROCEDURE — 78315 BONE IMAGING 3 PHASE: CPT

## 2021-11-05 PROCEDURE — 0 TECHNETIUM SESTAMIBI: Performed by: ORTHOPAEDIC SURGERY

## 2021-11-05 RX ADMIN — TECHNETIUM TC 99M SESTAMIBI 1 DOSE: 1 INJECTION INTRAVENOUS at 11:35

## 2021-11-13 ENCOUNTER — LAB (OUTPATIENT)
Dept: LAB | Facility: HOSPITAL | Age: 76
End: 2021-11-13

## 2021-11-13 LAB — SARS-COV-2 ORF1AB RESP QL NAA+PROBE: NOT DETECTED

## 2021-11-13 PROCEDURE — C9803 HOPD COVID-19 SPEC COLLECT: HCPCS

## 2021-11-13 PROCEDURE — U0004 COV-19 TEST NON-CDC HGH THRU: HCPCS

## 2021-11-15 PROBLEM — M17.9 OSTEOARTHRITIS OF KNEE: Status: ACTIVE | Noted: 2021-11-15

## 2021-11-15 PROBLEM — T84.033A MECHANICAL LOOSENING OF INTERNAL LEFT KNEE PROSTHETIC JOINT (HCC): Chronic | Status: ACTIVE | Noted: 2021-11-15

## 2021-11-15 RX ORDER — CHLORHEXIDINE GLUCONATE 500 MG/1
CLOTH TOPICAL 2 TIMES DAILY
Status: CANCELLED | OUTPATIENT
Start: 2021-11-15

## 2021-11-15 NOTE — H&P
BIANCA MENJIVAR is a 76-year-old male who comes into the office today for follow-up of his right knee and with new  complaints of worsening pain in his his left knee.    Right knee  He had a right revision total knee replacement on 09/15/2020.    This is a 1 year follow-up and he states that he is doing very well.  He denies any pain or problems with the right knee.  Left knee  He has been noticing catching sensation and crepitation.  He has been having worsening pain in the left knee.  Secondary to the left knee pain he has been using a cane.  He has a remote history of revision left total knee replacement.  He denies using any medications for the pain   This is a workman's comp related issue. He is retired.   He denies any fevers or chills.  He denies any history of MRSA, DVT.  And he is accompanied by his wife to the office visit.    Review of Systems:  Positive for: Decreased Motion and Joint Pain.    Patient denies: Abdominal Pain, Bleeding, Chest Pain, Convulsions/Seizure, Depression, Difficulty Swallowing, Easy Bruisability, Emotional Disturbances, Eyes or Vision Problems, Fecal Incontinence, Fever/Chills, Headaches, Increased Thirst, Increased Hunger, Insomnia, Nausea/Vomiting, Night Sweats, Poor Balance, Persistent Cough, Rash, Shortness of Breath, Shortness of Breath While Lying down, Skin Problems, Urinary Retention and Weakness.  Allergies:  Morphine (critical)  * percocet (critical)  Medications:  olanzapine 5 mg tablet (olanzapine) take 1 tablet by mouth every night  * amlodipine   oxcarbazepine tablet (oxcarbazepine tabs)   crestor tablet (rosuvastatin calcium tabs)   alprazolam tablet (alprazolam tabs)   hydrochlorothiazide tablet (hydrochlorothiazide tabs)   quinapril hcl tablet (quinapril hcl tabs)   Patient History of:  ARTHRITIS  ANXIETY DISORDER  HIGH CHOLESTEROL  BLOOD CLOTS/EMBOLISM - NEGATIVE  HYPERTENSION  Surgical History:  trigeminal neuralgia surgery-   bilateral Hip-   Tooth  Extraction-[CPT-19528]   CYST REMOVED-   Cataract removal-[CPT-99687]   left Wrist-   LT ARM-   Rotator Cuff Repair-[CPT-75507]   Total Knee Arthroplasty-[CPT-40069]   Known Family History of:  hypertension mother  heart disease-mother  Past medical, social, family histories and ROS reviewed today with the patient and changes documented in the chart (10/20/2021).  PCP Dr. KT TSANG, ROMANA FIGUEROA    Physical Exam  Height:  69 in.    Weight:  210 lbs.     BMI:  31.12      Gait: normal                     Mental/HEENT/Cardio/Skin  The patient's general appearance is well developed, well nourished, no acute distress.  Orientation is alert and oriented x 3.  The patient's mood is normal.  A head exam reveals normocephalic/atraumatic.  An eye exam reveals pupils equal.  Pulmonary exam shows normal air exchange, no labored breathing, or shortness of breath.  A skin exam shows normal temperature and color in the area of examination.      Right Knee  There is a mature midline anterior scar. incision: Clean, dry, and intact.  No signs of infection.  Neutral alignment.  There is no atrophy.  There is no effusion.  No warmth.  No erythema.  Range of motion of the knee is 0 to 120 degrees of flexion.  There is no tenderness in the knee.  Patella tracking is normal.  Patellar crepitation is not present.  Crepitus is not present.  Posterior drawer grade 0.  Valgus grade 0.  Varus grade 0.      Left Knee  There is a mature midline anterior scar. incision: Clean, dry, and intact.  No signs of infection.  Neutral alignment.  There is no atrophy.  There is no effusion.  No warmth.  No erythema.  Range of motion of the knee is 0 to 120 degrees of flexion.  There is no tenderness in the knee.  Patellar tracking is normal.  Moderate patellar crepitation.  Moderate crepitus.  Posterior drawer grade 1.  Valgus grade 1.  Varus grade 1.        Imaging/Diagnostic Studies  X-rays of the Left Knee [standing AP;Lateral;[facility]ants] were ordered and  reviewed today.    No spurring.  No lateral patellar tilt.  The total knee arthroplasty is well fixed and in satisfactory alignment, there is no evidence of loosening, the total knee arthroplasty shows no osteolysis and the total knee arthroplasty shows early wear.  There is a retaining screw which has floated free into the joint.  This is a new finding compared to old x-ray.  There is also suspicion for possible loosening of the femoral implant and the patella.    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   The patient has reached a point of disability and has failed nonoperative management. The patient is indicated for a revision left total knee arthroplasty.   I have advised CRP, ESR to rule out infection.  I will schedule three-phase bone scan of the left knee to assess mechanical loosening  Likely, Risks and benefits of the procedure including but not limited to infection, DVT, pulmonary embolism, stiffness, future loosening of the implants, possibility of injury to nerves vessels and tendons, periprosthetic fractures have been discussed in detail. Despite the risks involved, The patient would like to proceed.   The patient is being scheduled for a revision left total knee arthroplasty at [facility] on November 16, 2021.   I will request for Medical and cardiac clearance from Dr. Charity Kearney MD.   Postoperative DVT prophylaxis - Patient has no high risk factors Plan for ASPIRIN.   Preoperative antibiotic prophylaxis - Plan for SCIP protocol with CEFAZOLIN weight based. Will give VANCOMYCIN in addition due to  revision surgery.   Surgery will be scheduled for  postoperative observation due to medical  comorbidities.      We discussed the benefits of surgical intervention, as well as alternative treatments.  Potential surgical risks and complications include but are not limited to DVT, infection, neurovascular injury, fracture, implant wear, failure, possible need for revision surgery, loss of motion, dislocation, limb length changes.  Sufficient opportunity was given to discuss the condition and treatment plan with the doctor, and all questions were answered for the patient.  Nonsurgical measures such as injections, medications, or physical therapy may not offer significant relief to this patient.  The discussion lasted 30 minutes.  JEREMI agreed to proceed with the surgery.      Jeremi should follow up with NORMAN VILLARREAL MD post op.

## 2021-11-16 ENCOUNTER — ANESTHESIA EVENT (OUTPATIENT)
Dept: PERIOP | Facility: HOSPITAL | Age: 76
End: 2021-11-16

## 2021-11-16 ENCOUNTER — ANESTHESIA (OUTPATIENT)
Dept: PERIOP | Facility: HOSPITAL | Age: 76
End: 2021-11-16

## 2021-11-16 ENCOUNTER — APPOINTMENT (OUTPATIENT)
Dept: GENERAL RADIOLOGY | Facility: HOSPITAL | Age: 76
End: 2021-11-16

## 2021-11-16 ENCOUNTER — HOSPITAL ENCOUNTER (OUTPATIENT)
Facility: HOSPITAL | Age: 76
Discharge: HOME HEALTH | End: 2021-11-17
Attending: ORTHOPAEDIC SURGERY | Admitting: ORTHOPAEDIC SURGERY

## 2021-11-16 DIAGNOSIS — T84.033A MECHANICAL LOOSENING OF INTERNAL LEFT KNEE PROSTHETIC JOINT, INITIAL ENCOUNTER (HCC): Primary | Chronic | ICD-10-CM

## 2021-11-16 DIAGNOSIS — Z96.652 STATUS POST REVISION OF TOTAL KNEE REPLACEMENT, LEFT: ICD-10-CM

## 2021-11-16 DIAGNOSIS — T84.033A MECHANICAL LOOSENING OF INTERNAL LEFT KNEE PROSTHETIC JOINT (HCC): ICD-10-CM

## 2021-11-16 DIAGNOSIS — R53.1 GENERALIZED WEAKNESS: ICD-10-CM

## 2021-11-16 PROBLEM — M17.9 OSTEOARTHRITIS OF KNEE: Status: RESOLVED | Noted: 2021-11-15 | Resolved: 2021-11-16

## 2021-11-16 LAB
GLUCOSE BLDC GLUCOMTR-MCNC: 145 MG/DL (ref 70–130)
GLUCOSE BLDC GLUCOMTR-MCNC: 149 MG/DL (ref 70–130)

## 2021-11-16 PROCEDURE — 0 BUPIVACAINE LIPOSOME 1.3 % SUSPENSION 20 ML VIAL: Performed by: ORTHOPAEDIC SURGERY

## 2021-11-16 PROCEDURE — 25010000002 PROPOFOL 10 MG/ML EMULSION: Performed by: NURSE ANESTHETIST, CERTIFIED REGISTERED

## 2021-11-16 PROCEDURE — 25010000002 ONDANSETRON PER 1 MG: Performed by: NURSE ANESTHETIST, CERTIFIED REGISTERED

## 2021-11-16 PROCEDURE — 87075 CULTR BACTERIA EXCEPT BLOOD: CPT | Performed by: ORTHOPAEDIC SURGERY

## 2021-11-16 PROCEDURE — 25010000002 FENTANYL CITRATE (PF) 50 MCG/ML SOLUTION: Performed by: NURSE ANESTHETIST, CERTIFIED REGISTERED

## 2021-11-16 PROCEDURE — 76942 ECHO GUIDE FOR BIOPSY: CPT | Performed by: ORTHOPAEDIC SURGERY

## 2021-11-16 PROCEDURE — C1769 GUIDE WIRE: HCPCS | Performed by: ORTHOPAEDIC SURGERY

## 2021-11-16 PROCEDURE — 25010000002 ROPIVACAINE PER 1 MG: Performed by: ANESTHESIOLOGY

## 2021-11-16 PROCEDURE — 25010000002 HYDROMORPHONE PER 4 MG: Performed by: NURSE ANESTHETIST, CERTIFIED REGISTERED

## 2021-11-16 PROCEDURE — 87205 SMEAR GRAM STAIN: CPT | Performed by: ORTHOPAEDIC SURGERY

## 2021-11-16 PROCEDURE — 82962 GLUCOSE BLOOD TEST: CPT

## 2021-11-16 PROCEDURE — C1776 JOINT DEVICE (IMPLANTABLE): HCPCS | Performed by: ORTHOPAEDIC SURGERY

## 2021-11-16 PROCEDURE — 25010000002 MIDAZOLAM PER 1 MG: Performed by: ANESTHESIOLOGY

## 2021-11-16 PROCEDURE — 25010000002 FENTANYL CITRATE (PF) 50 MCG/ML SOLUTION: Performed by: ANESTHESIOLOGY

## 2021-11-16 PROCEDURE — 25010000002 ONDANSETRON PER 1 MG: Performed by: ORTHOPAEDIC SURGERY

## 2021-11-16 PROCEDURE — C1889 IMPLANT/INSERT DEVICE, NOC: HCPCS | Performed by: ORTHOPAEDIC SURGERY

## 2021-11-16 PROCEDURE — 87070 CULTURE OTHR SPECIMN AEROBIC: CPT | Performed by: ORTHOPAEDIC SURGERY

## 2021-11-16 PROCEDURE — G0378 HOSPITAL OBSERVATION PER HR: HCPCS

## 2021-11-16 PROCEDURE — L1830 KO IMMOB CANVAS LONG PRE OTS: HCPCS | Performed by: ORTHOPAEDIC SURGERY

## 2021-11-16 PROCEDURE — 25010000002 SUCCINYLCHOLINE PER 20 MG: Performed by: NURSE ANESTHETIST, CERTIFIED REGISTERED

## 2021-11-16 PROCEDURE — C9290 INJ, BUPIVACAINE LIPOSOME: HCPCS | Performed by: ORTHOPAEDIC SURGERY

## 2021-11-16 PROCEDURE — 87176 TISSUE HOMOGENIZATION CULTR: CPT | Performed by: ORTHOPAEDIC SURGERY

## 2021-11-16 PROCEDURE — 73560 X-RAY EXAM OF KNEE 1 OR 2: CPT

## 2021-11-16 PROCEDURE — C1713 ANCHOR/SCREW BN/BN,TIS/BN: HCPCS | Performed by: ORTHOPAEDIC SURGERY

## 2021-11-16 PROCEDURE — 0 CEFAZOLIN IN DEXTROSE 2-4 GM/100ML-% SOLUTION: Performed by: ORTHOPAEDIC SURGERY

## 2021-11-16 PROCEDURE — 25010000002 DEXAMETHASONE PER 1 MG: Performed by: NURSE ANESTHETIST, CERTIFIED REGISTERED

## 2021-11-16 PROCEDURE — 25010000002 VANCOMYCIN 10 G RECONSTITUTED SOLUTION: Performed by: ORTHOPAEDIC SURGERY

## 2021-11-16 DEVICE — IMPLANTABLE DEVICE: Type: IMPLANTABLE DEVICE | Site: KNEE | Status: FUNCTIONAL

## 2021-11-16 DEVICE — PALACOS® R+G IS A FAST SETTING POLYMER CONTAINING GENTAMICIN, FOR USE IN BONE SURGERY. MIXING OF THE TWO COMPONENT SYSTEM, CONSISTING OF A POWDER AND A LIQUID, INITIALLY PRODUCES A LIQUID AND THEN A PASTE, WHICH IS USED TO ANCHOR THE PROSTHESIS TO THE BONE. THE HARDENED BONE CEMENT ALLOWS STABLE FIXATION OF THE PROSTHESIS AND TRANSFERS ALL STRESSES PRODUCED IN A MOVEMENT TO THE BONE VIA THE LARGE INTERFACE. INSOLUBLE ZIRCONIUM DIOXIDE IS INCLUDED IN THE CEMENT POWDER AS AN X RAY CONTRAST MEDIUM. THE CHLOROPHYLL ADDITIVE SERVES AS OPTICAL MARKING OF THE BONE CEMENT AT THE SITE OF THE OPERATION.
Type: IMPLANTABLE DEVICE | Site: KNEE | Status: FUNCTIONAL
Brand: PALACOS®

## 2021-11-16 DEVICE — VIOLET ANTIBACTERIAL POLYDIOXANONE, KNOTLESS TISSUE CONTROL DEVICE
Type: IMPLANTABLE DEVICE | Site: KNEE | Status: FUNCTIONAL
Brand: STRATAFIX

## 2021-11-16 RX ORDER — CHLORHEXIDINE GLUCONATE 500 MG/1
CLOTH TOPICAL ONCE
Status: DISCONTINUED | OUTPATIENT
Start: 2021-11-16 | End: 2021-11-16 | Stop reason: HOSPADM

## 2021-11-16 RX ORDER — NALOXONE HCL 0.4 MG/ML
0.2 VIAL (ML) INJECTION AS NEEDED
Status: DISCONTINUED | OUTPATIENT
Start: 2021-11-16 | End: 2021-11-16 | Stop reason: HOSPADM

## 2021-11-16 RX ORDER — MIDAZOLAM HYDROCHLORIDE 1 MG/ML
0.5 INJECTION INTRAMUSCULAR; INTRAVENOUS
Status: DISCONTINUED | OUTPATIENT
Start: 2021-11-16 | End: 2021-11-16 | Stop reason: HOSPADM

## 2021-11-16 RX ORDER — MAGNESIUM HYDROXIDE 1200 MG/15ML
LIQUID ORAL AS NEEDED
Status: DISCONTINUED | OUTPATIENT
Start: 2021-11-16 | End: 2021-11-16 | Stop reason: HOSPADM

## 2021-11-16 RX ORDER — EPHEDRINE SULFATE 50 MG/ML
5 INJECTION, SOLUTION INTRAVENOUS ONCE AS NEEDED
Status: DISCONTINUED | OUTPATIENT
Start: 2021-11-16 | End: 2021-11-16 | Stop reason: HOSPADM

## 2021-11-16 RX ORDER — ONDANSETRON 2 MG/ML
4 INJECTION INTRAMUSCULAR; INTRAVENOUS EVERY 6 HOURS PRN
Status: DISCONTINUED | OUTPATIENT
Start: 2021-11-16 | End: 2021-11-17 | Stop reason: HOSPADM

## 2021-11-16 RX ORDER — HYDROCODONE BITARTRATE AND ACETAMINOPHEN 7.5; 325 MG/1; MG/1
1 TABLET ORAL ONCE AS NEEDED
Status: DISCONTINUED | OUTPATIENT
Start: 2021-11-16 | End: 2021-11-16 | Stop reason: HOSPADM

## 2021-11-16 RX ORDER — TRANEXAMIC ACID 100 MG/ML
INJECTION, SOLUTION INTRAVENOUS AS NEEDED
Status: DISCONTINUED | OUTPATIENT
Start: 2021-11-16 | End: 2021-11-16 | Stop reason: SURG

## 2021-11-16 RX ORDER — SODIUM CHLORIDE 0.9 % (FLUSH) 0.9 %
3-10 SYRINGE (ML) INJECTION AS NEEDED
Status: DISCONTINUED | OUTPATIENT
Start: 2021-11-16 | End: 2021-11-16 | Stop reason: HOSPADM

## 2021-11-16 RX ORDER — ONDANSETRON 4 MG/1
4 TABLET, FILM COATED ORAL EVERY 6 HOURS PRN
Status: DISCONTINUED | OUTPATIENT
Start: 2021-11-16 | End: 2021-11-17 | Stop reason: HOSPADM

## 2021-11-16 RX ORDER — ALPRAZOLAM 0.5 MG/1
0.5 TABLET ORAL 3 TIMES DAILY PRN
Status: DISCONTINUED | OUTPATIENT
Start: 2021-11-16 | End: 2021-11-17 | Stop reason: HOSPADM

## 2021-11-16 RX ORDER — FENTANYL CITRATE 50 UG/ML
50 INJECTION, SOLUTION INTRAMUSCULAR; INTRAVENOUS
Status: DISCONTINUED | OUTPATIENT
Start: 2021-11-16 | End: 2021-11-16 | Stop reason: HOSPADM

## 2021-11-16 RX ORDER — HYDROMORPHONE HYDROCHLORIDE 1 MG/ML
0.5 INJECTION, SOLUTION INTRAMUSCULAR; INTRAVENOUS; SUBCUTANEOUS
Status: DISCONTINUED | OUTPATIENT
Start: 2021-11-16 | End: 2021-11-16 | Stop reason: HOSPADM

## 2021-11-16 RX ORDER — FAMOTIDINE 10 MG/ML
20 INJECTION, SOLUTION INTRAVENOUS ONCE
Status: COMPLETED | OUTPATIENT
Start: 2021-11-16 | End: 2021-11-16

## 2021-11-16 RX ORDER — ROCURONIUM BROMIDE 10 MG/ML
INJECTION, SOLUTION INTRAVENOUS AS NEEDED
Status: DISCONTINUED | OUTPATIENT
Start: 2021-11-16 | End: 2021-11-16 | Stop reason: SURG

## 2021-11-16 RX ORDER — HYDRALAZINE HYDROCHLORIDE 20 MG/ML
5 INJECTION INTRAMUSCULAR; INTRAVENOUS
Status: DISCONTINUED | OUTPATIENT
Start: 2021-11-16 | End: 2021-11-16 | Stop reason: HOSPADM

## 2021-11-16 RX ORDER — ONDANSETRON 2 MG/ML
INJECTION INTRAMUSCULAR; INTRAVENOUS AS NEEDED
Status: DISCONTINUED | OUTPATIENT
Start: 2021-11-16 | End: 2021-11-16 | Stop reason: SURG

## 2021-11-16 RX ORDER — UREA 10 %
1 LOTION (ML) TOPICAL NIGHTLY PRN
Status: DISCONTINUED | OUTPATIENT
Start: 2021-11-16 | End: 2021-11-17 | Stop reason: HOSPADM

## 2021-11-16 RX ORDER — HYDROCODONE BITARTRATE AND ACETAMINOPHEN 7.5; 325 MG/1; MG/1
1 TABLET ORAL EVERY 4 HOURS PRN
Status: DISCONTINUED | OUTPATIENT
Start: 2021-11-16 | End: 2021-11-17 | Stop reason: HOSPADM

## 2021-11-16 RX ORDER — LISINOPRIL 5 MG/1
5 TABLET ORAL
Status: DISCONTINUED | OUTPATIENT
Start: 2021-11-16 | End: 2021-11-17 | Stop reason: HOSPADM

## 2021-11-16 RX ORDER — CEFAZOLIN SODIUM 2 G/100ML
2 INJECTION, SOLUTION INTRAVENOUS ONCE
Status: COMPLETED | OUTPATIENT
Start: 2021-11-16 | End: 2021-11-16

## 2021-11-16 RX ORDER — PREGABALIN 75 MG/1
75 CAPSULE ORAL ONCE
Status: COMPLETED | OUTPATIENT
Start: 2021-11-16 | End: 2021-11-16

## 2021-11-16 RX ORDER — LIDOCAINE HYDROCHLORIDE 10 MG/ML
0.5 INJECTION, SOLUTION EPIDURAL; INFILTRATION; INTRACAUDAL; PERINEURAL ONCE AS NEEDED
Status: DISCONTINUED | OUTPATIENT
Start: 2021-11-16 | End: 2021-11-16 | Stop reason: HOSPADM

## 2021-11-16 RX ORDER — SODIUM CHLORIDE, SODIUM LACTATE, POTASSIUM CHLORIDE, CALCIUM CHLORIDE 600; 310; 30; 20 MG/100ML; MG/100ML; MG/100ML; MG/100ML
9 INJECTION, SOLUTION INTRAVENOUS CONTINUOUS
Status: DISCONTINUED | OUTPATIENT
Start: 2021-11-16 | End: 2021-11-16 | Stop reason: HOSPADM

## 2021-11-16 RX ORDER — FENTANYL CITRATE 50 UG/ML
INJECTION, SOLUTION INTRAMUSCULAR; INTRAVENOUS AS NEEDED
Status: DISCONTINUED | OUTPATIENT
Start: 2021-11-16 | End: 2021-11-16 | Stop reason: SURG

## 2021-11-16 RX ORDER — DEXAMETHASONE SODIUM PHOSPHATE 10 MG/ML
INJECTION INTRAMUSCULAR; INTRAVENOUS AS NEEDED
Status: DISCONTINUED | OUTPATIENT
Start: 2021-11-16 | End: 2021-11-16 | Stop reason: SURG

## 2021-11-16 RX ORDER — ACETAMINOPHEN 500 MG
1000 TABLET ORAL ONCE
Status: COMPLETED | OUTPATIENT
Start: 2021-11-16 | End: 2021-11-16

## 2021-11-16 RX ORDER — HYDROCHLOROTHIAZIDE 25 MG/1
25 TABLET ORAL EVERY MORNING
Status: DISCONTINUED | OUTPATIENT
Start: 2021-11-17 | End: 2021-11-17 | Stop reason: HOSPADM

## 2021-11-16 RX ORDER — ONDANSETRON 2 MG/ML
4 INJECTION INTRAMUSCULAR; INTRAVENOUS ONCE AS NEEDED
Status: DISCONTINUED | OUTPATIENT
Start: 2021-11-16 | End: 2021-11-16 | Stop reason: HOSPADM

## 2021-11-16 RX ORDER — CEFAZOLIN SODIUM 2 G/100ML
2 INJECTION, SOLUTION INTRAVENOUS EVERY 8 HOURS
Status: COMPLETED | OUTPATIENT
Start: 2021-11-16 | End: 2021-11-17

## 2021-11-16 RX ORDER — MIDAZOLAM HYDROCHLORIDE 1 MG/ML
INJECTION INTRAMUSCULAR; INTRAVENOUS
Status: COMPLETED | OUTPATIENT
Start: 2021-11-16 | End: 2021-11-16

## 2021-11-16 RX ORDER — ACETAMINOPHEN 325 MG/1
650 TABLET ORAL ONCE AS NEEDED
Status: DISCONTINUED | OUTPATIENT
Start: 2021-11-16 | End: 2021-11-16 | Stop reason: HOSPADM

## 2021-11-16 RX ORDER — ROPIVACAINE HYDROCHLORIDE 5 MG/ML
INJECTION, SOLUTION EPIDURAL; INFILTRATION; PERINEURAL
Status: COMPLETED | OUTPATIENT
Start: 2021-11-16 | End: 2021-11-16

## 2021-11-16 RX ORDER — LABETALOL HYDROCHLORIDE 5 MG/ML
5 INJECTION, SOLUTION INTRAVENOUS
Status: DISCONTINUED | OUTPATIENT
Start: 2021-11-16 | End: 2021-11-16 | Stop reason: HOSPADM

## 2021-11-16 RX ORDER — LIDOCAINE HYDROCHLORIDE 20 MG/ML
INJECTION, SOLUTION INFILTRATION; PERINEURAL AS NEEDED
Status: DISCONTINUED | OUTPATIENT
Start: 2021-11-16 | End: 2021-11-16 | Stop reason: SURG

## 2021-11-16 RX ORDER — HYDROMORPHONE HYDROCHLORIDE 1 MG/ML
0.5 INJECTION, SOLUTION INTRAMUSCULAR; INTRAVENOUS; SUBCUTANEOUS
Status: DISCONTINUED | OUTPATIENT
Start: 2021-11-16 | End: 2021-11-17 | Stop reason: HOSPADM

## 2021-11-16 RX ORDER — PROMETHAZINE HYDROCHLORIDE 25 MG/1
25 SUPPOSITORY RECTAL ONCE AS NEEDED
Status: DISCONTINUED | OUTPATIENT
Start: 2021-11-16 | End: 2021-11-16 | Stop reason: HOSPADM

## 2021-11-16 RX ORDER — SODIUM CHLORIDE 0.9 % (FLUSH) 0.9 %
3 SYRINGE (ML) INJECTION EVERY 12 HOURS SCHEDULED
Status: DISCONTINUED | OUTPATIENT
Start: 2021-11-16 | End: 2021-11-16 | Stop reason: HOSPADM

## 2021-11-16 RX ORDER — PROMETHAZINE HYDROCHLORIDE 25 MG/1
25 TABLET ORAL ONCE AS NEEDED
Status: DISCONTINUED | OUTPATIENT
Start: 2021-11-16 | End: 2021-11-16 | Stop reason: HOSPADM

## 2021-11-16 RX ORDER — HYDROMORPHONE HCL 110MG/55ML
PATIENT CONTROLLED ANALGESIA SYRINGE INTRAVENOUS AS NEEDED
Status: DISCONTINUED | OUTPATIENT
Start: 2021-11-16 | End: 2021-11-16 | Stop reason: SURG

## 2021-11-16 RX ORDER — OXYCODONE HCL 10 MG/1
20 TABLET, FILM COATED, EXTENDED RELEASE ORAL ONCE
Status: DISCONTINUED | OUTPATIENT
Start: 2021-11-16 | End: 2021-11-16 | Stop reason: HOSPADM

## 2021-11-16 RX ORDER — ASPIRIN 81 MG/1
81 TABLET ORAL EVERY 12 HOURS SCHEDULED
Status: DISCONTINUED | OUTPATIENT
Start: 2021-11-16 | End: 2021-11-17 | Stop reason: HOSPADM

## 2021-11-16 RX ORDER — DIPHENHYDRAMINE HCL 25 MG
25 CAPSULE ORAL
Status: DISCONTINUED | OUTPATIENT
Start: 2021-11-16 | End: 2021-11-16 | Stop reason: HOSPADM

## 2021-11-16 RX ORDER — FLUMAZENIL 0.1 MG/ML
0.2 INJECTION INTRAVENOUS AS NEEDED
Status: DISCONTINUED | OUTPATIENT
Start: 2021-11-16 | End: 2021-11-16 | Stop reason: HOSPADM

## 2021-11-16 RX ORDER — DIPHENHYDRAMINE HYDROCHLORIDE 50 MG/ML
12.5 INJECTION INTRAMUSCULAR; INTRAVENOUS
Status: DISCONTINUED | OUTPATIENT
Start: 2021-11-16 | End: 2021-11-16 | Stop reason: HOSPADM

## 2021-11-16 RX ORDER — OXYCODONE AND ACETAMINOPHEN 10; 325 MG/1; MG/1
1 TABLET ORAL EVERY 4 HOURS PRN
Status: DISCONTINUED | OUTPATIENT
Start: 2021-11-16 | End: 2021-11-16 | Stop reason: HOSPADM

## 2021-11-16 RX ORDER — OXCARBAZEPINE 300 MG/1
300 TABLET, FILM COATED ORAL 3 TIMES DAILY
Status: DISCONTINUED | OUTPATIENT
Start: 2021-11-16 | End: 2021-11-17 | Stop reason: HOSPADM

## 2021-11-16 RX ORDER — NALOXONE HCL 0.4 MG/ML
0.1 VIAL (ML) INJECTION
Status: DISCONTINUED | OUTPATIENT
Start: 2021-11-16 | End: 2021-11-17 | Stop reason: HOSPADM

## 2021-11-16 RX ORDER — HYDROCODONE BITARTRATE AND ACETAMINOPHEN 7.5; 325 MG/1; MG/1
2 TABLET ORAL EVERY 4 HOURS PRN
Status: DISCONTINUED | OUTPATIENT
Start: 2021-11-16 | End: 2021-11-17 | Stop reason: HOSPADM

## 2021-11-16 RX ORDER — BISACODYL 5 MG/1
10 TABLET, DELAYED RELEASE ORAL DAILY PRN
Status: DISCONTINUED | OUTPATIENT
Start: 2021-11-17 | End: 2021-11-17 | Stop reason: HOSPADM

## 2021-11-16 RX ORDER — OXCARBAZEPINE 300 MG/1
300 TABLET, FILM COATED ORAL EVERY 12 HOURS SCHEDULED
Status: DISCONTINUED | OUTPATIENT
Start: 2021-11-16 | End: 2021-11-16

## 2021-11-16 RX ORDER — OLANZAPINE 5 MG/1
5 TABLET ORAL NIGHTLY
Status: DISCONTINUED | OUTPATIENT
Start: 2021-11-16 | End: 2021-11-17 | Stop reason: HOSPADM

## 2021-11-16 RX ORDER — ACETAMINOPHEN 325 MG/1
325 TABLET ORAL EVERY 4 HOURS PRN
Status: DISCONTINUED | OUTPATIENT
Start: 2021-11-16 | End: 2021-11-17 | Stop reason: HOSPADM

## 2021-11-16 RX ORDER — SUCCINYLCHOLINE CHLORIDE 20 MG/ML
INJECTION INTRAMUSCULAR; INTRAVENOUS AS NEEDED
Status: DISCONTINUED | OUTPATIENT
Start: 2021-11-16 | End: 2021-11-16 | Stop reason: SURG

## 2021-11-16 RX ORDER — BUPIVACAINE HYDROCHLORIDE 5 MG/ML
INJECTION, SOLUTION PERINEURAL AS NEEDED
Status: DISCONTINUED | OUTPATIENT
Start: 2021-11-16 | End: 2021-11-16 | Stop reason: HOSPADM

## 2021-11-16 RX ORDER — PROPOFOL 10 MG/ML
VIAL (ML) INTRAVENOUS AS NEEDED
Status: DISCONTINUED | OUTPATIENT
Start: 2021-11-16 | End: 2021-11-16 | Stop reason: SURG

## 2021-11-16 RX ORDER — AMLODIPINE BESYLATE 5 MG/1
5 TABLET ORAL EVERY MORNING
Status: DISCONTINUED | OUTPATIENT
Start: 2021-11-17 | End: 2021-11-17 | Stop reason: HOSPADM

## 2021-11-16 RX ORDER — FENTANYL CITRATE 50 UG/ML
INJECTION, SOLUTION INTRAMUSCULAR; INTRAVENOUS
Status: COMPLETED | OUTPATIENT
Start: 2021-11-16 | End: 2021-11-16

## 2021-11-16 RX ORDER — SODIUM CHLORIDE 9 MG/ML
100 INJECTION, SOLUTION INTRAVENOUS CONTINUOUS
Status: DISCONTINUED | OUTPATIENT
Start: 2021-11-16 | End: 2021-11-17 | Stop reason: HOSPADM

## 2021-11-16 RX ORDER — DOCUSATE SODIUM 100 MG/1
100 CAPSULE, LIQUID FILLED ORAL 2 TIMES DAILY
Status: DISCONTINUED | OUTPATIENT
Start: 2021-11-16 | End: 2021-11-17 | Stop reason: HOSPADM

## 2021-11-16 RX ORDER — CEFAZOLIN SODIUM 2 G/100ML
2 INJECTION, SOLUTION INTRAVENOUS ONCE
Status: DISCONTINUED | OUTPATIENT
Start: 2021-11-16 | End: 2021-11-16 | Stop reason: HOSPADM

## 2021-11-16 RX ADMIN — FENTANYL CITRATE 50 MCG: 0.05 INJECTION, SOLUTION INTRAMUSCULAR; INTRAVENOUS at 11:05

## 2021-11-16 RX ADMIN — HYDROMORPHONE HYDROCHLORIDE 0.5 MG: 2 INJECTION, SOLUTION INTRAMUSCULAR; INTRAVENOUS; SUBCUTANEOUS at 13:38

## 2021-11-16 RX ADMIN — CEFAZOLIN SODIUM 2 G: 2 INJECTION, SOLUTION INTRAVENOUS at 10:01

## 2021-11-16 RX ADMIN — SODIUM CHLORIDE 100 ML/HR: 9 INJECTION, SOLUTION INTRAVENOUS at 17:54

## 2021-11-16 RX ADMIN — DOCUSATE SODIUM 100 MG: 100 CAPSULE, LIQUID FILLED ORAL at 21:36

## 2021-11-16 RX ADMIN — FENTANYL CITRATE 50 MCG: 0.05 INJECTION, SOLUTION INTRAMUSCULAR; INTRAVENOUS at 10:29

## 2021-11-16 RX ADMIN — OXCARBAZEPINE 300 MG: 300 TABLET, FILM COATED ORAL at 21:37

## 2021-11-16 RX ADMIN — ASPIRIN 81 MG: 81 TABLET, COATED ORAL at 21:36

## 2021-11-16 RX ADMIN — ONDANSETRON 4 MG: 2 INJECTION INTRAMUSCULAR; INTRAVENOUS at 19:51

## 2021-11-16 RX ADMIN — FENTANYL CITRATE 50 MCG: 0.05 INJECTION, SOLUTION INTRAMUSCULAR; INTRAVENOUS at 10:08

## 2021-11-16 RX ADMIN — MIDAZOLAM 1 MG: 1 INJECTION INTRAMUSCULAR; INTRAVENOUS at 09:48

## 2021-11-16 RX ADMIN — SUCCINYLCHOLINE CHLORIDE 180 MG: 20 INJECTION, SOLUTION INTRAMUSCULAR; INTRAVENOUS; PARENTERAL at 10:10

## 2021-11-16 RX ADMIN — ROCURONIUM BROMIDE 5 MG: 50 INJECTION INTRAVENOUS at 10:10

## 2021-11-16 RX ADMIN — TRANEXAMIC ACID 1000 MG: 1 INJECTION, SOLUTION INTRAVENOUS at 10:20

## 2021-11-16 RX ADMIN — HYDROCODONE BITARTRATE AND ACETAMINOPHEN 2 TABLET: 7.5; 325 TABLET ORAL at 22:07

## 2021-11-16 RX ADMIN — FENTANYL CITRATE 50 MCG: 50 INJECTION INTRAMUSCULAR; INTRAVENOUS at 13:59

## 2021-11-16 RX ADMIN — FENTANYL CITRATE 50 MCG: 0.05 INJECTION, SOLUTION INTRAMUSCULAR; INTRAVENOUS at 10:46

## 2021-11-16 RX ADMIN — PROPOFOL 140 MG: 10 INJECTION, EMULSION INTRAVENOUS at 10:10

## 2021-11-16 RX ADMIN — LISINOPRIL 5 MG: 5 TABLET ORAL at 17:38

## 2021-11-16 RX ADMIN — SODIUM CHLORIDE, POTASSIUM CHLORIDE, SODIUM LACTATE AND CALCIUM CHLORIDE 9 ML/HR: 600; 310; 30; 20 INJECTION, SOLUTION INTRAVENOUS at 09:04

## 2021-11-16 RX ADMIN — OLANZAPINE 5 MG: 5 TABLET ORAL at 21:36

## 2021-11-16 RX ADMIN — DEXAMETHASONE SODIUM PHOSPHATE 8 MG: 10 INJECTION INTRAMUSCULAR; INTRAVENOUS at 10:24

## 2021-11-16 RX ADMIN — HYDROCODONE BITARTRATE AND ACETAMINOPHEN 1 TABLET: 7.5; 325 TABLET ORAL at 18:14

## 2021-11-16 RX ADMIN — LIDOCAINE HYDROCHLORIDE 100 MG: 20 INJECTION, SOLUTION INFILTRATION; PERINEURAL at 10:10

## 2021-11-16 RX ADMIN — CEFAZOLIN SODIUM 2 G: 2 INJECTION, SOLUTION INTRAVENOUS at 18:14

## 2021-11-16 RX ADMIN — FAMOTIDINE 20 MG: 10 INJECTION INTRAVENOUS at 08:57

## 2021-11-16 RX ADMIN — LABETALOL HYDROCHLORIDE 5 MG: 5 INJECTION, SOLUTION INTRAVENOUS at 14:25

## 2021-11-16 RX ADMIN — FENTANYL CITRATE 50 MCG: 0.05 INJECTION, SOLUTION INTRAMUSCULAR; INTRAVENOUS at 09:48

## 2021-11-16 RX ADMIN — ALPRAZOLAM 0.5 MG: 0.5 TABLET ORAL at 19:51

## 2021-11-16 RX ADMIN — VANCOMYCIN HYDROCHLORIDE 1250 MG: 10 INJECTION, POWDER, LYOPHILIZED, FOR SOLUTION INTRAVENOUS at 09:13

## 2021-11-16 RX ADMIN — HYDROMORPHONE HYDROCHLORIDE 0.5 MG: 2 INJECTION, SOLUTION INTRAMUSCULAR; INTRAVENOUS; SUBCUTANEOUS at 12:59

## 2021-11-16 RX ADMIN — ROPIVACAINE HYDROCHLORIDE 30 ML: 5 INJECTION, SOLUTION EPIDURAL; INFILTRATION; PERINEURAL at 09:54

## 2021-11-16 RX ADMIN — PREGABALIN 75 MG: 75 CAPSULE ORAL at 08:57

## 2021-11-16 RX ADMIN — ACETAMINOPHEN 1000 MG: 500 TABLET, FILM COATED ORAL at 08:57

## 2021-11-16 RX ADMIN — HYDROMORPHONE HYDROCHLORIDE 0.5 MG: 2 INJECTION, SOLUTION INTRAMUSCULAR; INTRAVENOUS; SUBCUTANEOUS at 13:17

## 2021-11-16 RX ADMIN — ONDANSETRON 4 MG: 2 INJECTION INTRAMUSCULAR; INTRAVENOUS at 12:55

## 2021-11-16 NOTE — ANESTHESIA PREPROCEDURE EVALUATION
Anesthesia Evaluation     Patient summary reviewed and Nursing notes reviewed   NPO Solid Status: > 8 hours  NPO Liquid Status: > 2 hours           Airway   Mallampati: II  Dental - normal exam     Pulmonary - normal exam   (+) a smoker Former,   Cardiovascular - normal exam    Rhythm: regular    (+) hypertension, dysrhythmias (RBBB and LAFB), hyperlipidemia,     ROS comment:   HEART RATE= 70  bpm  RR Interval= 852  ms  NV Interval= 153  ms  P Horizontal Axis= 3  deg  P Front Axis= 49  deg  QRSD Interval= 169  ms  QT Interval= 447  ms  QRS Axis= -94  deg  T Wave Axis= 13  deg  - ABNORMAL ECG -  Sinus rhythm  RBBB and LAFB  ST elevation secondary to IVCD  NO PRIOR TRACING AVAILABLE FOR COMPARISON  Electronically Signed By: Aldo Gregory ([facility]) 21-Sep-2021 07:27:04  Date and Time of Study: 2021-09-20 23        Neuro/Psych  (+) headaches, numbness, psychiatric history Anxiety and Depression,       ROS Comment: Trigeminal neuralgia  GI/Hepatic/Renal/Endo    (+)   renal disease,     Musculoskeletal     Abdominal   (+) obese,    Substance History      OB/GYN          Other   arthritis,        Other Comment: Polymyalgia rheumatica                  Anesthesia Plan    ASA 3     general   (Adductor canal block)  intravenous induction     Anesthetic plan, all risks, benefits, and alternatives have been provided, discussed and informed consent has been obtained with: patient and spouse/significant other.

## 2021-11-16 NOTE — PERIOPERATIVE NURSING NOTE
Spoke to Ron in pharmacy. He will release [facility] and states  To run over 90 minutes

## 2021-11-16 NOTE — BRIEF OP NOTE
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        Post-Op Diagnosis Codes:     * Mechanical loosening of internal left knee prosthetic joint (HCC) [T84.033A]    Procedure/CPT® Codes:        Procedure(s):  TOTAL KNEE ARTHROPLASTY REVISION    Surgeon(s):  Patricia Caldera MD Ziegele, Michael J, MD    Anesthesia: General    Staff:   Circulator: Tate Bell RN; Karen Zuniga RN  Scrub Person: Millicent King; Lenore Cochran  Vendor Representative: Jayro Garcia  Assistant: Moreno Uriarte CSA  Assistant: Moreno Uriarte CSA      Estimated Blood Loss: 150 mL    Urine Voided: * No values recorded between 11/16/2021 10:04 AM and 11/16/2021  1:37 PM *    Specimens:                Specimens     ID Source Type Tests Collected By Collected At Frozen?    1 Knee, Left Tissue · ANAEROBIC CULTURE  · TISSUE / BONE CULTURE   Patricia Caldera MD 11/16/21 1036     Description: LEFT KNEE TISSUE FOR AEROBIC AND ANAEROBIC CULTURE                Drains:   Closed/Suction Drain 1 Left; Anterior Knee Accordion 10 Fr. (Active)       Findings: see dict     Complications: none    Assistant: Moreno Uriarte CSA  was responsible for performing the following activities: Retraction, Suction, Irrigation, Suturing, Closing and Placing Dressing and their skilled assistance was necessary for the success of this case.    Patricia Caldera MD     Date: 11/16/2021  Time: 13:37 EST

## 2021-11-16 NOTE — PERIOPERATIVE NURSING NOTE
Dr. Caldera at [facility], states okay to holding oxycontin 2/ adverse reaction, states to give vanc 1250 mg, start slowly- states pt had a red rash with last dose and its okay to move forward with vanc

## 2021-11-16 NOTE — ANESTHESIA PROCEDURE NOTES
Peripheral Block      Patient reassessed immediately prior to procedure    Patient location during procedure: holding area  Start time: 11/16/2021 9:47 AM  Stop time: 11/16/2021 9:52 AM  Reason for block: at surgeon's request and post-op pain management  Performed by  Anesthesiologist: Madan Ambrocio MD  Preanesthetic Checklist  Completed: patient identified, IV checked, site marked, risks and benefits discussed, surgical consent, monitors and equipment checked, pre-op evaluation and timeout performed  Prep:  Pt Position: supine  Sterile barriers:gloves, cap and mask  Prep: ChloraPrep  Patient monitoring: blood pressure monitoring, continuous pulse oximetry and EKG  Procedure    Sedation: yes    Guidance:ultrasound guided    ULTRASOUND INTERPRETATION.  Using ultrasound guidance a 22 G gauge needle was placed in close proximity to the femoral nerve, at which point, under ultrasound guidance anesthetic was injected in the area of the nerve and spread of the anesthesia was seen on ultrasound in close proximity thereto.  There were no abnormalities seen on ultrasound; a digital image was taken; and the patient tolerated the procedure with no complications. Images:still images obtained    Laterality:right  Block Type:adductor canal block  Injection Technique:single-shot  Needle Type:echogenic  Needle Gauge:21 G      Medications Used: ropivacaine (NAROPIN) 0.5 % injection, 30 mL      Medications  Comment:Ultrasound used to precisely deposit local anesthetic    Post Assessment  Injection Assessment: incremental injection, no paresthesia on injection and negative aspiration for heme  Patient Tolerance:comfortable throughout block  Complications:no

## 2021-11-16 NOTE — ANESTHESIA POSTPROCEDURE EVALUATION
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)      (Mechanical loosening of internal left knee prosthetic joint )    Surgeons: Patricia Caldera MD Provider: Sanjay Yost MD    Anesthesia Type: general ASA Status: 3          Anesthesia Type: general    Vitals  Vitals Value Taken Time   /87 11/16/21 1446   Temp 36.9 °C (98.4 °F) 11/16/21 1349   Pulse 97 11/16/21 1453   Resp 16 11/16/21 1445   SpO2 91 % 11/16/21 1453           Post Anesthesia Care and Evaluation    Patient location during evaluation: bedside  Patient participation: complete - patient participated  Level of consciousness: sleepy but conscious  Pain score: 0  Pain management: adequate  Airway patency: patent  Anesthetic complications: No anesthetic complications    Cardiovascular status: acceptable  Respiratory status: acceptable  Hydration status: acceptable    Comments: /87   Pulse 97   Temp 36.9 °C (98.4 °F) (Oral)   Resp 16   Wt 108 kg (237 lb 14.4 oz)   SpO2 91%   BMI 35.13 kg/m²

## 2021-11-16 NOTE — ANESTHESIA PROCEDURE NOTES
Airway  Urgency: elective    Date/Time: 11/16/2021 10:14 AM  Airway not difficult    General Information and Staff    Patient location during procedure: OR  Anesthesiologist: Kathryn Chaidez MD  CRNA: Nya Zhao CRNA    Indications and Patient Condition  Indications for airway management: airway protection    Preoxygenated: yes  Mask difficulty assessment: 2 - vent by mask + OA or adjuvant +/- NMBA    Final Airway Details  Final airway type: endotracheal airway      Successful airway: ETT  Cuffed: yes   Successful intubation technique: direct laryngoscopy  Facilitating devices/methods: intubating stylet  Endotracheal tube insertion site: oral  Blade: Katy  Blade size: 4  ETT size (mm): 7.5  Cormack-Lehane Classification: grade I - full view of glottis  Placement verified by: chest auscultation and capnometry   Measured from: lips  ETT/EBT  to lips (cm): 23  Number of attempts at approach: 1  Assessment: lips, teeth, and gum same as pre-op and atraumatic intubation    Additional Comments  Front R tooth appears cracked/broken prior to DL. No noted change to dentition

## 2021-11-16 NOTE — OP NOTE
ORTHOPAEDIC OPERATIVE NOTE    Patient: Jeremi Ugarte       YOB: 1945    Medical Record Number: [id number]    Attending Physician: Patricia Caldera,*    Primary Care Physician: Charity Kearney MD    Date of Service: 11/16/2021    Surgeon: Patricia Caldera MD        DATE OF PROCEDURE: 11/16/2021    PREOPERATIVE DIAGNOSIS: Mechanical loosening of internal left knee prosthetic joint     1. 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   2.  Instability left knee.  3.  Left knee wear of articulating surface  4.  Left knee mechanical loosening of the right internal prosthetic joint    POSTOPERATIVE DIAGNOSIS: Mechanical loosening of internal left knee prosthetic joint     1. 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   2.  Instability left knee.  3.  Left knee wear of articulating surface  4.  Left knee mechanical loosening of the right internal prosthetic joint    PROCEDURE PERFORMED: LEFT TOTAL KNEE ARTHROPLASTY REVISION  1.  Revision LEFT total knee arthroplasty by utilizing the      Lidya NexGen revision knee system    TIBIA  Stemmed tibia component size 4  Tibial augments size 10 mm thickness medial and lateral  Stem extension sharp fluted longstem 12 mm diameter x130 mm length  Legacy knee LCCK articular surface 17 mm height polyethylene      Implant Name Type Inv. Item Serial No.  Lot No. LRB No. Used Action   DEV CONTRL TISS STRATAFIX PDS PLS OS6 REV SZ1 18IN 45CM - [id number] Implant DEV CONTRL TISS STRATAFIX PDS PLS OS6 REV SZ1 18IN 45CM  [employer]  [facility] Left 2 Implanted   CMT BONE PALACOS RPLSG W/GENT HI/VISC 1X40 - [id number] Implant CMT BONE PALACOS RPLSG W/GENT HI/VISC 1X40  [facility] [id number] Left 1 Implanted   CMT BONE PALACOS RPLSG W/GENT HI/VISC 1X40 - [id number] Implant CMT BONE PALACOS RPLSG W/GENT HI/VISC 1X40  [employer] [id number] Left 1 Implanted    NEXGEN COMPLETE KNEE SOLUTION STEM EXTENSION SHARP FLUTED LONG 12MM AGA X 130MM LENGTH    [employer] [id number] Left 1 Implanted   BLCK 1/2 TIB/KN NEXGEN AUG W/SCRWS SZ4 10MM - [id number] Implant BLCK 1/2 TIB/KN NEXGEN AUG W/SCRWS SZ4 10MM  [employer] [id number] Left 1 Implanted   BLCK 1/2 TIB/KN NEXGEN AUG W/SCRWS SZ4 10MM - [id number] Implant BLCK 1/2 TIB/KN NEXGEN AUG W/SCRWS SZ4 10MM  [employer] [id number] Left 1 Implanted   BASEPLT TIB/KN NEXGEN STEM PCOAT SZ4 66MM - [id number] Implant BASEPLT TIB/KN NEXGEN STEM PCOAT SZ4 66MM  [employer] [id number] Left 1 Implanted   ART/SRF KN NEXGEN LCCK SZEF 17MM YEL/STRP - [id number] Implant ART/SRF KN NEXGEN LCCK SZEF 17MM YEL/STRP  [employer] [id number] Left 1 Implanted       SURGEON: Patricia Caldera MD     ASSISTANT: Denilson Hu MD Fellow    [facility]     The services of a skilled  first assist were necessary for performing the procedure safely and expeditiously.  The first assist was present for the entire duration of the case and helped with positioning, retraction and closure of the incision.      ANESTHESIA: General anaesthesia with a regional block  and intraoperative periarticular  Exparel injection.    ESTIMATED BLOOD LOSS: 150 mL    SPECIMENS: 1.Tissue from  knee for culture sensitivity aerobic and anaerobic.      COMPLICATIONS: Nil.     DRAINS: A 10 [place] Hemovac drain.     INDICATIONS: Patient is a 76-year-old male who has been noticing catching sensation and crepitation in his left knee.  He has been having worsening pain in the left knee.  Secondary to the left knee pain he has been using a cane.  He has a remote history of revision left total knee replacement.  He denies using any medications for the pain   This is a workman's comp related issue. He is retired.   He denies any fevers or chills.  He denies any history of MRSA, DVT.     The patient has been evaluated for infection. Inflammatory Markers were negative. Knee  aspiration fluid was negative for suspicion of infection. 3-phase bone scan was obtained and confirmed suspected  loosening of the implant.  X-rays confirmed presence of a broken screw inside the joint.    Treatment options and alternatives were discussed in detail with the patient who is indicated for a revision total knee arthroplasty.     Likely risks and benefits of the procedure, including but not limited to infection, DVT, pulmonary embolism, future loosening of the implants, possibility of injury to tendons, ligaments, nerves or vessels and periprosthetic fractures, loss of extensor mechanism, stiffness, future above-the-knee amputation have been discussed in detail. Despite the risks involved, the patient elected to proceed and informed consent was obtained and the patient was scheduled for surgery. The patient was seen in the preoperative holding area and the operative site was marked.       DESCRIPTION OF PROCEDURE:   The patient was transferred to [facility] operating room.     Preoperative antibiotics in the form of  Vancomycin and  Kefzol  intravenously was infused prior to the incision and prior to the tourniquet placement according to the SCIP protocol.     A surgical time out was done with the team and the correct patient, surgical side and site were identified.     After achieving adequate general anesthesia, a well-padded tourniquet was placed over the proximal aspect of the operative thigh. The operative leg was prepped and draped in the usual sterile fashion. Tourniquet was elevated to a pressure of 250 mm Hg.     Trannexamic acid was given intravenously prior to incision.      A skin incision was made vertically oriented centering over the patella anteriorly incorporating previous surgical scar. Skin and subcutaneous tissue were incised, full thickness flaps were raised and a medial parapatellar approach was developed. There was a small effusion. The patella was subluxed and the  knee was inspected.      The broken screw was found in the anterior part of the knee just anterior to the posterior of the polyethylene.  This was removed.  It was found to be broken half of its threads were not seen.  The patient did have mild recurvatum and medial lateral as well as anteroposterior instability.  I inspected the knee this was a Lidya LCCK polyethylene liner.  The liner was removed this was a 20 mm thick liner.    I sent tissue for culture sensitivity.  I inspected the patella implant and this was found to be stable and well fixed to the underlying bone no sign of loosening.  I inspected the femur implant and this was found to be well fixed to the underlying bone.    Next attention was directed to the tibia.  I was able to remove the tibial implant from the underlying baseplate by utilizing a bone tamp.  I inspected the stem.  The broken portion of the screw was inside the stem and this was a metaphyseal cementation for the stem.  There was a well fixed to trabecular metal proximal tibial cone that was fixed very well to the underlying bone.    Secondary to the broken screw I had to remove the stem to complete the revision.  I tried to remove the broken portion of the screw with the help of some metal cutting burs and broken screw removal set but I was unable to remove the broken part of the screw as this was found to be cold welded to the underlying stem.  I then utilized osteotomes to remove the cement from the proximal portion of the stem.  Care was taken to go in between the cone and the stem circumferentially.  Followed by this I was able to utilize a Lidya trephine.  I utilized a size 16 diameter Lidya trephine and I was able to loosen the stem from the underlying bone this was a fluted stem.  I was able to remove the stem.    As the knee tissue was looking very benign  and as the  inflammatory markers were negative it was planned to proceed with reimplantation.  The knee joint was  thoroughly irrigated with saline.  All traces of previous cement was removed.  Debridement was completed.    I irrigated the canal and followed by this I opened the distal canal plug with the help of a reamer.  I used a ball-tipped guidewire to verify that I was intramedullary and that there were no perforations of the canal.  I elected to utilize a similar diameter of stem as the previous stem.  As the metaphyseal trabecular metal cone was found to be well fixed to the underlying bone I placed the trial tibia and trialed this.  The previously removed implant had a 5 mm tibial augment.  I utilized a 10 mm tibial augment.    Proximal tibia was sized and a trial reduction was performed. Reduction was found to be satisfactory with range of motion from 0° to 120° with the patella tracking well.     Having been satisfied with the trials, the bony surfaces irrigated with saline.  The tibial implant was assembled on the back table.  I used Simplex cement   for obtaining some support of the component to the bone secondary to bone loss.  The cement was allowed to set excess cement was removed.  Metaphyseal cementation technique was utilized.  Components were held without any movement till the cement was set..  Trial liner was removed and I replaced it with a 17 mm thick LCCK liner.  The set screw was seated.  This was torqued.    Again, range of motion was checked and the range was satisfactory from 0° to 120° with excellent stability throughout the range of motion. Good medial and lateral tissue tension, and soft tissue balancing. The patella was tracking well. Having been satisfied with this, the joint was thoroughly irrigated with saline. Soft tissue hemostasis was secured.     A 10-[place] Hemovac drain was placed.      The sponge and needle count was found to be correct.  Arthrotomy was closed with Ethibond sutures followed by closure of the incision in layers with Vicryl sutures and Monocryl. Sterile dressings were  placed and the patient was transferred to the recovery room in stable condition. The patient was given a knee immobilizer. The patient tolerated the procedure well and is being admitted for postoperative antibiotics according to the SCIP protocol for 2 more doses .     DVT Prophylaxis -  Mechanical - TEDS and venous foot plexipulses and  DVT Prophylaxis will be started daily on postoperative day #1.     The patient will be mobilized in am with physical therapy. WBAT and no restrictions for ROM.     I discussed the satisfactory performance of the procedure with the patient's family and discussed with them The postoperative management.      Patricia Caldera M.D.    11/16/2021    CC: Charity Kearney MD; MD Verenice Peter Madhusudhan R,*

## 2021-11-17 ENCOUNTER — READMISSION MANAGEMENT (OUTPATIENT)
Dept: CALL CENTER | Facility: HOSPITAL | Age: 76
End: 2021-11-17

## 2021-11-17 VITALS
HEART RATE: 92 BPM | DIASTOLIC BLOOD PRESSURE: 78 MMHG | WEIGHT: 237.9 LBS | TEMPERATURE: 96.9 F | RESPIRATION RATE: 18 BRPM | SYSTOLIC BLOOD PRESSURE: 181 MMHG | OXYGEN SATURATION: 97 % | BODY MASS INDEX: 35.13 KG/M2

## 2021-11-17 LAB
ANION GAP SERPL CALCULATED.3IONS-SCNC: 10.5 MMOL/L (ref 5–15)
BASOPHILS # BLD AUTO: 0.02 10*3/MM3 (ref 0–0.2)
BASOPHILS NFR BLD AUTO: 0.2 % (ref 0–1.5)
BUN SERPL-MCNC: 17 MG/DL (ref 8–23)
BUN/CREAT SERPL: 18.7 (ref 7–25)
CALCIUM SPEC-SCNC: 8 MG/DL (ref 8.6–10.5)
CHLORIDE SERPL-SCNC: 101 MMOL/L (ref 98–107)
CO2 SERPL-SCNC: 19.5 MMOL/L (ref 22–29)
CREAT SERPL-MCNC: 0.91 MG/DL (ref 0.76–1.27)
DEPRECATED RDW RBC AUTO: 45.1 FL (ref 37–54)
EOSINOPHIL # BLD AUTO: 0 10*3/MM3 (ref 0–0.4)
EOSINOPHIL NFR BLD AUTO: 0 % (ref 0.3–6.2)
ERYTHROCYTE [DISTWIDTH] IN BLOOD BY AUTOMATED COUNT: 12.2 % (ref 12.3–15.4)
GFR SERPL CREATININE-BSD FRML MDRD: 81 ML/MIN/1.73
GLUCOSE BLDC GLUCOMTR-MCNC: 100 MG/DL (ref 70–130)
GLUCOSE BLDC GLUCOMTR-MCNC: 110 MG/DL (ref 70–130)
GLUCOSE SERPL-MCNC: 116 MG/DL (ref 65–99)
HCT VFR BLD AUTO: 35 % (ref 37.5–51)
HGB BLD-MCNC: 11.4 G/DL (ref 13–17.7)
IMM GRANULOCYTES # BLD AUTO: 0.06 10*3/MM3 (ref 0–0.05)
IMM GRANULOCYTES NFR BLD AUTO: 0.5 % (ref 0–0.5)
LYMPHOCYTES # BLD AUTO: 1.84 10*3/MM3 (ref 0.7–3.1)
LYMPHOCYTES NFR BLD AUTO: 13.9 % (ref 19.6–45.3)
MCH RBC QN AUTO: 32.8 PG (ref 26.6–33)
MCHC RBC AUTO-ENTMCNC: 32.6 G/DL (ref 31.5–35.7)
MCV RBC AUTO: 100.6 FL (ref 79–97)
MONOCYTES # BLD AUTO: 1.61 10*3/MM3 (ref 0.1–0.9)
MONOCYTES NFR BLD AUTO: 12.2 % (ref 5–12)
NEUTROPHILS NFR BLD AUTO: 73.2 % (ref 42.7–76)
NEUTROPHILS NFR BLD AUTO: 9.68 10*3/MM3 (ref 1.7–7)
NRBC BLD AUTO-RTO: 0 /100 WBC (ref 0–0.2)
PLATELET # BLD AUTO: 246 10*3/MM3 (ref 140–450)
PMV BLD AUTO: 9.7 FL (ref 6–12)
POTASSIUM SERPL-SCNC: 4.5 MMOL/L (ref 3.5–5.2)
RBC # BLD AUTO: 3.48 10*6/MM3 (ref 4.14–5.8)
SODIUM SERPL-SCNC: 131 MMOL/L (ref 136–145)
WBC # BLD AUTO: 13.21 10*3/MM3 (ref 3.4–10.8)

## 2021-11-17 PROCEDURE — 82962 GLUCOSE BLOOD TEST: CPT

## 2021-11-17 PROCEDURE — 0 CEFAZOLIN IN DEXTROSE 2-4 GM/100ML-% SOLUTION: Performed by: ORTHOPAEDIC SURGERY

## 2021-11-17 PROCEDURE — 97161 PT EVAL LOW COMPLEX 20 MIN: CPT

## 2021-11-17 PROCEDURE — G0378 HOSPITAL OBSERVATION PER HR: HCPCS

## 2021-11-17 PROCEDURE — 85025 COMPLETE CBC W/AUTO DIFF WBC: CPT | Performed by: ORTHOPAEDIC SURGERY

## 2021-11-17 PROCEDURE — 97166 OT EVAL MOD COMPLEX 45 MIN: CPT

## 2021-11-17 PROCEDURE — 25010000002 ONDANSETRON PER 1 MG: Performed by: ORTHOPAEDIC SURGERY

## 2021-11-17 PROCEDURE — 97535 SELF CARE MNGMENT TRAINING: CPT

## 2021-11-17 PROCEDURE — 97110 THERAPEUTIC EXERCISES: CPT

## 2021-11-17 PROCEDURE — 25010000002 HYDROMORPHONE 1 MG/ML SOLUTION: Performed by: ORTHOPAEDIC SURGERY

## 2021-11-17 PROCEDURE — 80048 BASIC METABOLIC PNL TOTAL CA: CPT | Performed by: ORTHOPAEDIC SURGERY

## 2021-11-17 RX ORDER — BISACODYL 5 MG/1
10 TABLET, DELAYED RELEASE ORAL DAILY PRN
Qty: 20 TABLET | Refills: 0 | Status: SHIPPED | OUTPATIENT
Start: 2021-11-17 | End: 2021-11-27

## 2021-11-17 RX ORDER — PSEUDOEPHEDRINE HCL 30 MG
100 TABLET ORAL 2 TIMES DAILY
Qty: 31 CAPSULE | Refills: 0 | Status: SHIPPED | OUTPATIENT
Start: 2021-11-17 | End: 2021-12-03

## 2021-11-17 RX ORDER — HYDROCODONE BITARTRATE AND ACETAMINOPHEN 7.5; 325 MG/1; MG/1
1 TABLET ORAL EVERY 4 HOURS PRN
Qty: 42 TABLET | Refills: 0 | Status: SHIPPED | OUTPATIENT
Start: 2021-11-17 | End: 2021-11-26

## 2021-11-17 RX ORDER — ONDANSETRON 4 MG/1
4 TABLET, FILM COATED ORAL EVERY 6 HOURS PRN
Qty: 30 TABLET | Refills: 0 | Status: SHIPPED | OUTPATIENT
Start: 2021-11-17 | End: 2022-03-07

## 2021-11-17 RX ORDER — ASPIRIN 81 MG/1
81 TABLET ORAL EVERY 12 HOURS SCHEDULED
Qty: 30 TABLET | Refills: 0 | Status: SHIPPED | OUTPATIENT
Start: 2021-11-17 | End: 2021-12-02

## 2021-11-17 RX ADMIN — ASPIRIN 81 MG: 81 TABLET, COATED ORAL at 09:07

## 2021-11-17 RX ADMIN — CEFAZOLIN SODIUM 2 G: 2 INJECTION, SOLUTION INTRAVENOUS at 01:30

## 2021-11-17 RX ADMIN — OXCARBAZEPINE 300 MG: 300 TABLET, FILM COATED ORAL at 09:07

## 2021-11-17 RX ADMIN — HYDROCODONE BITARTRATE AND ACETAMINOPHEN 2 TABLET: 7.5; 325 TABLET ORAL at 01:45

## 2021-11-17 RX ADMIN — HYDROMORPHONE HYDROCHLORIDE 0.5 MG: 10 INJECTION INTRAMUSCULAR; INTRAVENOUS; SUBCUTANEOUS at 02:55

## 2021-11-17 RX ADMIN — ONDANSETRON 4 MG: 2 INJECTION INTRAMUSCULAR; INTRAVENOUS at 02:55

## 2021-11-17 RX ADMIN — AMLODIPINE BESYLATE 5 MG: 5 TABLET ORAL at 06:57

## 2021-11-17 RX ADMIN — LISINOPRIL 5 MG: 5 TABLET ORAL at 09:07

## 2021-11-17 RX ADMIN — HYDROCODONE BITARTRATE AND ACETAMINOPHEN 2 TABLET: 7.5; 325 TABLET ORAL at 06:56

## 2021-11-17 RX ADMIN — DOCUSATE SODIUM 100 MG: 100 CAPSULE, LIQUID FILLED ORAL at 09:07

## 2021-11-17 RX ADMIN — HYDROCHLOROTHIAZIDE 25 MG: 25 TABLET ORAL at 06:57

## 2021-11-17 RX ADMIN — HYDROCODONE BITARTRATE AND ACETAMINOPHEN 2 TABLET: 7.5; 325 TABLET ORAL at 12:47

## 2021-11-17 RX ADMIN — SODIUM CHLORIDE 100 ML/HR: 9 INJECTION, SOLUTION INTRAVENOUS at 05:23

## 2021-11-17 NOTE — PLAN OF CARE
Goal Outcome Evaluation:  Plan of Care Reviewed With: patient        Progress: improving  Outcome Summary: Pt is a 76 year old make s/op L TKA revision. Pt lives w/ spouse and reports indep at PLOF. OT educ pt and pt spouse regarding adaptive dressing techniques including AE and DME for increased indep and safety w/ ADLs. OT educ on use of sock-aid, reacher, and shoehorn for increased indep w/ LB dressing- pt has shoehorn and states his spouse can assist him w/ socks until he can perform independently. Educ on use of shower chair for increased safety w/ bathing tasks. Drain site bleeding and leaking during session and RN about to change- so transfers and ambulation withtheld during OT, but noted pt transferred earlier this AM w/ PT. No further skilled acute OT services warranted. OT to sign off.  OT wore standard mask during session and OT also wore gloves, glasses, and performed thorough hand hygiene before and after session.

## 2021-11-17 NOTE — THERAPY DISCHARGE NOTE
"Acute Care - Occupational Therapy Discharge  [facility]    Patient Name: Jeremi Ugarte  : 1945    MRN: [id number]                              Today's Date: 2021       Admit Date: 2021    Visit Dx:     ICD-10-CM ICD-9-CM   1. Mechanical loosening of internal left knee prosthetic joint, initial encounter ([facility])  T84.033A 996.41   2. Mechanical loosening of internal left knee prosthetic joint ([facility])  T84.033A 996.41     V43.65   3. Status post revision of total knee replacement, left  Z96.652 V43.65   4. Generalized weakness  R53.1 780.79     Patient Active Problem List   Diagnosis   • Trigeminal neuralgia of right side of face   • Bifascicular block   • Dyslipidemia   • Benign essential HTN   • Anxiety   • Hyperlipidemia LDL goal <100   • Status post revision of total replacement of right knee   • Hypertension   • Cluster headaches   • Status post revision of total knee replacement, left     Past Medical History:   Diagnosis Date   • Anemia    • Ankle arthritis    • Anxiety    • Arthritis    • Chronic headaches    • Depression    • GCA (giant cell arteritis) ([facility])     \"PT STATES MISDIAGNOSED\"   • Gout of foot    • High risk medication use    • Hyperlipidemia    • Hypertension    • Hyponatremia     HX   • Macrocytosis    • Muscle weakness    • Myositis     \"PT STATES MISDIAGNOSED\"   • Osteoarthritis    • Osteoarthritis of both hips    • Polymyalgia rheumatica ([facility])     \"PT STATES MISDIAGNOSED\"   • Renal insufficiency    • Right bundle branch block (RBBB)    • S/p bilateral revision of total hip replacement    • S/P foot surgery    • S/P revision of total knee, left    • S/P revision of total knee, right    • Trigeminal neuralgia      Past Surgical History:   Procedure Laterality Date   • ANKLE ARTHROPLASTY Left 2018   • BACK SURGERY      lower back surg   • CATARACT EXTRACTION     • COLONOSCOPY     • CRANIOTOMY Right 2019    Dr. Aldridge, for trigeminal neuralgia   • EXCISION BAKERS CYST KNEE  " 2008   • HAND SURGERY Left 1971    Left hand and wrist 1971   • HIP SURGERY Right 12/08/2016    total right hip replacement   • KNEE ARTHROPLASTY Bilateral     LEFT - 1996  -  RIGHT -  1999    • REPLACEMENT TOTAL KNEE      1996 and 1999   • SHOULDER SURGERY     • TOTAL HIP ARTHROPLASTY Left 2017   • TOTAL KNEE ARTHROPLASTY REVISION  2009    left   • TOTAL KNEE ARTHROPLASTY REVISION Right 9/15/2020    Procedure: RIGHT TOTAL KNEE ARTHROPLASTY REVISION;  Surgeon: Patricia Caldera MD;  Location: [facility] MAIN OR;  Service: Orthopedics;  Laterality: Right;   • WRIST SURGERY        General Information     Row Name 11/17/21 1502          OT Time and Intention    Document Type discharge evaluation/summary  -RD     Mode of Treatment occupational therapy  -RD     Row Name 11/17/21 1502          General Information    Patient Profile Reviewed yes  -RD     Prior Level of Function independent:; ADL's  -RD     Existing Precautions/Restrictions fall  -RD     Row Name 11/17/21 1502          Living Environment    Lives With spouse  -RD     Row Name 11/17/21 1502          Cognition    Orientation Status (Cognition) oriented x 4  -RD           User Key  (r) = Recorded By, (t) = Taken By, (c) = Cosigned By    Initials Name Provider Type    RD Sabina Song, JONATHAN Occupational Therapist               Mobility/ADL's     Row Name 11/17/21 1502          Bed Mobility    Comment (Bed Mobility) NT- up in chair  -RD     Row Name 11/17/21 1502          Transfers    Comment (Transfers) NT d/t pt drain site heavily bleeding- awaiting RN to address issue; noted pt able to get up to chair w/ PT earlier this AM w/ CGA  -RD     Row Name 11/17/21 1502          Activities of Daily Living    BADL Assessment/Intervention lower body dressing; bathing; grooming  -RD     Row Name 11/17/21 1502          Lower Body Dressing Assessment/Training    Assistive Devices (Lower Body Dressing) long-handled shoe horn; reacher; sock-aid  -RD     Comment (Lower  Body Dressing) OT educ on adaptive dressing techniques in order to don R sock and pants including use of reacher, sock-aid, and shoehorn. Pt has shoehorn at home but pt and spouse report spouse will assist w/ donning R sock and pants until pt is able to complete himself as they have done this w/ previous knee surgeries  -RD     Row Name 11/17/21 1502          Bathing Assessment/Intervention    Comment (Bathing) OT educ on bathing techniques including use of shower chair; pt did not previously use a shower chair but spouse reports they have one that they can use  -RD     Row Name 11/17/21 1502          Grooming Assessment/Training    [place] (Grooming) grooming skills; set up; supervision  -RD     Position (Grooming) supported sitting  -RD           User Key  (r) = Recorded By, (t) = Taken By, (c) = Cosigned By    Initials Name Provider Type    Sabina Saleh OT Occupational Therapist               Obj/Interventions     Row Name 11/17/21 1506          Strength Comprehensive (MMT)    Comment, General Manual Muscle Testing (MMT) Assessment B UE MMT grossly WFL  -RD           User Key  (r) = Recorded By, (t) = Taken By, (c) = Cosigned By    Initials Name Provider Type    Sabina Saleh OT Occupational Therapist               Goals/Plan     Row Name 11/17/21 1500          Problem Specific Goal 1 (OT)    Problem Specific Goal 1 (OT) Pt educ on adaptive dressing techniques in order to increase indep and safety w/ ADLs at home.  -RD     Time Frame (Problem Specific Goal 1, OT) short term goal (STG); 1 day  -RD     Progress/Outcome (Problem Specific Goal 1, OT) goal met  -RD           User Key  (r) = Recorded By, (t) = Taken By, (c) = Cosigned By    Initials Name Provider Type    Sabina Saleh OT Occupational Therapist               Clinical Impression     Row Name 11/17/21 1506          Pain Assessment    Additional Documentation Pain Scale: FACES Pre/Post-Treatment (Group)  -RD      Row Name 11/17/21 1508          Pain Scale: Numbers Pre/Post-Treatment    Pain Intervention(s) Repositioned; Ambulation/increased activity; Rest  -RD     Row Name 11/17/21 1508          Pain Scale: FACES Pre/Post-Treatment    Pain: FACES Scale, Pretreatment 4-->hurts little more  -RD     Posttreatment Pain Rating 4-->hurts little more  -RD     Pain Location - Side Left  -RD     Pain Location knee  -RD     Row Name 11/17/21 1501          Plan of Care Review    Plan of Care Reviewed With patient  -RD     Progress improving  -RD     Outcome Summary Pt is a 76 year old make s/op L TKA revision. Pt lives w/ spouse and reports indep at PLOF. OT educ pt and pt spouse regarding adaptive dressing techniques including AE and DME for increased indep and safety w/ ADLs. OT educ on use of sock-aid, reacher, and shoehorn for increased indep w/ LB dressing- pt has shoehorn and states his spouse can assist him w/ socks until he can perform independently. Educ on use of shower chair for increased safety w/ bathing tasks. Drain site bleeding and leaking during session and RN about to change- so transfers and ambulation withtheld during OT, but noted pt transferred earlier this AM w/ PT. No further skilled acute OT services warranted. OT to sign off.  OT wore standard mask during session and OT also wore gloves, glasses, and performed thorough hand hygiene before and after session.  -RD     Row Name 11/17/21 1505          Therapy Assessment/Plan (OT)    Criteria for Skilled Therapeutic Interventions Met (OT) no; no problems identified which require skilled intervention  -RD     Therapy Frequency (OT) evaluation only  -RD     Row Name 11/17/21 1506          Positioning and Restraints    Pre-Treatment Position sitting in chair/recliner  -RD     Post Treatment Position chair  -RD     In Chair reclined; call light within reach; encouraged to call for assist; exit alarm on; with family/caregiver; with nsg  -RD           User Key  (r) =  Recorded By, (t) = Taken By, (c) = Cosigned By    Initials Name Provider Type    Sabina Saleh, JONATHAN Occupational Therapist               Outcome Measures     Row Name 11/17/21 1507          How much help from another is currently needed...    Putting on and taking off regular lower body clothing? 2  -RD     Bathing (including washing, rinsing, and drying) 3  -RD     Toileting (which includes using toilet bed pan or urinal) 3  -RD     Putting on and taking off regular upper body clothing 3  -RD     Taking care of personal grooming (such as brushing teeth) 3  -RD     Eating meals 3  -RD     AM-PAC 6 Clicks Score (OT) 17  -RD     Row Name 11/17/21 1000          How much help from another person do you currently need...    Turning from your back to your side while in flat bed without using bedrails? 4  -LH     Moving from lying on back to sitting on the side of a flat bed without bedrails? 4  -LH     Moving to and from a bed to a chair (including a wheelchair)? 4  -LH     Standing up from a chair using your arms (e.g., wheelchair, bedside chair)? 3  -LH     Climbing 3-5 steps with a railing? 3  -LH     To walk in hospital room? 3  -LH     AM-PAC 6 Clicks Score (PT) 21  -LH     Row Name 11/17/21 1507 11/17/21 1000       Functional Assessment    Outcome Measure Options AM-PAC 6 Clicks Daily Activity (OT)  -RD AM-PAC 6 Clicks Basic Mobility (PT)  -          User Key  (r) = Recorded By, (t) = Taken By, (c) = Cosigned By    Initials Name Provider Type     Kathryn Gordon, PT Physical Therapist    Sabina Saleh OT Occupational Therapist              Occupational Therapy Education                 Title: PT OT SLP Therapies (Resolved)     Topic: Occupational Therapy (Resolved)     Point: ADL training (Resolved)     Description:   Instruct learner(s) on proper safety adaptation and remediation techniques during self care or transfers.   Instruct in proper use of assistive devices.              Learning  Progress Summary           Patient Acceptance, E,D, VU by RD at 11/17/2021 1507    Comment: OT educ on OT role in therapeutic prcoess and pt's POC. OT also educ on adaptive dressing techniques including use of AE for increased indep and safety w/ ADLs at home. OT also educ on DME for showering.   Significant Other Acceptance, E,D, VU by RD at 11/17/2021 1507    Comment: OT educ on OT role in therapeutic prcoess and pt's POC. OT also educ on adaptive dressing techniques including use of AE for increased indep and safety w/ ADLs at home. OT also educ on DME for showering.                   Point: Precautions (Resolved)     Description:   Instruct learner(s) on prescribed precautions during self-care and functional transfers.              Learning Progress Summary           Patient Acceptance, E,D, VU by RD at 11/17/2021 1507    Comment: OT educ on OT role in therapeutic prcoess and pt's POC. OT also educ on adaptive dressing techniques including use of AE for increased indep and safety w/ ADLs at home. OT also educ on DME for showering.   Significant Other Acceptance, E,D, VU by RD at 11/17/2021 1507    Comment: OT educ on OT role in therapeutic prcoess and pt's POC. OT also educ on adaptive dressing techniques including use of AE for increased indep and safety w/ ADLs at home. OT also educ on DME for showering.                   Point: Body mechanics (Resolved)     Description:   Instruct learner(s) on proper positioning and spine alignment during self-care, functional mobility activities and/or exercises.              Learning Progress Summary           Patient Acceptance, E,D, VU by RD at 11/17/2021 1507    Comment: OT educ on OT role in therapeutic prcoess and pt's POC. OT also educ on adaptive dressing techniques including use of AE for increased indep and safety w/ ADLs at home. OT also educ on DME for showering.   Significant Other Acceptance, E,D, VU by RD at 11/17/2021 1507    Comment: OT educ on OT role in  therapeutic prcoess and pt's POC. OT also educ on adaptive dressing techniques including use of AE for increased indep and safety w/ ADLs at home. OT also educ on DME for showering.                               User Key     Initials Effective Dates Name Provider Type Discipline    RD 06/16/21 -  Sabina Song, OT Occupational Therapist OT              OT Recommendation and Plan  Retired Outcome Summary/Treatment Plan (OT)  Anticipated Discharge Disposition (OT): home with assist, home with home health  Therapy Frequency (OT): evaluation only  Plan of Care Review  Plan of Care Reviewed With: patient  Progress: improving  Outcome Summary: Pt is a 76 year old make s/op L TKA revision. Pt lives w/ spouse and reports indep at PLOF. OT educ pt and pt spouse regarding adaptive dressing techniques including AE and DME for increased indep and safety w/ ADLs. OT educ on use of sock-aid, reacher, and shoehorn for increased indep w/ LB dressing- pt has shoehorn and states his spouse can assist him w/ socks until he can perform independently. Educ on use of shower chair for increased safety w/ bathing tasks. Drain site bleeding and leaking during session and RN about to change- so transfers and ambulation withtheld during OT, but noted pt transferred earlier this AM w/ PT. No further skilled acute OT services warranted. OT to sign off.  OT wore standard mask during session and OT also wore gloves, glasses, and performed thorough hand hygiene before and after session.  Plan of Care Reviewed With: patient  Outcome Summary: Pt is a 76 year old make s/op L TKA revision. Pt lives w/ spouse and reports indep at PLOF. OT educ pt and pt spouse regarding adaptive dressing techniques including AE and DME for increased indep and safety w/ ADLs. OT educ on use of sock-aid, reacher, and shoehorn for increased indep w/ LB dressing- pt has shoehorn and states his spouse can assist him w/ socks until he can perform independently. Educ on  use of shower chair for increased safety w/ bathing tasks. Drain site bleeding and leaking during session and RN about to change- so transfers and ambulation withtheld during OT, but noted pt transferred earlier this AM w/ PT. No further skilled acute OT services warranted. OT to sign off.  OT wore standard mask during session and OT also wore gloves, glasses, and performed thorough hand hygiene before and after session.     Time Calculation:    Time Calculation- OT     Row Name 11/17/21 1512             Time Calculation- OT    OT Start Time 0957  -RD      OT Stop Time 1020  -RD      OT Time Calculation (min) 23 min  -RD      Total Timed Code Minutes- OT 16 minute(s)  -RD      OT Received On 11/17/21  -RD              Timed Charges    97358 - OT Self Care/Mgmt Minutes 16  -RD              Untimed Charges    OT Eval/Re-eval Minutes 7  -RD              Total Minutes    Timed Charges Total Minutes 16  -RD      Untimed Charges Total Minutes 7  -RD       Total Minutes 23  -RD            User Key  (r) = Recorded By, (t) = Taken By, (c) = Cosigned By    Initials Name Provider Type    RD Sabina Song OT Occupational Therapist              Therapy Charges for Today     Code Description Service Date Service Provider Modifiers Qty    46954798743 HC OT SELF CARE/MGMT/TRAIN EA 15 MIN 11/17/2021 Sabina Song OT GO 1    83541252559 HC OT EVAL MOD COMPLEXITY 2 11/17/2021 Sabina Song OT GO 1               Sabina Song OT  11/17/2021

## 2021-11-17 NOTE — DISCHARGE PLACEMENT REQUEST
"Jeremi Ugarte (76 y.o. Male)             Date of Birth Social Security Number Address Home Phone MRN    1945  [id number] [phone] [id number]    Religious Marital Status             Muslim        Admission Date Admission Type Admitting Provider Attending Provider Department, Room/Bed    11/16/21 Elective Patricia Caldera MD Yakkanti, Madhusudhan R, MD [address], P792/1    Discharge Date Discharge Disposition Discharge Destination           [place] [facility] [place]              Attending Provider: Patricia Caldera MD    Allergies: Oxycodone-acetaminophen, Morphine, Vancomycin    Isolation: None   Infection: None   Code Status: CPR   Advance Care Planning Activity    Ht: 175.3 cm (69\")   Wt: 108 kg (237 lb 14.4 oz)    Admission Cmt: None   Principal Problem: Mechanical loosening of internal left knee prosthetic joint ([facility]) [T84.033A]                 Active Insurance as of 11/16/2021     Primary Coverage     Payor Plan Insurance Group Employer/Plan Group    WORKERS COMPENSATION MISC WORKERS COMPENSATION      Coverage Address Coverage Phone Number Coverage Fax Number Effective Dates    [address] [address] [phone]  9/1/1989 - None Entered    [facility] [id number]       Subscriber Name Subscriber Birth Date Member ID       JEREMI UGARTE 1945 [id number]                 Emergency Contacts      (Rel.) Home Phone Work Phone Mobile Phone    Vidya Ugarte (Spouse) [phone] -- --          "

## 2021-11-17 NOTE — DISCHARGE SUMMARY
Orthopedic Discharge Summary      Patient: Jeremi Ugarte   YOB: 1945    Medical Record Number: [id number]    Attending Physician: Patricia Caldera,*    Consulting Physician(s):   Consulting Physician(s)  Chat With All Active Members    Provider Relationship Specialty    Charlie Yadav MD  Consulting Physician Hospitalist          Date of Admission: 11/16/2021  7:29 AM   Date of Discharge:      Admitting Diagnosis: Mechanical loosening of internal left knee prosthetic joint   Procedures Performed  Procedure(s):  TOTAL KNEE ARTHROPLASTY REVISION       Patient Active Problem List   Diagnosis   • Trigeminal neuralgia of right side of face   • Bifascicular block   • Dyslipidemia   • Benign essential HTN   • Anxiety   • Hyperlipidemia LDL goal <100   • Status post revision of total replacement of right knee   • Hypertension   • Cluster headaches   • Status post revision of total knee replacement, left          Allergies   Allergen Reactions   • Oxycodone-Acetaminophen Nausea And Vomiting     Pt states he can take hydrocodone and tylenol     • Morphine Hallucinations     N & V   • Vancomycin Itching          Discharge Medications      New Medications      Instructions Start Date   aspirin 81 MG EC tablet   81 mg, Oral, Every 12 Hours Scheduled      bisacodyl 5 MG EC tablet  Commonly known as: DULCOLAX   10 mg, Oral, Daily PRN      docusate sodium 100 MG capsule   100 mg, Oral, 2 Times Daily      HYDROcodone-acetaminophen 7.5-325 MG per tablet  Commonly known as: NORCO   1 tablet, Oral, Every 4 Hours PRN      ondansetron 4 MG tablet  Commonly known as: ZOFRAN   4 mg, Oral, Every 6 Hours PRN         Changes to Medications      Instructions Start Date   ALPRAZolam 0.5 MG tablet  Commonly known as: XANAX  What changed:   · when to take this  · reasons to take this   0.5 mg, Oral, 3 Times Daily      amLODIPine 5 MG tablet  Commonly known as: NORVASC  What changed: when to take this   5 mg, Oral,  "Daily      hydroCHLOROthiazide 25 MG tablet  Commonly known as: HYDRODIURIL  What changed: when to take this   25 mg, Oral, Daily      quinapril 40 MG tablet  Commonly known as: ACCUPRIL  What changed:   · how much to take  · when to take this  · additional instructions   TAKE 1 TABLET BY MOUTH TWICE DAILY         Continue These Medications      Instructions Start Date   CALCIUM 1200 PO   1,000 mg, Oral, Every Morning, PT HOLDING FOR SURGERY      coenzyme Q10 100 MG capsule   100 mg, Oral, Daily, PT HOLDING FOR SURGERY      fish oil 1000 MG capsule capsule   1,200 mg, Oral, Daily With Breakfast, PT HOLDING FOR SURGERY      multivitamin with minerals tablet tablet   1 tablet, Oral, Every Morning, PT HOLDING FOR SURGERY      NON FORMULARY   1 tablet, Oral, Every Morning, Ocuvite Extra - eye supplement/PT HOLDING FOR SURGERY      OLANZapine 5 MG tablet  Commonly known as: zyPREXA   5 mg, Oral, Nightly      OXcarbazepine 300 MG tablet  Commonly known as: TRILEPTAL   300 mg, Oral, 3 Times Daily PRN      rosuvastatin 20 MG tablet  Commonly known as: CRESTOR   20 mg, Oral, Daily      vitamin B-12 1000 MCG tablet  Commonly known as: CYANOCOBALAMIN   1,000 mcg, Oral, Daily, PT HOLDING FOR SURGERY                Past Medical History:   Diagnosis Date   • Anemia    • Ankle arthritis    • Anxiety    • Arthritis    • Chronic headaches    • Depression    • GCA (giant cell arteritis) (HCC)     \"PT STATES MISDIAGNOSED\"   • Gout of foot    • High risk medication use    • Hyperlipidemia    • Hypertension    • Hyponatremia     HX   • Macrocytosis    • Muscle weakness    • Myositis     \"PT STATES MISDIAGNOSED\"   • Osteoarthritis    • Osteoarthritis of both hips    • Polymyalgia rheumatica (HCC)     \"PT STATES MISDIAGNOSED\"   • Renal insufficiency    • Right bundle branch block (RBBB)    • S/p bilateral revision of total hip replacement    • S/P foot surgery    • S/P revision of total knee, left    • S/P revision of total knee, right  "   • Trigeminal neuralgia         Past Surgical History:   Procedure Laterality Date   • ANKLE ARTHROPLASTY Left 2018   • BACK SURGERY      lower back surg   • CATARACT EXTRACTION     • COLONOSCOPY     • CRANIOTOMY Right 02/14/2019    Dr. Aldridge, for trigeminal neuralgia   • EXCISION BAKERS CYST KNEE  2008   • HAND SURGERY Left 1971    Left hand and wrist 1971   • HIP SURGERY Right 12/08/2016    total right hip replacement   • KNEE ARTHROPLASTY Bilateral     LEFT - 1996  -  RIGHT -  1999    • REPLACEMENT TOTAL KNEE      1996 and 1999   • SHOULDER SURGERY     • TOTAL HIP ARTHROPLASTY Left 2017   • TOTAL KNEE ARTHROPLASTY REVISION  2009    left   • TOTAL KNEE ARTHROPLASTY REVISION Right 9/15/2020    Procedure: RIGHT TOTAL KNEE ARTHROPLASTY REVISION;  Surgeon: Patricia Caldera MD;  Location: [facility] OR;  Service: Orthopedics;  Laterality: Right;   • WRIST SURGERY          Social History     Occupational History   • Not on file   Tobacco Use   • Smoking status: Former Smoker     Types: Cigars   • Smokeless tobacco: Former User     Types: Chew   • Tobacco comment: FOR A SHORT TIME AS A YOUNG ADULT   Vaping Use   • Vaping Use: Never used   Substance and Sexual Activity   • Alcohol use: Not Currently   • Drug use: No   • Sexual activity: Defer      Social History     Social History Narrative   • Not on file        Family History   Problem Relation Age of Onset   • Cardiomyopathy Mother    • Hypertension Mother    • Hyperlipidemia Sister    • Hypertension Sister    • Coronary artery disease Brother    • Hyperlipidemia Brother    • Hypertension Brother    • No Known Problems Father    • Malig Hyperthermia Neg Hx        Physical Exam: 76 y.o. male  General Appearance:    Alert, cooperative, in no acute distress                      Vitals:    11/16/21 1738 11/16/21 1956 11/17/21 0339 11/17/21 0720   BP: 170/98 156/86 163/80 122/56   BP Location:  Left arm Left arm Left arm   Patient Position:  Lying Lying Lying    Pulse: 100 103 87 83   Resp:  18 18 16   Temp:  97.5 °F (36.4 °C) 98.2 °F (36.8 °C) 97.2 °F (36.2 °C)   TempSrc:  Oral Oral Oral   SpO2:  93% 92% 93%   Weight:            Hospital Course:  76 y.o. male admitted to [facility], [place] to services of AdaashleyPatricia han NAGA,* with Mechanical loosening of internal LEFT knee prosthetic joint   on 11/16/2021 and underwent a LFFT total knee arthroplasty Per GREY Rai. Antibiotic and VTE prophylaxis were per SCIP protocols and included Kefzol  every 8 hours and Aspirin daily . Post-operatively the patient transferred to the post-operative floor where the patient underwent mobilization therapy that included active as well as passive ROM exercises. Opioids were titrated to achieve appropriate pain management to allow for participation in mobilization exercises. Vital signs are now stable. The incision is intact without signs or symptoms of infection. Operative extremity neurovascular status remains intact.     Appropriate education re: incision care, activity levels, medications, and follow up visits was completed and all questions were answered. The patient is now deemed stable for discharge.      DISCHARGE DISPOSITION AND PLAN:  The  Patient is being discharged home with home health for PT  2-3 X per week for 2-3 weeks and nursing care as needed.     DIAGNOSTIC TESTS:     Admission on 11/16/2021   Component Date Value Ref Range Status   • Tissue Culture 11/16/2021 No growth   Preliminary   • Gram Stain 11/16/2021 Rare (1+) WBCs per low power field   Preliminary   • Gram Stain 11/16/2021 No organisms seen   Preliminary   • Glucose 11/16/2021 149* 70 - 130 mg/dL Final    Meter: [id number] : [id number] [facility] April NA   • Glucose 11/16/2021 145* 70 - 130 mg/dL Final    Meter: [id number] : [id number] [facility] NA   • Glucose 11/17/2021 116* 65 - 99 mg/dL Final   • BUN 11/17/2021 17  8 - 23 mg/dL Final   • Creatinine 11/17/2021 0.91  0.76 - 1.27 mg/dL  Final   • Sodium 11/17/2021 131* 136 - 145 mmol/L Final   • Potassium 11/17/2021 4.5  3.5 - 5.2 mmol/L Final   • Chloride 11/17/2021 101  98 - 107 mmol/L Final   • CO2 11/17/2021 19.5* 22.0 - 29.0 mmol/L Final   • Calcium 11/17/2021 8.0* 8.6 - 10.5 mg/dL Final   • eGFR Non  Amer 11/17/2021 81  >60 mL/min/1.73 Final   • BUN/Creatinine Ratio 11/17/2021 18.7  7.0 - 25.0 Final   • Anion Gap 11/17/2021 10.5  5.0 - 15.0 mmol/L Final   • WBC 11/17/2021 13.21* 3.40 - 10.80 10*3/mm3 Final   • RBC 11/17/2021 3.48* 4.14 - 5.80 10*6/mm3 Final   • Hemoglobin 11/17/2021 11.4* 13.0 - 17.7 g/dL Final   • Hematocrit 11/17/2021 35.0* 37.5 - 51.0 % Final   • MCV 11/17/2021 100.6* 79.0 - 97.0 fL Final   • MCH 11/17/2021 32.8  26.6 - 33.0 pg Final   • MCHC 11/17/2021 32.6  31.5 - 35.7 g/dL Final   • RDW 11/17/2021 12.2* 12.3 - 15.4 % Final   • RDW-SD 11/17/2021 45.1  37.0 - 54.0 fl Final   • MPV 11/17/2021 9.7  6.0 - 12.0 fL Final   • Platelets 11/17/2021 246  140 - 450 10*3/mm3 Final   • Neutrophil % 11/17/2021 73.2  42.7 - 76.0 % Final   • Lymphocyte % 11/17/2021 13.9* 19.6 - 45.3 % Final   • Monocyte % 11/17/2021 12.2* 5.0 - 12.0 % Final   • Eosinophil % 11/17/2021 0.0* 0.3 - 6.2 % Final   • Basophil % 11/17/2021 0.2  0.0 - 1.5 % Final   • Immature Grans % 11/17/2021 0.5  0.0 - 0.5 % Final   • Neutrophils, Absolute 11/17/2021 9.68* 1.70 - 7.00 10*3/mm3 Final   • Lymphocytes, Absolute 11/17/2021 1.84  0.70 - 3.10 10*3/mm3 Final   • Monocytes, Absolute 11/17/2021 1.61* 0.10 - 0.90 10*3/mm3 Final   • Eosinophils, Absolute 11/17/2021 0.00  0.00 - 0.40 10*3/mm3 Final   • Basophils, Absolute 11/17/2021 0.02  0.00 - 0.20 10*3/mm3 Final   • Immature Grans, Absolute 11/17/2021 0.06* 0.00 - 0.05 10*3/mm3 Final   • nRBC 11/17/2021 0.0  0.0 - 0.2 /100 WBC Final   • Glucose 11/17/2021 110  70 - 130 mg/dL Final    Meter: [id number] : [id number] Bhavesh ESCOTO       No results found for: URICACID  No results found for:  CRYSTAL  Microbiology Results (last 10 days)     Procedure Component Value - Date/Time    Tissue / Bone Culture - Tissue, Knee, Left [[id number]] Collected: 11/16/21 1036    Lab Status: Preliminary result Specimen: Tissue from Knee, Left Updated: 11/17/21 0708     Tissue Culture No growth     Gram Stain Rare (1+) WBCs per low power field      No organisms seen    COVID PRE-OP / PRE-PROCEDURE SCREENING ORDER (NO ISOLATION) - Swab, Nasopharynx [[id number]]  (Normal) Collected: 11/13/21 0936    Lab Status: Final result Specimen: Swab from Nasopharynx Updated: 11/13/21 1624    Narrative:      The following orders were created for panel order COVID PRE-OP / PRE-PROCEDURE SCREENING ORDER (NO ISOLATION) - Swab, Nasopharynx.  Procedure                               Abnormality         Status                     ---------                               -----------         ------                     COVID-19,APTIMA PANTHER(...[[id number]]  Normal              Final result                 Please view results for these tests on the individual orders.    COVID-19,APTIMA PANTHER(EVAN),[facility] GABBY, NP/OP SWAB IN UTM/VTM/SALINE TRANSPORT MEDIA,24 HR TAT - Swab, Nasopharynx [[id number]]  (Normal) Collected: 11/13/21 0936    Lab Status: Final result Specimen: Swab from Nasopharynx Updated: 11/13/21 1624     COVID19 Not Detected    Narrative:      Fact sheet for providers: https://www.fda.gov/media/881313/download     Fact sheet for patients: https://www.fda.gov/media/151610/download    Test performed by RT PCR.        XR Knee 1 or 2 View Left    Result Date: 11/16/2021   Postsurgical changes.    This report was finalized on 11/16/2021 2:28 PM by Dr. Jv Howell M.D.        Discharge and Follow up Instructions:     Total Knee Joint Replacement Discharge Instructions:    I. ACTIVITIES:  1. Exercises:  ? Complete exercise program as taught by the hospital physical therapist 2 times per day  ? Exercise program will be advanced by your home  health physical therapist  ? During the day be up ambulating every 2 hours (while awake) for short distances  ? Complete the ankle pump exercises at least 10 times per hour (while awake)  ? Elevate legs most of the day the first week post operatively and thereafter elevate legs when in bed and for at least 30 minutes during the day.   ? Caution must be taken to avoid pillow placement under the bend of the knee as this can led to flexion contractures of the knee. Pillow placement under the heel is encouraged.  ? Use cold packs 20-30 minutes approximately 5 times per day. This should be done before and after completing your exercises and at any time you are experiencing pain/ stiffness in your operative extremity.      2. Activities of Daily Living:  ? No tub baths, hot tubs, or swimming pools for 4 weeks  ? May shower and let water run over the incision on post-operative day #5 if no drainage. Do not scrub or rub the incision. Simply let the water run over the incision and pat dry.    II. Restrictions  ? Do not cross legs or kneel  ? Your surgeon will discuss with you when you will be able to drive again. Usual guidelines are you are to be off pain medications prior to driving.  ? Weight bearing is as tolerated  ? First week stay inside on even terrain. May go up and down stairs one stair at a time utilizing the hand rail.  ? After one week, you may venture outside.    III. Precautions:  ? Everyone that comes near you should wash their hands  ? No elective dental, genital-urinary, or colon procedures or surgical procedures for 12 weeks after surgery unless absolutely necessary.  ?  If dental work or surgical procedure is deemed absolutely necessary, you will need to contact your surgeon as you will need to take antibiotics 1 hour prior to any dental work (including teeth cleanings).  ? Please discuss with your surgeon prophylactic antibiotics as the length of time this intervention will be necessary for you varies  with each patient’s health history and situation.  ? Avoid sick people. If you must be around someone who is ill, they should wear a mask.  ? Avoid visits to the Emergency Room or Urgent Care. If you feel you need to go to the emergency room, please notify your surgeon.    ? Stockings are to be worn for one week after surgery and are to be placed on in the morning and removed at night. Observe your skin when stocking is removed for any problems. Monitor the stockings to ensure that any swelling is not causing the stockings to become too tight. In this case, remove stockings immediately.    IV. INCISION CARE:  ? Wash your hands prior to dressing changes  ? Change the dressing as needed to keep incision clean and dry. Utilize dry gauze and paper tape. Avoid touching the side of the gauze that goes against the incision with your hands.  ? No creams or ointments to the incision  ? May remove dressing once the incision is free of drainage  ? Do not touch or pick at the incision  ? Check incision every day and notify surgeon immediately if any of the following signs or symptoms are noted:  o Increase in redness  o Increase in swelling around the incision and of the entire extremity  o Increase in pain  o Drainage oozing from the incision  o Pulling apart of the edges of the incision  o Increase in overall body temperature (greater than 100.5 degrees)    ?  You have absorbable sutures with steristrips, please do not remove the steri strips for 14 days, you can shower on them 6 days after surgery.      V. Medications:   1. Anticoagulants: You will be discharged on an anticoagulant. This is a prophylactic medication that helps prevent blood clots during your post-operative period.  You will be on Aspirin  81 mg twice daily for 30 days. If you were on Aspirin 81 mg prior to surgery you can go back to home dose once the 30 days are completed.     ? While taking the anticoagulant, you should avoid taking any additional aspirin,  ibuprofen (Advil or Motrin), Aleve (Naprosyn) or other non-steroidal anti-inflammatory medications.   ? Notify surgeon immediately if any sohan bleeding is noted in the urine, stool, emesis, or from the nose or the incision. Blood in the stool will often appear as black rather than red. Blood in urine may appear as pink. Blood in emesis may appear as brown/black like coffee grounds.  ? You will need to apply pressure for longer periods of time to any cuts or abrasions to stop bleeding  ? Avoid alcohol while taking anticoagulants    2. Stool Softeners: You will be at greater risk of constipation after surgery due to being less mobile and the pain medications.   ? Take stool softeners as instructed by your surgeon while on pain medications. Over the counter Colace 100 mg 1-2 capsules twice daily.   ? If stools become too loose or too frequent, please decreases the dosage or stop the stool softener.  ? If constipation occurs despite use of stool softeners, you are to continue the stool softeners and add a laxative (Milk of Magnesia 1 ounce daily as needed).  ? Dulcolax oral tabs or suppository, or a fleets enema can also be utilized for constipation and can be obtained over the counter.   ? If above interventions are unsuccessful in inducing bowel movements, please contact your surgeon's office / family physician's office.  ? Drink plenty of fluids, and eat fruits and vegetables during your recovery time    3. Pain Medications utilized after surgery are narcotics and the law requires that the following information be given to all patients that are prescribed narcotics:  ? CLASSIFICATION: Pain medications are called Opioids and are narcotics  ? LEGALITIES: It is illegal to share narcotics with others and to drive within 24 hours of taking narcotics  ? POTENTIAL SIDE EFFECTS: Potential side effects of opioids include: nausea, vomiting, itching, dizziness, drowsiness, dry mouth, constipation, and difficulty  urinating.  ? POTENTIAL ADVERSE EFFECTS:   o Opioid tolerance can develop with use of pain medications and this simply means that it requires more and more of the medication to control pain; however, this is seen more in patients that use opioids for longer periods of time.  o Opioid dependence can develop with use of Opioids and this simply means that to stop the medication can cause withdrawal symptoms; however, this is seen with patients that use Opioids for longer periods of time.  o Opioid addiction can develop with use of Opioids and the incidence of this is very unlikely in patients who take the medications as ordered and stop the medications as instructed.  o Opioid overdose can be dangerous, but is unlikely when the medication is taken as ordered and stopped when ordered. It is important not to mix opioids with alcohol or with and type of sedative such as Benadryl as this can lead to over sedation and respiratory difficulty.  ? DOSAGE:   o Pain medications will need to be taken consistently for the first week to decrease pain and promote adequate pain relief and participation in physical therapy.  o After the initial surgical pain begins to resolve, you may begin to decrease the pain medication. By the end of 6 weeks, you should be off of pain medications.  o Refills will not be given by the office during evening hours, on weekends, or after 6 weeks post-op.  o To seek refills on pain medications during the initial 6 week post-operative period, you must call the office 48 hours in advance to request the refill. The office will then notify you when to  the prescription. DO NOT wait until you are out of the medication to request a refill.    V. FOLLOW-UP VISITS:  ? You will need to follow up in the office with your surgeon on November 30, 2021.  Please call this number [phone] to schedule this appointment.  ? If you have any concerns or suspected complications prior to your follow up visit, please  call your surgeons office. Do not wait until your appointment time if you suspect complications. These will need to be addressed in the office promptly.      Date:     GREY Rai    CC: Charity Kearney MD; GREY Rai Madhusudhan R,*

## 2021-11-17 NOTE — PROGRESS NOTES
Patient: Jeremi Ugarte  YOB: 1945     Date of Admission: 11/16/2021  7:29 AM Medical Record Number: [id number]     Attending Physician: Patricia Caldera,Sukhjinder    Procedure(s):  TOTAL KNEE ARTHROPLASTY REVISION Post Operative Day Number: 1    Subjective : No new orthopaedic complaints     Pain Relief: some relief with present medication.     Systemic Complaints: No Complaints  Vitals:    11/16/21 1545 11/16/21 1738 11/16/21 1956 11/17/21 0339   BP: 154/77 170/98 156/86 163/80   BP Location: Right arm  Left arm Left arm   Patient Position: Lying  Lying Lying   Pulse: 95 100 103 87   Resp: 16 18 18   Temp: 98.1 °F (36.7 °C)  97.5 °F (36.4 °C) 98.2 °F (36.8 °C)   TempSrc: Oral  Oral Oral   SpO2: 94%  93% 92%   Weight:           Physical Exam: 76 y.o. male    General Appearance:       Alert, cooperative, in no acute distress                  Extremities:    Dressing Clean, Dry and Intact         Incision healthy without signs or symptoms of infections         No clinical sign of DVT        Able to do good movements of digits    Pulses:   Pulses palpable and equal bilaterally           Diagnostic Tests:     Results from last 7 days   Lab Units 11/17/21  0539   WBC 10*3/mm3 13.21*   HEMOGLOBIN g/dL 11.4*   HEMATOCRIT % 35.0*   PLATELETS 10*3/mm3 246             Lab Results   Component Value Date    CRP 0.71 (H) 11/02/2021     Lab Results   Component Value Date    SEDRATE 13 11/02/2021     No results found for: URICACID  No results found for: CRYSTAL  Microbiology Results (last 10 days)     Procedure Component Value - Date/Time    Tissue / Bone Culture - Tissue, Knee, Left [[id number]] Collected: 11/16/21 1036    Lab Status: Preliminary result Specimen: Tissue from Knee, Left Updated: 11/16/21 1504     Gram Stain Rare (1+) WBCs per low power field      No organisms seen    COVID PRE-OP / PRE-PROCEDURE SCREENING ORDER (NO ISOLATION) - Swab, Nasopharynx [[id number]]  (Normal) Collected: 11/13/21 0961     Lab Status: Final result Specimen: Swab from Nasopharynx Updated: 11/13/21 1624    Narrative:      The following orders were created for panel order COVID PRE-OP / PRE-PROCEDURE SCREENING ORDER (NO ISOLATION) - Swab, Nasopharynx.  Procedure                               Abnormality         Status                     ---------                               -----------         ------                     COVID-19,APTIMA PANTHER(...[[id number]]  Normal              Final result                 Please view results for these tests on the individual orders.    COVID-19,APTIMA PANTHER(EVAN), GABBY, NP/OP SWAB IN UTM/VTM/SALINE TRANSPORT MEDIA,24 HR TAT - Swab, Nasopharynx [[id number]]  (Normal) Collected: 11/13/21 0936    Lab Status: Final result Specimen: Swab from Nasopharynx Updated: 11/13/21 1624     COVID19 Not Detected    Narrative:      Fact sheet for providers: https://www.fda.gov/media/464648/download     Fact sheet for patients: https://www.fda.gov/media/801785/download    Test performed by RT PCR.        XR Knee 1 or 2 View Left    Result Date: 11/16/2021   Postsurgical changes.    This report was finalized on 11/16/2021 2:28 PM by Dr. Jv Howell M.D.              Current Medications:  Scheduled Meds:amLODIPine, 5 mg, Oral, QAM  aspirin, 81 mg, Oral, Q12H  docusate sodium, 100 mg, Oral, BID  hydroCHLOROthiazide, 25 mg, Oral, QAM  lisinopril, 5 mg, Oral, Q24H  OLANZapine, 5 mg, Oral, Nightly  OXcarbazepine, 300 mg, Oral, TID      Continuous Infusions:sodium chloride, 100 mL/hr, Last Rate: 100 mL/hr (11/17/21 0523)      PRN Meds:.•  acetaminophen  •  ALPRAZolam  •  bisacodyl  •  HYDROcodone-acetaminophen  •  HYDROcodone-acetaminophen  •  HYDROmorphone **AND** naloxone  •  melatonin  •  ondansetron **OR** ondansetron    Assessment:    Procedure(s):  TOTAL KNEE ARTHROPLASTY REVISION    Patient Active Problem List   Diagnosis   • Trigeminal neuralgia of right side of face   • Bifascicular block   •  Dyslipidemia   • Benign essential HTN   • Anxiety   • Hyperlipidemia LDL goal <100   • Status post revision of total replacement of right knee   • Hypertension   • Cluster headaches   • Status post revision of total knee replacement, left       PLAN:   Continues current post-op course  Anticoagulation: Aspirin started  Hemovac Drain to be removed later this afternoon prior to discharge  Dressing Change PRN  Mobilize with PT as tolerated per protocol    Weight Bearing: WBAT  Discharge Plan: OK to plan for discharge in  today to home and home health  from orthopadic perspective.      Bozena Yanez, APRN    Date: 11/17/2021    Time: 06:54 EST

## 2021-11-17 NOTE — OUTREACH NOTE
Prep Survey      Responses   [facility] patient discharged from? [place]   Is LACE score < 7 ? Yes   Emergency Room discharge w/ pulse ox? No   Eligibility [facility]   Date of Admission 11/16/21   Date of Discharge 11/17/21   Discharge Disposition Home or Self Care   Discharge diagnosis TOTAL KNEE ARTHROPLASTY REVISION   Does the patient have one of the following disease processes/diagnoses(primary or secondary)? Total Joint Replacement   Does the patient have Home health ordered? Yes   What is the Home health agency?  [facility]    Is there a DME ordered? No   Prep survey completed? Yes          Charity Garner RN

## 2021-11-17 NOTE — CONSULTS
"Internal Medicine Consult  INTERNAL MEDICINE   [facility]       Patient Identification:  Name: Jeremi Ugarte  Age: 76 y.o.  Sex: male  :  1945  MRN: [id number]                   Primary Care Physician: Charity Kearney MD                               Requesting physician: Dr. rivas  Date of consultation: 2021    Reason for consultation: Medical comanagement as patient is recovering from left total knee arthroplasty revision    History of Present Illness:   Patient is a 76-year-old male with past medical history as noted below brought in today for elective left total knee arthroplasty revision with possible loosening of the arthroplasty mechanism.  Postoperatively patient had some nausea and vomiting which improved and currently does not have any symptoms.  Prior to surgery he did not have any symptoms of chest pain shortness of breath nausea vomiting or diarrhea and since surgery except for the episode of nausea vomiting he denies any new symptoms.      Past Medical History:  Past Medical History:   Diagnosis Date   • Anemia    • Ankle arthritis    • Anxiety    • Arthritis    • Chronic headaches    • Depression    • GCA (giant cell arteritis) (HCC)     \"PT STATES MISDIAGNOSED\"   • Gout of foot    • High risk medication use    • Hyperlipidemia    • Hypertension    • Hyponatremia     HX   • Macrocytosis    • Muscle weakness    • Myositis     \"PT STATES MISDIAGNOSED\"   • Osteoarthritis    • Osteoarthritis of both hips    • Polymyalgia rheumatica (HCC)     \"PT STATES MISDIAGNOSED\"   • Renal insufficiency    • Right bundle branch block (RBBB)    • S/p bilateral revision of total hip replacement    • S/P foot surgery    • S/P revision of total knee, left    • S/P revision of total knee, right    • Trigeminal neuralgia      Past Surgical History:  Past Surgical History:   Procedure Laterality Date   • ANKLE ARTHROPLASTY Left 2018   • BACK SURGERY      lower back surg   • CATARACT " EXTRACTION     • COLONOSCOPY     • CRANIOTOMY Right 02/14/2019    Dr. Aldridge, for trigeminal neuralgia   • EXCISION BAKERS CYST KNEE  2008   • HAND SURGERY Left 1971    Left hand and wrist 1971   • HIP SURGERY Right 12/08/2016    total right hip replacement   • KNEE ARTHROPLASTY Bilateral     LEFT - 1996  -  RIGHT -  1999    • REPLACEMENT TOTAL KNEE      1996 and 1999   • SHOULDER SURGERY     • TOTAL HIP ARTHROPLASTY Left 2017   • TOTAL KNEE ARTHROPLASTY REVISION  2009    left   • TOTAL KNEE ARTHROPLASTY REVISION Right 9/15/2020    Procedure: RIGHT TOTAL KNEE ARTHROPLASTY REVISION;  Surgeon: Patricia Caldera MD;  Location: [facility] OR;  Service: Orthopedics;  Laterality: Right;   • WRIST SURGERY        Home Meds:  Medications Prior to Admission   Medication Sig Dispense Refill Last Dose   • ALPRAZolam (XANAX) 0.5 MG tablet Take 1 tablet by mouth 3 (Three) Times a Day. (Patient taking differently: Take 0.5 mg by mouth 3 (Three) Times a Day As Needed.) 100 tablet 1 11/16/2021 at 0500   • amLODIPine (NORVASC) 5 MG tablet TAKE 1 TABLET BY MOUTH DAILY (Patient taking differently: Take 5 mg by mouth Every Morning.) 90 tablet 1 11/16/2021 at 0500   • Calcium Carbonate-Vit D-Min (CALCIUM 1200 PO) Take 1,000 mg by mouth Every Morning. PT HOLDING FOR SURGERY   Past Week at Unknown time   • coenzyme Q10 100 MG capsule Take 100 mg by mouth Daily. PT HOLDING FOR SURGERY   Past Week at Unknown time   • hydroCHLOROthiazide (HYDRODIURIL) 25 MG tablet TAKE 1 TABLET BY MOUTH DAILY (Patient taking differently: Take 25 mg by mouth Every Morning.) 90 tablet 1 Past Week at Unknown time   • Multiple Vitamins-Minerals (MULTIVITAMIN ADULTS 50+ PO) Take 1 tablet by mouth Every Morning. PT HOLDING FOR SURGERY   Past Week at Unknown time   • NON FORMULARY Take 1 tablet by mouth Every Morning. Ocuvite Extra - eye supplement/PT HOLDING FOR SURGERY   Past Week at Unknown time   • OLANZapine (zyPREXA) 5 MG tablet Take 1 tablet by mouth  Every Night. 90 tablet 1 11/15/2021 at 2000   • Omega-3 Fatty Acids (FISH OIL) 1000 MG capsule capsule Take 1,200 mg by mouth Daily With Breakfast. PT HOLDING FOR SURGERY   Past Week at Unknown time   • OXcarbazepine (TRILEPTAL) 300 MG tablet Take 300 mg by mouth 3 (Three) Times a Day As Needed (FOR HEADACHE, has been taking twice daily).  1 11/16/2021 at 0500   • quinapril (ACCUPRIL) 40 MG tablet TAKE 1 TABLET BY MOUTH TWICE DAILY (Patient taking differently: Take 80 mg by mouth Every Morning. 2 TABS DAILY IN AM) 180 tablet 1 11/15/2021 at 0400   • rosuvastatin (CRESTOR) 20 MG tablet TAKE 1 TABLET BY MOUTH DAILY 90 tablet 1 11/15/2021 at 2000   • vitamin B-12 (CYANOCOBALAMIN) 1000 MCG tablet Take 1,000 mcg by mouth Daily. PT HOLDING FOR SURGERY   Past Week at Unknown time     Current Meds:     Current Facility-Administered Medications:   •  acetaminophen (TYLENOL) tablet 325 mg, 325 mg, Oral, Q4H PRN, Patricia Caldera MD  •  ALPRAZolam (XANAX) tablet 0.5 mg, 0.5 mg, Oral, TID PRN, Patricia Caldera MD  •  [START ON 11/17/2021] amLODIPine (NORVASC) tablet 5 mg, 5 mg, Oral, QAM, Patricia Caldera MD  •  aspirin EC tablet 81 mg, 81 mg, Oral, Q12H, Patricia Caldera MD  •  [START ON 11/17/2021] bisacodyl (DULCOLAX) EC tablet 10 mg, 10 mg, Oral, Daily PRN, Patricia Caldera MD  •  ceFAZolin in dextrose (ANCEF) IVPB solution 2 g, 2 g, Intravenous, Q8H, Patricia Caldera MD, 2 g at 11/16/21 1814  •  docusate sodium (COLACE) capsule 100 mg, 100 mg, Oral, BID, Particia Caldera MD  •  [START ON 11/17/2021] hydroCHLOROthiazide (HYDRODIURIL) tablet 25 mg, 25 mg, Oral, QAM, Patricia Caldera MD  •  HYDROcodone-acetaminophen (NORCO) 7.5-325 MG per tablet 1 tablet, 1 tablet, Oral, Q4H PRN, Patricia Caldera MD, 1 tablet at 11/16/21 1814  •  HYDROcodone-acetaminophen (NORCO) 7.5-325 MG per tablet 2 tablet, 2 tablet, Oral, Q4H PRN, Patricia Caldera MD  •   HYDROmorphone (DILAUDID) injection 0.5 mg, 0.5 mg, Intravenous, Q2H PRN **AND** naloxone (NARCAN) injection 0.1 mg, 0.1 mg, Intravenous, Q5 Min PRN, Patricia Caldera MD  •  lisinopril (PRINIVIL,ZESTRIL) tablet 5 mg, 5 mg, Oral, Q24H, Patricia Caldera MD, 5 mg at 11/16/21 1738  •  melatonin tablet 1 mg, 1 mg, Oral, Nightly PRN, Patricia Caldera MD  •  OLANZapine (zyPREXA) tablet 5 mg, 5 mg, Oral, Nightly, Patricia Caldera MD  •  ondansetron (ZOFRAN) tablet 4 mg, 4 mg, Oral, Q6H PRN **OR** ondansetron (ZOFRAN) injection 4 mg, 4 mg, Intravenous, Q6H PRN, Patricia Caldera MD  •  OXcarbazepine (TRILEPTAL) tablet 300 mg, 300 mg, Oral, TID, Keaton Martinez MD  •  sodium chloride 0.9 % infusion, 100 mL/hr, Intravenous, Continuous, Patricia Caldera MD, Last Rate: 100 mL/hr at 11/16/21 1754, 100 mL/hr at 11/16/21 1754  Allergies:  Allergies   Allergen Reactions   • Oxycodone-Acetaminophen Nausea And Vomiting     Pt states he can take hydrocodone and tylenol     • Morphine Hallucinations     N & V   • Vancomycin Itching     Social History:   Social History     Tobacco Use   • Smoking status: Former Smoker     Types: Cigars   • Smokeless tobacco: Former User     Types: Chew   • Tobacco comment: FOR A SHORT TIME AS A YOUNG ADULT   Substance Use Topics   • Alcohol use: Not Currently      Family History:  Family History   Problem Relation Age of Onset   • Cardiomyopathy Mother    • Hypertension Mother    • Hyperlipidemia Sister    • Hypertension Sister    • Coronary artery disease Brother    • Hyperlipidemia Brother    • Hypertension Brother    • No Known Problems Father    • Malig Hyperthermia Neg Hx           Review of Systems  See history of present illness and past medical history.  Patient denies headache, dizziness, syncope, falls, trauma, change in vision, change in hearing, change in taste, changes in weight, changes in appetite, focal weakness, numbness, or paresthesia.   Patient denies chest pain, palpitations, dyspnea, orthopnea, PND, cough, sinus pressure, rhinorrhea, epistaxis, hemoptysis, nausea, vomiting,hematemesis, diarrhea, constipation or hematchezia.  Denies cold or heat intolerance, polydipsia, polyuria, polyphagia. Denies hematuria, pyuria, dysuria, hesitancy, frequency or urgency. Denies consumption of raw and under cooked meats foods or change in water source.  Denies fever, chills, sweats, night sweats.  Denies missing any routine medications. Remainder of ROS is negative.      Vitals:   /98   Pulse 100   Temp 98.1 °F (36.7 °C) (Oral)   Resp 16   Wt 108 kg (237 lb 14.4 oz)   SpO2 94%   BMI 35.13 kg/m²   I/O:     Intake/Output Summary (Last 24 hours) at 11/16/2021 1928  Last data filed at 11/16/2021 1439  Gross per 24 hour   Intake 1000 ml   Output 220 ml   Net 780 ml     Exam:  Patient is examined using the personal protective equipment as per guidelines from infection control for this particular patient as enacted.  Hand washing was performed before and after patient interaction.  General Appearance:    Alert, cooperative, no distress, appears stated age   Head:    Normocephalic, without obvious abnormality, atraumatic   Eyes:    PERRL, conjunctiva/corneas clear, EOM's intact, both eyes   Ears:    Normal external ear canals, both ears   Nose:   Nares normal, septum midline, mucosa normal, no drainage    or sinus tenderness   Throat:   Lips, tongue, gums normal; oral mucosa pink and moist   Neck:   Supple, symmetrical, trachea midline, no adenopathy;     thyroid:  no enlargement/tenderness/nodules; no carotid    bruit or JVD   Back:     Symmetric, no curvature, ROM normal, no CVA tenderness   Lungs:     Clear to auscultation bilaterally, respirations unlabored   Chest Wall:    No tenderness or deformity    Heart:    Regular rate and rhythm, S1 and S2 normal, no murmur, rub   or gallop   Abdomen:     Soft, non-tender, bowel sounds active all four quadrants,      no masses, no hepatomegaly, no splenomegaly   Extremities:  Left knee dressed   Pulses:   Pulses palpable in all extremities; symmetric all extremities   Skin:   Skin color normal, Skin is warm and dry,  no rashes or palpable lesions   Neurologic:  Grossly nonfocal       Data Review:      I reviewed the patient's new clinical results.  Preop lab work on 11/2/2021 reviewed.  Microbiology Results (last 10 days)     Procedure Component Value - Date/Time    Tissue / Bone Culture - Tissue, Knee, Left [[id number]] Collected: 11/16/21 1036    Lab Status: Preliminary result Specimen: Tissue from Knee, Left Updated: 11/16/21 1505     Gram Stain Rare (1+) WBCs per low power field      No organisms seen    COVID PRE-OP / PRE-PROCEDURE SCREENING ORDER (NO ISOLATION) - Swab, Nasopharynx [[id number]]  (Normal) Collected: 11/13/21 0936    Lab Status: Final result Specimen: Swab from Nasopharynx Updated: 11/13/21 1624    Narrative:      The following orders were created for panel order COVID PRE-OP / PRE-PROCEDURE SCREENING ORDER (NO ISOLATION) - Swab, Nasopharynx.  Procedure                               Abnormality         Status                     ---------                               -----------         ------                     COVID-19,APTIMA PANTHER(...[[id number]]  Normal              Final result                 Please view results for these tests on the individual orders.    COVID-19,APTIMA PANTHER(EVAN),[facility] GABBY, NP/OP SWAB IN UTM/VTM/SALINE TRANSPORT MEDIA,24 HR TAT - Swab, Nasopharynx [[id number]]  (Normal) Collected: 11/13/21 0936    Lab Status: Final result Specimen: Swab from Nasopharynx Updated: 11/13/21 1624     COVID19 Not Detected    Narrative:      Fact sheet for providers: https://www.fda.gov/media/446266/download     Fact sheet for patients: https://www.fda.gov/media/241348/download    Test performed by RT PCR.          Assessment:  Active Hospital Problems    Diagnosis  POA   • Status post revision of total  knee replacement, left [Z96.652]  Not Applicable   • Benign essential HTN [I10]  Yes       Medical decision making:  Status post revision of left total knee arthroplasty-management of pain, activity guidelines, DVT prophylaxis and discharge disposition as per primary orthopedic surgery service.  Transient postop nausea and vomiting improved after symptomatic management-continue as needed antiemetics.  Hypertension-continue antihypertensive regimen and watch for postoperative hypotension.  Hold antihypertensive if systolic blood pressures 100 or less.    Suly Krishnamurthy MD   11/16/2021  19:28 EST  Much of this encounter note is an electronic transcription/translation of spoken language to printed text. The electronic translation of spoken language may permit erroneous, or at times, nonsensical words or phrases to be inadvertently transcribed; Although I have reviewed the note for such errors, some may still exist

## 2021-11-17 NOTE — THERAPY EVALUATION
"Acute Care - Physical Therapy Initial Evaluation  [facility]     Patient Name: Jeremi Ugarte  : 1945  MRN: [id number]  Today's Date: 2021   Onset of Illness/Injury or Date of Surgery: 21  Visit Dx:     ICD-10-CM ICD-9-CM   1. Mechanical loosening of internal left knee prosthetic joint, initial encounter ([facility])  T84.033A 996.41   2. Mechanical loosening of internal left knee prosthetic joint ([facility])  T84.033A 996.41     V43.65   3. Status post revision of total knee replacement, left  Z96.652 V43.65   4. Generalized weakness  R53.1 780.79     Patient Active Problem List   Diagnosis   • Trigeminal neuralgia of right side of face   • Bifascicular block   • Dyslipidemia   • Benign essential HTN   • Anxiety   • Hyperlipidemia LDL goal <100   • Status post revision of total replacement of right knee   • Hypertension   • Cluster headaches   • Status post revision of total knee replacement, left     Past Medical History:   Diagnosis Date   • Anemia    • Ankle arthritis    • Anxiety    • Arthritis    • Chronic headaches    • Depression    • GCA (giant cell arteritis) ([facility])     \"PT STATES MISDIAGNOSED\"   • Gout of foot    • High risk medication use    • Hyperlipidemia    • Hypertension    • Hyponatremia     HX   • Macrocytosis    • Muscle weakness    • Myositis     \"PT STATES MISDIAGNOSED\"   • Osteoarthritis    • Osteoarthritis of both hips    • Polymyalgia rheumatica (HCC)     \"PT STATES MISDIAGNOSED\"   • Renal insufficiency    • Right bundle branch block (RBBB)    • S/p bilateral revision of total hip replacement    • S/P foot surgery    • S/P revision of total knee, left    • S/P revision of total knee, right    • Trigeminal neuralgia      Past Surgical History:   Procedure Laterality Date   • ANKLE ARTHROPLASTY Left 2018   • BACK SURGERY      lower back surg   • CATARACT EXTRACTION     • COLONOSCOPY     • CRANIOTOMY Right 2019    Dr. Aldridge, for trigeminal neuralgia   • EXCISION BAKERS CYST KNEE  " 2008   • HAND SURGERY Left 1971    Left hand and wrist 1971   • HIP SURGERY Right 12/08/2016    total right hip replacement   • KNEE ARTHROPLASTY Bilateral     LEFT - 1996  -  RIGHT -  1999    • REPLACEMENT TOTAL KNEE      1996 and 1999   • SHOULDER SURGERY     • TOTAL HIP ARTHROPLASTY Left 2017   • TOTAL KNEE ARTHROPLASTY REVISION  2009    left   • TOTAL KNEE ARTHROPLASTY REVISION Right 9/15/2020    Procedure: RIGHT TOTAL KNEE ARTHROPLASTY REVISION;  Surgeon: Patricia Caldera MD;  Location: [facility] OR;  Service: Orthopedics;  Laterality: Right;   • WRIST SURGERY       PT Assessment (last 12 hours)     PT Evaluation and Treatment     Row Name 11/17/21 1047          Physical Therapy Time and Intention    Subjective Information no complaints  -     Document Type evaluation  -     Mode of Treatment individual therapy; physical therapy  -     Patient Effort excellent  -     Symptoms Noted During/After Treatment none  -     Row Name 11/17/21 1047          General Information    Patient Profile Reviewed yes  -     Onset of Illness/Injury or Date of Surgery 11/16/21  -     Referring Physician Verenice  -     Patient Observations cooperative; alert; agree to therapy  -     General Observations of Patient pt supine in bed no acute distress  -     Prior Level of Function independent:  -     Pertinent History of Current Functional Problem POD 1 L TKR revision  -     Existing Precautions/Restrictions fall  -     Risks Reviewed patient and family:  -     Benefits Reviewed patient and family:  -     Row Name 11/17/21 1047          Cognition    Affect/Mental Status (Cognitive) WFL  -     Row Name 11/17/21 1047          Pain Scale: Word Pre/Post-Treatment    Pain: Word Scale, Pretreatment 2 - mild pain  -LH     Posttreatment Pain Rating 2 - mild pain  -LH     Pain Location - Side Left  -     Pain Location - Orientation incisional  -     Pain Location hip  -LH     Pain Intervention(s)  Cold applied; Repositioned  -     Row Name 11/17/21 1047          Range of Motion Comprehensive    Comment, General Range of Motion BLEs WFL except L knee  -[facility] Name 11/17/21 1047          Strength Comprehensive (MMT)    Comment, General Manual Muscle Testing (MMT) Assessment BLEs grossly at least 3/5  -     Row Name 11/17/21 1047          Bed Mobility    Bed Mobility supine-sit; sit-supine  -     Supine-Sit Dyer (Bed Mobility) standby assist  -     Assistive Device (Bed Mobility) bed rails  -     Row Name 11/17/21 1047          Transfers    Transfers stand-sit transfer; sit-stand transfer  -     Sit-Stand Dyer (Transfers) contact guard; verbal cues  -     Stand-Sit Dyer (Transfers) contact guard; verbal cues  -[facility] Name 11/17/21 1047          Sit-Stand Transfer    Assistive Device (Sit-Stand Transfers) walker, front-wheeled  -[facility] Name 11/17/21 1047          Stand-Sit Transfer    Assistive Device (Stand-Sit Transfers) walker, front-wheeled  -[facility] Name 11/17/21 1047          Gait/Stairs (Locomotion)    Dyer Level (Gait) contact guard; verbal cues  -     Assistive Device (Gait) walker, front-wheeled  -     Distance in Feet (Gait) 100  -     Pattern (Gait) step-to; step-through  -     Deviations/Abnormal Patterns (Gait) gatito decreased  -     Bilateral Gait Deviations forward flexed posture; heel strike decreased  -     Dyer Level (Stairs) contact guard; verbal cues  -     Handrail Location (Stairs) left side (ascending); right side (descending)  -     Number of Steps (Stairs) 3  -     Ascending Technique (Stairs) step-to-step  -     Descending Technique (Stairs) step-to-step  -     Row Name 11/17/21 1047          Motor Skills    Therapeutic Exercise --  TKR protocol 10 reps  -     Row Name             Wound 11/16/21 1032 Left midline knee Incision    Wound - Properties Group Placement Date: 11/16/21  -AH Placement  Time: 1032 -AH Side: Left  - Orientation: midline  - Location: knee  -AH Primary Wound Type: Incision  -AH     Retired Wound - Properties Group Date first assessed: 11/16/21  - Time first assessed: 1032 -AH Side: Left  - Location: knee  -AH Primary Wound Type: Incision  -AH     Row Name 11/17/21 1047          Plan of Care Review    Plan of Care Reviewed With spouse; patient  -     Outcome Summary Pt 75 yo Male POD 1 L TKR revision. Pt safely ambulated 100ft CGA rwx, navigated 3 steps 1 HR CGA. Pt completed TKR therex good tolerance. Pt has DME needs, to DC home today [facility].  -     Row Name 11/17/21 1047          Positioning and Restraints    Pre-Treatment Position in bed  -     Post Treatment Position chair  -     In Chair reclined; call light within reach; encouraged to call for assist; exit alarm on  -     Row Name 11/17/21 1047          Therapy Assessment/Plan (PT)    Rehab Potential (PT) good, to achieve stated therapy goals  -     Criteria for Skilled Interventions Met (PT) yes  -     Row Name 11/17/21 1047          PT Evaluation Complexity    History, PT Evaluation Complexity 1-2 personal factors and/or comorbidities  -     Examination of Body Systems (PT Eval Complexity) 1-2 elements  -     Clinical Presentation (PT Evaluation Complexity) stable  -     Clinical Decision Making (PT Evaluation Complexity) low complexity  -     Overall Complexity (PT Evaluation Complexity) low complexity  -           User Key  (r) = Recorded By, (t) = Taken By, (c) = Cosigned By    Initials Name Provider Type     Kathryn Gordon, PT Physical Therapist    Tate Trujillo, RN Registered Nurse                Physical Therapy Education                 Title: PT OT SLP Therapies (Done)     Topic: Physical Therapy (Done)     Point: Mobility training (Done)     Learning Progress Summary           Patient Acceptance, E, VU,NR,DU by  at 11/17/2021 1053                   Point: Home exercise  program (Done)     Learning Progress Summary           Patient Acceptance, E, VU,NR,DU by  at 11/17/2021 1053                   Point: Body mechanics (Done)     Learning Progress Summary           Patient Acceptance, E, VU,NR,DU by  at 11/17/2021 1053                   Point: Precautions (Done)     Learning Progress Summary           Patient Acceptance, E, VU,NR,DU by  at 11/17/2021 1053                               User Key     Initials Effective Dates Name Provider Type [facility] 06/16/21 -  Kathryn Gordon, PT Physical Therapist PT              PT Recommendation and Plan  Anticipated Discharge Disposition (PT): home with home health, home with assist  Plan of Care Reviewed With: spouse, patient  Outcome Summary: Pt 75 yo Male POD 1 L TKR revision. Pt safely ambulated 100ft CGA rwx, navigated 3 steps 1 HR CGA. Pt completed TKR therex good tolerance. Pt has DME needs, to DC home today [facility].   Outcome Measures     Row Name 11/17/21 1000             How much help from another person do you currently need...    Turning from your back to your side while in flat bed without using bedrails? 4  -LH      Moving from lying on back to sitting on the side of a flat bed without bedrails? 4  -LH      Moving to and from a bed to a chair (including a wheelchair)? 4  -LH      Standing up from a chair using your arms (e.g., wheelchair, bedside chair)? 3  -LH      Climbing 3-5 steps with a railing? 3  -LH      To walk in hospital room? 3  -      AM-PAC 6 Clicks Score (PT) 21  -              Functional Assessment    Outcome Measure Options AM-PAC 6 Clicks Basic Mobility (PT)  -            User Key  (r) = Recorded By, (t) = Taken By, (c) = Cosigned By    Initials Name Provider Type     Kathryn Gordon, PT Physical Therapist                 Time Calculation:    PT Charges     Row Name 11/17/21 1055 11/17/21 1054          Time Calculation    Start Time 1030  - 0915  -     Stop Time 1045  nsg came in during PT  session to remove hemivac dressing  - 0930  -     Time Calculation (min) 15 min  - 15 min  -     PT Received On -- 11/17/21  -           User Key  (r) = Recorded By, (t) = Taken By, (c) = Cosigned By    Initials Name Provider Type     Kathryn Gordon, PT Physical Therapist              Therapy Charges for Today     Code Description Service Date Service Provider Modifiers Qty    [id number]  PT EVAL LOW COMPLEXITY 2 11/17/2021 Kathryn Gordon, PT GP 1    [id number] HC PT THER PROC EA 15 MIN 11/17/2021 Kathryn Gordon, PT GP 1          PT G-Codes  Outcome Measure Options: AM-PAC 6 Clicks Basic Mobility (PT)  AM-PAC 6 Clicks Score (PT): 21    Kathryn Gordon, PT  11/17/2021

## 2021-11-17 NOTE — PLAN OF CARE
Goal Outcome Evaluation:              Outcome Summary: Pt found conscious, alert, oriented, anxious at onset of shift.  Vitals signs stable.  Pt pain controlled via PO meds, dilaudid 1x dose.  Pt slept intermittently during shift w/o incident or change.  Will continue to monitor.

## 2021-11-17 NOTE — PROGRESS NOTES
Continued Stay Note  [facility]     Patient Name: Jeremi Ugarte  MRN: [id number]  Today's Date: 11/17/2021    Admit Date: 11/16/2021     Discharge Plan     Row Name 11/17/21 1211       Plan    Plan [facility]    Patient/Family in Agreement with Plan yes    Plan Comments Spoke with pt, verified correct information on facesheet and explained the role of CCP. Pt would like to d/c home with [facility], referral given to Alicia with [facility] who states they are able to accept. Plan will be to d/c home with [facility] and family support.    Final Discharge Disposition Code 01 - home or self-care    Final Note Pt to dc home with [facility]               Discharge Codes    No documentation.               Expected Discharge Date and Time     Expected Discharge Date Expected Discharge Time    Nov 17, 2021             Radha Ramon RN

## 2021-11-17 NOTE — PLAN OF CARE
Goal Outcome Evaluation:  Plan of Care Reviewed With: spouse, patient           Outcome Summary: Pt 75 yo Male POD 1 L TKR revision. Pt safely ambulated 100ft CGA rwx, navigated 3 steps 1 HR CGA. Pt completed TKR therex good tolerance. Pt has DME needs, to DC home today [facility].    ..Patient was wearing a face mask during this therapy encounter. Therapist used appropriate personal protective equipment including eye protection, mask, and gloves.  Mask used was standard procedure mask. Appropriate PPE was worn during the entire therapy session. Hand hygiene was completed before and after therapy session. Patient is not in enhanced droplet precautions.

## 2021-11-17 NOTE — PLAN OF CARE
Goal Outcome Evaluation:              Outcome Summary: Pt s/p L Total knee revision. AxO x4. VSS. NVI. Pain controlled with PO meds. Up with assist x1. Episodes of vomiting, no interventions per patient request. HV in place, jayshree CDI. Educated on treatment plan, verbalized understanding. Plans to d/c pending.

## 2021-11-18 ENCOUNTER — TRANSITIONAL CARE MANAGEMENT TELEPHONE ENCOUNTER (OUTPATIENT)
Dept: CALL CENTER | Facility: HOSPITAL | Age: 76
End: 2021-11-18

## 2021-11-18 NOTE — OUTREACH NOTE
Call Center TCM Note      Responses   [facility] [place] patient discharged from? [facility]   Does the patient have one of the following disease processes/diagnoses(primary or secondary)? Total Joint Replacement   TCM attempt successful? Yes  [Vidya on verbal release]   Call start time 1028   Call end time 1032   Discharge diagnosis TOTAL KNEE ARTHROPLASTY REVISION   Does the patient have all medications related to this admission filled (includes all antibiotics, pain medications, etc.) Yes   Is the patient taking all medications as directed (includes completed medication regime)? Yes   Is the patient able to teach back alternate methods of pain control? Ice   Does the patient have a follow up appointment with their surgeon? --  [pt unsure ]   Comments Spouse will make apt for pt when she returns from work - will route message to group    What is the Home health agency?  [facility]    Has home health visited the patient within 72 hours of discharge? Yes  [PT today ]   Psychosocial issues? No   Has the patient began therapy sessions (either in the home or as an out patient)? Yes   Has the patient fallen since discharge? No   Did the patient receive a copy of their discharge instructions? Yes   Nursing interventions Reviewed instructions with patient   What is the patient's perception of their functional status since discharge? Improving   Is the patient able to teach back signs and symptoms of infection? Increased swelling or redness around incision (not associated with surgical edema),  Severe discomfort or pain   Is the patient able to teach back how to prevent infection? Check incision daily,  Wash hands before and after touching incision   Is the patient able to teach back signs and symptoms of DVT? Swelling in calf,  Area hot to touch,  Redness in calf,  Severe pain in calf   If the patient is a current smoker, are they able to teach back resources for cessation? Not a smoker   Is the  patient/caregiver able to teach back the hierarchy of who to call/visit for symptoms/problems? PCP, Specialist, Home health nurse, Urgent Care, ED, 911 Yes   TCM call completed? Yes          Janell [place] RN    11/18/2021, 10:33 EST

## 2021-11-19 LAB
BACTERIA SPEC AEROBE CULT: NORMAL
GRAM STN SPEC: NORMAL
GRAM STN SPEC: NORMAL

## 2021-11-21 LAB — BACTERIA SPEC ANAEROBE CULT: NORMAL

## 2021-11-29 DIAGNOSIS — F41.9 ANXIETY: ICD-10-CM

## 2021-11-29 RX ORDER — ALPRAZOLAM 0.5 MG/1
TABLET ORAL
Qty: 90 TABLET | Refills: 1 | Status: SHIPPED | OUTPATIENT
Start: 2021-11-29 | End: 2022-03-09 | Stop reason: SDUPTHER

## 2021-12-27 RX ORDER — QUINAPRIL 40 MG/1
TABLET ORAL
Qty: 180 TABLET | Refills: 1 | Status: SHIPPED | OUTPATIENT
Start: 2021-12-27 | End: 2021-12-28

## 2021-12-28 RX ORDER — QUINAPRIL 40 MG/1
TABLET ORAL
Qty: 180 TABLET | Refills: 1 | Status: SHIPPED | OUTPATIENT
Start: 2021-12-28 | End: 2022-05-21 | Stop reason: HOSPADM

## 2022-03-01 LAB
ALBUMIN SERPL-MCNC: 4.6 G/DL (ref 3.7–4.7)
ALBUMIN/GLOB SERPL: 2.3 {RATIO} (ref 1.2–2.2)
ALP SERPL-CCNC: 85 IU/L (ref 44–121)
ALT SERPL-CCNC: 12 IU/L (ref 0–44)
AST SERPL-CCNC: 21 IU/L (ref 0–40)
BASOPHILS # BLD AUTO: 0 X10E3/UL (ref 0–0.2)
BASOPHILS NFR BLD AUTO: 1 %
BILIRUB SERPL-MCNC: 0.3 MG/DL (ref 0–1.2)
BUN SERPL-MCNC: 18 MG/DL (ref 8–27)
BUN/CREAT SERPL: 18 (ref 10–24)
CALCIUM SERPL-MCNC: 9.9 MG/DL (ref 8.6–10.2)
CHLORIDE SERPL-SCNC: 95 MMOL/L (ref 96–106)
CHOLEST SERPL-MCNC: 183 MG/DL (ref 100–199)
CHOLEST/HDLC SERPL: 4 RATIO (ref 0–5)
CO2 SERPL-SCNC: 22 MMOL/L (ref 20–29)
CREAT SERPL-MCNC: 1.02 MG/DL (ref 0.76–1.27)
EGFR GENE MUT ANL BLD/T: 76 ML/MIN/1.73
EOSINOPHIL # BLD AUTO: 0.2 X10E3/UL (ref 0–0.4)
EOSINOPHIL NFR BLD AUTO: 3 %
ERYTHROCYTE [DISTWIDTH] IN BLOOD BY AUTOMATED COUNT: 12.5 % (ref 11.6–15.4)
GLOBULIN SER CALC-MCNC: 2 G/DL (ref 1.5–4.5)
GLUCOSE SERPL-MCNC: 79 MG/DL (ref 65–99)
HCT VFR BLD AUTO: 44.6 % (ref 37.5–51)
HDLC SERPL-MCNC: 46 MG/DL
HGB BLD-MCNC: 15.4 G/DL (ref 13–17.7)
IMM GRANULOCYTES # BLD AUTO: 0 X10E3/UL (ref 0–0.1)
IMM GRANULOCYTES NFR BLD AUTO: 0 %
LDLC SERPL CALC-MCNC: 115 MG/DL (ref 0–99)
LYMPHOCYTES # BLD AUTO: 2.1 X10E3/UL (ref 0.7–3.1)
LYMPHOCYTES NFR BLD AUTO: 28 %
MCH RBC QN AUTO: 32.4 PG (ref 26.6–33)
MCHC RBC AUTO-ENTMCNC: 34.5 G/DL (ref 31.5–35.7)
MCV RBC AUTO: 94 FL (ref 79–97)
MONOCYTES # BLD AUTO: 0.9 X10E3/UL (ref 0.1–0.9)
MONOCYTES NFR BLD AUTO: 11 %
NEUTROPHILS # BLD AUTO: 4.3 X10E3/UL (ref 1.4–7)
NEUTROPHILS NFR BLD AUTO: 57 %
PLATELET # BLD AUTO: 332 X10E3/UL (ref 150–450)
POTASSIUM SERPL-SCNC: 4.7 MMOL/L (ref 3.5–5.2)
PROT SERPL-MCNC: 6.6 G/DL (ref 6–8.5)
RBC # BLD AUTO: 4.75 X10E6/UL (ref 4.14–5.8)
SODIUM SERPL-SCNC: 135 MMOL/L (ref 134–144)
TRIGL SERPL-MCNC: 125 MG/DL (ref 0–149)
TSH SERPL DL<=0.005 MIU/L-ACNC: 6.32 UIU/ML (ref 0.45–4.5)
VIT B12 SERPL-MCNC: >2000 PG/ML (ref 232–1245)
VLDLC SERPL CALC-MCNC: 22 MG/DL (ref 5–40)
WBC # BLD AUTO: 7.5 X10E3/UL (ref 3.4–10.8)

## 2022-03-07 ENCOUNTER — OFFICE VISIT (OUTPATIENT)
Dept: INTERNAL MEDICINE | Facility: CLINIC | Age: 77
End: 2022-03-07

## 2022-03-07 VITALS
BODY MASS INDEX: 34.66 KG/M2 | WEIGHT: 234 LBS | HEART RATE: 76 BPM | HEIGHT: 69 IN | DIASTOLIC BLOOD PRESSURE: 90 MMHG | SYSTOLIC BLOOD PRESSURE: 150 MMHG | TEMPERATURE: 97.3 F

## 2022-03-07 DIAGNOSIS — E78.5 DYSLIPIDEMIA: Primary | ICD-10-CM

## 2022-03-07 DIAGNOSIS — I10 BENIGN ESSENTIAL HTN: ICD-10-CM

## 2022-03-07 DIAGNOSIS — Z00.00 MEDICARE ANNUAL WELLNESS VISIT, SUBSEQUENT: ICD-10-CM

## 2022-03-07 DIAGNOSIS — E03.9 HYPOTHYROIDISM, UNSPECIFIED TYPE: ICD-10-CM

## 2022-03-07 DIAGNOSIS — F41.9 ANXIETY: ICD-10-CM

## 2022-03-07 DIAGNOSIS — G50.0 TRIGEMINAL NEURALGIA OF RIGHT SIDE OF FACE: ICD-10-CM

## 2022-03-07 PROCEDURE — 1159F MED LIST DOCD IN RCRD: CPT | Performed by: INTERNAL MEDICINE

## 2022-03-07 PROCEDURE — G0439 PPPS, SUBSEQ VISIT: HCPCS | Performed by: INTERNAL MEDICINE

## 2022-03-07 PROCEDURE — 99214 OFFICE O/P EST MOD 30 MIN: CPT | Performed by: INTERNAL MEDICINE

## 2022-03-07 RX ORDER — AMLODIPINE BESYLATE 10 MG/1
10 TABLET ORAL EVERY MORNING
Qty: 90 TABLET | Refills: 1 | Status: SHIPPED | OUTPATIENT
Start: 2022-03-07 | End: 2022-05-21 | Stop reason: HOSPADM

## 2022-03-07 RX ORDER — LEVOTHYROXINE SODIUM 0.07 MG/1
75 TABLET ORAL DAILY
Qty: 90 TABLET | Refills: 1 | Status: SHIPPED | OUTPATIENT
Start: 2022-03-07 | End: 2022-08-31

## 2022-03-07 NOTE — PROGRESS NOTES
The ABCs of the Annual Wellness Visit  Subsequent Medicare Wellness Visit    Chief Complaint   Patient presents with   • Medicare Wellness-subsequent      Subjective    History of Present Illness:  Jeremi Ugarte is a 76 y.o. male who presents for a Subsequent Medicare Wellness Visit.  Weight had gone up but decided to go on a shark Tank diet pill- John Keto- which helped his appetite tremendously but had a bad headache over the weekend so he stopped.  Worried that is interferes with his headache meds.   He had been going up on the Trileptal per Dr. Hudson  He would like to be able to take 1 of the keto pills but worries a lot about his headaches returning.     The following portions of the patient's history were reviewed and   updated as appropriate: allergies, current medications, past family history, past medical history, past social history, past surgical history and problem list.    Compared to one year ago, the patient feels his physical   health is the same.    Compared to one year ago, the patient feels his mental   health is the same.    Recent Hospitalizations:  He was admitted within the past 365 days at [facility].       Current Medical Providers:  Patient Care Team:  Charity Kearney MD as PCP - General (Internal Medicine)  Aldo Hudson DO as Consulting Physician (Neurology)  Patricia Caldera MD as Consulting Physician (Orthopedic Surgery)  Antonio Mesa MD as Consulting Physician (Cardiology)    Outpatient Medications Prior to Visit   Medication Sig Dispense Refill   • ALPRAZolam (XANAX) 0.5 MG tablet TAKE 1 TABLET BY MOUTH THREE TIMES DAILY 90 tablet 1   • Calcium Carbonate-Vit D-Min (CALCIUM 1200 PO) Take 1,000 mg by mouth Every Morning. PT HOLDING FOR SURGERY     • coenzyme Q10 100 MG capsule Take 100 mg by mouth Daily. PT HOLDING FOR SURGERY     • hydroCHLOROthiazide (HYDRODIURIL) 25 MG tablet TAKE 1 TABLET BY MOUTH DAILY (Patient taking differently: Take 25 mg by mouth  Every Morning.) 90 tablet 1   • Multiple Vitamins-Minerals (MULTIVITAMIN ADULTS 50+ PO) Take 1 tablet by mouth Every Morning. PT HOLDING FOR SURGERY     • NON FORMULARY Take 1 tablet by mouth Every Morning. Ocuvite Extra - eye supplement/PT HOLDING FOR SURGERY     • OLANZapine (zyPREXA) 5 MG tablet Take 1 tablet by mouth Every Night. 90 tablet 1   • OXcarbazepine (TRILEPTAL) 300 MG tablet Take 300 mg by mouth 3 (Three) Times a Day As Needed (FOR HEADACHE, has been taking twice daily).  1   • quinapril (ACCUPRIL) 40 MG tablet TAKE 1 TABLET BY MOUTH TWICE DAILY 180 tablet 1   • rosuvastatin (CRESTOR) 20 MG tablet TAKE 1 TABLET BY MOUTH DAILY 90 tablet 1   • vitamin B-12 (CYANOCOBALAMIN) 1000 MCG tablet Take 1,000 mcg by mouth Daily. PT HOLDING FOR SURGERY     • amLODIPine (NORVASC) 5 MG tablet TAKE 1 TABLET BY MOUTH DAILY (Patient taking differently: Take 5 mg by mouth Every Morning.) 90 tablet 1   • HYDROcodone-acetaminophen (NORCO) 7.5-325 MG per tablet Take 1 tablet by mouth every eight hours as needed for pain 30 tablet 0   • Omega-3 Fatty Acids (FISH OIL) 1000 MG capsule capsule Take 1,200 mg by mouth Daily With Breakfast. PT HOLDING FOR SURGERY     • ondansetron (ZOFRAN) 4 MG tablet Take 1 tablet by mouth Every 6 (Six) Hours As Needed for Nausea or Vomiting. 30 tablet 0     No facility-administered medications prior to visit.       Opioid medication/s are on active medication list.  and I have evaluated his active treatment plan and pain score trends (see table).  There were no vitals filed for this visit.  I have reviewed the chart for potential of high risk medication and harmful drug interactions in the elderly.            Aspirin is not on active medication list.  Aspirin use is not indicated based on review of current medical condition/s. Risk of harm outweighs potential benefits.  .    Patient Active Problem List   Diagnosis   • Trigeminal neuralgia of right side of face   • Bifascicular block   •  "Dyslipidemia   • Benign essential HTN   • Anxiety   • Status post revision of total replacement of right knee   • Hypertension   • Cluster headaches   • Status post revision of total knee replacement, left     Advance Care Planning  Advance Directive is on file.  ACP discussion was held with the patient during this visit. Patient has an advance directive in EMR which is still valid.     Review of Systems   HENT: Negative for trouble swallowing.    Respiratory: Negative for shortness of breath.    Cardiovascular: Negative for chest pain and leg swelling.   Gastrointestinal: Negative for abdominal pain.   Genitourinary: Negative for difficulty urinating.   Musculoskeletal: Negative for arthralgias and back pain.   Psychiatric/Behavioral: Negative for dysphoric mood. The patient is nervous/anxious.         Objective    Vitals:    03/07/22 1324   BP: 150/90   Pulse: 76   Temp: 97.3 °F (36.3 °C)   Weight: 106 kg (234 lb)   Height: 175.3 cm (69\")     BMI Readings from Last 1 Encounters:   03/07/22 34.56 kg/m²   BMI is above normal parameters. Recommendations include: exercise counseling and nutrition counseling    Does the patient have evidence of cognitive impairment? No    Physical Exam  Lab Results   Component Value Date    CHLPL 183 02/28/2022    TRIG 125 02/28/2022    HDL 46 02/28/2022     (H) 02/28/2022    VLDL 22 02/28/2022            HEALTH RISK ASSESSMENT    Smoking Status:  Social History     Tobacco Use   Smoking Status Former Smoker   • Types: Cigars   Smokeless Tobacco Former User   • Types: Chew   Tobacco Comment    FOR A SHORT TIME AS A YOUNG ADULT     Alcohol Consumption:  Social History     Substance and Sexual Activity   Alcohol Use Not Currently     Fall Risk Screen:    STEADI Fall Risk Assessment was completed, and patient is at LOW risk for falls.Assessment completed on:3/7/2022    Depression Screening:  PHQ-2/PHQ-9 Depression Screening 3/7/2022   Little Interest or Pleasure in Doing Things " 0-->not at all   Feeling Down, Depressed or Hopeless 0-->not at all       Health Habits and Functional and Cognitive Screening:  Functional & Cognitive Status 3/7/2022   Do you have difficulty preparing food and eating? No   Do you have difficulty bathing yourself, getting dressed or grooming yourself? No   Do you have difficulty using the toilet? No   Do you have difficulty moving around from place to place? No   Do you have trouble with steps or getting out of a bed or a chair? No   Current Diet Well Balanced Diet   Dental Exam Up to date   Eye Exam Not up to date   Exercise (times per week) 0 times per week   Current Exercises Include No Regular Exercise   Do you need help using the phone?  No   Are you deaf or do you have serious difficulty hearing?  No   Do you need help with transportation? No   Do you need help shopping? No   Do you need help preparing meals?  No   Do you need help with housework?  No   Do you need help with laundry? No   Do you need help taking your medications? No   Do you need help managing money? No   Do you ever drive or ride in a car without wearing a seat belt? No   Have you felt unusual stress, anger or loneliness in the last month? No   Who do you live with? Spouse   If you need help, do you have trouble finding someone available to you? No   Have you been bothered in the last four weeks by sexual problems? No   Do you have difficulty concentrating, remembering or making decisions? No       Age-appropriate Screening Schedule:  Refer to the list below for future screening recommendations based on patient's age, sex and/or medical conditions. Orders for these recommended tests are listed in the plan section. The patient has been provided with a written plan.    Health Maintenance   Topic Date Due   • TDAP/TD VACCINES (1 - Tdap) Never done   • ZOSTER VACCINE (1 of 2) Never done   • LIPID PANEL  02/28/2023   • INFLUENZA VACCINE  Completed              Assessment/Plan   CMS Preventative  Services Quick Reference  Risk Factors Identified During Encounter  Immunizations Discussed/Encouraged (specific Immunizations; Tdap, Pneumococcal 23 and Shingrix  The above risks/problems have been discussed with the patient.  Follow up actions/plans if indicated are seen below in the Assessment/Plan Section.  Pertinent information has been shared with the patient in the After Visit Summary.    Diagnoses and all orders for this visit:    1. Dyslipidemia (Primary)  Comments:  at goal    2. Anxiety    3. Trigeminal neuralgia of right side of face  Comments:  wants to rechallenge with the keto pills- I recommend diet/exercise.  No data about interaction with his meds.     4. Benign essential HTN  Comments:  elevated- increase amlodipine 10 mg daily.   Orders:  -     amLODIPine (NORVASC) 10 MG tablet; Take 1 tablet by mouth Every Morning.  Dispense: 90 tablet; Refill: 1    5. Hypothyroidism, unspecified type  Comments:  new dx.  Will add levothyroxine 75 cg daily and recheck in 3 mnt  reviewed proper way to take it.   Orders:  -     levothyroxine (Synthroid) 75 MCG tablet; Take 1 tablet by mouth Daily.  Dispense: 90 tablet; Refill: 1  -     TSH; Future    6. Medicare annual wellness visit, subsequent  Comments:  to check records about c-scope, thinks he has vaccines. Shingrix at pharmacy. Recheck as above.         Follow Up:   Return in about 3 months (around 6/7/2022) for Recheck, Lab Today.     An After Visit Summary and PPPS were made available to the patient.

## 2022-03-08 LAB
ALBUMIN SERPL-MCNC: 4.6 G/DL (ref 3.7–4.7)
ALBUMIN/GLOB SERPL: 2 {RATIO} (ref 1.2–2.2)
ALP SERPL-CCNC: 94 IU/L (ref 44–121)
ALT SERPL-CCNC: 16 IU/L (ref 0–44)
AST SERPL-CCNC: 20 IU/L (ref 0–40)
BASOPHILS # BLD AUTO: 0 X10E3/UL (ref 0–0.2)
BASOPHILS NFR BLD AUTO: 0 %
BILIRUB SERPL-MCNC: 0.3 MG/DL (ref 0–1.2)
BUN SERPL-MCNC: 23 MG/DL (ref 8–27)
BUN/CREAT SERPL: 20 (ref 10–24)
CALCIUM SERPL-MCNC: 9.6 MG/DL (ref 8.6–10.2)
CHLORIDE SERPL-SCNC: 102 MMOL/L (ref 96–106)
CHOLEST SERPL-MCNC: 181 MG/DL (ref 100–199)
CHOLEST/HDLC SERPL: 3.9 RATIO (ref 0–5)
CO2 SERPL-SCNC: 24 MMOL/L (ref 20–29)
CREAT SERPL-MCNC: 1.17 MG/DL (ref 0.76–1.27)
EGFR GENE MUT ANL BLD/T: 65 ML/MIN/1.73
EOSINOPHIL # BLD AUTO: 0.2 X10E3/UL (ref 0–0.4)
EOSINOPHIL NFR BLD AUTO: 2 %
ERYTHROCYTE [DISTWIDTH] IN BLOOD BY AUTOMATED COUNT: 12.4 % (ref 11.6–15.4)
GLOBULIN SER CALC-MCNC: 2.3 G/DL (ref 1.5–4.5)
GLUCOSE SERPL-MCNC: 82 MG/DL (ref 65–99)
HCT VFR BLD AUTO: 46 % (ref 37.5–51)
HDLC SERPL-MCNC: 47 MG/DL
HGB BLD-MCNC: 15.6 G/DL (ref 13–17.7)
IMM GRANULOCYTES # BLD AUTO: 0 X10E3/UL (ref 0–0.1)
IMM GRANULOCYTES NFR BLD AUTO: 0 %
LDLC SERPL CALC-MCNC: 109 MG/DL (ref 0–99)
LYMPHOCYTES # BLD AUTO: 2.6 X10E3/UL (ref 0.7–3.1)
LYMPHOCYTES NFR BLD AUTO: 30 %
MCH RBC QN AUTO: 32.8 PG (ref 26.6–33)
MCHC RBC AUTO-ENTMCNC: 33.9 G/DL (ref 31.5–35.7)
MCV RBC AUTO: 97 FL (ref 79–97)
MONOCYTES # BLD AUTO: 0.8 X10E3/UL (ref 0.1–0.9)
MONOCYTES NFR BLD AUTO: 9 %
NEUTROPHILS # BLD AUTO: 5.1 X10E3/UL (ref 1.4–7)
NEUTROPHILS NFR BLD AUTO: 59 %
PLATELET # BLD AUTO: 373 X10E3/UL (ref 150–450)
POTASSIUM SERPL-SCNC: 4.4 MMOL/L (ref 3.5–5.2)
PROT SERPL-MCNC: 6.9 G/DL (ref 6–8.5)
RBC # BLD AUTO: 4.76 X10E6/UL (ref 4.14–5.8)
SODIUM SERPL-SCNC: 145 MMOL/L (ref 134–144)
TRIGL SERPL-MCNC: 140 MG/DL (ref 0–149)
VLDLC SERPL CALC-MCNC: 25 MG/DL (ref 5–40)
WBC # BLD AUTO: 8.7 X10E3/UL (ref 3.4–10.8)

## 2022-03-09 DIAGNOSIS — F41.9 ANXIETY: ICD-10-CM

## 2022-03-09 RX ORDER — ALPRAZOLAM 0.5 MG/1
0.5 TABLET ORAL 3 TIMES DAILY
Qty: 90 TABLET | Refills: 1 | Status: SHIPPED | OUTPATIENT
Start: 2022-03-09 | End: 2022-03-27 | Stop reason: HOSPADM

## 2022-03-15 ENCOUNTER — OFFICE VISIT (OUTPATIENT)
Dept: CARDIOLOGY | Facility: CLINIC | Age: 77
End: 2022-03-15

## 2022-03-15 VITALS
WEIGHT: 239 LBS | DIASTOLIC BLOOD PRESSURE: 92 MMHG | HEIGHT: 69 IN | HEART RATE: 83 BPM | SYSTOLIC BLOOD PRESSURE: 160 MMHG | RESPIRATION RATE: 16 BRPM | BODY MASS INDEX: 35.4 KG/M2 | OXYGEN SATURATION: 95 %

## 2022-03-15 DIAGNOSIS — I10 BENIGN ESSENTIAL HTN: ICD-10-CM

## 2022-03-15 DIAGNOSIS — I45.2 BIFASCICULAR BLOCK: Primary | ICD-10-CM

## 2022-03-15 DIAGNOSIS — E78.00 HYPERCHOLESTEROLEMIA: ICD-10-CM

## 2022-03-15 PROCEDURE — 93000 ELECTROCARDIOGRAM COMPLETE: CPT | Performed by: INTERNAL MEDICINE

## 2022-03-15 PROCEDURE — 99214 OFFICE O/P EST MOD 30 MIN: CPT | Performed by: INTERNAL MEDICINE

## 2022-03-15 NOTE — PROGRESS NOTES
"PATIENTINFORMATION    Date of Office Visit: 03/15/2022  Encounter Provider: Antonio Mesa MD  Place of Service: [facility] CARDIOLOGY  Patient Name: Jeremi Ugarte  : 1945    Subjective:     Encounter Date:03/15/2022      Patient ID: Jeremi Ugarte is a 76 y.o. male.    Chief Complaint   Patient presents with   • Hypertension     Annual follow up     HPI  Mr. Ugarte is a pleasant 76 years old gentleman came to cardiology clinic for follow-up visit.  He had orthopedic surgery in 2021 otherwise no ER visits or hospitalization.  He just started walking regularly last week and currently doing 1 mile of walking on a treadmill every day without any significant chest pain.  He denies symptoms of heart failure.Patient is compliant with current medical regimen and denies any significant side effects from medications.   He takes Aleve PM 2 pills every night to help him sleep.  Does not regularly check blood pressure at home.  Amlodipine was increased to 10mg by his primary care provider recently for persistently elevated blood pressure.   He denies symptoms of sleep apnea    ROS  All systems reviewed and negative except as noted in HPI.    Past Medical History:   Diagnosis Date   • Anemia    • Ankle arthritis    • Anxiety    • Arthritis    • Chronic headaches    • Depression    • GCA (giant cell arteritis) (HCC)     \"PT STATES MISDIAGNOSED\"   • Gout of foot    • High risk medication use    • Hyperlipidemia    • Hypertension    • Hyponatremia     HX   • Macrocytosis    • Muscle weakness    • Myositis     \"PT STATES MISDIAGNOSED\"   • Osteoarthritis    • Osteoarthritis of both hips    • Polymyalgia rheumatica (HCC)     \"PT STATES MISDIAGNOSED\"   • Renal insufficiency    • Right bundle branch block (RBBB)    • S/p bilateral revision of total hip replacement    • S/P foot surgery    • S/P revision of total knee, left    • S/P revision of total knee, right    • Trigeminal neuralgia  "       Past Surgical History:   Procedure Laterality Date   • ANKLE ARTHROPLASTY Left 2018   • BACK SURGERY      lower back surg   • CATARACT EXTRACTION     • COLONOSCOPY     • CRANIOTOMY Right 02/14/2019    Dr. Aldridge, for trigeminal neuralgia   • EXCISION BAKERS CYST KNEE  2008   • HAND SURGERY Left 1971    Left hand and wrist 1971   • HIP SURGERY Right 12/08/2016    total right hip replacement   • KNEE ARTHROPLASTY Bilateral     LEFT - 1996  -  RIGHT -  1999    • REPLACEMENT TOTAL KNEE      1996 and 1999   • SHOULDER SURGERY     • TOTAL HIP ARTHROPLASTY Left 2017   • TOTAL KNEE ARTHROPLASTY REVISION  2009    left   • TOTAL KNEE ARTHROPLASTY REVISION Right 9/15/2020    Procedure: RIGHT TOTAL KNEE ARTHROPLASTY REVISION;  Surgeon: Patricia Caldera MD;  Location: [facility];  Service: Orthopedics;  Laterality: Right;   • TOTAL KNEE ARTHROPLASTY REVISION Left 11/16/2021    Procedure: TOTAL KNEE ARTHROPLASTY REVISION;  Surgeon: Patricia Caldera MD;  Location: [facility];  Service: Orthopedics;  Laterality: Left;   • WRIST SURGERY         Social History     Socioeconomic History   • Marital status:    Tobacco Use   • Smoking status: Former Smoker     Types: Cigars   • Smokeless tobacco: Former User     Types: Chew   • Tobacco comment: FOR A SHORT TIME AS A YOUNG ADULT   Vaping Use   • Vaping Use: Never used   Substance and Sexual Activity   • Alcohol use: Not Currently   • Drug use: No   • Sexual activity: Defer       Family History   Problem Relation Age of Onset   • Cardiomyopathy Mother    • Hypertension Mother    • Hyperlipidemia Sister    • Hypertension Sister    • Coronary artery disease Brother    • Hyperlipidemia Brother    • Hypertension Brother    • No Known Problems Father    • Malig Hyperthermia Neg Hx            ECG 12 Lead    Date/Time: 3/15/2022 1:15 PM  Performed by: Antonio Mesa MD  Authorized by: Antonio Mesa MD   Comparison: compared with previous ECG from  "9/20/2021  Similar to previous ECG  Rhythm: sinus rhythm  Rate: normal  Conduction: conduction normal  ST Segments: ST segments normal  T Waves: T waves normal  QRS axis: normal  Other: no other findings    Clinical impression: abnormal EKG               Objective:     /92 (BP Location: Left arm, Patient Position: Sitting, Cuff Size: Large Adult)   Pulse 83   Resp 16   Ht 175.3 cm (69.02\")   Wt 108 kg (239 lb)   SpO2 95%   BMI 35.28 kg/m²  Body mass index is 35.28 kg/m².     Constitutional:       General: Not in acute distress.     Appearance: Well-developed. Not diaphoretic.   Eyes:      Pupils: Pupils are equal, round, and reactive to light.   HENT:      Head: Normocephalic and atraumatic.   Neck:      Thyroid: No thyromegaly.   Pulmonary:      Effort: Pulmonary effort is normal. No respiratory distress.      Breath sounds: Normal breath sounds. No wheezing. No rales.   Chest:      Chest wall: Not tender to palpatation.   Cardiovascular:      Normal rate. Regular rhythm.      No gallop.   Pulses:     Intact distal pulses.   Edema:     Peripheral edema absent.   Abdominal:      General: Bowel sounds are normal. There is no distension.      Palpations: Abdomen is soft.      Tenderness: There is no guarding.   Musculoskeletal: Normal range of motion.         General: No deformity.      Cervical back: Normal range of motion and neck supple. Skin:     General: Skin is warm and dry.      Findings: No rash.   Neurological:      Mental Status: Alert and oriented to person, place, and time.      Cranial Nerves: No cranial nerve deficit.      Deep Tendon Reflexes: Reflexes are normal and symmetric.   Psychiatric:         Judgment: Judgment normal.         Review Of Data: I have reviewed pertinent recent labs, images and documents and pertinent findings included in HPI or assessment below.    Lipid Panel    Lipid Panel 9/14/21 2/28/22 3/7/22   Total Cholesterol 128     Total Cholesterol  183 181   Triglycerides " 93 125 140   HDL Cholesterol 48 46 47   VLDL Cholesterol 18 22 25   LDL Cholesterol  62 115 (A) 109 (A)   (A) Abnormal value                Assessment/Plan:           Bifascicular block with right bundle branch block and left anterior fascicular block-no significant EKG changes today  -Patient denies symptoms of heart block    Essential hypertension- BP in office today is 160/80 mmHg.  Amlodipine increased to 10 by his PCP recently  -He will avoid use of NSAIDs  -Modified diet including decrease salt intake.  Weight loss encouraged  Continue regular walking  Call back with blood pressure logs in 2 weeks    CKD-stable    Hyperlipidemia  on statin and last lipid panel at goal.    3 of general anxiety disorder    Trigeminal neuralgia-with history craniectomy with  microvascular decompression-patient treated ports improved symptoms and currently uses oxcarbazepine only as needed and following up with neurology.    Chronic degenerative joint disease-had multiple surgeries on the knees and he had a left knee revision in November 2021      Consider doing echocardiogram and myocardial perfusion study especially with persistently elevated blood pressure and uncontrolled risk factors.    Return to clinic in 1 year or sooner with any concerning symptoms.  Diagnosis and plan of care discussed with patient and verbalized understanding.            Your medication list          Accurate as of March 15, 2022  3:30 PM. If you have any questions, ask your nurse or doctor.            CHANGE how you take these medications      Instructions Last Dose Given Next Dose Due   hydroCHLOROthiazide 25 MG tablet  Commonly known as: HYDRODIURIL  What changed: when to take this      TAKE 1 TABLET BY MOUTH DAILY          CONTINUE taking these medications      Instructions Last Dose Given Next Dose Due   ALPRAZolam 0.5 MG tablet  Commonly known as: XANAX      Take 1 tablet by mouth 3 (Three) Times a Day.       amLODIPine 10 MG tablet  Commonly  known as: NORVASC      Take 1 tablet by mouth Every Morning.       CALCIUM 1200 PO      Take 1,000 mg by mouth Every Morning. PT HOLDING FOR SURGERY       coenzyme Q10 100 MG capsule      Take 100 mg by mouth Daily. PT HOLDING FOR SURGERY       fish oil 1000 MG capsule capsule      Take 1,200 mg by mouth Daily With Breakfast. PT HOLDING FOR SURGERY       HYDROcodone-acetaminophen 7.5-325 MG per tablet  Commonly known as: NORCO      Take 1 tablet by mouth every eight hours as needed for pain       levothyroxine 75 MCG tablet  Commonly known as: Synthroid      Take 1 tablet by mouth Daily.       multivitamin with minerals tablet tablet      Take 1 tablet by mouth Every Morning. PT HOLDING FOR SURGERY       NON FORMULARY      Take 1 tablet by mouth Every Morning. Ocuvite Extra - eye supplement/PT HOLDING FOR SURGERY       OLANZapine 5 MG tablet  Commonly known as: zyPREXA      Take 1 tablet by mouth Every Night.       OXcarbazepine 300 MG tablet  Commonly known as: TRILEPTAL      Take 300 mg by mouth 3 (Three) Times a Day As Needed (FOR HEADACHE, has been taking twice daily).       quinapril 40 MG tablet  Commonly known as: ACCUPRIL      TAKE 1 TABLET BY MOUTH TWICE DAILY       rosuvastatin 20 MG tablet  Commonly known as: CRESTOR      TAKE 1 TABLET BY MOUTH DAILY       vitamin B-12 1000 MCG tablet  Commonly known as: CYANOCOBALAMIN      Take 1,000 mcg by mouth Daily. PT HOLDING FOR SURGERY                  Antonio Mesa MD  03/15/22  15:30 EDT

## 2022-03-18 RX ORDER — ROSUVASTATIN CALCIUM 20 MG/1
20 TABLET, COATED ORAL DAILY
Qty: 90 TABLET | Refills: 1 | Status: SHIPPED | OUTPATIENT
Start: 2022-03-18 | End: 2022-09-29

## 2022-03-21 DIAGNOSIS — F41.9 ANXIETY: Primary | ICD-10-CM

## 2022-03-21 RX ORDER — ALPRAZOLAM 0.5 MG/1
0.5 TABLET ORAL 3 TIMES DAILY PRN
Qty: 12 TABLET | Refills: 0 | Status: ON HOLD | OUTPATIENT
Start: 2022-03-21 | End: 2022-05-21 | Stop reason: SDUPTHER

## 2022-03-22 ENCOUNTER — HOSPITAL ENCOUNTER (INPATIENT)
Facility: HOSPITAL | Age: 77
LOS: 2 days | Discharge: HOME | End: 2022-03-27
Attending: EMERGENCY MEDICINE | Admitting: STUDENT IN AN ORGANIZED HEALTH CARE EDUCATION/TRAINING PROGRAM

## 2022-03-22 ENCOUNTER — APPOINTMENT (OUTPATIENT)
Dept: GENERAL RADIOLOGY | Facility: HOSPITAL | Age: 77
End: 2022-03-22

## 2022-03-22 DIAGNOSIS — S83.91XA SPRAIN OF RIGHT KNEE, UNSPECIFIED LIGAMENT, INITIAL ENCOUNTER: Primary | ICD-10-CM

## 2022-03-22 DIAGNOSIS — S76.111A QUADRICEPS TENDON RUPTURE, RIGHT, INITIAL ENCOUNTER: ICD-10-CM

## 2022-03-22 DIAGNOSIS — M25.461 KNEE EFFUSION, RIGHT: ICD-10-CM

## 2022-03-22 PROBLEM — E87.1 HYPONATREMIA: Status: ACTIVE | Noted: 2022-03-22

## 2022-03-22 LAB
ALBUMIN SERPL-MCNC: 4.6 G/DL (ref 3.5–5.2)
ALBUMIN/GLOB SERPL: 2 G/DL
ALP SERPL-CCNC: 89 U/L (ref 39–117)
ALT SERPL W P-5'-P-CCNC: 20 U/L (ref 1–41)
ANION GAP SERPL CALCULATED.3IONS-SCNC: 14 MMOL/L (ref 5–15)
AST SERPL-CCNC: 27 U/L (ref 1–40)
BASOPHILS # BLD AUTO: 0.03 10*3/MM3 (ref 0–0.2)
BASOPHILS NFR BLD AUTO: 0.3 % (ref 0–1.5)
BILIRUB SERPL-MCNC: 0.2 MG/DL (ref 0–1.2)
BUN SERPL-MCNC: 13 MG/DL (ref 8–23)
BUN/CREAT SERPL: 14.1 (ref 7–25)
CALCIUM SPEC-SCNC: 9.2 MG/DL (ref 8.6–10.5)
CHLORIDE SERPL-SCNC: 91 MMOL/L (ref 98–107)
CO2 SERPL-SCNC: 22 MMOL/L (ref 22–29)
CREAT SERPL-MCNC: 0.92 MG/DL (ref 0.76–1.27)
DEPRECATED RDW RBC AUTO: 46.6 FL (ref 37–54)
EGFRCR SERPLBLD CKD-EPI 2021: 86.2 ML/MIN/1.73
EOSINOPHIL # BLD AUTO: 0.03 10*3/MM3 (ref 0–0.4)
EOSINOPHIL NFR BLD AUTO: 0.3 % (ref 0.3–6.2)
ERYTHROCYTE [DISTWIDTH] IN BLOOD BY AUTOMATED COUNT: 13.2 % (ref 12.3–15.4)
GLOBULIN UR ELPH-MCNC: 2.3 GM/DL
GLUCOSE SERPL-MCNC: 92 MG/DL (ref 65–99)
HCT VFR BLD AUTO: 44.9 % (ref 37.5–51)
HGB BLD-MCNC: 15 G/DL (ref 13–17.7)
IMM GRANULOCYTES # BLD AUTO: 0.03 10*3/MM3 (ref 0–0.05)
IMM GRANULOCYTES NFR BLD AUTO: 0.3 % (ref 0–0.5)
LYMPHOCYTES # BLD AUTO: 1.32 10*3/MM3 (ref 0.7–3.1)
LYMPHOCYTES NFR BLD AUTO: 12.2 % (ref 19.6–45.3)
MCH RBC QN AUTO: 32.3 PG (ref 26.6–33)
MCHC RBC AUTO-ENTMCNC: 33.4 G/DL (ref 31.5–35.7)
MCV RBC AUTO: 96.8 FL (ref 79–97)
MONOCYTES # BLD AUTO: 1.07 10*3/MM3 (ref 0.1–0.9)
MONOCYTES NFR BLD AUTO: 9.9 % (ref 5–12)
NEUTROPHILS NFR BLD AUTO: 77 % (ref 42.7–76)
NEUTROPHILS NFR BLD AUTO: 8.37 10*3/MM3 (ref 1.7–7)
NRBC BLD AUTO-RTO: 0 /100 WBC (ref 0–0.2)
PLATELET # BLD AUTO: 304 10*3/MM3 (ref 140–450)
PMV BLD AUTO: 10 FL (ref 6–12)
POTASSIUM SERPL-SCNC: 4.4 MMOL/L (ref 3.5–5.2)
PROT SERPL-MCNC: 6.9 G/DL (ref 6–8.5)
RBC # BLD AUTO: 4.64 10*6/MM3 (ref 4.14–5.8)
SODIUM SERPL-SCNC: 127 MMOL/L (ref 136–145)
WBC NRBC COR # BLD: 10.85 10*3/MM3 (ref 3.4–10.8)

## 2022-03-22 PROCEDURE — G0378 HOSPITAL OBSERVATION PER HR: HCPCS

## 2022-03-22 PROCEDURE — 80053 COMPREHEN METABOLIC PANEL: CPT | Performed by: EMERGENCY MEDICINE

## 2022-03-22 PROCEDURE — 85025 COMPLETE CBC W/AUTO DIFF WBC: CPT | Performed by: EMERGENCY MEDICINE

## 2022-03-22 PROCEDURE — U0004 COV-19 TEST NON-CDC HGH THRU: HCPCS | Performed by: EMERGENCY MEDICINE

## 2022-03-22 PROCEDURE — U0005 INFEC AGEN DETEC AMPLI PROBE: HCPCS | Performed by: EMERGENCY MEDICINE

## 2022-03-22 PROCEDURE — 99284 EMERGENCY DEPT VISIT MOD MDM: CPT

## 2022-03-22 PROCEDURE — 25010000002 HYDROMORPHONE PER 4 MG: Performed by: EMERGENCY MEDICINE

## 2022-03-22 PROCEDURE — 73560 X-RAY EXAM OF KNEE 1 OR 2: CPT

## 2022-03-22 PROCEDURE — 25010000002 ONDANSETRON PER 1 MG: Performed by: EMERGENCY MEDICINE

## 2022-03-22 PROCEDURE — 25010000002 HYDROMORPHONE PER 4 MG: Performed by: NURSE PRACTITIONER

## 2022-03-22 RX ORDER — SODIUM CHLORIDE 0.9 % (FLUSH) 0.9 %
10 SYRINGE (ML) INJECTION EVERY 12 HOURS SCHEDULED
Status: DISCONTINUED | OUTPATIENT
Start: 2022-03-22 | End: 2022-03-27 | Stop reason: HOSPADM

## 2022-03-22 RX ORDER — HYDROCHLOROTHIAZIDE 25 MG/1
25 TABLET ORAL EVERY MORNING
Status: DISCONTINUED | OUTPATIENT
Start: 2022-03-23 | End: 2022-03-22

## 2022-03-22 RX ORDER — LEVOTHYROXINE SODIUM 0.07 MG/1
75 TABLET ORAL
Status: DISCONTINUED | OUTPATIENT
Start: 2022-03-23 | End: 2022-03-27 | Stop reason: HOSPADM

## 2022-03-22 RX ORDER — SODIUM CHLORIDE 0.9 % (FLUSH) 0.9 %
10 SYRINGE (ML) INJECTION AS NEEDED
Status: DISCONTINUED | OUTPATIENT
Start: 2022-03-22 | End: 2022-03-27 | Stop reason: HOSPADM

## 2022-03-22 RX ORDER — OXCARBAZEPINE 300 MG/1
300 TABLET, FILM COATED ORAL EVERY 8 HOURS PRN
Status: DISCONTINUED | OUTPATIENT
Start: 2022-03-22 | End: 2022-03-25

## 2022-03-22 RX ORDER — SODIUM CHLORIDE 9 MG/ML
100 INJECTION, SOLUTION INTRAVENOUS CONTINUOUS
Status: DISCONTINUED | OUTPATIENT
Start: 2022-03-22 | End: 2022-03-24

## 2022-03-22 RX ORDER — HYDROCODONE BITARTRATE AND ACETAMINOPHEN 7.5; 325 MG/1; MG/1
1 TABLET ORAL EVERY 6 HOURS PRN
Status: DISCONTINUED | OUTPATIENT
Start: 2022-03-22 | End: 2022-03-23

## 2022-03-22 RX ORDER — ROSUVASTATIN CALCIUM 20 MG/1
20 TABLET, COATED ORAL DAILY
Status: DISCONTINUED | OUTPATIENT
Start: 2022-03-23 | End: 2022-03-27 | Stop reason: HOSPADM

## 2022-03-22 RX ORDER — HYDROMORPHONE HYDROCHLORIDE 1 MG/ML
0.5 INJECTION, SOLUTION INTRAMUSCULAR; INTRAVENOUS; SUBCUTANEOUS ONCE
Status: COMPLETED | OUTPATIENT
Start: 2022-03-22 | End: 2022-03-22

## 2022-03-22 RX ORDER — ACETAMINOPHEN 325 MG/1
650 TABLET ORAL EVERY 4 HOURS PRN
Status: DISCONTINUED | OUTPATIENT
Start: 2022-03-22 | End: 2022-03-27 | Stop reason: HOSPADM

## 2022-03-22 RX ORDER — MULTIPLE VITAMINS W/ MINERALS TAB 9MG-400MCG
1 TAB ORAL EVERY MORNING
Status: DISCONTINUED | OUTPATIENT
Start: 2022-03-23 | End: 2022-03-27 | Stop reason: HOSPADM

## 2022-03-22 RX ORDER — ALPRAZOLAM 0.5 MG/1
0.5 TABLET ORAL 3 TIMES DAILY PRN
Status: DISCONTINUED | OUTPATIENT
Start: 2022-03-22 | End: 2022-03-27 | Stop reason: HOSPADM

## 2022-03-22 RX ORDER — NALOXONE HCL 0.4 MG/ML
0.4 VIAL (ML) INJECTION
Status: DISCONTINUED | OUTPATIENT
Start: 2022-03-22 | End: 2022-03-23

## 2022-03-22 RX ORDER — ACETAMINOPHEN 160 MG/5ML
650 SOLUTION ORAL EVERY 4 HOURS PRN
Status: DISCONTINUED | OUTPATIENT
Start: 2022-03-22 | End: 2022-03-27 | Stop reason: HOSPADM

## 2022-03-22 RX ORDER — ONDANSETRON 2 MG/ML
4 INJECTION INTRAMUSCULAR; INTRAVENOUS EVERY 6 HOURS PRN
Status: DISCONTINUED | OUTPATIENT
Start: 2022-03-22 | End: 2022-03-27 | Stop reason: HOSPADM

## 2022-03-22 RX ORDER — HYDROMORPHONE HYDROCHLORIDE 1 MG/ML
0.5 INJECTION, SOLUTION INTRAMUSCULAR; INTRAVENOUS; SUBCUTANEOUS
Status: DISCONTINUED | OUTPATIENT
Start: 2022-03-22 | End: 2022-03-23

## 2022-03-22 RX ORDER — ACETAMINOPHEN 650 MG/1
650 SUPPOSITORY RECTAL EVERY 4 HOURS PRN
Status: DISCONTINUED | OUTPATIENT
Start: 2022-03-22 | End: 2022-03-27 | Stop reason: HOSPADM

## 2022-03-22 RX ORDER — ONDANSETRON 2 MG/ML
4 INJECTION INTRAMUSCULAR; INTRAVENOUS ONCE
Status: COMPLETED | OUTPATIENT
Start: 2022-03-22 | End: 2022-03-22

## 2022-03-22 RX ORDER — AMLODIPINE BESYLATE 10 MG/1
10 TABLET ORAL EVERY MORNING
Status: DISCONTINUED | OUTPATIENT
Start: 2022-03-23 | End: 2022-03-27 | Stop reason: HOSPADM

## 2022-03-22 RX ADMIN — HYDROMORPHONE HYDROCHLORIDE 0.5 MG: 1 INJECTION, SOLUTION INTRAMUSCULAR; INTRAVENOUS; SUBCUTANEOUS at 21:13

## 2022-03-22 RX ADMIN — ALPRAZOLAM 0.5 MG: 0.5 TABLET ORAL at 23:28

## 2022-03-22 RX ADMIN — Medication 10 ML: at 22:55

## 2022-03-22 RX ADMIN — ONDANSETRON 4 MG: 2 INJECTION INTRAMUSCULAR; INTRAVENOUS at 20:04

## 2022-03-22 RX ADMIN — HYDROMORPHONE HYDROCHLORIDE 0.5 MG: 1 INJECTION, SOLUTION INTRAMUSCULAR; INTRAVENOUS; SUBCUTANEOUS at 22:55

## 2022-03-22 RX ADMIN — HYDROMORPHONE HYDROCHLORIDE 0.5 MG: 1 INJECTION, SOLUTION INTRAMUSCULAR; INTRAVENOUS; SUBCUTANEOUS at 20:04

## 2022-03-22 RX ADMIN — Medication 10 ML: at 20:06

## 2022-03-22 RX ADMIN — HYDROCODONE BITARTRATE AND ACETAMINOPHEN 1 TABLET: 7.5; 325 TABLET ORAL at 23:33

## 2022-03-23 LAB
ANION GAP SERPL CALCULATED.3IONS-SCNC: 16.1 MMOL/L (ref 5–15)
BASOPHILS # BLD AUTO: 0.02 10*3/MM3 (ref 0–0.2)
BASOPHILS NFR BLD AUTO: 0.2 % (ref 0–1.5)
BUN SERPL-MCNC: 15 MG/DL (ref 8–23)
BUN/CREAT SERPL: 17.2 (ref 7–25)
CALCIUM SPEC-SCNC: 8.9 MG/DL (ref 8.6–10.5)
CHLORIDE SERPL-SCNC: 91 MMOL/L (ref 98–107)
CO2 SERPL-SCNC: 20.9 MMOL/L (ref 22–29)
CREAT SERPL-MCNC: 0.87 MG/DL (ref 0.76–1.27)
DEPRECATED RDW RBC AUTO: 42.9 FL (ref 37–54)
EGFRCR SERPLBLD CKD-EPI 2021: 89.4 ML/MIN/1.73
EOSINOPHIL # BLD AUTO: 0 10*3/MM3 (ref 0–0.4)
EOSINOPHIL NFR BLD AUTO: 0 % (ref 0.3–6.2)
ERYTHROCYTE [DISTWIDTH] IN BLOOD BY AUTOMATED COUNT: 12.7 % (ref 12.3–15.4)
GLUCOSE SERPL-MCNC: 133 MG/DL (ref 65–99)
HCT VFR BLD AUTO: 39.4 % (ref 37.5–51)
HGB BLD-MCNC: 13.5 G/DL (ref 13–17.7)
IMM GRANULOCYTES # BLD AUTO: 0.04 10*3/MM3 (ref 0–0.05)
IMM GRANULOCYTES NFR BLD AUTO: 0.4 % (ref 0–0.5)
INR PPP: 1.03 (ref 0.9–1.1)
LYMPHOCYTES # BLD AUTO: 0.89 10*3/MM3 (ref 0.7–3.1)
LYMPHOCYTES NFR BLD AUTO: 8.3 % (ref 19.6–45.3)
MAGNESIUM SERPL-MCNC: 1.9 MG/DL (ref 1.6–2.4)
MCH RBC QN AUTO: 31.8 PG (ref 26.6–33)
MCHC RBC AUTO-ENTMCNC: 34.3 G/DL (ref 31.5–35.7)
MCV RBC AUTO: 92.7 FL (ref 79–97)
MONOCYTES # BLD AUTO: 0.68 10*3/MM3 (ref 0.1–0.9)
MONOCYTES NFR BLD AUTO: 6.4 % (ref 5–12)
NEUTROPHILS NFR BLD AUTO: 84.7 % (ref 42.7–76)
NEUTROPHILS NFR BLD AUTO: 9.07 10*3/MM3 (ref 1.7–7)
NRBC BLD AUTO-RTO: 0 /100 WBC (ref 0–0.2)
PLATELET # BLD AUTO: 313 10*3/MM3 (ref 140–450)
PMV BLD AUTO: 9.6 FL (ref 6–12)
POTASSIUM SERPL-SCNC: 4.3 MMOL/L (ref 3.5–5.2)
PROTHROMBIN TIME: 13.4 SECONDS (ref 11.7–14.2)
RBC # BLD AUTO: 4.25 10*6/MM3 (ref 4.14–5.8)
SARS-COV-2 ORF1AB RESP QL NAA+PROBE: NOT DETECTED
SODIUM SERPL-SCNC: 128 MMOL/L (ref 136–145)
WBC NRBC COR # BLD: 10.7 10*3/MM3 (ref 3.4–10.8)

## 2022-03-23 PROCEDURE — 80048 BASIC METABOLIC PNL TOTAL CA: CPT | Performed by: NURSE PRACTITIONER

## 2022-03-23 PROCEDURE — G0378 HOSPITAL OBSERVATION PER HR: HCPCS

## 2022-03-23 PROCEDURE — 85610 PROTHROMBIN TIME: CPT | Performed by: NURSE PRACTITIONER

## 2022-03-23 PROCEDURE — 25010000002 ONDANSETRON PER 1 MG: Performed by: NURSE PRACTITIONER

## 2022-03-23 PROCEDURE — 36415 COLL VENOUS BLD VENIPUNCTURE: CPT | Performed by: NURSE PRACTITIONER

## 2022-03-23 PROCEDURE — 83735 ASSAY OF MAGNESIUM: CPT | Performed by: NURSE PRACTITIONER

## 2022-03-23 PROCEDURE — 85025 COMPLETE CBC W/AUTO DIFF WBC: CPT | Performed by: NURSE PRACTITIONER

## 2022-03-23 PROCEDURE — 25010000002 HYDROMORPHONE PER 4 MG: Performed by: NURSE PRACTITIONER

## 2022-03-23 RX ORDER — CLONIDINE HYDROCHLORIDE 0.1 MG/1
0.1 TABLET ORAL EVERY 6 HOURS PRN
Status: DISCONTINUED | OUTPATIENT
Start: 2022-03-23 | End: 2022-03-27 | Stop reason: HOSPADM

## 2022-03-23 RX ORDER — HYDROCODONE BITARTRATE AND ACETAMINOPHEN 7.5; 325 MG/1; MG/1
1 TABLET ORAL EVERY 4 HOURS PRN
Status: DISCONTINUED | OUTPATIENT
Start: 2022-03-23 | End: 2022-03-24

## 2022-03-23 RX ORDER — LISINOPRIL 40 MG/1
40 TABLET ORAL
Refills: 1 | Status: DISCONTINUED | OUTPATIENT
Start: 2022-03-23 | End: 2022-03-27 | Stop reason: HOSPADM

## 2022-03-23 RX ADMIN — Medication 10 ML: at 21:30

## 2022-03-23 RX ADMIN — ONDANSETRON 4 MG: 2 INJECTION INTRAMUSCULAR; INTRAVENOUS at 16:34

## 2022-03-23 RX ADMIN — HYDROCODONE BITARTRATE AND ACETAMINOPHEN 1 TABLET: 7.5; 325 TABLET ORAL at 15:35

## 2022-03-23 RX ADMIN — LISINOPRIL 40 MG: 40 TABLET ORAL at 12:04

## 2022-03-23 RX ADMIN — OXCARBAZEPINE 300 MG: 300 TABLET, FILM COATED ORAL at 17:51

## 2022-03-23 RX ADMIN — OXCARBAZEPINE 300 MG: 300 TABLET, FILM COATED ORAL at 09:19

## 2022-03-23 RX ADMIN — CLONIDINE HYDROCHLORIDE 0.1 MG: 0.1 TABLET ORAL at 12:05

## 2022-03-23 RX ADMIN — Medication 10 ML: at 09:28

## 2022-03-23 RX ADMIN — AMLODIPINE BESYLATE 10 MG: 10 TABLET ORAL at 09:19

## 2022-03-23 RX ADMIN — ONDANSETRON 4 MG: 2 INJECTION INTRAMUSCULAR; INTRAVENOUS at 04:34

## 2022-03-23 RX ADMIN — HYDROMORPHONE HYDROCHLORIDE 0.5 MG: 1 INJECTION, SOLUTION INTRAMUSCULAR; INTRAVENOUS; SUBCUTANEOUS at 00:59

## 2022-03-23 RX ADMIN — SODIUM CHLORIDE 100 ML/HR: 9 INJECTION, SOLUTION INTRAVENOUS at 04:44

## 2022-03-23 RX ADMIN — SODIUM CHLORIDE 100 ML/HR: 9 INJECTION, SOLUTION INTRAVENOUS at 14:42

## 2022-03-23 RX ADMIN — ALPRAZOLAM 0.5 MG: 0.5 TABLET ORAL at 07:39

## 2022-03-23 RX ADMIN — ROSUVASTATIN CALCIUM 20 MG: 20 TABLET, FILM COATED ORAL at 09:19

## 2022-03-23 RX ADMIN — HYDROCODONE BITARTRATE AND ACETAMINOPHEN 1 TABLET: 7.5; 325 TABLET ORAL at 09:20

## 2022-03-23 RX ADMIN — HYDROCODONE BITARTRATE AND ACETAMINOPHEN 1 TABLET: 7.5; 325 TABLET ORAL at 21:14

## 2022-03-23 RX ADMIN — HYDROMORPHONE HYDROCHLORIDE 0.5 MG: 1 INJECTION, SOLUTION INTRAMUSCULAR; INTRAVENOUS; SUBCUTANEOUS at 14:40

## 2022-03-23 RX ADMIN — MULTIPLE VITAMINS W/ MINERALS TAB 1 TABLET: TAB at 09:19

## 2022-03-23 RX ADMIN — OXCARBAZEPINE 300 MG: 300 TABLET, FILM COATED ORAL at 00:04

## 2022-03-23 RX ADMIN — CLONIDINE HYDROCHLORIDE 0.1 MG: 0.1 TABLET ORAL at 18:35

## 2022-03-23 RX ADMIN — ALPRAZOLAM 0.5 MG: 0.5 TABLET ORAL at 15:45

## 2022-03-24 ENCOUNTER — APPOINTMENT (OUTPATIENT)
Dept: MRI IMAGING | Facility: HOSPITAL | Age: 77
End: 2022-03-24

## 2022-03-24 LAB
ANION GAP SERPL CALCULATED.3IONS-SCNC: 9.3 MMOL/L (ref 5–15)
BASOPHILS # BLD AUTO: 0.04 10*3/MM3 (ref 0–0.2)
BASOPHILS NFR BLD AUTO: 0.5 % (ref 0–1.5)
BUN SERPL-MCNC: 11 MG/DL (ref 8–23)
BUN/CREAT SERPL: 13.8 (ref 7–25)
CALCIUM SPEC-SCNC: 8.2 MG/DL (ref 8.6–10.5)
CHLORIDE SERPL-SCNC: 96 MMOL/L (ref 98–107)
CO2 SERPL-SCNC: 24.7 MMOL/L (ref 22–29)
CREAT SERPL-MCNC: 0.8 MG/DL (ref 0.76–1.27)
DEPRECATED RDW RBC AUTO: 43.6 FL (ref 37–54)
EGFRCR SERPLBLD CKD-EPI 2021: 91.7 ML/MIN/1.73
EOSINOPHIL # BLD AUTO: 0.15 10*3/MM3 (ref 0–0.4)
EOSINOPHIL NFR BLD AUTO: 1.9 % (ref 0.3–6.2)
ERYTHROCYTE [DISTWIDTH] IN BLOOD BY AUTOMATED COUNT: 12.8 % (ref 12.3–15.4)
GLUCOSE SERPL-MCNC: 96 MG/DL (ref 65–99)
HCT VFR BLD AUTO: 34.1 % (ref 37.5–51)
HGB BLD-MCNC: 11.5 G/DL (ref 13–17.7)
IMM GRANULOCYTES # BLD AUTO: 0.01 10*3/MM3 (ref 0–0.05)
IMM GRANULOCYTES NFR BLD AUTO: 0.1 % (ref 0–0.5)
LYMPHOCYTES # BLD AUTO: 2.48 10*3/MM3 (ref 0.7–3.1)
LYMPHOCYTES NFR BLD AUTO: 31.5 % (ref 19.6–45.3)
MCH RBC QN AUTO: 31.9 PG (ref 26.6–33)
MCHC RBC AUTO-ENTMCNC: 33.7 G/DL (ref 31.5–35.7)
MCV RBC AUTO: 94.5 FL (ref 79–97)
MONOCYTES # BLD AUTO: 0.92 10*3/MM3 (ref 0.1–0.9)
MONOCYTES NFR BLD AUTO: 11.7 % (ref 5–12)
NEUTROPHILS NFR BLD AUTO: 4.27 10*3/MM3 (ref 1.7–7)
NEUTROPHILS NFR BLD AUTO: 54.3 % (ref 42.7–76)
NRBC BLD AUTO-RTO: 0 /100 WBC (ref 0–0.2)
PLATELET # BLD AUTO: 285 10*3/MM3 (ref 140–450)
PMV BLD AUTO: 9.7 FL (ref 6–12)
POTASSIUM SERPL-SCNC: 4.2 MMOL/L (ref 3.5–5.2)
RBC # BLD AUTO: 3.61 10*6/MM3 (ref 4.14–5.8)
SODIUM SERPL-SCNC: 130 MMOL/L (ref 136–145)
WBC NRBC COR # BLD: 7.87 10*3/MM3 (ref 3.4–10.8)

## 2022-03-24 PROCEDURE — 73721 MRI JNT OF LWR EXTRE W/O DYE: CPT

## 2022-03-24 PROCEDURE — G0378 HOSPITAL OBSERVATION PER HR: HCPCS

## 2022-03-24 PROCEDURE — 85025 COMPLETE CBC W/AUTO DIFF WBC: CPT | Performed by: INTERNAL MEDICINE

## 2022-03-24 PROCEDURE — 80048 BASIC METABOLIC PNL TOTAL CA: CPT | Performed by: INTERNAL MEDICINE

## 2022-03-24 RX ORDER — HYDROCODONE BITARTRATE AND ACETAMINOPHEN 5; 325 MG/1; MG/1
1 TABLET ORAL EVERY 4 HOURS PRN
Status: DISCONTINUED | OUTPATIENT
Start: 2022-03-24 | End: 2022-03-27 | Stop reason: HOSPADM

## 2022-03-24 RX ORDER — HYDROCODONE BITARTRATE AND ACETAMINOPHEN 5; 325 MG/1; MG/1
2 TABLET ORAL EVERY 4 HOURS PRN
Status: DISCONTINUED | OUTPATIENT
Start: 2022-03-24 | End: 2022-03-27 | Stop reason: HOSPADM

## 2022-03-24 RX ADMIN — HYDROCODONE BITARTRATE AND ACETAMINOPHEN 1 TABLET: 5; 325 TABLET ORAL at 16:19

## 2022-03-24 RX ADMIN — HYDROCODONE BITARTRATE AND ACETAMINOPHEN 1 TABLET: 5; 325 TABLET ORAL at 21:07

## 2022-03-24 RX ADMIN — LEVOTHYROXINE SODIUM 75 MCG: 0.07 TABLET ORAL at 05:03

## 2022-03-24 RX ADMIN — Medication 10 ML: at 08:00

## 2022-03-24 RX ADMIN — Medication 10 ML: at 21:30

## 2022-03-24 RX ADMIN — AMLODIPINE BESYLATE 10 MG: 10 TABLET ORAL at 07:57

## 2022-03-24 RX ADMIN — OXCARBAZEPINE 300 MG: 300 TABLET, FILM COATED ORAL at 08:49

## 2022-03-24 RX ADMIN — OXCARBAZEPINE 300 MG: 300 TABLET, FILM COATED ORAL at 01:01

## 2022-03-24 RX ADMIN — ALPRAZOLAM 0.5 MG: 0.5 TABLET ORAL at 07:56

## 2022-03-24 RX ADMIN — LISINOPRIL 40 MG: 40 TABLET ORAL at 08:00

## 2022-03-24 RX ADMIN — HYDROCODONE BITARTRATE AND ACETAMINOPHEN 1 TABLET: 5; 325 TABLET ORAL at 11:04

## 2022-03-24 RX ADMIN — MULTIPLE VITAMINS W/ MINERALS TAB 1 TABLET: TAB at 07:58

## 2022-03-24 RX ADMIN — ROSUVASTATIN CALCIUM 20 MG: 20 TABLET, FILM COATED ORAL at 08:00

## 2022-03-24 RX ADMIN — ALPRAZOLAM 0.5 MG: 0.5 TABLET ORAL at 16:19

## 2022-03-24 RX ADMIN — OXCARBAZEPINE 300 MG: 300 TABLET, FILM COATED ORAL at 16:49

## 2022-03-25 PROBLEM — S76.111A QUADRICEPS TENDON RUPTURE, RIGHT, INITIAL ENCOUNTER: Status: ACTIVE | Noted: 2022-03-25

## 2022-03-25 LAB
ANION GAP SERPL CALCULATED.3IONS-SCNC: 16.3 MMOL/L (ref 5–15)
BASOPHILS # BLD AUTO: 0.04 10*3/MM3 (ref 0–0.2)
BASOPHILS NFR BLD AUTO: 0.4 % (ref 0–1.5)
BUN SERPL-MCNC: 10 MG/DL (ref 8–23)
BUN/CREAT SERPL: 12 (ref 7–25)
CALCIUM SPEC-SCNC: 9 MG/DL (ref 8.6–10.5)
CHLORIDE SERPL-SCNC: 94 MMOL/L (ref 98–107)
CO2 SERPL-SCNC: 24.7 MMOL/L (ref 22–29)
CREAT SERPL-MCNC: 0.83 MG/DL (ref 0.76–1.27)
DEPRECATED RDW RBC AUTO: 45.6 FL (ref 37–54)
EGFRCR SERPLBLD CKD-EPI 2021: 90.7 ML/MIN/1.73
EOSINOPHIL # BLD AUTO: 0.16 10*3/MM3 (ref 0–0.4)
EOSINOPHIL NFR BLD AUTO: 1.4 % (ref 0.3–6.2)
ERYTHROCYTE [DISTWIDTH] IN BLOOD BY AUTOMATED COUNT: 13.1 % (ref 12.3–15.4)
GLUCOSE SERPL-MCNC: 96 MG/DL (ref 65–99)
HCT VFR BLD AUTO: 38.9 % (ref 37.5–51)
HGB BLD-MCNC: 13.1 G/DL (ref 13–17.7)
IMM GRANULOCYTES # BLD AUTO: 0.04 10*3/MM3 (ref 0–0.05)
IMM GRANULOCYTES NFR BLD AUTO: 0.4 % (ref 0–0.5)
LYMPHOCYTES # BLD AUTO: 2.11 10*3/MM3 (ref 0.7–3.1)
LYMPHOCYTES NFR BLD AUTO: 18.6 % (ref 19.6–45.3)
MCH RBC QN AUTO: 32 PG (ref 26.6–33)
MCHC RBC AUTO-ENTMCNC: 33.7 G/DL (ref 31.5–35.7)
MCV RBC AUTO: 95.1 FL (ref 79–97)
MONOCYTES # BLD AUTO: 1.17 10*3/MM3 (ref 0.1–0.9)
MONOCYTES NFR BLD AUTO: 10.3 % (ref 5–12)
NEUTROPHILS NFR BLD AUTO: 68.9 % (ref 42.7–76)
NEUTROPHILS NFR BLD AUTO: 7.83 10*3/MM3 (ref 1.7–7)
NRBC BLD AUTO-RTO: 0 /100 WBC (ref 0–0.2)
PLATELET # BLD AUTO: 323 10*3/MM3 (ref 140–450)
PMV BLD AUTO: 9.6 FL (ref 6–12)
POTASSIUM SERPL-SCNC: 4.3 MMOL/L (ref 3.5–5.2)
RBC # BLD AUTO: 4.09 10*6/MM3 (ref 4.14–5.8)
SODIUM SERPL-SCNC: 135 MMOL/L (ref 136–145)
TSH SERPL DL<=0.05 MIU/L-ACNC: 4.18 UIU/ML (ref 0.27–4.2)
WBC NRBC COR # BLD: 11.35 10*3/MM3 (ref 3.4–10.8)

## 2022-03-25 PROCEDURE — 80048 BASIC METABOLIC PNL TOTAL CA: CPT | Performed by: INTERNAL MEDICINE

## 2022-03-25 PROCEDURE — 84443 ASSAY THYROID STIM HORMONE: CPT | Performed by: HOSPITALIST

## 2022-03-25 PROCEDURE — 85025 COMPLETE CBC W/AUTO DIFF WBC: CPT | Performed by: INTERNAL MEDICINE

## 2022-03-25 RX ORDER — CEFAZOLIN SODIUM 2 G/100ML
2 INJECTION, SOLUTION INTRAVENOUS
Status: COMPLETED | OUTPATIENT
Start: 2022-03-26 | End: 2022-03-26

## 2022-03-25 RX ORDER — OXCARBAZEPINE 300 MG/1
300 TABLET, FILM COATED ORAL
Status: DISCONTINUED | OUTPATIENT
Start: 2022-03-25 | End: 2022-03-25

## 2022-03-25 RX ORDER — OXCARBAZEPINE 300 MG/1
300 TABLET, FILM COATED ORAL 3 TIMES DAILY
Status: DISCONTINUED | OUTPATIENT
Start: 2022-03-25 | End: 2022-03-27 | Stop reason: HOSPADM

## 2022-03-25 RX ADMIN — OXCARBAZEPINE 300 MG: 300 TABLET, FILM COATED ORAL at 09:08

## 2022-03-25 RX ADMIN — Medication 10 ML: at 09:09

## 2022-03-25 RX ADMIN — ROSUVASTATIN CALCIUM 20 MG: 20 TABLET, FILM COATED ORAL at 09:08

## 2022-03-25 RX ADMIN — ALPRAZOLAM 0.5 MG: 0.5 TABLET ORAL at 00:40

## 2022-03-25 RX ADMIN — ALPRAZOLAM 0.5 MG: 0.5 TABLET ORAL at 20:05

## 2022-03-25 RX ADMIN — CLONIDINE HYDROCHLORIDE 0.1 MG: 0.1 TABLET ORAL at 20:11

## 2022-03-25 RX ADMIN — LISINOPRIL 40 MG: 40 TABLET ORAL at 09:08

## 2022-03-25 RX ADMIN — OXCARBAZEPINE 300 MG: 300 TABLET, FILM COATED ORAL at 20:05

## 2022-03-25 RX ADMIN — MULTIPLE VITAMINS W/ MINERALS TAB 1 TABLET: TAB at 09:08

## 2022-03-25 RX ADMIN — Medication 10 ML: at 20:06

## 2022-03-25 RX ADMIN — ACETAMINOPHEN 650 MG: 325 TABLET, FILM COATED ORAL at 20:05

## 2022-03-25 RX ADMIN — AMLODIPINE BESYLATE 10 MG: 10 TABLET ORAL at 09:11

## 2022-03-25 RX ADMIN — OXCARBAZEPINE 300 MG: 300 TABLET, FILM COATED ORAL at 00:40

## 2022-03-25 RX ADMIN — LEVOTHYROXINE SODIUM 75 MCG: 0.07 TABLET ORAL at 06:36

## 2022-03-26 ENCOUNTER — ANESTHESIA EVENT (OUTPATIENT)
Dept: PERIOP | Facility: HOSPITAL | Age: 77
End: 2022-03-26

## 2022-03-26 ENCOUNTER — ANESTHESIA (OUTPATIENT)
Dept: PERIOP | Facility: HOSPITAL | Age: 77
End: 2022-03-26

## 2022-03-26 LAB
ANION GAP SERPL CALCULATED.3IONS-SCNC: 13 MMOL/L (ref 5–15)
BASOPHILS # BLD AUTO: 0.04 10*3/MM3 (ref 0–0.2)
BASOPHILS NFR BLD AUTO: 0.5 % (ref 0–1.5)
BUN SERPL-MCNC: 11 MG/DL (ref 8–23)
BUN/CREAT SERPL: 12.2 (ref 7–25)
CALCIUM SPEC-SCNC: 8.6 MG/DL (ref 8.6–10.5)
CHLORIDE SERPL-SCNC: 95 MMOL/L (ref 98–107)
CO2 SERPL-SCNC: 22 MMOL/L (ref 22–29)
CREAT SERPL-MCNC: 0.9 MG/DL (ref 0.76–1.27)
DEPRECATED RDW RBC AUTO: 44.9 FL (ref 37–54)
EGFRCR SERPLBLD CKD-EPI 2021: 88.5 ML/MIN/1.73
EOSINOPHIL # BLD AUTO: 0.13 10*3/MM3 (ref 0–0.4)
EOSINOPHIL NFR BLD AUTO: 1.5 % (ref 0.3–6.2)
ERYTHROCYTE [DISTWIDTH] IN BLOOD BY AUTOMATED COUNT: 13.2 % (ref 12.3–15.4)
GLUCOSE SERPL-MCNC: 89 MG/DL (ref 65–99)
HCT VFR BLD AUTO: 33.9 % (ref 37.5–51)
HGB BLD-MCNC: 11.8 G/DL (ref 13–17.7)
IMM GRANULOCYTES # BLD AUTO: 0.02 10*3/MM3 (ref 0–0.05)
IMM GRANULOCYTES NFR BLD AUTO: 0.2 % (ref 0–0.5)
LYMPHOCYTES # BLD AUTO: 2.53 10*3/MM3 (ref 0.7–3.1)
LYMPHOCYTES NFR BLD AUTO: 29 % (ref 19.6–45.3)
MCH RBC QN AUTO: 32.5 PG (ref 26.6–33)
MCHC RBC AUTO-ENTMCNC: 34.8 G/DL (ref 31.5–35.7)
MCV RBC AUTO: 93.4 FL (ref 79–97)
MONOCYTES # BLD AUTO: 1.04 10*3/MM3 (ref 0.1–0.9)
MONOCYTES NFR BLD AUTO: 11.9 % (ref 5–12)
NEUTROPHILS NFR BLD AUTO: 4.96 10*3/MM3 (ref 1.7–7)
NEUTROPHILS NFR BLD AUTO: 56.9 % (ref 42.7–76)
NRBC BLD AUTO-RTO: 0 /100 WBC (ref 0–0.2)
PLATELET # BLD AUTO: 272 10*3/MM3 (ref 140–450)
PMV BLD AUTO: 9.5 FL (ref 6–12)
POTASSIUM SERPL-SCNC: 4.2 MMOL/L (ref 3.5–5.2)
RBC # BLD AUTO: 3.63 10*6/MM3 (ref 4.14–5.8)
SARS-COV-2 RNA PNL SPEC NAA+PROBE: NOT DETECTED
SODIUM SERPL-SCNC: 130 MMOL/L (ref 136–145)
WBC NRBC COR # BLD: 8.72 10*3/MM3 (ref 3.4–10.8)

## 2022-03-26 PROCEDURE — 25010000002 FENTANYL CITRATE (PF) 50 MCG/ML SOLUTION: Performed by: NURSE ANESTHETIST, CERTIFIED REGISTERED

## 2022-03-26 PROCEDURE — 25010000002 ONDANSETRON PER 1 MG: Performed by: NURSE ANESTHETIST, CERTIFIED REGISTERED

## 2022-03-26 PROCEDURE — C1889 IMPLANT/INSERT DEVICE, NOC: HCPCS | Performed by: ORTHOPAEDIC SURGERY

## 2022-03-26 PROCEDURE — 25010000002 HYDROMORPHONE PER 4 MG: Performed by: NURSE ANESTHETIST, CERTIFIED REGISTERED

## 2022-03-26 PROCEDURE — 0LQL0ZZ REPAIR RIGHT UPPER LEG TENDON, OPEN APPROACH: ICD-10-PCS | Performed by: ORTHOPAEDIC SURGERY

## 2022-03-26 PROCEDURE — 85025 COMPLETE CBC W/AUTO DIFF WBC: CPT | Performed by: INTERNAL MEDICINE

## 2022-03-26 PROCEDURE — L1833 KO ADJ JNT POS R SUP PRE OTS: HCPCS | Performed by: ORTHOPAEDIC SURGERY

## 2022-03-26 PROCEDURE — 80048 BASIC METABOLIC PNL TOTAL CA: CPT | Performed by: INTERNAL MEDICINE

## 2022-03-26 PROCEDURE — 25010000002 DEXAMETHASONE PER 1 MG: Performed by: NURSE ANESTHETIST, CERTIFIED REGISTERED

## 2022-03-26 PROCEDURE — 25010000002 PROPOFOL 10 MG/ML EMULSION: Performed by: NURSE ANESTHETIST, CERTIFIED REGISTERED

## 2022-03-26 PROCEDURE — 25010000002 KETOROLAC TROMETHAMINE PER 15 MG: Performed by: NURSE ANESTHETIST, CERTIFIED REGISTERED

## 2022-03-26 PROCEDURE — 87635 SARS-COV-2 COVID-19 AMP PRB: CPT | Performed by: ORTHOPAEDIC SURGERY

## 2022-03-26 PROCEDURE — 25010000002 CEFAZOLIN IN DEXTROSE 2-4 GM/100ML-% SOLUTION: Performed by: ORTHOPAEDIC SURGERY

## 2022-03-26 PROCEDURE — L1830 KO IMMOB CANVAS LONG PRE OTS: HCPCS | Performed by: ORTHOPAEDIC SURGERY

## 2022-03-26 DEVICE — DEV CONTRL TISS STRATAFIX SPIRAL PDS PLS CT1 2-0 45CM: Type: IMPLANTABLE DEVICE | Site: KNEE | Status: FUNCTIONAL

## 2022-03-26 DEVICE — FW BPB #5 SUTR,BLU W/NDL
Type: IMPLANTABLE DEVICE | Site: KNEE | Status: FUNCTIONAL
Brand: ARTHREX®

## 2022-03-26 RX ORDER — PROPOFOL 10 MG/ML
VIAL (ML) INTRAVENOUS AS NEEDED
Status: DISCONTINUED | OUTPATIENT
Start: 2022-03-26 | End: 2022-03-26 | Stop reason: SURG

## 2022-03-26 RX ORDER — MAGNESIUM HYDROXIDE 1200 MG/15ML
LIQUID ORAL AS NEEDED
Status: DISCONTINUED | OUTPATIENT
Start: 2022-03-26 | End: 2022-03-26 | Stop reason: HOSPADM

## 2022-03-26 RX ORDER — CEFAZOLIN SODIUM 2 G/100ML
2 INJECTION, SOLUTION INTRAVENOUS EVERY 8 HOURS
Status: COMPLETED | OUTPATIENT
Start: 2022-03-26 | End: 2022-03-27

## 2022-03-26 RX ORDER — ONDANSETRON 2 MG/ML
4 INJECTION INTRAMUSCULAR; INTRAVENOUS ONCE AS NEEDED
Status: DISCONTINUED | OUTPATIENT
Start: 2022-03-26 | End: 2022-03-26 | Stop reason: HOSPADM

## 2022-03-26 RX ORDER — MIDAZOLAM HYDROCHLORIDE 1 MG/ML
0.5 INJECTION INTRAMUSCULAR; INTRAVENOUS
Status: DISCONTINUED | OUTPATIENT
Start: 2022-03-26 | End: 2022-03-26 | Stop reason: HOSPADM

## 2022-03-26 RX ORDER — FLUMAZENIL 0.1 MG/ML
0.2 INJECTION INTRAVENOUS AS NEEDED
Status: DISCONTINUED | OUTPATIENT
Start: 2022-03-26 | End: 2022-03-26 | Stop reason: HOSPADM

## 2022-03-26 RX ORDER — FENTANYL CITRATE 50 UG/ML
50 INJECTION, SOLUTION INTRAMUSCULAR; INTRAVENOUS
Status: DISCONTINUED | OUTPATIENT
Start: 2022-03-26 | End: 2022-03-26 | Stop reason: HOSPADM

## 2022-03-26 RX ORDER — HYDROCODONE BITARTRATE AND ACETAMINOPHEN 7.5; 325 MG/1; MG/1
1 TABLET ORAL ONCE AS NEEDED
Status: DISCONTINUED | OUTPATIENT
Start: 2022-03-26 | End: 2022-03-26 | Stop reason: HOSPADM

## 2022-03-26 RX ORDER — OXYCODONE HYDROCHLORIDE AND ACETAMINOPHEN 5; 325 MG/1; MG/1
1 TABLET ORAL EVERY 4 HOURS PRN
Status: DISCONTINUED | OUTPATIENT
Start: 2022-03-26 | End: 2022-03-27 | Stop reason: HOSPADM

## 2022-03-26 RX ORDER — OXYCODONE HYDROCHLORIDE AND ACETAMINOPHEN 5; 325 MG/1; MG/1
2 TABLET ORAL EVERY 4 HOURS PRN
Status: DISCONTINUED | OUTPATIENT
Start: 2022-03-26 | End: 2022-03-27 | Stop reason: HOSPADM

## 2022-03-26 RX ORDER — ACETAMINOPHEN 325 MG/1
650 TABLET ORAL ONCE AS NEEDED
Status: DISCONTINUED | OUTPATIENT
Start: 2022-03-26 | End: 2022-03-26 | Stop reason: HOSPADM

## 2022-03-26 RX ORDER — KETOROLAC TROMETHAMINE 30 MG/ML
INJECTION, SOLUTION INTRAMUSCULAR; INTRAVENOUS AS NEEDED
Status: DISCONTINUED | OUTPATIENT
Start: 2022-03-26 | End: 2022-03-26 | Stop reason: SURG

## 2022-03-26 RX ORDER — SODIUM CHLORIDE 0.9 % (FLUSH) 0.9 %
3-10 SYRINGE (ML) INJECTION AS NEEDED
Status: DISCONTINUED | OUTPATIENT
Start: 2022-03-26 | End: 2022-03-26 | Stop reason: HOSPADM

## 2022-03-26 RX ORDER — SODIUM CHLORIDE, SODIUM LACTATE, POTASSIUM CHLORIDE, CALCIUM CHLORIDE 600; 310; 30; 20 MG/100ML; MG/100ML; MG/100ML; MG/100ML
9 INJECTION, SOLUTION INTRAVENOUS CONTINUOUS
Status: DISCONTINUED | OUTPATIENT
Start: 2022-03-26 | End: 2022-03-27 | Stop reason: HOSPADM

## 2022-03-26 RX ORDER — FAMOTIDINE 10 MG/ML
20 INJECTION, SOLUTION INTRAVENOUS ONCE
Status: COMPLETED | OUTPATIENT
Start: 2022-03-26 | End: 2022-03-26

## 2022-03-26 RX ORDER — LIDOCAINE HYDROCHLORIDE 20 MG/ML
INJECTION, SOLUTION INFILTRATION; PERINEURAL AS NEEDED
Status: DISCONTINUED | OUTPATIENT
Start: 2022-03-26 | End: 2022-03-26 | Stop reason: SURG

## 2022-03-26 RX ORDER — PROMETHAZINE HYDROCHLORIDE 25 MG/1
25 SUPPOSITORY RECTAL ONCE AS NEEDED
Status: DISCONTINUED | OUTPATIENT
Start: 2022-03-26 | End: 2022-03-26 | Stop reason: HOSPADM

## 2022-03-26 RX ORDER — GLYCOPYRROLATE 0.2 MG/ML
INJECTION INTRAMUSCULAR; INTRAVENOUS AS NEEDED
Status: DISCONTINUED | OUTPATIENT
Start: 2022-03-26 | End: 2022-03-26 | Stop reason: SURG

## 2022-03-26 RX ORDER — ONDANSETRON 2 MG/ML
INJECTION INTRAMUSCULAR; INTRAVENOUS AS NEEDED
Status: DISCONTINUED | OUTPATIENT
Start: 2022-03-26 | End: 2022-03-26 | Stop reason: SURG

## 2022-03-26 RX ORDER — DIPHENHYDRAMINE HCL 25 MG
25 CAPSULE ORAL
Status: DISCONTINUED | OUTPATIENT
Start: 2022-03-26 | End: 2022-03-26 | Stop reason: HOSPADM

## 2022-03-26 RX ORDER — BUPIVACAINE HYDROCHLORIDE AND EPINEPHRINE 2.5; 5 MG/ML; UG/ML
INJECTION, SOLUTION EPIDURAL; INFILTRATION; INTRACAUDAL; PERINEURAL AS NEEDED
Status: DISCONTINUED | OUTPATIENT
Start: 2022-03-26 | End: 2022-03-26 | Stop reason: HOSPADM

## 2022-03-26 RX ORDER — EPHEDRINE SULFATE 50 MG/ML
5 INJECTION, SOLUTION INTRAVENOUS ONCE AS NEEDED
Status: DISCONTINUED | OUTPATIENT
Start: 2022-03-26 | End: 2022-03-26 | Stop reason: HOSPADM

## 2022-03-26 RX ORDER — FENTANYL CITRATE 50 UG/ML
INJECTION, SOLUTION INTRAMUSCULAR; INTRAVENOUS AS NEEDED
Status: DISCONTINUED | OUTPATIENT
Start: 2022-03-26 | End: 2022-03-26 | Stop reason: SURG

## 2022-03-26 RX ORDER — LABETALOL HYDROCHLORIDE 5 MG/ML
5 INJECTION, SOLUTION INTRAVENOUS
Status: DISCONTINUED | OUTPATIENT
Start: 2022-03-26 | End: 2022-03-26 | Stop reason: HOSPADM

## 2022-03-26 RX ORDER — DIPHENHYDRAMINE HYDROCHLORIDE 50 MG/ML
12.5 INJECTION INTRAMUSCULAR; INTRAVENOUS
Status: DISCONTINUED | OUTPATIENT
Start: 2022-03-26 | End: 2022-03-26 | Stop reason: HOSPADM

## 2022-03-26 RX ORDER — HYDROMORPHONE HCL 110MG/55ML
PATIENT CONTROLLED ANALGESIA SYRINGE INTRAVENOUS AS NEEDED
Status: DISCONTINUED | OUTPATIENT
Start: 2022-03-26 | End: 2022-03-26 | Stop reason: SURG

## 2022-03-26 RX ORDER — HYDROMORPHONE HYDROCHLORIDE 1 MG/ML
0.5 INJECTION, SOLUTION INTRAMUSCULAR; INTRAVENOUS; SUBCUTANEOUS
Status: DISCONTINUED | OUTPATIENT
Start: 2022-03-26 | End: 2022-03-26 | Stop reason: HOSPADM

## 2022-03-26 RX ORDER — NALOXONE HCL 0.4 MG/ML
0.2 VIAL (ML) INJECTION AS NEEDED
Status: DISCONTINUED | OUTPATIENT
Start: 2022-03-26 | End: 2022-03-26 | Stop reason: HOSPADM

## 2022-03-26 RX ORDER — PROMETHAZINE HYDROCHLORIDE 25 MG/1
25 TABLET ORAL ONCE AS NEEDED
Status: DISCONTINUED | OUTPATIENT
Start: 2022-03-26 | End: 2022-03-26 | Stop reason: HOSPADM

## 2022-03-26 RX ORDER — SODIUM CHLORIDE 0.9 % (FLUSH) 0.9 %
3 SYRINGE (ML) INJECTION EVERY 12 HOURS SCHEDULED
Status: DISCONTINUED | OUTPATIENT
Start: 2022-03-26 | End: 2022-03-26 | Stop reason: HOSPADM

## 2022-03-26 RX ORDER — HYDRALAZINE HYDROCHLORIDE 20 MG/ML
5 INJECTION INTRAMUSCULAR; INTRAVENOUS
Status: DISCONTINUED | OUTPATIENT
Start: 2022-03-26 | End: 2022-03-26 | Stop reason: HOSPADM

## 2022-03-26 RX ORDER — HYDROCODONE BITARTRATE AND ACETAMINOPHEN 10; 325 MG/1; MG/1
1 TABLET ORAL EVERY 4 HOURS PRN
Status: DISCONTINUED | OUTPATIENT
Start: 2022-03-26 | End: 2022-03-26 | Stop reason: HOSPADM

## 2022-03-26 RX ORDER — DEXAMETHASONE SODIUM PHOSPHATE 10 MG/ML
INJECTION INTRAMUSCULAR; INTRAVENOUS AS NEEDED
Status: DISCONTINUED | OUTPATIENT
Start: 2022-03-26 | End: 2022-03-26 | Stop reason: SURG

## 2022-03-26 RX ORDER — LIDOCAINE HYDROCHLORIDE 10 MG/ML
0.5 INJECTION, SOLUTION EPIDURAL; INFILTRATION; INTRACAUDAL; PERINEURAL ONCE AS NEEDED
Status: DISCONTINUED | OUTPATIENT
Start: 2022-03-26 | End: 2022-03-26 | Stop reason: HOSPADM

## 2022-03-26 RX ADMIN — HYDROCODONE BITARTRATE AND ACETAMINOPHEN 2 TABLET: 5; 325 TABLET ORAL at 21:51

## 2022-03-26 RX ADMIN — ONDANSETRON 4 MG: 2 INJECTION INTRAMUSCULAR; INTRAVENOUS at 09:18

## 2022-03-26 RX ADMIN — DEXAMETHASONE SODIUM PHOSPHATE 8 MG: 10 INJECTION INTRAMUSCULAR; INTRAVENOUS at 09:07

## 2022-03-26 RX ADMIN — PROPOFOL 150 MG: 10 INJECTION, EMULSION INTRAVENOUS at 09:00

## 2022-03-26 RX ADMIN — LISINOPRIL 40 MG: 40 TABLET ORAL at 12:20

## 2022-03-26 RX ADMIN — OXCARBAZEPINE 300 MG: 300 TABLET, FILM COATED ORAL at 07:49

## 2022-03-26 RX ADMIN — GLYCOPYRROLATE 0.2 MG: 0.2 INJECTION INTRAMUSCULAR; INTRAVENOUS at 09:12

## 2022-03-26 RX ADMIN — HYDROMORPHONE HYDROCHLORIDE 0.5 MG: 1 INJECTION, SOLUTION INTRAMUSCULAR; INTRAVENOUS; SUBCUTANEOUS at 10:45

## 2022-03-26 RX ADMIN — SODIUM CHLORIDE, POTASSIUM CHLORIDE, SODIUM LACTATE AND CALCIUM CHLORIDE 9 ML/HR: 600; 310; 30; 20 INJECTION, SOLUTION INTRAVENOUS at 08:48

## 2022-03-26 RX ADMIN — HYDROMORPHONE HYDROCHLORIDE 0.5 MG: 2 INJECTION, SOLUTION INTRAMUSCULAR; INTRAVENOUS; SUBCUTANEOUS at 09:31

## 2022-03-26 RX ADMIN — MULTIPLE VITAMINS W/ MINERALS TAB 1 TABLET: TAB at 06:01

## 2022-03-26 RX ADMIN — KETOROLAC TROMETHAMINE 30 MG: 30 INJECTION, SOLUTION INTRAMUSCULAR at 09:31

## 2022-03-26 RX ADMIN — LIDOCAINE HYDROCHLORIDE 100 MG: 20 INJECTION, SOLUTION INFILTRATION; PERINEURAL at 09:00

## 2022-03-26 RX ADMIN — CLONIDINE HYDROCHLORIDE 0.1 MG: 0.1 TABLET ORAL at 13:40

## 2022-03-26 RX ADMIN — ALPRAZOLAM 0.5 MG: 0.5 TABLET ORAL at 15:54

## 2022-03-26 RX ADMIN — HYDROCODONE BITARTRATE AND ACETAMINOPHEN 2 TABLET: 5; 325 TABLET ORAL at 13:50

## 2022-03-26 RX ADMIN — LEVOTHYROXINE SODIUM 75 MCG: 0.07 TABLET ORAL at 06:01

## 2022-03-26 RX ADMIN — CEFAZOLIN SODIUM 2 G: 2 INJECTION, SOLUTION INTRAVENOUS at 20:48

## 2022-03-26 RX ADMIN — HYDROCODONE BITARTRATE AND ACETAMINOPHEN 2 TABLET: 5; 325 TABLET ORAL at 17:44

## 2022-03-26 RX ADMIN — AMLODIPINE BESYLATE 10 MG: 10 TABLET ORAL at 06:01

## 2022-03-26 RX ADMIN — HYDROCODONE BITARTRATE AND ACETAMINOPHEN 1 TABLET: 10; 325 TABLET ORAL at 10:58

## 2022-03-26 RX ADMIN — FENTANYL CITRATE 25 MCG: 0.05 INJECTION, SOLUTION INTRAMUSCULAR; INTRAVENOUS at 09:19

## 2022-03-26 RX ADMIN — CLONIDINE HYDROCHLORIDE 0.1 MG: 0.1 TABLET ORAL at 07:16

## 2022-03-26 RX ADMIN — CEFAZOLIN SODIUM 2 G: 2 INJECTION, SOLUTION INTRAVENOUS at 14:04

## 2022-03-26 RX ADMIN — ALPRAZOLAM 0.5 MG: 0.5 TABLET ORAL at 07:16

## 2022-03-26 RX ADMIN — FENTANYL CITRATE 50 MCG: 0.05 INJECTION, SOLUTION INTRAMUSCULAR; INTRAVENOUS at 09:14

## 2022-03-26 RX ADMIN — FENTANYL CITRATE 25 MCG: 0.05 INJECTION, SOLUTION INTRAMUSCULAR; INTRAVENOUS at 09:08

## 2022-03-26 RX ADMIN — CEFAZOLIN SODIUM 2 G: 2 INJECTION, SOLUTION INTRAVENOUS at 08:48

## 2022-03-26 RX ADMIN — FENTANYL CITRATE 50 MCG: 50 INJECTION, SOLUTION INTRAMUSCULAR; INTRAVENOUS at 10:26

## 2022-03-26 RX ADMIN — FAMOTIDINE 20 MG: 10 INJECTION INTRAVENOUS at 08:47

## 2022-03-26 RX ADMIN — OXCARBAZEPINE 300 MG: 300 TABLET, FILM COATED ORAL at 15:54

## 2022-03-26 NOTE — ANESTHESIA PREPROCEDURE EVALUATION
Anesthesia Evaluation     Patient summary reviewed and Nursing notes reviewed   no history of anesthetic complications:  NPO Solid Status: > 8 hours  NPO Liquid Status: > 8 hours           Airway   Mallampati: II  Dental - normal exam     Pulmonary - normal exam   (+) a smoker Former,   Cardiovascular - normal exam    (+) hypertension 2 medications or greater, hyperlipidemia,       Neuro/Psych  (+) headaches, numbness, psychiatric history Anxiety and Depression,    GI/Hepatic/Renal/Endo    (+) obesity,   renal disease CRI,     Musculoskeletal     Abdominal    Substance History      OB/GYN          Other   arthritis,                      Anesthesia Plan    ASA 3     general     intravenous induction     Anesthetic plan, all risks, benefits, and alternatives have been provided, discussed and informed consent has been obtained with: patient.        CODE STATUS:    Code Status (Patient has no pulse and is not breathing): CPR (Attempt to Resuscitate)  Medical Interventions (Patient has pulse or is breathing): Full Support

## 2022-03-26 NOTE — ANESTHESIA POSTPROCEDURE EVALUATION
"Patient: Jeremi Ugarte    Procedure Summary     Date: 03/26/22 Room / Location: [facility] OR [address] [place] [address] MAIN OR    Anesthesia Start: 0855 Anesthesia Stop: 1005    Procedure: RIGHT QUADRICEPS TENDON REPAIR (Right Thigh) Diagnosis:       Sprain of right knee, unspecified ligament, initial encounter      (Sprain of right knee, unspecified ligament, initial encounter [S83.91XA])    Surgeons: Keaton Martinez II, MD Provider: Flex Cochran MD    Anesthesia Type: general ASA Status: 3          Anesthesia Type: general    Vitals  Vitals Value Taken Time   /89 03/26/22 1046   Temp 36.9 °C (98.5 °F) 03/26/22 1002   Pulse 91 03/26/22 1049   Resp 16 03/26/22 1045   SpO2 93 % 03/26/22 1049   Vitals shown include unvalidated device data.        Post Anesthesia Care and Evaluation    Patient location during evaluation: bedside  Patient participation: complete - patient participated  Level of consciousness: awake and alert  Pain management: adequate  Airway patency: patent  Anesthetic complications: No anesthetic complications  PONV Status: none  Cardiovascular status: acceptable  Respiratory status: acceptable  Hydration status: acceptable    Comments: /89   Pulse 93   Temp 36.9 °C (98.5 °F) (Oral)   Resp 16   Ht 175.3 cm (69\")   Wt 102 kg (225 lb)   SpO2 94%   BMI 33.23 kg/m²         "

## 2022-03-26 NOTE — ANESTHESIA PROCEDURE NOTES
Airway  Urgency: elective    Date/Time: 3/26/2022 9:02 AM    General Information and Staff    Patient location during procedure: OR  Anesthesiologist: Flex Cochran MD  CRNA: Nya Zhao CRNA    Indications and Patient Condition  Indications for airway management: airway protection    Preoxygenated: yes  Mask difficulty assessment: 0 - not attempted    Final Airway Details  Final airway type: supraglottic airway      Successful airway: unique  Size 5    Number of attempts at approach: 1  Assessment: lips, teeth, and gum same as pre-op and atraumatic intubation

## 2022-03-27 ENCOUNTER — READMISSION MANAGEMENT (OUTPATIENT)
Dept: CALL CENTER | Facility: HOSPITAL | Age: 77
End: 2022-03-27

## 2022-03-27 VITALS
DIASTOLIC BLOOD PRESSURE: 78 MMHG | HEIGHT: 69 IN | SYSTOLIC BLOOD PRESSURE: 153 MMHG | HEART RATE: 83 BPM | OXYGEN SATURATION: 91 % | RESPIRATION RATE: 16 BRPM | TEMPERATURE: 97.6 F | WEIGHT: 225 LBS | BODY MASS INDEX: 33.33 KG/M2

## 2022-03-27 LAB
ANION GAP SERPL CALCULATED.3IONS-SCNC: 10 MMOL/L (ref 5–15)
BASOPHILS # BLD AUTO: 0.03 10*3/MM3 (ref 0–0.2)
BASOPHILS NFR BLD AUTO: 0.3 % (ref 0–1.5)
BUN SERPL-MCNC: 12 MG/DL (ref 8–23)
BUN/CREAT SERPL: 12 (ref 7–25)
CALCIUM SPEC-SCNC: 8.1 MG/DL (ref 8.6–10.5)
CHLORIDE SERPL-SCNC: 90 MMOL/L (ref 98–107)
CO2 SERPL-SCNC: 25 MMOL/L (ref 22–29)
CREAT SERPL-MCNC: 1 MG/DL (ref 0.76–1.27)
DEPRECATED RDW RBC AUTO: 41.9 FL (ref 37–54)
EGFRCR SERPLBLD CKD-EPI 2021: 78 ML/MIN/1.73
EOSINOPHIL # BLD AUTO: 0.05 10*3/MM3 (ref 0–0.4)
EOSINOPHIL NFR BLD AUTO: 0.4 % (ref 0.3–6.2)
ERYTHROCYTE [DISTWIDTH] IN BLOOD BY AUTOMATED COUNT: 12.8 % (ref 12.3–15.4)
GLUCOSE SERPL-MCNC: 102 MG/DL (ref 65–99)
HCT VFR BLD AUTO: 31.9 % (ref 37.5–51)
HGB BLD-MCNC: 11.3 G/DL (ref 13–17.7)
IMM GRANULOCYTES # BLD AUTO: 0.05 10*3/MM3 (ref 0–0.05)
IMM GRANULOCYTES NFR BLD AUTO: 0.4 % (ref 0–0.5)
LYMPHOCYTES # BLD AUTO: 2.8 10*3/MM3 (ref 0.7–3.1)
LYMPHOCYTES NFR BLD AUTO: 24.2 % (ref 19.6–45.3)
MCH RBC QN AUTO: 32.2 PG (ref 26.6–33)
MCHC RBC AUTO-ENTMCNC: 35.4 G/DL (ref 31.5–35.7)
MCV RBC AUTO: 90.9 FL (ref 79–97)
MONOCYTES # BLD AUTO: 1.42 10*3/MM3 (ref 0.1–0.9)
MONOCYTES NFR BLD AUTO: 12.3 % (ref 5–12)
NEUTROPHILS NFR BLD AUTO: 62.4 % (ref 42.7–76)
NEUTROPHILS NFR BLD AUTO: 7.21 10*3/MM3 (ref 1.7–7)
NRBC BLD AUTO-RTO: 0 /100 WBC (ref 0–0.2)
PLATELET # BLD AUTO: 352 10*3/MM3 (ref 140–450)
PMV BLD AUTO: 9.4 FL (ref 6–12)
POTASSIUM SERPL-SCNC: 4 MMOL/L (ref 3.5–5.2)
RBC # BLD AUTO: 3.51 10*6/MM3 (ref 4.14–5.8)
SODIUM SERPL-SCNC: 125 MMOL/L (ref 136–145)
WBC NRBC COR # BLD: 11.56 10*3/MM3 (ref 3.4–10.8)

## 2022-03-27 PROCEDURE — 85025 COMPLETE CBC W/AUTO DIFF WBC: CPT | Performed by: ORTHOPAEDIC SURGERY

## 2022-03-27 PROCEDURE — 97161 PT EVAL LOW COMPLEX 20 MIN: CPT | Performed by: PHYSICAL THERAPIST

## 2022-03-27 PROCEDURE — 80048 BASIC METABOLIC PNL TOTAL CA: CPT | Performed by: ORTHOPAEDIC SURGERY

## 2022-03-27 PROCEDURE — 97116 GAIT TRAINING THERAPY: CPT | Performed by: PHYSICAL THERAPIST

## 2022-03-27 PROCEDURE — 25010000002 CEFAZOLIN IN DEXTROSE 2-4 GM/100ML-% SOLUTION: Performed by: ORTHOPAEDIC SURGERY

## 2022-03-27 PROCEDURE — 97110 THERAPEUTIC EXERCISES: CPT | Performed by: PHYSICAL THERAPIST

## 2022-03-27 PROCEDURE — 97530 THERAPEUTIC ACTIVITIES: CPT | Performed by: PHYSICAL THERAPIST

## 2022-03-27 RX ORDER — HYDROCODONE BITARTRATE AND ACETAMINOPHEN 5; 325 MG/1; MG/1
1-2 TABLET ORAL EVERY 4 HOURS PRN
Qty: 24 TABLET | Refills: 0 | Status: SHIPPED | OUTPATIENT
Start: 2022-03-27 | End: 2022-03-31

## 2022-03-27 RX ADMIN — LISINOPRIL 40 MG: 40 TABLET ORAL at 08:03

## 2022-03-27 RX ADMIN — ALPRAZOLAM 0.5 MG: 0.5 TABLET ORAL at 00:05

## 2022-03-27 RX ADMIN — HYDROCODONE BITARTRATE AND ACETAMINOPHEN 2 TABLET: 5; 325 TABLET ORAL at 12:11

## 2022-03-27 RX ADMIN — CEFAZOLIN SODIUM 2 G: 2 INJECTION, SOLUTION INTRAVENOUS at 05:48

## 2022-03-27 RX ADMIN — ALPRAZOLAM 0.5 MG: 0.5 TABLET ORAL at 08:03

## 2022-03-27 RX ADMIN — MULTIPLE VITAMINS W/ MINERALS TAB 1 TABLET: TAB at 08:03

## 2022-03-27 RX ADMIN — OXCARBAZEPINE 300 MG: 300 TABLET, FILM COATED ORAL at 00:05

## 2022-03-27 RX ADMIN — OXCARBAZEPINE 300 MG: 300 TABLET, FILM COATED ORAL at 08:03

## 2022-03-27 RX ADMIN — ROSUVASTATIN CALCIUM 20 MG: 20 TABLET, FILM COATED ORAL at 08:03

## 2022-03-27 RX ADMIN — HYDROCODONE BITARTRATE AND ACETAMINOPHEN 2 TABLET: 5; 325 TABLET ORAL at 05:48

## 2022-03-27 RX ADMIN — AMLODIPINE BESYLATE 10 MG: 10 TABLET ORAL at 08:03

## 2022-03-27 RX ADMIN — LEVOTHYROXINE SODIUM 75 MCG: 0.07 TABLET ORAL at 05:48

## 2022-03-27 NOTE — OUTREACH NOTE
Prep Survey    Flowsheet Row Responses   [facility] facility patient discharged from? [facility]   Is LACE score < 7 ? No   Emergency Room discharge w/ pulse ox? No   Eligibility [facility]   Date of Admission 03/22/22   Date of Discharge 03/27/22   Discharge Disposition Home-Health Care Sv   Discharge diagnosis Open Right Quadriceps Tendon Tear Repair   Does the patient have one of the following disease processes/diagnoses(primary or secondary)? Other   Does the patient have Home health ordered? Yes   What is the Home health agency?  [facility]   Is there a DME ordered? No   Prep survey completed? Yes          ADA PALMER - Registered Nurse

## 2022-03-28 ENCOUNTER — TRANSITIONAL CARE MANAGEMENT TELEPHONE ENCOUNTER (OUTPATIENT)
Dept: CALL CENTER | Facility: HOSPITAL | Age: 77
End: 2022-03-28

## 2022-03-28 NOTE — OUTREACH NOTE
Call Center TCM Note    Flowsheet Row Responses   [facility] patient discharged from? [place]   Does the patient have one of the following disease processes/diagnoses(primary or secondary)? Other   TCM attempt successful? Yes   Call start time 1553   Call end time 1559   Discharge diagnosis Open Right Quadriceps Tendon Tear Repair   Person spoke with today (if not patient) and relationship spouse Vidya Romero reviewed with patient/caregiver? Yes   Is the patient having any side effects they believe may be caused by any medication additions or changes? No   Does the patient have all medications ordered at discharge? Yes   Is the patient taking all medications as directed (includes completed medication regime)? Yes   Does the patient have a primary care provider?  Yes   Does the patient have an appointment with their PCP within 7 days of discharge? Yes   Comments regarding PCP Declines PCP followup appt at this time. Wife reports he will followup with surgeon.    Has the patient kept scheduled appointments due by today? N/A   What is the Home health agency?  [facility]   Psychosocial issues? No   Did the patient receive a copy of their discharge instructions? Yes   Nursing interventions Reviewed instructions with patient   What is the patient's perception of their health status since discharge? Improving   Is the patient/caregiver able to teach back signs and symptoms related to disease process for when to call PCP? Yes   Is the patient/caregiver able to teach back signs and symptoms related to disease process for when to call 911? Yes   Is the patient/caregiver able to teach back the hierarchy of who to call/visit for symptoms/problems? PCP, Specialist, Home health nurse, Urgent Care, ED, 911 Yes   If the patient is a current smoker, are they able to teach back resources for cessation? Not a smoker   TCM call completed? Yes   Wrap up additional comments Per spouse he is doing well. He has not had a  bowel movement since discharge. Encouraged her to speak with doctor concerning laxative and stool softener. She verbalized understanding,          Jose F Burrows RN    3/28/2022, 15:59 EDT

## 2022-04-06 ENCOUNTER — READMISSION MANAGEMENT (OUTPATIENT)
Dept: CALL CENTER | Facility: HOSPITAL | Age: 77
End: 2022-04-06

## 2022-04-06 NOTE — OUTREACH NOTE
Medical Week 2 Survey    Flowsheet Row Responses   [facility] patient discharged from? [place]   Does the patient have one of the following disease processes/diagnoses(primary or secondary)? Other   Week 2 attempt successful? Yes   Call start time 1441   Discharge diagnosis Open Right Quadriceps Tendon Tear Repair   Call end time 1445   Meds reviewed with patient/caregiver? Yes   Is the patient having any side effects they believe may be caused by any medication additions or changes? No   Is the patient taking all medications as directed (includes completed medication regime)? Yes   Comments regarding appointments Ortho appt 4/13/22   Does the patient have a primary care provider?  Yes   Does the patient have an appointment with their PCP within 7 days of discharge? No   What is preventing the patient from scheduling follow up appointments within 7 days of discharge? Haven't had time   Nursing Interventions Educated patient on importance of making appointment   Has the patient kept scheduled appointments due by today? N/A   What is the Home health agency?  [facility]   Has home health visited the patient within 72 hours of discharge? Yes   Psychosocial issues? No   What is the patient's perception of their health status since discharge? Improving   Is the patient/caregiver able to teach back the hierarchy of who to call/visit for symptoms/problems? PCP, Specialist, Home health nurse, Urgent Care, ED, 911 Yes   Week 2 Call Completed? Yes          IVANNA PALMER - Registered Nurse

## 2022-04-18 ENCOUNTER — READMISSION MANAGEMENT (OUTPATIENT)
Dept: CALL CENTER | Facility: HOSPITAL | Age: 77
End: 2022-04-18

## 2022-04-18 NOTE — OUTREACH NOTE
Medical Week 3 Survey    Flowsheet Row Responses   [facility] patient discharged from? [place]   Does the patient have one of the following disease processes/diagnoses(primary or secondary)? Other   Week 3 attempt successful? Yes   Call start time 1431   Call end time 1444   Discharge diagnosis Open Right Quadriceps Tendon Tear Repair   Person spoke with today (if not patient) and relationship Patient and spouse Vidya Romero reviewed with patient/caregiver? Yes   Is the patient taking all medications as directed (includes completed medication regime)? Yes   Has the patient kept scheduled appointments due by today? Yes   Nursing interventions Educated on MyChart   What is the patient's perception of their health status since discharge? Worsening  [Pt's knee buckled one day last week. He didn't fall but he tore his tendon in his quadriceps last week. He's in the wheelchair and nonbearing for 4 weeks.]   Week 3 Call Completed? Yes          ADINA RODRIGUEZ - Registered Nurse

## 2022-04-27 ENCOUNTER — READMISSION MANAGEMENT (OUTPATIENT)
Dept: CALL CENTER | Facility: HOSPITAL | Age: 77
End: 2022-04-27

## 2022-04-27 NOTE — OUTREACH NOTE
Medical Week 4 Survey    Flowsheet Row Responses   [facility] patient discharged from? [place]   Does the patient have one of the following disease processes/diagnoses(primary or secondary)? Other   Week 4 attempt successful? Yes   Call start time 1646   Call end time 1647   Discharge diagnosis Open Right Quadriceps Tendon Tear Repair   Meds reviewed with patient/caregiver? Yes   Is the patient taking all medications as directed (includes completed medication regime)? Yes   Has the patient kept scheduled appointments due by today? Yes   Psychosocial issues? No   Comments Possibly to have surgery May 17. Denies pain or other issues.   What is the patient's perception of their health status since discharge? Improving   Is the patient/caregiver able to teach back signs and symptoms related to disease process for when to call PCP? Yes   Week 4 Call Completed? Yes   Would the patient like one additional call? No   Graduated Yes   Is the patient interested in additional calls from an ambulatory ?  NOTE:  applies to high risk patients requiring additional follow-up. No   Did the patient feel the follow up calls were helpful during their recovery period? Yes          KENNEDY BETANCOURT - Registered Nurse

## 2022-05-03 PROBLEM — T84.033A MECHANICAL LOOSENING OF INTERNAL LEFT KNEE PROSTHETIC JOINT: Status: ACTIVE | Noted: 2022-05-03

## 2022-05-05 ENCOUNTER — PRE-ADMISSION TESTING (OUTPATIENT)
Dept: PREADMISSION TESTING | Facility: HOSPITAL | Age: 77
End: 2022-05-05

## 2022-05-05 ENCOUNTER — HOSPITAL ENCOUNTER (OUTPATIENT)
Dept: GENERAL RADIOLOGY | Facility: HOSPITAL | Age: 77
Discharge: HOME | End: 2022-05-05

## 2022-05-05 VITALS
HEIGHT: 69 IN | DIASTOLIC BLOOD PRESSURE: 82 MMHG | OXYGEN SATURATION: 95 % | WEIGHT: 200 LBS | TEMPERATURE: 98.1 F | BODY MASS INDEX: 29.62 KG/M2 | RESPIRATION RATE: 16 BRPM | HEART RATE: 70 BPM | SYSTOLIC BLOOD PRESSURE: 142 MMHG

## 2022-05-05 DIAGNOSIS — Z96.651 STATUS POST REVISION OF TOTAL REPLACEMENT OF RIGHT KNEE: ICD-10-CM

## 2022-05-05 DIAGNOSIS — S76.111S QUADRICEPS TENDON RUPTURE, RIGHT, SEQUELA: ICD-10-CM

## 2022-05-05 PROBLEM — M17.9 OSTEOARTHRITIS, KNEE: Status: ACTIVE | Noted: 2022-05-05

## 2022-05-05 LAB
ALBUMIN SERPL-MCNC: 4.2 G/DL (ref 3.5–5.2)
ALBUMIN/GLOB SERPL: 2 G/DL
ALP SERPL-CCNC: 88 U/L (ref 39–117)
ALT SERPL W P-5'-P-CCNC: 15 U/L (ref 1–41)
ANION GAP SERPL CALCULATED.3IONS-SCNC: 12 MMOL/L (ref 5–15)
APTT PPP: 27.1 SECONDS (ref 22.7–35.4)
AST SERPL-CCNC: 17 U/L (ref 1–40)
BACTERIA UR QL AUTO: NORMAL /HPF
BILIRUB SERPL-MCNC: 0.2 MG/DL (ref 0–1.2)
BILIRUB UR QL STRIP: NEGATIVE
BUN SERPL-MCNC: 15 MG/DL (ref 8–23)
BUN/CREAT SERPL: 16.1 (ref 7–25)
CALCIUM SPEC-SCNC: 9.1 MG/DL (ref 8.6–10.5)
CHLORIDE SERPL-SCNC: 88 MMOL/L (ref 98–107)
CLARITY UR: CLEAR
CO2 SERPL-SCNC: 26 MMOL/L (ref 22–29)
COLOR UR: YELLOW
CREAT SERPL-MCNC: 0.93 MG/DL (ref 0.76–1.27)
CRP SERPL-MCNC: 0.75 MG/DL (ref 0–0.5)
DEPRECATED RDW RBC AUTO: 44.8 FL (ref 37–54)
EGFRCR SERPLBLD CKD-EPI 2021: 84.6 ML/MIN/1.73
ERYTHROCYTE [DISTWIDTH] IN BLOOD BY AUTOMATED COUNT: 13.2 % (ref 12.3–15.4)
ERYTHROCYTE [SEDIMENTATION RATE] IN BLOOD: 5 MM/HR (ref 0–20)
GLOBULIN UR ELPH-MCNC: 2.1 GM/DL
GLUCOSE SERPL-MCNC: 99 MG/DL (ref 65–99)
GLUCOSE UR STRIP-MCNC: NEGATIVE MG/DL
HCT VFR BLD AUTO: 38 % (ref 37.5–51)
HGB BLD-MCNC: 13.5 G/DL (ref 13–17.7)
HGB UR QL STRIP.AUTO: NEGATIVE
HYALINE CASTS UR QL AUTO: NORMAL /LPF
INR PPP: 0.93 (ref 0.9–1.1)
KETONES UR QL STRIP: ABNORMAL
LEUKOCYTE ESTERASE UR QL STRIP.AUTO: ABNORMAL
MCH RBC QN AUTO: 33 PG (ref 26.6–33)
MCHC RBC AUTO-ENTMCNC: 35.5 G/DL (ref 31.5–35.7)
MCV RBC AUTO: 92.9 FL (ref 79–97)
NITRITE UR QL STRIP: NEGATIVE
PH UR STRIP.AUTO: 6.5 [PH] (ref 5–8)
PLATELET # BLD AUTO: 323 10*3/MM3 (ref 140–450)
PMV BLD AUTO: 8.8 FL (ref 6–12)
POTASSIUM SERPL-SCNC: 4.3 MMOL/L (ref 3.5–5.2)
PROT SERPL-MCNC: 6.3 G/DL (ref 6–8.5)
PROT UR QL STRIP: NEGATIVE
PROTHROMBIN TIME: 12.4 SECONDS (ref 11.7–14.2)
RBC # BLD AUTO: 4.09 10*6/MM3 (ref 4.14–5.8)
RBC # UR STRIP: NORMAL /HPF
REF LAB TEST METHOD: NORMAL
SODIUM SERPL-SCNC: 126 MMOL/L (ref 136–145)
SP GR UR STRIP: 1.02 (ref 1–1.03)
SQUAMOUS #/AREA URNS HPF: NORMAL /HPF
UROBILINOGEN UR QL STRIP: ABNORMAL
WBC # UR STRIP: NORMAL /HPF
WBC NRBC COR # BLD: 6.16 10*3/MM3 (ref 3.4–10.8)

## 2022-05-05 PROCEDURE — 71046 X-RAY EXAM CHEST 2 VIEWS: CPT

## 2022-05-05 PROCEDURE — 85610 PROTHROMBIN TIME: CPT

## 2022-05-05 PROCEDURE — 36415 COLL VENOUS BLD VENIPUNCTURE: CPT

## 2022-05-05 PROCEDURE — 86140 C-REACTIVE PROTEIN: CPT

## 2022-05-05 PROCEDURE — 85652 RBC SED RATE AUTOMATED: CPT

## 2022-05-05 PROCEDURE — 85730 THROMBOPLASTIN TIME PARTIAL: CPT

## 2022-05-05 PROCEDURE — 81001 URINALYSIS AUTO W/SCOPE: CPT

## 2022-05-05 PROCEDURE — 80053 COMPREHEN METABOLIC PANEL: CPT

## 2022-05-05 PROCEDURE — 85027 COMPLETE CBC AUTOMATED: CPT

## 2022-05-05 RX ORDER — CHLORHEXIDINE GLUCONATE 500 MG/1
CLOTH TOPICAL 2 TIMES DAILY
Status: ACTIVE | OUTPATIENT
Start: 2022-05-05

## 2022-05-05 RX ORDER — HYDROCHLOROTHIAZIDE 25 MG/1
25 TABLET ORAL DAILY
COMMUNITY
End: 2022-05-12

## 2022-05-05 RX ORDER — CHLORHEXIDINE GLUCONATE 500 MG/1
CLOTH TOPICAL 2 TIMES DAILY
Status: CANCELLED | OUTPATIENT
Start: 2022-05-05

## 2022-05-05 ASSESSMENT — KOOS JR
KOOS JR SCORE: 28
KOOS JR SCORE: 0

## 2022-05-05 NOTE — DISCHARGE INSTRUCTIONS
CHLORHEXIDINE CLOTH INSTRUCTIONS  The morning of surgery follow these instructions using the Chlorhexidine cloths you've been given.  These steps reduce bacteria on the body.  Do not use the cloths near your eyes, ears mouth, genitalia or on open wounds.  Throw the cloths away after use but do not try to flush them down a toilet.      Open and remove one cloth at a time from the package.    Leave the cloth unfolded and begin the bathing.  Massage the skin with the cloths using gentle pressure to remove bacteria.  Do not scrub harshly.   Follow the steps below with one 2% CHG cloth per area (6 total cloths).  One cloth for neck, shoulders and chest.  One cloth for both arms, hands, fingers and underarms (do underarms last).  One cloth for the abdomen followed by groin.  One cloth for right leg and foot including between the toes.  One cloth for left leg and foot including between the toes.  The last cloth is to be used for the back of the neck, back and buttocks.    Allow the CHG to air dry 3 minutes on the skin which will give it time to work and decrease the chance of irritation.  The skin may feel sticky until it is dry.  Do not rinse with water or any other liquid or you will lose the beneficial effects of the CHG.  If mild skin irritation occurs, do rinse the skin to remove the CHG.  Report this to the nurse at time of admission.  Do not apply lotions, creams, ointments, deodorants or perfumes after using the clothes. Dress in clean clothes before coming to the hospital.    BACTROBAN NASAL OINTMENT  There are many germs normally in your nose. Bactroban is an ointment that will help reduce these germs. Please follow these instructions for Bactroban use:      ____The day before surgery in the morning  Date___5/16_____    ____The day before surgery in the evening              Date___5/16_____    ____The day of surgery in the morning    Date____5/17____    **Squirt ½ package of Bactroban Ointment onto a cotton  applicator and apply to inside of 1st nostril.  Squirt the remaining Bactroban and apply to the inside of the other nostril.      Take the following medications the morning of surgery: LEVOTHYROXINE, TRILEPTAL AND AMLODIPINE    ARRIVAL TIME 09:00      If you are on prescription narcotic pain medication to control your pain you may also take that medication the morning of surgery.    General Instructions:  Do not eat solid food after midnight the night before surgery.  You may drink clear liquids day of surgery but must stop at least one hour before your hospital arrival time.  It is beneficial for you to have a clear drink that contains carbohydrates the day of surgery.  We suggest a 12 to 20 ounce bottle of Gatorade or Powerade for non-diabetic patients or a 12 to 20 ounce bottle of G2 or Powerade Zero for diabetic patients. (Pediatric patients, are not advised to drink a 12 to 20 ounce carbohydrate drink)    Clear liquids are liquids you can see through.  Nothing red in color.     Plain water                               Sports drinks  Sodas                                   Gelatin (Jell-O)  Fruit juices without pulp such as white grape juice and apple juice  Popsicles that contain no fruit or yogurt  Tea or coffee (no cream or milk added)  Gatorade / Powerade  G2 / Powerade Zero      Patients who avoid smoking, chewing tobacco and alcohol for 4 weeks prior to surgery have a reduced risk of post-operative complications.  Quit smoking as many days before surgery as you can.  Do not smoke, use chewing tobacco or drink alcohol the day of surgery.   If applicable bring your C-PAP/ BI-PAP machine.  Bring any papers given to you in the doctor’s office.  Wear clean comfortable clothes.  Do not wear contact lenses, false eyelashes or make-up.  Bring a case for your glasses.   Bring crutches or walker if applicable.  Remove all piercings.  Leave jewelry and any other valuables at home.  Hair extensions with metal clips  must be removed prior to surgery.  The Pre-Admission Testing nurse will instruct you to bring medications if unable to obtain an accurate list in Pre-Admission Testing.        Preventing a Surgical Site Infection:  For 2 to 3 days before surgery, avoid shaving with a razor because the razor can irritate skin and make it easier to develop an infection.    Any areas of open skin can increase the risk of a post-operative wound infection by allowing bacteria to enter and travel throughout the body.  Notify your surgeon if you have any skin wounds / rashes even if it is not near the expected surgical site.  The area will need assessed to determine if surgery should be delayed until it is healed.  The night prior to surgery shower using a fresh bar of anti-bacterial soap (such as Dial) and clean washcloth.  Sleep in a clean bed with clean clothing.  Do not allow pets to sleep with you.  Shower on the morning of surgery using a fresh bar of anti-bacterial soap (such as Dial) and clean washcloth.  Dry with a clean towel and dress in clean clothing.  Ask your surgeon if you will be receiving antibiotics prior to surgery.  Make sure you, your family, and all healthcare providers clean their hands with soap and water or an alcohol based hand  before caring for you or your wound.    Day of surgery:  Your arrival time is approximately two hours before your scheduled surgery time.  Upon arrival, a Pre-op nurse and Anesthesiologist will review your health history, obtain vital signs, and answer questions you may have.  The only belongings needed at this time will be a list of your home medications and if applicable your C-PAP/BI-PAP machine.  A Pre-op nurse will start an IV and you may receive medication in preparation for surgery, including something to help you relax.     Please be aware that surgery does come with discomfort.  We want to make every effort to control your discomfort so please discuss any uncontrolled  symptoms with your nurse.   Your doctor will most likely have prescribed pain medications.      If you are going home after surgery you will receive individualized written care instructions before being discharged.  A responsible adult must drive you to and from the hospital on the day of your surgery and stay with you for 24 hours.  Discharge prescriptions can be filled by the hospital pharmacy during regular pharmacy hours.  If you are having surgery late in the day/evening your prescription may be e-prescribed to your pharmacy.  Please verify your pharmacy hours or chose a 24 hour pharmacy to avoid not having access to your prescription because your pharmacy has closed for the day.    If you are staying overnight following surgery, you will be transported to your hospital room following the recovery period.  [facility] has all private rooms.    If you have any questions please call Pre-Admission Testing at [phone].  Deductibles and co-payments are collected on the day of service. Please be prepared to pay the required co-pay, deductible or deposit on the day of service as defined by your plan.    Patient Education for Self-Quarantine Process    Following your COVID testing, we strongly recommend that you wear a mask when you are with other people and practice social distancing.   Limit your activities to only required outings.  Wash your hands with soap and water frequently for at least 20 seconds.   Avoid touching your eyes, nose and mouth with unwashed hands.  Do not share anything - utensils, drinking glasses, food from the same bowl.   Sanitize household surfaces daily. Include all high touch areas (door handles, light switches, phones, countertops, etc.)    Call your surgeon immediately if you experience any of the following symptoms:  Sore Throat  Shortness of Breath or difficulty breathing  Cough  Chills  Body soreness or muscle pain  Headache  Fever  New loss of taste or smell  Do not  arrive for your surgery ill.  Your procedure will need to be rescheduled to another time.  You will need to call your physician before the day of surgery to avoid any unnecessary exposure to hospital staff as well as other patients.

## 2022-05-07 DIAGNOSIS — I10 BENIGN ESSENTIAL HTN: ICD-10-CM

## 2022-05-09 RX ORDER — AMLODIPINE BESYLATE 5 MG/1
5 TABLET ORAL DAILY
Qty: 90 TABLET | Refills: 1 | Status: ON HOLD | OUTPATIENT
Start: 2022-05-09 | End: 2022-05-17

## 2022-05-12 RX ORDER — HYDROCHLOROTHIAZIDE 25 MG/1
TABLET ORAL
Qty: 90 TABLET | Refills: 1 | Status: SHIPPED | OUTPATIENT
Start: 2022-05-12 | End: 2022-05-21 | Stop reason: HOSPADM

## 2022-05-14 ENCOUNTER — LAB (OUTPATIENT)
Dept: LAB | Facility: HOSPITAL | Age: 77
End: 2022-05-14

## 2022-05-14 LAB — SARS-COV-2 ORF1AB RESP QL NAA+PROBE: NOT DETECTED

## 2022-05-14 PROCEDURE — U0005 INFEC AGEN DETEC AMPLI PROBE: HCPCS

## 2022-05-14 PROCEDURE — C9803 HOPD COVID-19 SPEC COLLECT: HCPCS

## 2022-05-14 PROCEDURE — U0004 COV-19 TEST NON-CDC HGH THRU: HCPCS

## 2022-05-17 ENCOUNTER — HOSPITAL ENCOUNTER (INPATIENT)
Facility: HOSPITAL | Age: 77
LOS: 4 days | Discharge: NURSING, REHAB OR LONG-TERM CARE | End: 2022-05-21
Attending: ORTHOPAEDIC SURGERY | Admitting: ORTHOPAEDIC SURGERY

## 2022-05-17 ENCOUNTER — ANESTHESIA (OUTPATIENT)
Dept: PERIOP | Facility: HOSPITAL | Age: 77
End: 2022-05-17

## 2022-05-17 ENCOUNTER — APPOINTMENT (OUTPATIENT)
Dept: GENERAL RADIOLOGY | Facility: HOSPITAL | Age: 77
End: 2022-05-17

## 2022-05-17 ENCOUNTER — ANESTHESIA EVENT (OUTPATIENT)
Dept: PERIOP | Facility: HOSPITAL | Age: 77
End: 2022-05-17

## 2022-05-17 DIAGNOSIS — S76.111S QUADRICEPS TENDON RUPTURE, RIGHT, SEQUELA: Primary | ICD-10-CM

## 2022-05-17 DIAGNOSIS — F41.9 ANXIETY: ICD-10-CM

## 2022-05-17 DIAGNOSIS — Z96.651 STATUS POST REVISION OF TOTAL REPLACEMENT OF RIGHT KNEE: ICD-10-CM

## 2022-05-17 DIAGNOSIS — Z96.652 STATUS POST REVISION OF TOTAL KNEE REPLACEMENT, LEFT: ICD-10-CM

## 2022-05-17 DIAGNOSIS — T14.8XXA TENDON TEAR: ICD-10-CM

## 2022-05-17 PROBLEM — M17.9 OSTEOARTHRITIS, KNEE: Status: RESOLVED | Noted: 2022-05-05 | Resolved: 2022-05-17

## 2022-05-17 PROBLEM — T84.033A MECHANICAL LOOSENING OF INTERNAL LEFT KNEE PROSTHETIC JOINT (HCC): Status: RESOLVED | Noted: 2022-05-03 | Resolved: 2022-05-17

## 2022-05-17 PROCEDURE — 25010000002 VANCOMYCIN 10 G RECONSTITUTED SOLUTION: Performed by: ORTHOPAEDIC SURGERY

## 2022-05-17 PROCEDURE — 0SPC09Z REMOVAL OF LINER FROM RIGHT KNEE JOINT, OPEN APPROACH: ICD-10-PCS | Performed by: ORTHOPAEDIC SURGERY

## 2022-05-17 PROCEDURE — 25010000002 ROPIVACAINE PER 1 MG: Performed by: ANESTHESIOLOGY

## 2022-05-17 PROCEDURE — C9290 INJ, BUPIVACAINE LIPOSOME: HCPCS | Performed by: ORTHOPAEDIC SURGERY

## 2022-05-17 PROCEDURE — C1713 ANCHOR/SCREW BN/BN,TIS/BN: HCPCS | Performed by: ORTHOPAEDIC SURGERY

## 2022-05-17 PROCEDURE — 25010000002 DEXAMETHASONE PER 1 MG: Performed by: NURSE ANESTHETIST, CERTIFIED REGISTERED

## 2022-05-17 PROCEDURE — P9041 ALBUMIN (HUMAN),5%, 50ML: HCPCS | Performed by: NURSE ANESTHETIST, CERTIFIED REGISTERED

## 2022-05-17 PROCEDURE — 25010000002 DIPHENHYDRAMINE PER 50 MG: Performed by: ORTHOPAEDIC SURGERY

## 2022-05-17 PROCEDURE — 25010000002 HYDROMORPHONE PER 4 MG: Performed by: NURSE ANESTHETIST, CERTIFIED REGISTERED

## 2022-05-17 PROCEDURE — 0 BUPIVACAINE LIPOSOME 1.3 % SUSPENSION 20 ML VIAL: Performed by: ORTHOPAEDIC SURGERY

## 2022-05-17 PROCEDURE — 25010000002 PHENYLEPHRINE 10 MG/ML SOLUTION: Performed by: NURSE ANESTHETIST, CERTIFIED REGISTERED

## 2022-05-17 PROCEDURE — 25010000002 MIDAZOLAM PER 1 MG: Performed by: ANESTHESIOLOGY

## 2022-05-17 PROCEDURE — 25010000002 FENTANYL CITRATE (PF) 50 MCG/ML SOLUTION: Performed by: ANESTHESIOLOGY

## 2022-05-17 PROCEDURE — 0LRQ0KZ REPLACEMENT OF RIGHT KNEE TENDON WITH NONAUTOLOGOUS TISSUE SUBSTITUTE, OPEN APPROACH: ICD-10-PCS | Performed by: ORTHOPAEDIC SURGERY

## 2022-05-17 PROCEDURE — 25010000002 PROPOFOL 10 MG/ML EMULSION: Performed by: NURSE ANESTHETIST, CERTIFIED REGISTERED

## 2022-05-17 PROCEDURE — 0SUV09Z SUPPLEMENT RIGHT KNEE JOINT, TIBIAL SURFACE WITH LINER, OPEN APPROACH: ICD-10-PCS | Performed by: ORTHOPAEDIC SURGERY

## 2022-05-17 PROCEDURE — L1833 KO ADJ JNT POS R SUP PRE OTS: HCPCS | Performed by: ORTHOPAEDIC SURGERY

## 2022-05-17 PROCEDURE — C1776 JOINT DEVICE (IMPLANTABLE): HCPCS | Performed by: ORTHOPAEDIC SURGERY

## 2022-05-17 PROCEDURE — 25010000002 CEFAZOLIN IN DEXTROSE 2-4 GM/100ML-% SOLUTION: Performed by: ORTHOPAEDIC SURGERY

## 2022-05-17 PROCEDURE — 87070 CULTURE OTHR SPECIMN AEROBIC: CPT | Performed by: ORTHOPAEDIC SURGERY

## 2022-05-17 PROCEDURE — 76942 ECHO GUIDE FOR BIOPSY: CPT | Performed by: ORTHOPAEDIC SURGERY

## 2022-05-17 PROCEDURE — 25010000002 NEOSTIGMINE 5 MG/10ML SOLUTION: Performed by: NURSE ANESTHETIST, CERTIFIED REGISTERED

## 2022-05-17 PROCEDURE — 25010000002 VANCOMYCIN 1 G RECONSTITUTED SOLUTION: Performed by: ORTHOPAEDIC SURGERY

## 2022-05-17 PROCEDURE — 25010000002 VANCOMYCIN 1 G RECONSTITUTED SOLUTION 1 EACH VIAL: Performed by: ORTHOPAEDIC SURGERY

## 2022-05-17 PROCEDURE — 25010000002 FENTANYL CITRATE (PF) 50 MCG/ML SOLUTION: Performed by: NURSE ANESTHETIST, CERTIFIED REGISTERED

## 2022-05-17 PROCEDURE — 25010000002 ONDANSETRON PER 1 MG: Performed by: NURSE ANESTHETIST, CERTIFIED REGISTERED

## 2022-05-17 PROCEDURE — 0QBG0ZZ EXCISION OF RIGHT TIBIA, OPEN APPROACH: ICD-10-PCS | Performed by: ORTHOPAEDIC SURGERY

## 2022-05-17 PROCEDURE — 87176 TISSUE HOMOGENIZATION CULTR: CPT | Performed by: ORTHOPAEDIC SURGERY

## 2022-05-17 PROCEDURE — 73560 X-RAY EXAM OF KNEE 1 OR 2: CPT

## 2022-05-17 PROCEDURE — 87205 SMEAR GRAM STAIN: CPT | Performed by: ORTHOPAEDIC SURGERY

## 2022-05-17 PROCEDURE — 25010000002 ALBUMIN HUMAN 5% PER 50 ML: Performed by: NURSE ANESTHETIST, CERTIFIED REGISTERED

## 2022-05-17 PROCEDURE — L1830 KO IMMOB CANVAS LONG PRE OTS: HCPCS | Performed by: ORTHOPAEDIC SURGERY

## 2022-05-17 PROCEDURE — 87075 CULTR BACTERIA EXCEPT BLOOD: CPT | Performed by: ORTHOPAEDIC SURGERY

## 2022-05-17 DEVICE — SS SUTURE, 4 PER SLEEVE
Type: IMPLANTABLE DEVICE | Site: KNEE | Status: FUNCTIONAL
Brand: MYO/WIRE II

## 2022-05-17 DEVICE — SUT NONABS MAXBRAID NMBR5 K60 38IN WHT/BLU: Type: IMPLANTABLE DEVICE | Site: KNEE | Status: FUNCTIONAL

## 2022-05-17 DEVICE — SIGMA TIBIAL INSERT ROTATING PLATFORM TC3 SIZE 5 20MM GVF
Type: IMPLANTABLE DEVICE | Site: KNEE | Status: FUNCTIONAL
Brand: SIGMA

## 2022-05-17 DEVICE — VIOLET ANTIBACTERIAL POLYDIOXANONE, KNOTLESS TISSUE CONTROL DEVICE
Type: IMPLANTABLE DEVICE | Site: KNEE | Status: FUNCTIONAL
Brand: STRATAFIX

## 2022-05-17 DEVICE — IMPLANTABLE DEVICE: Type: IMPLANTABLE DEVICE | Site: KNEE | Status: FUNCTIONAL

## 2022-05-17 DEVICE — SUT FW #2 W/TPR NDL 1/2 CIR 38IN 97CM 26.5MM BLU: Type: IMPLANTABLE DEVICE | Site: KNEE | Status: FUNCTIONAL

## 2022-05-17 DEVICE — SUT NONABS MAXBRAID/PE NMBR2 C7 38IN BLU 900335: Type: IMPLANTABLE DEVICE | Site: KNEE | Status: FUNCTIONAL

## 2022-05-17 RX ORDER — FENTANYL CITRATE 50 UG/ML
50 INJECTION, SOLUTION INTRAMUSCULAR; INTRAVENOUS
Status: DISCONTINUED | OUTPATIENT
Start: 2022-05-17 | End: 2022-05-17 | Stop reason: HOSPADM

## 2022-05-17 RX ORDER — EPHEDRINE SULFATE 50 MG/ML
INJECTION, SOLUTION INTRAVENOUS AS NEEDED
Status: DISCONTINUED | OUTPATIENT
Start: 2022-05-17 | End: 2022-05-17 | Stop reason: SURG

## 2022-05-17 RX ORDER — ROPIVACAINE HYDROCHLORIDE 2 MG/ML
INJECTION, SOLUTION EPIDURAL; INFILTRATION; PERINEURAL
Status: COMPLETED | OUTPATIENT
Start: 2022-05-17 | End: 2022-05-17

## 2022-05-17 RX ORDER — ACETAMINOPHEN 500 MG
1000 TABLET ORAL ONCE
Status: COMPLETED | OUTPATIENT
Start: 2022-05-17 | End: 2022-05-17

## 2022-05-17 RX ORDER — KETAMINE HYDROCHLORIDE 10 MG/ML
INJECTION INTRAMUSCULAR; INTRAVENOUS AS NEEDED
Status: DISCONTINUED | OUTPATIENT
Start: 2022-05-17 | End: 2022-05-17 | Stop reason: SURG

## 2022-05-17 RX ORDER — UREA 10 %
1 LOTION (ML) TOPICAL NIGHTLY PRN
Status: DISCONTINUED | OUTPATIENT
Start: 2022-05-17 | End: 2022-05-21 | Stop reason: HOSPADM

## 2022-05-17 RX ORDER — SODIUM CHLORIDE 0.9 % (FLUSH) 0.9 %
3-10 SYRINGE (ML) INJECTION AS NEEDED
Status: DISCONTINUED | OUTPATIENT
Start: 2022-05-17 | End: 2022-05-17 | Stop reason: HOSPADM

## 2022-05-17 RX ORDER — PROMETHAZINE HYDROCHLORIDE 25 MG/1
25 TABLET ORAL ONCE AS NEEDED
Status: DISCONTINUED | OUTPATIENT
Start: 2022-05-17 | End: 2022-05-17 | Stop reason: HOSPADM

## 2022-05-17 RX ORDER — HYDROCODONE BITARTRATE AND ACETAMINOPHEN 7.5; 325 MG/1; MG/1
1 TABLET ORAL EVERY 4 HOURS PRN
Status: DISCONTINUED | OUTPATIENT
Start: 2022-05-17 | End: 2022-05-21 | Stop reason: HOSPADM

## 2022-05-17 RX ORDER — BUPIVACAINE HYDROCHLORIDE 5 MG/ML
INJECTION, SOLUTION EPIDURAL; INTRACAUDAL AS NEEDED
Status: DISCONTINUED | OUTPATIENT
Start: 2022-05-17 | End: 2022-05-17 | Stop reason: HOSPADM

## 2022-05-17 RX ORDER — DOCUSATE SODIUM 100 MG/1
100 CAPSULE, LIQUID FILLED ORAL 2 TIMES DAILY
Status: DISCONTINUED | OUTPATIENT
Start: 2022-05-17 | End: 2022-05-21 | Stop reason: HOSPADM

## 2022-05-17 RX ORDER — PREGABALIN 75 MG/1
75 CAPSULE ORAL ONCE
Status: COMPLETED | OUTPATIENT
Start: 2022-05-17 | End: 2022-05-17

## 2022-05-17 RX ORDER — DEXAMETHASONE SODIUM PHOSPHATE 10 MG/ML
INJECTION INTRAMUSCULAR; INTRAVENOUS AS NEEDED
Status: DISCONTINUED | OUTPATIENT
Start: 2022-05-17 | End: 2022-05-17 | Stop reason: SURG

## 2022-05-17 RX ORDER — HYDROMORPHONE HCL 110MG/55ML
PATIENT CONTROLLED ANALGESIA SYRINGE INTRAVENOUS AS NEEDED
Status: DISCONTINUED | OUTPATIENT
Start: 2022-05-17 | End: 2022-05-17 | Stop reason: SURG

## 2022-05-17 RX ORDER — HYDROCODONE BITARTRATE AND ACETAMINOPHEN 7.5; 325 MG/1; MG/1
1 TABLET ORAL ONCE AS NEEDED
Status: DISCONTINUED | OUTPATIENT
Start: 2022-05-17 | End: 2022-05-17 | Stop reason: HOSPADM

## 2022-05-17 RX ORDER — CEFAZOLIN SODIUM 2 G/100ML
2 INJECTION, SOLUTION INTRAVENOUS EVERY 8 HOURS
Status: COMPLETED | OUTPATIENT
Start: 2022-05-17 | End: 2022-05-19

## 2022-05-17 RX ORDER — DIPHENHYDRAMINE HYDROCHLORIDE 50 MG/ML
25 INJECTION INTRAMUSCULAR; INTRAVENOUS ONCE
Status: COMPLETED | OUTPATIENT
Start: 2022-05-17 | End: 2022-05-17

## 2022-05-17 RX ORDER — OXYCODONE HCL 10 MG/1
20 TABLET, FILM COATED, EXTENDED RELEASE ORAL ONCE
Status: DISCONTINUED | OUTPATIENT
Start: 2022-05-17 | End: 2022-05-17 | Stop reason: HOSPADM

## 2022-05-17 RX ORDER — ACETAMINOPHEN 325 MG/1
650 TABLET ORAL ONCE AS NEEDED
Status: DISCONTINUED | OUTPATIENT
Start: 2022-05-17 | End: 2022-05-17 | Stop reason: HOSPADM

## 2022-05-17 RX ORDER — HYDROMORPHONE HYDROCHLORIDE 1 MG/ML
0.5 INJECTION, SOLUTION INTRAMUSCULAR; INTRAVENOUS; SUBCUTANEOUS
Status: DISCONTINUED | OUTPATIENT
Start: 2022-05-17 | End: 2022-05-21 | Stop reason: HOSPADM

## 2022-05-17 RX ORDER — CEFAZOLIN SODIUM 2 G/100ML
2 INJECTION, SOLUTION INTRAVENOUS ONCE
Status: COMPLETED | OUTPATIENT
Start: 2022-05-17 | End: 2022-05-17

## 2022-05-17 RX ORDER — PROMETHAZINE HYDROCHLORIDE 25 MG/1
25 SUPPOSITORY RECTAL ONCE AS NEEDED
Status: DISCONTINUED | OUTPATIENT
Start: 2022-05-17 | End: 2022-05-17 | Stop reason: HOSPADM

## 2022-05-17 RX ORDER — SODIUM CHLORIDE, SODIUM LACTATE, POTASSIUM CHLORIDE, CALCIUM CHLORIDE 600; 310; 30; 20 MG/100ML; MG/100ML; MG/100ML; MG/100ML
9 INJECTION, SOLUTION INTRAVENOUS CONTINUOUS
Status: DISCONTINUED | OUTPATIENT
Start: 2022-05-17 | End: 2022-05-17

## 2022-05-17 RX ORDER — MAGNESIUM HYDROXIDE 1200 MG/15ML
LIQUID ORAL AS NEEDED
Status: DISCONTINUED | OUTPATIENT
Start: 2022-05-17 | End: 2022-05-17 | Stop reason: HOSPADM

## 2022-05-17 RX ORDER — FENTANYL CITRATE 50 UG/ML
INJECTION, SOLUTION INTRAMUSCULAR; INTRAVENOUS
Status: COMPLETED | OUTPATIENT
Start: 2022-05-17 | End: 2022-05-17

## 2022-05-17 RX ORDER — GLYCOPYRROLATE 0.2 MG/ML
INJECTION INTRAMUSCULAR; INTRAVENOUS AS NEEDED
Status: DISCONTINUED | OUTPATIENT
Start: 2022-05-17 | End: 2022-05-17 | Stop reason: SURG

## 2022-05-17 RX ORDER — MIDAZOLAM HYDROCHLORIDE 1 MG/ML
0.5 INJECTION INTRAMUSCULAR; INTRAVENOUS
Status: DISCONTINUED | OUTPATIENT
Start: 2022-05-17 | End: 2022-05-17 | Stop reason: HOSPADM

## 2022-05-17 RX ORDER — ASPIRIN 81 MG/1
81 TABLET ORAL EVERY 12 HOURS SCHEDULED
Status: DISCONTINUED | OUTPATIENT
Start: 2022-05-17 | End: 2022-05-21 | Stop reason: HOSPADM

## 2022-05-17 RX ORDER — FENTANYL CITRATE 50 UG/ML
INJECTION, SOLUTION INTRAMUSCULAR; INTRAVENOUS AS NEEDED
Status: DISCONTINUED | OUTPATIENT
Start: 2022-05-17 | End: 2022-05-17 | Stop reason: SURG

## 2022-05-17 RX ORDER — SODIUM CHLORIDE 9 MG/ML
100 INJECTION, SOLUTION INTRAVENOUS CONTINUOUS
Status: DISCONTINUED | OUTPATIENT
Start: 2022-05-17 | End: 2022-05-18

## 2022-05-17 RX ORDER — VANCOMYCIN HYDROCHLORIDE 1 G/20ML
INJECTION, POWDER, LYOPHILIZED, FOR SOLUTION INTRAVENOUS AS NEEDED
Status: DISCONTINUED | OUTPATIENT
Start: 2022-05-17 | End: 2022-05-17 | Stop reason: HOSPADM

## 2022-05-17 RX ORDER — SODIUM CHLORIDE 0.9 % (FLUSH) 0.9 %
3 SYRINGE (ML) INJECTION EVERY 12 HOURS SCHEDULED
Status: DISCONTINUED | OUTPATIENT
Start: 2022-05-17 | End: 2022-05-17 | Stop reason: HOSPADM

## 2022-05-17 RX ORDER — DIPHENHYDRAMINE HCL 25 MG
25 CAPSULE ORAL
Status: DISCONTINUED | OUTPATIENT
Start: 2022-05-17 | End: 2022-05-17 | Stop reason: HOSPADM

## 2022-05-17 RX ORDER — OXCARBAZEPINE 300 MG/1
300 TABLET, FILM COATED ORAL 3 TIMES DAILY PRN
Status: DISCONTINUED | OUTPATIENT
Start: 2022-05-17 | End: 2022-05-18

## 2022-05-17 RX ORDER — LIDOCAINE HYDROCHLORIDE 20 MG/ML
INJECTION, SOLUTION INFILTRATION; PERINEURAL AS NEEDED
Status: DISCONTINUED | OUTPATIENT
Start: 2022-05-17 | End: 2022-05-17 | Stop reason: SURG

## 2022-05-17 RX ORDER — NALOXONE HCL 0.4 MG/ML
0.2 VIAL (ML) INJECTION AS NEEDED
Status: DISCONTINUED | OUTPATIENT
Start: 2022-05-17 | End: 2022-05-17 | Stop reason: HOSPADM

## 2022-05-17 RX ORDER — MIDAZOLAM HYDROCHLORIDE 1 MG/ML
INJECTION INTRAMUSCULAR; INTRAVENOUS
Status: COMPLETED | OUTPATIENT
Start: 2022-05-17 | End: 2022-05-17

## 2022-05-17 RX ORDER — NEOSTIGMINE METHYLSULFATE 0.5 MG/ML
INJECTION, SOLUTION INTRAVENOUS AS NEEDED
Status: DISCONTINUED | OUTPATIENT
Start: 2022-05-17 | End: 2022-05-17 | Stop reason: SURG

## 2022-05-17 RX ORDER — ALBUMIN, HUMAN INJ 5% 5 %
SOLUTION INTRAVENOUS CONTINUOUS PRN
Status: DISCONTINUED | OUTPATIENT
Start: 2022-05-17 | End: 2022-05-17 | Stop reason: SURG

## 2022-05-17 RX ORDER — WOUND DRESSING ADHESIVE - LIQUID
LIQUID MISCELLANEOUS AS NEEDED
Status: DISCONTINUED | OUTPATIENT
Start: 2022-05-17 | End: 2022-05-17 | Stop reason: HOSPADM

## 2022-05-17 RX ORDER — ONDANSETRON 2 MG/ML
4 INJECTION INTRAMUSCULAR; INTRAVENOUS ONCE AS NEEDED
Status: DISCONTINUED | OUTPATIENT
Start: 2022-05-17 | End: 2022-05-17 | Stop reason: HOSPADM

## 2022-05-17 RX ORDER — LIDOCAINE HYDROCHLORIDE AND EPINEPHRINE BITARTRATE 20; .01 MG/ML; MG/ML
INJECTION, SOLUTION SUBCUTANEOUS
Status: COMPLETED | OUTPATIENT
Start: 2022-05-17 | End: 2022-05-17

## 2022-05-17 RX ORDER — ALPRAZOLAM 0.5 MG/1
0.5 TABLET ORAL 3 TIMES DAILY PRN
Status: DISCONTINUED | OUTPATIENT
Start: 2022-05-17 | End: 2022-05-21 | Stop reason: HOSPADM

## 2022-05-17 RX ORDER — ONDANSETRON 4 MG/1
4 TABLET, FILM COATED ORAL EVERY 6 HOURS PRN
Status: DISCONTINUED | OUTPATIENT
Start: 2022-05-17 | End: 2022-05-21 | Stop reason: HOSPADM

## 2022-05-17 RX ORDER — HYDROCODONE BITARTRATE AND ACETAMINOPHEN 10; 325 MG/1; MG/1
1 TABLET ORAL EVERY 4 HOURS PRN
Status: DISCONTINUED | OUTPATIENT
Start: 2022-05-17 | End: 2022-05-17 | Stop reason: HOSPADM

## 2022-05-17 RX ORDER — FLUMAZENIL 0.1 MG/ML
0.2 INJECTION INTRAVENOUS AS NEEDED
Status: DISCONTINUED | OUTPATIENT
Start: 2022-05-17 | End: 2022-05-17 | Stop reason: HOSPADM

## 2022-05-17 RX ORDER — FAMOTIDINE 10 MG/ML
20 INJECTION, SOLUTION INTRAVENOUS ONCE
Status: COMPLETED | OUTPATIENT
Start: 2022-05-17 | End: 2022-05-17

## 2022-05-17 RX ORDER — ONDANSETRON 2 MG/ML
4 INJECTION INTRAMUSCULAR; INTRAVENOUS EVERY 6 HOURS PRN
Status: DISCONTINUED | OUTPATIENT
Start: 2022-05-17 | End: 2022-05-21 | Stop reason: HOSPADM

## 2022-05-17 RX ORDER — TRANEXAMIC ACID 100 MG/ML
INJECTION, SOLUTION INTRAVENOUS AS NEEDED
Status: DISCONTINUED | OUTPATIENT
Start: 2022-05-17 | End: 2022-05-17 | Stop reason: SURG

## 2022-05-17 RX ORDER — NALOXONE HCL 0.4 MG/ML
0.1 VIAL (ML) INJECTION
Status: DISCONTINUED | OUTPATIENT
Start: 2022-05-17 | End: 2022-05-21 | Stop reason: HOSPADM

## 2022-05-17 RX ORDER — HYDROCODONE BITARTRATE AND ACETAMINOPHEN 7.5; 325 MG/1; MG/1
2 TABLET ORAL EVERY 4 HOURS PRN
Status: DISCONTINUED | OUTPATIENT
Start: 2022-05-17 | End: 2022-05-21 | Stop reason: HOSPADM

## 2022-05-17 RX ORDER — BISACODYL 5 MG/1
10 TABLET, DELAYED RELEASE ORAL DAILY PRN
Status: DISCONTINUED | OUTPATIENT
Start: 2022-05-18 | End: 2022-05-21 | Stop reason: HOSPADM

## 2022-05-17 RX ORDER — ONDANSETRON 2 MG/ML
INJECTION INTRAMUSCULAR; INTRAVENOUS AS NEEDED
Status: DISCONTINUED | OUTPATIENT
Start: 2022-05-17 | End: 2022-05-17 | Stop reason: SURG

## 2022-05-17 RX ORDER — HYDROMORPHONE HYDROCHLORIDE 1 MG/ML
0.5 INJECTION, SOLUTION INTRAMUSCULAR; INTRAVENOUS; SUBCUTANEOUS
Status: DISCONTINUED | OUTPATIENT
Start: 2022-05-17 | End: 2022-05-17 | Stop reason: HOSPADM

## 2022-05-17 RX ORDER — PHENYLEPHRINE HYDROCHLORIDE 10 MG/ML
INJECTION INTRAVENOUS AS NEEDED
Status: DISCONTINUED | OUTPATIENT
Start: 2022-05-17 | End: 2022-05-17 | Stop reason: SURG

## 2022-05-17 RX ORDER — AMLODIPINE BESYLATE 10 MG/1
10 TABLET ORAL EVERY MORNING
Status: DISCONTINUED | OUTPATIENT
Start: 2022-05-18 | End: 2022-05-21 | Stop reason: HOSPADM

## 2022-05-17 RX ORDER — EPHEDRINE SULFATE 50 MG/ML
5 INJECTION, SOLUTION INTRAVENOUS ONCE AS NEEDED
Status: DISCONTINUED | OUTPATIENT
Start: 2022-05-17 | End: 2022-05-17 | Stop reason: HOSPADM

## 2022-05-17 RX ORDER — PROPOFOL 10 MG/ML
VIAL (ML) INTRAVENOUS AS NEEDED
Status: DISCONTINUED | OUTPATIENT
Start: 2022-05-17 | End: 2022-05-17 | Stop reason: SURG

## 2022-05-17 RX ORDER — HYDROCHLOROTHIAZIDE 25 MG/1
25 TABLET ORAL DAILY
Status: DISCONTINUED | OUTPATIENT
Start: 2022-05-17 | End: 2022-05-17

## 2022-05-17 RX ORDER — ROCURONIUM BROMIDE 10 MG/ML
INJECTION, SOLUTION INTRAVENOUS AS NEEDED
Status: DISCONTINUED | OUTPATIENT
Start: 2022-05-17 | End: 2022-05-17 | Stop reason: SURG

## 2022-05-17 RX ORDER — CHLORHEXIDINE GLUCONATE 500 MG/1
CLOTH TOPICAL ONCE
Status: DISCONTINUED | OUTPATIENT
Start: 2022-05-17 | End: 2022-05-17 | Stop reason: HOSPADM

## 2022-05-17 RX ORDER — LABETALOL HYDROCHLORIDE 5 MG/ML
5 INJECTION, SOLUTION INTRAVENOUS
Status: DISCONTINUED | OUTPATIENT
Start: 2022-05-17 | End: 2022-05-17 | Stop reason: HOSPADM

## 2022-05-17 RX ORDER — LEVOTHYROXINE SODIUM 0.07 MG/1
75 TABLET ORAL DAILY
Status: DISCONTINUED | OUTPATIENT
Start: 2022-05-18 | End: 2022-05-21 | Stop reason: HOSPADM

## 2022-05-17 RX ORDER — LIDOCAINE HYDROCHLORIDE 10 MG/ML
0.5 INJECTION, SOLUTION EPIDURAL; INFILTRATION; INTRACAUDAL; PERINEURAL ONCE AS NEEDED
Status: DISCONTINUED | OUTPATIENT
Start: 2022-05-17 | End: 2022-05-17 | Stop reason: HOSPADM

## 2022-05-17 RX ORDER — HYDRALAZINE HYDROCHLORIDE 20 MG/ML
5 INJECTION INTRAMUSCULAR; INTRAVENOUS
Status: DISCONTINUED | OUTPATIENT
Start: 2022-05-17 | End: 2022-05-17 | Stop reason: HOSPADM

## 2022-05-17 RX ORDER — DIPHENHYDRAMINE HYDROCHLORIDE 50 MG/ML
12.5 INJECTION INTRAMUSCULAR; INTRAVENOUS
Status: DISCONTINUED | OUTPATIENT
Start: 2022-05-17 | End: 2022-05-17 | Stop reason: HOSPADM

## 2022-05-17 RX ORDER — LISINOPRIL 20 MG/1
10 TABLET ORAL
Status: DISCONTINUED | OUTPATIENT
Start: 2022-05-17 | End: 2022-05-20

## 2022-05-17 RX ADMIN — PROPOFOL 75 MCG/KG/MIN: 10 INJECTION, EMULSION INTRAVENOUS at 10:45

## 2022-05-17 RX ADMIN — LIDOCAINE HYDROCHLORIDE 100 MG: 20 INJECTION, SOLUTION INFILTRATION; PERINEURAL at 10:26

## 2022-05-17 RX ADMIN — PROPOFOL 160 MG: 10 INJECTION, EMULSION INTRAVENOUS at 10:26

## 2022-05-17 RX ADMIN — PREGABALIN 75 MG: 75 CAPSULE ORAL at 08:32

## 2022-05-17 RX ADMIN — ROPIVACAINE HYDROCHLORIDE 20 ML: 2 INJECTION, SOLUTION EPIDURAL; INFILTRATION at 10:18

## 2022-05-17 RX ADMIN — DIPHENHYDRAMINE HYDROCHLORIDE 25 MG: 50 INJECTION, SOLUTION INTRAMUSCULAR; INTRAVENOUS at 08:58

## 2022-05-17 RX ADMIN — LIDOCAINE HYDROCHLORIDE,EPINEPHRINE BITARTRATE 5 ML: 20; .01 INJECTION, SOLUTION INFILTRATION; PERINEURAL at 10:18

## 2022-05-17 RX ADMIN — EPHEDRINE SULFATE 10 MG: 50 INJECTION INTRAVENOUS at 11:49

## 2022-05-17 RX ADMIN — ROCURONIUM BROMIDE 40 MG: 50 INJECTION INTRAVENOUS at 10:26

## 2022-05-17 RX ADMIN — TRANEXAMIC ACID 1000 MG: 1 INJECTION, SOLUTION INTRAVENOUS at 10:39

## 2022-05-17 RX ADMIN — ASPIRIN 81 MG: 81 TABLET, COATED ORAL at 20:19

## 2022-05-17 RX ADMIN — MIDAZOLAM 1 MG: 1 INJECTION INTRAMUSCULAR; INTRAVENOUS at 10:08

## 2022-05-17 RX ADMIN — DOCUSATE SODIUM 100 MG: 100 CAPSULE, LIQUID FILLED ORAL at 20:19

## 2022-05-17 RX ADMIN — EPHEDRINE SULFATE 10 MG: 50 INJECTION INTRAVENOUS at 11:36

## 2022-05-17 RX ADMIN — ACETAMINOPHEN 1000 MG: 500 TABLET ORAL at 08:32

## 2022-05-17 RX ADMIN — PHENYLEPHRINE HYDROCHLORIDE 150 MCG: 10 INJECTION, SOLUTION INTRAVENOUS at 13:17

## 2022-05-17 RX ADMIN — ALPRAZOLAM 0.5 MG: 0.5 TABLET ORAL at 20:24

## 2022-05-17 RX ADMIN — PHENYLEPHRINE HYDROCHLORIDE 100 MCG: 10 INJECTION, SOLUTION INTRAVENOUS at 11:17

## 2022-05-17 RX ADMIN — FENTANYL CITRATE 25 MCG: 0.05 INJECTION, SOLUTION INTRAMUSCULAR; INTRAVENOUS at 13:45

## 2022-05-17 RX ADMIN — PHENYLEPHRINE HYDROCHLORIDE 50 MCG: 10 INJECTION, SOLUTION INTRAVENOUS at 11:26

## 2022-05-17 RX ADMIN — CEFAZOLIN SODIUM 2 G: 2 INJECTION, SOLUTION INTRAVENOUS at 14:12

## 2022-05-17 RX ADMIN — HYDROMORPHONE HYDROCHLORIDE 0.5 MG: 2 INJECTION, SOLUTION INTRAMUSCULAR; INTRAVENOUS; SUBCUTANEOUS at 14:02

## 2022-05-17 RX ADMIN — DEXAMETHASONE SODIUM PHOSPHATE 10 MG: 10 INJECTION INTRAMUSCULAR; INTRAVENOUS at 10:39

## 2022-05-17 RX ADMIN — PHENYLEPHRINE HYDROCHLORIDE 100 MCG: 10 INJECTION, SOLUTION INTRAVENOUS at 12:48

## 2022-05-17 RX ADMIN — ONDANSETRON 4 MG: 2 INJECTION INTRAMUSCULAR; INTRAVENOUS at 13:36

## 2022-05-17 RX ADMIN — SODIUM CHLORIDE, POTASSIUM CHLORIDE, SODIUM LACTATE AND CALCIUM CHLORIDE 9 ML/HR: 600; 310; 30; 20 INJECTION, SOLUTION INTRAVENOUS at 09:32

## 2022-05-17 RX ADMIN — MIDAZOLAM 1 MG: 1 INJECTION INTRAMUSCULAR; INTRAVENOUS at 10:13

## 2022-05-17 RX ADMIN — FENTANYL CITRATE 50 MCG: 0.05 INJECTION, SOLUTION INTRAMUSCULAR; INTRAVENOUS at 10:07

## 2022-05-17 RX ADMIN — GLYCOPYRROLATE 0.3 MG: 0.2 INJECTION INTRAMUSCULAR; INTRAVENOUS at 14:00

## 2022-05-17 RX ADMIN — OXCARBAZEPINE 300 MG: 300 TABLET, FILM COATED ORAL at 20:19

## 2022-05-17 RX ADMIN — FENTANYL CITRATE 50 MCG: 0.05 INJECTION, SOLUTION INTRAMUSCULAR; INTRAVENOUS at 10:25

## 2022-05-17 RX ADMIN — PHENYLEPHRINE HYDROCHLORIDE 100 MCG: 10 INJECTION, SOLUTION INTRAVENOUS at 11:47

## 2022-05-17 RX ADMIN — NEOSTIGMINE METHYLSULFATE 2 MG: 0.5 INJECTION INTRAVENOUS at 14:00

## 2022-05-17 RX ADMIN — FAMOTIDINE 20 MG: 10 INJECTION INTRAVENOUS at 09:30

## 2022-05-17 RX ADMIN — VANCOMYCIN HYDROCHLORIDE 1250 MG: 10 INJECTION, POWDER, LYOPHILIZED, FOR SOLUTION INTRAVENOUS at 08:32

## 2022-05-17 RX ADMIN — KETAMINE HYDROCHLORIDE 25 MG: 10 INJECTION INTRAMUSCULAR; INTRAVENOUS at 10:38

## 2022-05-17 RX ADMIN — ALBUMIN HUMAN: 0.05 INJECTION, SOLUTION INTRAVENOUS at 13:21

## 2022-05-17 RX ADMIN — EPHEDRINE SULFATE 10 MG: 50 INJECTION INTRAVENOUS at 11:26

## 2022-05-17 RX ADMIN — PHENYLEPHRINE HYDROCHLORIDE 150 MCG: 10 INJECTION, SOLUTION INTRAVENOUS at 13:00

## 2022-05-17 RX ADMIN — PHENYLEPHRINE HYDROCHLORIDE 100 MCG: 10 INJECTION, SOLUTION INTRAVENOUS at 11:37

## 2022-05-17 RX ADMIN — FENTANYL CITRATE 25 MCG: 0.05 INJECTION, SOLUTION INTRAMUSCULAR; INTRAVENOUS at 13:38

## 2022-05-17 RX ADMIN — CEFAZOLIN SODIUM 2 G: 2 INJECTION, SOLUTION INTRAVENOUS at 10:12

## 2022-05-17 RX ADMIN — EPHEDRINE SULFATE 10 MG: 50 INJECTION INTRAVENOUS at 12:09

## 2022-05-17 RX ADMIN — EPHEDRINE SULFATE 10 MG: 50 INJECTION INTRAVENOUS at 11:07

## 2022-05-17 RX ADMIN — PHENYLEPHRINE HYDROCHLORIDE 100 MCG: 10 INJECTION, SOLUTION INTRAVENOUS at 12:09

## 2022-05-17 RX ADMIN — PHENYLEPHRINE HYDROCHLORIDE 100 MCG: 10 INJECTION, SOLUTION INTRAVENOUS at 11:01

## 2022-05-17 RX ADMIN — LISINOPRIL 10 MG: 20 TABLET ORAL at 19:57

## 2022-05-17 RX ADMIN — PROPOFOL 50 MCG/KG/MIN: 10 INJECTION, EMULSION INTRAVENOUS at 13:21

## 2022-05-17 NOTE — ANESTHESIA PREPROCEDURE EVALUATION
Anesthesia Evaluation     Patient summary reviewed and Nursing notes reviewed   history of anesthetic complications: PONV  NPO Solid Status: > 8 hours  NPO Liquid Status: > 4 hours           Airway   Mallampati: III  Neck ROM: full  Possible difficult intubation  Dental    (+) poor dentition    Pulmonary     breath sounds clear to auscultation  (+) a smoker Former,   Cardiovascular     Rhythm: regular    (+) hypertension, dysrhythmias (RBBB,LAFB), hyperlipidemia,       Neuro/Psych  (+) headaches, numbness, psychiatric history Anxiety,      ROS Comment: Trigeminal neuralgia  GI/Hepatic/Renal/Endo    (+)   renal disease, thyroid problem hypothyroidism    Musculoskeletal     Abdominal   (+) obese,    Substance History      OB/GYN          Other   arthritis,                      Anesthesia Plan    ASA 3     general   (?adductor canal)  intravenous induction     Anesthetic plan, all risks, benefits, and alternatives have been provided, discussed and informed consent has been obtained with: patient.        CODE STATUS:

## 2022-05-17 NOTE — ANESTHESIA POSTPROCEDURE EVALUATION
"Patient: Jeremi Ugarte    Procedure Summary     Date: 05/17/22 Room / Location: [facility] OR  / [facility] MAIN OR    Anesthesia Start: 1021 Anesthesia Stop: 1439    Procedure: REVISION RIGHT TOTAL KNEE ARTHROPLASTY WITH EXTENSOR MECHANISM ALLOGRAFT/ (Right Knee) Diagnosis:     Surgeons: Patricia Caldera MD Provider: Madan Ambrocio MD    Anesthesia Type: general ASA Status: 3          Anesthesia Type: general    Vitals  Vitals Value Taken Time   /72 05/17/22 1646   Temp 36.4 °C (97.6 °F) 05/17/22 1615   Pulse 98 05/17/22 1658   Resp 16 05/17/22 1645   SpO2 98 % 05/17/22 1658   Vitals shown include unvalidated device data.        Post Anesthesia Care and Evaluation    Pain management: adequate  Airway patency: patent  Anesthetic complications: No anesthetic complications    Cardiovascular status: acceptable  Respiratory status: acceptable  Hydration status: acceptable    Comments: /72   Pulse 93   Temp 36.4 °C (97.6 °F)   Resp 16   Ht 175.3 cm (69\")   Wt 103 kg (227 lb 12.8 oz)   SpO2 96%   BMI 33.64 kg/m²         "

## 2022-05-17 NOTE — ANESTHESIA PROCEDURE NOTES
Peripheral Block      Patient reassessed immediately prior to procedure    Patient location during procedure: pre-op  Stop time: 5/17/2022 10:18 AM  Reason for block: at surgeon's request and post-op pain management  Performed by  Anesthesiologist: Jalen Li MD  Preanesthetic Checklist  Completed: patient identified, IV checked, site marked, risks and benefits discussed, surgical consent, monitors and equipment checked, pre-op evaluation and timeout performed  Prep:  Pt Position: supine  Sterile barriers:cap, gloves and mask  Prep: ChloraPrep  Patient monitoring: blood pressure monitoring, continuous pulse oximetry and EKG  Procedure    Sedation: yes  Performed under: local infiltration  Guidance:ultrasound guided    ULTRASOUND INTERPRETATION. Using ultrasound guidance a 22 G gauge needle was placed in close proximity to the nerve, at which point, under ultrasound guidance anesthetic was injected in the area of the nerve and spread of the anesthesia was seen on ultrasound in close proximity thereto.  There were no abnormalities seen on ultrasound; a digital image was taken; and the patient tolerated the procedure with no complications. Images:still images obtained    Laterality:right  Block Type:femoral  Injection Technique:single-shot  Needle Type:echogenic  Needle Gauge:22 G      Medications Used: ropivacaine (NAROPIN) 0.2 % injection, 20 mL  lidocaine-EPINEPHrine (XYLOCAINE W/EPI) 2 %-[id number] injection, 5 mL  Med administered at 5/17/2022 10:18 AM      Post Assessment  Injection Assessment: negative aspiration for heme, no paresthesia on injection and incremental injection  Patient Tolerance:comfortable throughout block  Complications:no  Additional Notes  Ultrasound guidance was used for needle placement and verify medication disbursement.

## 2022-05-17 NOTE — ANESTHESIA PROCEDURE NOTES
Airway  Urgency: elective    Date/Time: 5/17/2022 10:31 AM  Airway not difficult    General Information and Staff    Patient location during procedure: OR  Anesthesiologist: Madan Ambrocio MD  CRNA/CAA: Nya Zhao CRNA    Indications and Patient Condition  Indications for airway management: airway protection    Preoxygenated: yes  Mask difficulty assessment: 2 - vent by mask + OA or adjuvant +/- NMBA    Final Airway Details  Final airway type: endotracheal airway      Successful airway: ETT  Cuffed: yes   Successful intubation technique: direct laryngoscopy  Facilitating devices/methods: intubating stylet  Endotracheal tube insertion site: oral  Blade: [place]  Blade size: 4  ETT size (mm): 7.5  Cormack-Lehane Classification: grade IIa - partial view of glottis  Placement verified by: chest auscultation and capnometry   Measured from: lips  ETT/EBT  to lips (cm): 23  Number of attempts at approach: 1  Assessment: lips, teeth, and gum same as pre-op and atraumatic intubation    Additional Comments  Preoxygenated with 100% FiO2. Smooth IV induction. Atraumatic insertion. No change to dentition or soft tissue.

## 2022-05-18 PROBLEM — D62 ACUTE POSTHEMORRHAGIC ANEMIA: Status: ACTIVE | Noted: 2022-05-18

## 2022-05-18 PROBLEM — D72.829 LEUKOCYTOSIS: Status: ACTIVE | Noted: 2022-05-18

## 2022-05-18 LAB
ANION GAP SERPL CALCULATED.3IONS-SCNC: 11.3 MMOL/L (ref 5–15)
BASOPHILS # BLD AUTO: 0.03 10*3/MM3 (ref 0–0.2)
BASOPHILS NFR BLD AUTO: 0.2 % (ref 0–1.5)
BUN SERPL-MCNC: 13 MG/DL (ref 8–23)
BUN/CREAT SERPL: 15.1 (ref 7–25)
CALCIUM SPEC-SCNC: 8.4 MG/DL (ref 8.6–10.5)
CHLORIDE SERPL-SCNC: 90 MMOL/L (ref 98–107)
CO2 SERPL-SCNC: 21.7 MMOL/L (ref 22–29)
CREAT SERPL-MCNC: 0.86 MG/DL (ref 0.76–1.27)
DEPRECATED RDW RBC AUTO: 46.5 FL (ref 37–54)
EGFRCR SERPLBLD CKD-EPI 2021: 89.2 ML/MIN/1.73
EOSINOPHIL # BLD AUTO: 0.02 10*3/MM3 (ref 0–0.4)
EOSINOPHIL NFR BLD AUTO: 0.2 % (ref 0.3–6.2)
ERYTHROCYTE [DISTWIDTH] IN BLOOD BY AUTOMATED COUNT: 13.4 % (ref 12.3–15.4)
GLUCOSE SERPL-MCNC: 115 MG/DL (ref 65–99)
HCT VFR BLD AUTO: 31.3 % (ref 37.5–51)
HGB BLD-MCNC: 10.8 G/DL (ref 13–17.7)
IMM GRANULOCYTES # BLD AUTO: 0.05 10*3/MM3 (ref 0–0.05)
IMM GRANULOCYTES NFR BLD AUTO: 0.4 % (ref 0–0.5)
LYMPHOCYTES # BLD AUTO: 1.97 10*3/MM3 (ref 0.7–3.1)
LYMPHOCYTES NFR BLD AUTO: 15.9 % (ref 19.6–45.3)
MCH RBC QN AUTO: 32.8 PG (ref 26.6–33)
MCHC RBC AUTO-ENTMCNC: 34.5 G/DL (ref 31.5–35.7)
MCV RBC AUTO: 95.1 FL (ref 79–97)
MONOCYTES # BLD AUTO: 1.29 10*3/MM3 (ref 0.1–0.9)
MONOCYTES NFR BLD AUTO: 10.4 % (ref 5–12)
NEUTROPHILS NFR BLD AUTO: 72.9 % (ref 42.7–76)
NEUTROPHILS NFR BLD AUTO: 9.01 10*3/MM3 (ref 1.7–7)
NRBC BLD AUTO-RTO: 0 /100 WBC (ref 0–0.2)
OSMOLALITY UR: 429 MOSM/KG
PLATELET # BLD AUTO: 326 10*3/MM3 (ref 140–450)
PMV BLD AUTO: 9 FL (ref 6–12)
POTASSIUM SERPL-SCNC: 4.2 MMOL/L (ref 3.5–5.2)
RBC # BLD AUTO: 3.29 10*6/MM3 (ref 4.14–5.8)
SODIUM SERPL-SCNC: 116 MMOL/L (ref 136–145)
SODIUM SERPL-SCNC: 123 MMOL/L (ref 136–145)
SODIUM UR-SCNC: 39 MMOL/L
WBC NRBC COR # BLD: 12.37 10*3/MM3 (ref 3.4–10.8)

## 2022-05-18 PROCEDURE — 25010000002 CEFAZOLIN IN DEXTROSE 2-4 GM/100ML-% SOLUTION: Performed by: ORTHOPAEDIC SURGERY

## 2022-05-18 PROCEDURE — 84300 ASSAY OF URINE SODIUM: CPT | Performed by: HOSPITALIST

## 2022-05-18 PROCEDURE — 25010000002 ONDANSETRON PER 1 MG: Performed by: ORTHOPAEDIC SURGERY

## 2022-05-18 PROCEDURE — 83935 ASSAY OF URINE OSMOLALITY: CPT | Performed by: HOSPITALIST

## 2022-05-18 PROCEDURE — 84295 ASSAY OF SERUM SODIUM: CPT | Performed by: HOSPITALIST

## 2022-05-18 PROCEDURE — 97161 PT EVAL LOW COMPLEX 20 MIN: CPT

## 2022-05-18 PROCEDURE — 80048 BASIC METABOLIC PNL TOTAL CA: CPT | Performed by: ORTHOPAEDIC SURGERY

## 2022-05-18 PROCEDURE — 97530 THERAPEUTIC ACTIVITIES: CPT

## 2022-05-18 PROCEDURE — 25010000002 VANCOMYCIN 10 G RECONSTITUTED SOLUTION: Performed by: ORTHOPAEDIC SURGERY

## 2022-05-18 PROCEDURE — 85025 COMPLETE CBC W/AUTO DIFF WBC: CPT | Performed by: ORTHOPAEDIC SURGERY

## 2022-05-18 RX ORDER — HYDRALAZINE HYDROCHLORIDE 25 MG/1
25 TABLET, FILM COATED ORAL EVERY 8 HOURS SCHEDULED
Status: DISCONTINUED | OUTPATIENT
Start: 2022-05-18 | End: 2022-05-19

## 2022-05-18 RX ORDER — OXCARBAZEPINE 300 MG/1
300 TABLET, FILM COATED ORAL 3 TIMES DAILY PRN
Status: DISCONTINUED | OUTPATIENT
Start: 2022-05-18 | End: 2022-05-21 | Stop reason: HOSPADM

## 2022-05-18 RX ADMIN — CEFAZOLIN SODIUM 2 G: 2 INJECTION, SOLUTION INTRAVENOUS at 14:17

## 2022-05-18 RX ADMIN — CEFAZOLIN SODIUM 2 G: 2 INJECTION, SOLUTION INTRAVENOUS at 21:51

## 2022-05-18 RX ADMIN — ONDANSETRON 4 MG: 2 INJECTION INTRAMUSCULAR; INTRAVENOUS at 15:00

## 2022-05-18 RX ADMIN — OXCARBAZEPINE 300 MG: 300 TABLET, FILM COATED ORAL at 18:41

## 2022-05-18 RX ADMIN — HYDRALAZINE HYDROCHLORIDE 25 MG: 25 TABLET, FILM COATED ORAL at 20:20

## 2022-05-18 RX ADMIN — CEFAZOLIN SODIUM 2 G: 2 INJECTION, SOLUTION INTRAVENOUS at 00:15

## 2022-05-18 RX ADMIN — AMLODIPINE BESYLATE 10 MG: 10 TABLET ORAL at 06:12

## 2022-05-18 RX ADMIN — LISINOPRIL 10 MG: 20 TABLET ORAL at 09:04

## 2022-05-18 RX ADMIN — HYDROCODONE BITARTRATE AND ACETAMINOPHEN 2 TABLET: 7.5; 325 TABLET ORAL at 06:12

## 2022-05-18 RX ADMIN — HYDROCODONE BITARTRATE AND ACETAMINOPHEN 2 TABLET: 7.5; 325 TABLET ORAL at 14:17

## 2022-05-18 RX ADMIN — HYDROCODONE BITARTRATE AND ACETAMINOPHEN 2 TABLET: 7.5; 325 TABLET ORAL at 01:56

## 2022-05-18 RX ADMIN — DOCUSATE SODIUM 100 MG: 100 CAPSULE, LIQUID FILLED ORAL at 09:04

## 2022-05-18 RX ADMIN — ONDANSETRON 4 MG: 2 INJECTION INTRAMUSCULAR; INTRAVENOUS at 06:57

## 2022-05-18 RX ADMIN — VANCOMYCIN HYDROCHLORIDE 1500 MG: 10 INJECTION, POWDER, LYOPHILIZED, FOR SOLUTION INTRAVENOUS at 20:22

## 2022-05-18 RX ADMIN — ALPRAZOLAM 0.5 MG: 0.5 TABLET ORAL at 20:26

## 2022-05-18 RX ADMIN — CEFAZOLIN SODIUM 2 G: 2 INJECTION, SOLUTION INTRAVENOUS at 06:12

## 2022-05-18 RX ADMIN — ASPIRIN 81 MG: 81 TABLET, COATED ORAL at 09:04

## 2022-05-18 RX ADMIN — ASPIRIN 81 MG: 81 TABLET, COATED ORAL at 20:21

## 2022-05-18 RX ADMIN — OXCARBAZEPINE 300 MG: 300 TABLET, FILM COATED ORAL at 09:04

## 2022-05-18 RX ADMIN — LEVOTHYROXINE SODIUM 75 MCG: 0.07 TABLET ORAL at 09:04

## 2022-05-18 RX ADMIN — DOCUSATE SODIUM 100 MG: 100 CAPSULE, LIQUID FILLED ORAL at 20:21

## 2022-05-18 RX ADMIN — ALPRAZOLAM 0.5 MG: 0.5 TABLET ORAL at 14:17

## 2022-05-18 RX ADMIN — Medication 1 MG: at 21:51

## 2022-05-19 LAB
ANION GAP SERPL CALCULATED.3IONS-SCNC: 11.2 MMOL/L (ref 5–15)
BUN SERPL-MCNC: 9 MG/DL (ref 8–23)
BUN/CREAT SERPL: 12.5 (ref 7–25)
CALCIUM SPEC-SCNC: 8.8 MG/DL (ref 8.6–10.5)
CHLORIDE SERPL-SCNC: 92 MMOL/L (ref 98–107)
CO2 SERPL-SCNC: 21.8 MMOL/L (ref 22–29)
CREAT SERPL-MCNC: 0.72 MG/DL (ref 0.76–1.27)
DEPRECATED RDW RBC AUTO: 43.7 FL (ref 37–54)
EGFRCR SERPLBLD CKD-EPI 2021: 94.1 ML/MIN/1.73
ERYTHROCYTE [DISTWIDTH] IN BLOOD BY AUTOMATED COUNT: 13 % (ref 12.3–15.4)
GLUCOSE SERPL-MCNC: 114 MG/DL (ref 65–99)
HCT VFR BLD AUTO: 32.9 % (ref 37.5–51)
HGB BLD-MCNC: 11.4 G/DL (ref 13–17.7)
MCH RBC QN AUTO: 32.6 PG (ref 26.6–33)
MCHC RBC AUTO-ENTMCNC: 34.7 G/DL (ref 31.5–35.7)
MCV RBC AUTO: 94 FL (ref 79–97)
PLATELET # BLD AUTO: 329 10*3/MM3 (ref 140–450)
PMV BLD AUTO: 9.6 FL (ref 6–12)
POTASSIUM SERPL-SCNC: 4.7 MMOL/L (ref 3.5–5.2)
RBC # BLD AUTO: 3.5 10*6/MM3 (ref 4.14–5.8)
SODIUM SERPL-SCNC: 123 MMOL/L (ref 136–145)
SODIUM SERPL-SCNC: 125 MMOL/L (ref 136–145)
SODIUM SERPL-SCNC: 127 MMOL/L (ref 136–145)
SODIUM SERPL-SCNC: 130 MMOL/L (ref 136–145)
TSH SERPL DL<=0.05 MIU/L-ACNC: 2.31 UIU/ML (ref 0.27–4.2)
WBC NRBC COR # BLD: 11.79 10*3/MM3 (ref 3.4–10.8)

## 2022-05-19 PROCEDURE — 84443 ASSAY THYROID STIM HORMONE: CPT | Performed by: HOSPITALIST

## 2022-05-19 PROCEDURE — 84295 ASSAY OF SERUM SODIUM: CPT | Performed by: HOSPITALIST

## 2022-05-19 PROCEDURE — 25010000002 CEFAZOLIN IN DEXTROSE 2-4 GM/100ML-% SOLUTION: Performed by: ORTHOPAEDIC SURGERY

## 2022-05-19 PROCEDURE — 85027 COMPLETE CBC AUTOMATED: CPT | Performed by: HOSPITALIST

## 2022-05-19 PROCEDURE — 25010000002 VANCOMYCIN 10 G RECONSTITUTED SOLUTION: Performed by: ORTHOPAEDIC SURGERY

## 2022-05-19 PROCEDURE — 80048 BASIC METABOLIC PNL TOTAL CA: CPT | Performed by: HOSPITALIST

## 2022-05-19 PROCEDURE — 25010000002 ONDANSETRON PER 1 MG: Performed by: ORTHOPAEDIC SURGERY

## 2022-05-19 PROCEDURE — 97530 THERAPEUTIC ACTIVITIES: CPT

## 2022-05-19 RX ORDER — HYDRALAZINE HYDROCHLORIDE 50 MG/1
50 TABLET, FILM COATED ORAL EVERY 8 HOURS SCHEDULED
Status: DISCONTINUED | OUTPATIENT
Start: 2022-05-19 | End: 2022-05-19

## 2022-05-19 RX ORDER — HYDRALAZINE HYDROCHLORIDE 50 MG/1
50 TABLET, FILM COATED ORAL EVERY 6 HOURS
Status: DISCONTINUED | OUTPATIENT
Start: 2022-05-19 | End: 2022-05-19

## 2022-05-19 RX ORDER — HYDRALAZINE HYDROCHLORIDE 50 MG/1
50 TABLET, FILM COATED ORAL EVERY 8 HOURS SCHEDULED
Status: DISCONTINUED | OUTPATIENT
Start: 2022-05-19 | End: 2022-05-20

## 2022-05-19 RX ORDER — POLYETHYLENE GLYCOL 3350 17 G/17G
17 POWDER, FOR SOLUTION ORAL DAILY
Status: DISCONTINUED | OUTPATIENT
Start: 2022-05-19 | End: 2022-05-21 | Stop reason: HOSPADM

## 2022-05-19 RX ADMIN — VANCOMYCIN HYDROCHLORIDE 1500 MG: 10 INJECTION, POWDER, LYOPHILIZED, FOR SOLUTION INTRAVENOUS at 21:09

## 2022-05-19 RX ADMIN — ALPRAZOLAM 0.5 MG: 0.5 TABLET ORAL at 09:20

## 2022-05-19 RX ADMIN — HYDRALAZINE HYDROCHLORIDE 50 MG: 50 TABLET, FILM COATED ORAL at 14:21

## 2022-05-19 RX ADMIN — AMLODIPINE BESYLATE 10 MG: 10 TABLET ORAL at 05:06

## 2022-05-19 RX ADMIN — HYDRALAZINE HYDROCHLORIDE 25 MG: 25 TABLET, FILM COATED ORAL at 05:05

## 2022-05-19 RX ADMIN — OXCARBAZEPINE 300 MG: 300 TABLET, FILM COATED ORAL at 08:43

## 2022-05-19 RX ADMIN — POLYETHYLENE GLYCOL 3350 17 G: 17 POWDER, FOR SOLUTION ORAL at 14:21

## 2022-05-19 RX ADMIN — DOCUSATE SODIUM 100 MG: 100 CAPSULE, LIQUID FILLED ORAL at 08:42

## 2022-05-19 RX ADMIN — HYDRALAZINE HYDROCHLORIDE 50 MG: 50 TABLET, FILM COATED ORAL at 21:10

## 2022-05-19 RX ADMIN — BISACODYL 10 MG: 5 TABLET ORAL at 08:43

## 2022-05-19 RX ADMIN — LEVOTHYROXINE SODIUM 75 MCG: 0.07 TABLET ORAL at 08:43

## 2022-05-19 RX ADMIN — OXCARBAZEPINE 300 MG: 300 TABLET, FILM COATED ORAL at 17:41

## 2022-05-19 RX ADMIN — ASPIRIN 81 MG: 81 TABLET, COATED ORAL at 08:42

## 2022-05-19 RX ADMIN — Medication 15 G: at 21:11

## 2022-05-19 RX ADMIN — ALPRAZOLAM 0.5 MG: 0.5 TABLET ORAL at 17:41

## 2022-05-19 RX ADMIN — LISINOPRIL 10 MG: 20 TABLET ORAL at 08:42

## 2022-05-19 RX ADMIN — Medication 1 MG: at 21:09

## 2022-05-19 RX ADMIN — ASPIRIN 81 MG: 81 TABLET, COATED ORAL at 21:09

## 2022-05-19 RX ADMIN — CEFAZOLIN SODIUM 2 G: 2 INJECTION, SOLUTION INTRAVENOUS at 05:51

## 2022-05-19 RX ADMIN — DOCUSATE SODIUM 100 MG: 100 CAPSULE, LIQUID FILLED ORAL at 21:09

## 2022-05-19 RX ADMIN — ONDANSETRON 4 MG: 2 INJECTION INTRAMUSCULAR; INTRAVENOUS at 08:09

## 2022-05-19 RX ADMIN — Medication 15 G: at 11:50

## 2022-05-20 LAB
ALBUMIN SERPL-MCNC: 3.5 G/DL (ref 3.5–5.2)
ANION GAP SERPL CALCULATED.3IONS-SCNC: 12 MMOL/L (ref 5–15)
BACTERIA SPEC AEROBE CULT: NORMAL
BUN SERPL-MCNC: 19 MG/DL (ref 8–23)
BUN/CREAT SERPL: 29.2 (ref 7–25)
CALCIUM SPEC-SCNC: 8.4 MG/DL (ref 8.6–10.5)
CHLORIDE SERPL-SCNC: 94 MMOL/L (ref 98–107)
CO2 SERPL-SCNC: 21 MMOL/L (ref 22–29)
CREAT SERPL-MCNC: 0.65 MG/DL (ref 0.76–1.27)
EGFRCR SERPLBLD CKD-EPI 2021: 97 ML/MIN/1.73
GLUCOSE SERPL-MCNC: 117 MG/DL (ref 65–99)
GRAM STN SPEC: NORMAL
GRAM STN SPEC: NORMAL
PHOSPHATE SERPL-MCNC: 2.7 MG/DL (ref 2.5–4.5)
POTASSIUM SERPL-SCNC: 4 MMOL/L (ref 3.5–5.2)
SODIUM SERPL-SCNC: 127 MMOL/L (ref 136–145)
SODIUM SERPL-SCNC: 128 MMOL/L (ref 136–145)

## 2022-05-20 PROCEDURE — 97530 THERAPEUTIC ACTIVITIES: CPT

## 2022-05-20 PROCEDURE — 80069 RENAL FUNCTION PANEL: CPT | Performed by: HOSPITALIST

## 2022-05-20 PROCEDURE — 84295 ASSAY OF SERUM SODIUM: CPT | Performed by: HOSPITALIST

## 2022-05-20 RX ORDER — CARVEDILOL 12.5 MG/1
12.5 TABLET ORAL 2 TIMES DAILY WITH MEALS
Status: DISCONTINUED | OUTPATIENT
Start: 2022-05-20 | End: 2022-05-21 | Stop reason: HOSPADM

## 2022-05-20 RX ORDER — CALCIUM CARBONATE 200(500)MG
2 TABLET,CHEWABLE ORAL 3 TIMES DAILY PRN
Status: DISCONTINUED | OUTPATIENT
Start: 2022-05-20 | End: 2022-05-21 | Stop reason: HOSPADM

## 2022-05-20 RX ADMIN — HYDRALAZINE HYDROCHLORIDE 75 MG: 50 TABLET, FILM COATED ORAL at 21:08

## 2022-05-20 RX ADMIN — OXCARBAZEPINE 300 MG: 300 TABLET, FILM COATED ORAL at 21:10

## 2022-05-20 RX ADMIN — HYDRALAZINE HYDROCHLORIDE 50 MG: 50 TABLET, FILM COATED ORAL at 05:27

## 2022-05-20 RX ADMIN — Medication 1 MG: at 21:08

## 2022-05-20 RX ADMIN — CALCIUM CARBONATE 2 TABLET: 500 TABLET, CHEWABLE ORAL at 16:12

## 2022-05-20 RX ADMIN — DOCUSATE SODIUM 100 MG: 100 CAPSULE, LIQUID FILLED ORAL at 09:02

## 2022-05-20 RX ADMIN — POLYETHYLENE GLYCOL 3350 17 G: 17 POWDER, FOR SOLUTION ORAL at 09:02

## 2022-05-20 RX ADMIN — LISINOPRIL 10 MG: 20 TABLET ORAL at 09:02

## 2022-05-20 RX ADMIN — ALPRAZOLAM 0.5 MG: 0.5 TABLET ORAL at 18:08

## 2022-05-20 RX ADMIN — OXCARBAZEPINE 300 MG: 300 TABLET, FILM COATED ORAL at 01:44

## 2022-05-20 RX ADMIN — CARVEDILOL 12.5 MG: 12.5 TABLET, FILM COATED ORAL at 18:11

## 2022-05-20 RX ADMIN — AMLODIPINE BESYLATE 10 MG: 10 TABLET ORAL at 09:01

## 2022-05-20 RX ADMIN — DOCUSATE SODIUM 100 MG: 100 CAPSULE, LIQUID FILLED ORAL at 21:08

## 2022-05-20 RX ADMIN — ALPRAZOLAM 0.5 MG: 0.5 TABLET ORAL at 01:44

## 2022-05-20 RX ADMIN — LEVOTHYROXINE SODIUM 75 MCG: 0.07 TABLET ORAL at 09:02

## 2022-05-20 RX ADMIN — ALPRAZOLAM 0.5 MG: 0.5 TABLET ORAL at 10:07

## 2022-05-20 RX ADMIN — ASPIRIN 81 MG: 81 TABLET, COATED ORAL at 09:02

## 2022-05-20 RX ADMIN — HYDRALAZINE HYDROCHLORIDE 75 MG: 50 TABLET, FILM COATED ORAL at 14:49

## 2022-05-20 RX ADMIN — ASPIRIN 81 MG: 81 TABLET, COATED ORAL at 21:08

## 2022-05-20 RX ADMIN — HYDROCODONE BITARTRATE AND ACETAMINOPHEN 1 TABLET: 7.5; 325 TABLET ORAL at 06:45

## 2022-05-20 RX ADMIN — Medication 15 G: at 22:02

## 2022-05-20 RX ADMIN — OXCARBAZEPINE 300 MG: 300 TABLET, FILM COATED ORAL at 12:36

## 2022-05-20 RX ADMIN — Medication 15 G: at 09:02

## 2022-05-21 VITALS
SYSTOLIC BLOOD PRESSURE: 138 MMHG | BODY MASS INDEX: 33.63 KG/M2 | HEIGHT: 69 IN | RESPIRATION RATE: 16 BRPM | WEIGHT: 227.07 LBS | TEMPERATURE: 97.8 F | HEART RATE: 83 BPM | OXYGEN SATURATION: 94 % | DIASTOLIC BLOOD PRESSURE: 71 MMHG

## 2022-05-21 LAB
ALBUMIN SERPL-MCNC: 3.5 G/DL (ref 3.5–5.2)
ANION GAP SERPL CALCULATED.3IONS-SCNC: 9 MMOL/L (ref 5–15)
BUN SERPL-MCNC: 19 MG/DL (ref 8–23)
BUN/CREAT SERPL: 24.4 (ref 7–25)
CALCIUM SPEC-SCNC: 9.4 MG/DL (ref 8.6–10.5)
CHLORIDE SERPL-SCNC: 96 MMOL/L (ref 98–107)
CO2 SERPL-SCNC: 27 MMOL/L (ref 22–29)
CREAT SERPL-MCNC: 0.78 MG/DL (ref 0.76–1.27)
DEPRECATED RDW RBC AUTO: 45.6 FL (ref 37–54)
EGFRCR SERPLBLD CKD-EPI 2021: 91.9 ML/MIN/1.73
ERYTHROCYTE [DISTWIDTH] IN BLOOD BY AUTOMATED COUNT: 13 % (ref 12.3–15.4)
GLUCOSE SERPL-MCNC: 105 MG/DL (ref 65–99)
HCT VFR BLD AUTO: 28.4 % (ref 37.5–51)
HGB BLD-MCNC: 9.8 G/DL (ref 13–17.7)
MCH RBC QN AUTO: 33 PG (ref 26.6–33)
MCHC RBC AUTO-ENTMCNC: 34.5 G/DL (ref 31.5–35.7)
MCV RBC AUTO: 95.6 FL (ref 79–97)
PHOSPHATE SERPL-MCNC: 4.4 MG/DL (ref 2.5–4.5)
PLATELET # BLD AUTO: 355 10*3/MM3 (ref 140–450)
PMV BLD AUTO: 9.2 FL (ref 6–12)
POTASSIUM SERPL-SCNC: 4.2 MMOL/L (ref 3.5–5.2)
RBC # BLD AUTO: 2.97 10*6/MM3 (ref 4.14–5.8)
SARS-COV-2 RNA PNL SPEC NAA+PROBE: NOT DETECTED
SODIUM SERPL-SCNC: 132 MMOL/L (ref 136–145)
SODIUM SERPL-SCNC: 132 MMOL/L (ref 136–145)
WBC NRBC COR # BLD: 9.05 10*3/MM3 (ref 3.4–10.8)

## 2022-05-21 PROCEDURE — 85027 COMPLETE CBC AUTOMATED: CPT | Performed by: HOSPITALIST

## 2022-05-21 PROCEDURE — 84295 ASSAY OF SERUM SODIUM: CPT | Performed by: HOSPITALIST

## 2022-05-21 PROCEDURE — 80069 RENAL FUNCTION PANEL: CPT | Performed by: HOSPITALIST

## 2022-05-21 PROCEDURE — 87635 SARS-COV-2 COVID-19 AMP PRB: CPT | Performed by: HOSPITALIST

## 2022-05-21 RX ORDER — ASPIRIN 81 MG/1
81 TABLET ORAL EVERY 12 HOURS SCHEDULED
Qty: 55 TABLET | Refills: 0
Start: 2022-05-21 | End: 2022-06-18

## 2022-05-21 RX ORDER — BISACODYL 5 MG/1
10 TABLET, DELAYED RELEASE ORAL DAILY PRN
Start: 2022-05-21 | End: 2022-05-27

## 2022-05-21 RX ORDER — POLYETHYLENE GLYCOL 3350 17 G/17G
17 POWDER, FOR SOLUTION ORAL DAILY
Start: 2022-05-21 | End: 2023-01-12

## 2022-05-21 RX ORDER — PSEUDOEPHEDRINE HCL 30 MG
100 TABLET ORAL 2 TIMES DAILY
Qty: 25 CAPSULE | Refills: 0
Start: 2022-05-21 | End: 2022-06-03

## 2022-05-21 RX ORDER — CARVEDILOL 12.5 MG/1
12.5 TABLET ORAL 2 TIMES DAILY WITH MEALS
Start: 2022-05-21 | End: 2023-03-20 | Stop reason: SDUPTHER

## 2022-05-21 RX ORDER — HYDRALAZINE HYDROCHLORIDE 25 MG/1
75 TABLET, FILM COATED ORAL EVERY 8 HOURS SCHEDULED
Start: 2022-05-21 | End: 2023-03-20 | Stop reason: SDUPTHER

## 2022-05-21 RX ORDER — HYDROCODONE BITARTRATE AND ACETAMINOPHEN 5; 325 MG/1; MG/1
TABLET ORAL
Qty: 8 TABLET | Refills: 0 | Status: SHIPPED | OUTPATIENT
Start: 2022-05-21 | End: 2023-01-12

## 2022-05-21 RX ORDER — AMLODIPINE BESYLATE 10 MG/1
10 TABLET ORAL EVERY MORNING
Start: 2022-05-21 | End: 2022-08-31

## 2022-05-21 RX ORDER — ALPRAZOLAM 0.5 MG/1
0.5 TABLET ORAL 3 TIMES DAILY PRN
Qty: 9 TABLET | Refills: 0 | Status: SHIPPED | OUTPATIENT
Start: 2022-05-21 | End: 2022-06-01 | Stop reason: SDUPTHER

## 2022-05-21 RX ADMIN — POLYETHYLENE GLYCOL 3350 17 G: 17 POWDER, FOR SOLUTION ORAL at 10:00

## 2022-05-21 RX ADMIN — AMLODIPINE BESYLATE 10 MG: 10 TABLET ORAL at 08:00

## 2022-05-21 RX ADMIN — DOCUSATE SODIUM 100 MG: 100 CAPSULE, LIQUID FILLED ORAL at 10:00

## 2022-05-21 RX ADMIN — ALPRAZOLAM 0.5 MG: 0.5 TABLET ORAL at 04:53

## 2022-05-21 RX ADMIN — LEVOTHYROXINE SODIUM 75 MCG: 0.07 TABLET ORAL at 09:59

## 2022-05-21 RX ADMIN — Medication 15 G: at 10:00

## 2022-05-21 RX ADMIN — ASPIRIN 81 MG: 81 TABLET, COATED ORAL at 09:59

## 2022-05-21 RX ADMIN — HYDRALAZINE HYDROCHLORIDE 75 MG: 50 TABLET, FILM COATED ORAL at 14:55

## 2022-05-21 RX ADMIN — CARVEDILOL 12.5 MG: 12.5 TABLET, FILM COATED ORAL at 18:09

## 2022-05-21 RX ADMIN — CARVEDILOL 12.5 MG: 12.5 TABLET, FILM COATED ORAL at 08:00

## 2022-05-21 RX ADMIN — OXCARBAZEPINE 300 MG: 300 TABLET, FILM COATED ORAL at 04:53

## 2022-05-21 RX ADMIN — ALPRAZOLAM 0.5 MG: 0.5 TABLET ORAL at 16:09

## 2022-05-22 LAB — BACTERIA SPEC ANAEROBE CULT: NORMAL

## 2022-05-24 ENCOUNTER — TELEPHONE (OUTPATIENT)
Dept: ORTHOPEDIC SURGERY | Facility: HOSPITAL | Age: 77
End: 2022-05-24

## 2022-05-24 NOTE — TELEPHONE ENCOUNTER
Called and spoke with Ms. Ugarte to see how Mr. Ugarte has been doing as he is 1 week SP Revision Right Total Knee Arthroplasty. He was discharged to [facility] on Friday, and didn't receive therapy. Things didn't work out at the facility and Mr. Ugrate is now at home with his wife. She said that things are going ok. But he still hasn't had any formal therapy. [facility] can't get [facility] till Saturday. He has been trying to do some exercises and walking some at home, but isn't able to do much. His pain is controlled and the dressing looks good. They don't have any questions for me at this time. Ms. Ugarte was given my contact information should they need anything. She voiced understanding.

## 2022-05-27 LAB
BACTERIA SPEC AEROBE CULT: NORMAL
GRAM STN SPEC: NORMAL

## 2022-05-30 DIAGNOSIS — F41.9 ANXIETY: ICD-10-CM

## 2022-05-31 RX ORDER — ALPRAZOLAM 0.5 MG/1
TABLET ORAL
Qty: 90 TABLET | OUTPATIENT
Start: 2022-05-31

## 2022-06-01 ENCOUNTER — TELEPHONE (OUTPATIENT)
Dept: INTERNAL MEDICINE | Facility: CLINIC | Age: 77
End: 2022-06-01

## 2022-06-01 DIAGNOSIS — F41.9 ANXIETY: ICD-10-CM

## 2022-06-01 RX ORDER — TRIAMCINOLONE ACETONIDE 0.25 MG/G
1 CREAM TOPICAL 2 TIMES DAILY
Qty: 15 G | Refills: 0 | Status: SHIPPED | OUTPATIENT
Start: 2022-06-01 | End: 2022-06-07 | Stop reason: SDUPTHER

## 2022-06-01 RX ORDER — ALPRAZOLAM 0.5 MG/1
0.5 TABLET ORAL 3 TIMES DAILY PRN
Qty: 90 TABLET | Refills: 0 | Status: SHIPPED | OUTPATIENT
Start: 2022-06-01 | End: 2022-07-01

## 2022-06-07 ENCOUNTER — OFFICE VISIT (OUTPATIENT)
Dept: INTERNAL MEDICINE | Facility: CLINIC | Age: 77
End: 2022-06-07

## 2022-06-07 VITALS
SYSTOLIC BLOOD PRESSURE: 118 MMHG | WEIGHT: 227 LBS | DIASTOLIC BLOOD PRESSURE: 60 MMHG | TEMPERATURE: 96.9 F | BODY MASS INDEX: 33.62 KG/M2 | HEIGHT: 69 IN | HEART RATE: 82 BPM

## 2022-06-07 DIAGNOSIS — F41.9 ANXIETY: ICD-10-CM

## 2022-06-07 DIAGNOSIS — Z23 NEED FOR PNEUMOCOCCAL VACCINATION: ICD-10-CM

## 2022-06-07 DIAGNOSIS — E87.1 HYPONATREMIA: ICD-10-CM

## 2022-06-07 DIAGNOSIS — I10 BENIGN ESSENTIAL HTN: Primary | ICD-10-CM

## 2022-06-07 DIAGNOSIS — E03.9 ACQUIRED HYPOTHYROIDISM: ICD-10-CM

## 2022-06-07 PROBLEM — D72.829 LEUKOCYTOSIS: Status: RESOLVED | Noted: 2022-05-18 | Resolved: 2022-06-07

## 2022-06-07 PROBLEM — D62 ACUTE POSTHEMORRHAGIC ANEMIA: Status: RESOLVED | Noted: 2022-05-18 | Resolved: 2022-06-07

## 2022-06-07 PROCEDURE — 90677 PCV20 VACCINE IM: CPT | Performed by: INTERNAL MEDICINE

## 2022-06-07 PROCEDURE — 99213 OFFICE O/P EST LOW 20 MIN: CPT | Performed by: INTERNAL MEDICINE

## 2022-06-07 PROCEDURE — G0009 ADMIN PNEUMOCOCCAL VACCINE: HCPCS | Performed by: INTERNAL MEDICINE

## 2022-06-07 RX ORDER — TRIAMCINOLONE ACETONIDE 0.25 MG/G
1 CREAM TOPICAL 2 TIMES DAILY
Qty: 80 G | Refills: 0 | Status: SHIPPED | OUTPATIENT
Start: 2022-06-07 | End: 2023-01-12

## 2022-06-07 NOTE — PROGRESS NOTES
"Chief Complaint  Anxiety    Subjective        Jeremi Ugarte presents to [facility]  History of Present Illness Has been through a lot of orthopedic procedures in the last few months- he finally feels like he is getting better.   Recurrent problem with poison ivy- big problem in his back yard, using steroid cream prn.   Otherwise says he's doing well- BP has been a little high but he does feel better today so hoping it's better. Wife says she has just been giving him hydralazine 25 mg TID, not 75 mg.   Using alprazolam 2-3 times daily.  Trileptal helpful, no significant headache issues.   Problems with hyponatremia post-op, tried to treat with urea but couldn't afford it.  He has been watching water intake.       Objective   Vital Signs:  /60   Pulse 82   Temp 96.9 °F (36.1 °C)   Ht 175.3 cm (69\")   Wt 103 kg (227 lb)   BMI 33.52 kg/m²   Estimated body mass index is 33.52 kg/m² as calculated from the following:    Height as of this encounter: 175.3 cm (69\").    Weight as of this encounter: 103 kg (227 lb).           Physical Exam  Constitutional:       Appearance: Normal appearance.   Cardiovascular:      Rate and Rhythm: Normal rate and regular rhythm.   Pulmonary:      Effort: Pulmonary effort is normal.   Musculoskeletal:      Right lower leg: No edema.      Left lower leg: No edema.   Neurological:      General: No focal deficit present.   Psychiatric:         Mood and Affect: Mood normal.        Result Review :  The following data was reviewed by: Charity Kearney MD on 06/07/2022:  Common labs    Common Labsle 5/19/22 5/19/22 5/19/22 5/19/22 5/20/22 5/20/22 5/21/22 5/21/22 5/21/22    0556 0557 1625 2040 0750 2005 0348 0348 0747   Glucose  114 (A)   117 (A)   105 (A)    BUN  9   19   19    Creatinine  0.72 (A)   0.65 (A)   0.78    Sodium  125 (A) 127 (A) 130 (A) 127 (A) 128 (A)  132 (A) 132 (A)   Potassium  4.7   4.0   4.2    Chloride  92 (A)   94 (A)   96 (A)  "   Calcium  8.8   8.4 (A)   9.4    Albumin     3.50   3.50    WBC 11.79 (A)      9.05     Hemoglobin 11.4 (A)      9.8 (A)     Hematocrit 32.9 (A)      28.4 (A)     Platelets 329      355     (A) Abnormal value       Comments are available for some flowsheets but are not being displayed.           Data reviewed: Recent hospitalization notes [facility]          Assessment and Plan   Diagnoses and all orders for this visit:    1. Benign essential HTN (Primary)  Comments:  much better control, tolerates well.  no change.     2. Anxiety  Comments:  stable use of alprazolam, no change.     3. Acquired hypothyroidism  Comments:  current dose is excellent, no change.     4. Need for pneumococcal vaccination  -     Pneumococcal Conjugate Vaccine 20-Valent (PCV20)    5. Hyponatremia  Comments:  reviewed records- showed some improvement, would not take the urea now- f/u with renal next week.     Other orders  -     triamcinolone (KENALOG) 0.025 % cream; Apply 1 application topically to the appropriate area as directed 2 (Two) Times a Day.  Dispense: 80 g; Refill: 0             Follow Up   Return in about 6 months (around 12/7/2022) for Medicare Wellness, Lab Before FUP.  Patient was given instructions and counseling regarding his condition or for health maintenance advice. Please see specific information pulled into the AVS if appropriate.

## 2022-07-01 DIAGNOSIS — F41.9 ANXIETY: ICD-10-CM

## 2022-07-01 RX ORDER — ALPRAZOLAM 0.5 MG/1
TABLET ORAL
Qty: 90 TABLET | Refills: 0 | Status: SHIPPED | OUTPATIENT
Start: 2022-07-01 | End: 2022-08-01

## 2022-07-31 DIAGNOSIS — F41.9 ANXIETY: ICD-10-CM

## 2022-08-01 RX ORDER — ALPRAZOLAM 0.5 MG/1
TABLET ORAL
Qty: 90 TABLET | Refills: 1 | Status: SHIPPED | OUTPATIENT
Start: 2022-08-01 | End: 2022-10-03

## 2022-08-31 DIAGNOSIS — E03.9 HYPOTHYROIDISM, UNSPECIFIED TYPE: ICD-10-CM

## 2022-08-31 RX ORDER — AMLODIPINE BESYLATE 10 MG/1
10 TABLET ORAL EVERY MORNING
Qty: 90 TABLET | Refills: 1 | Status: SHIPPED | OUTPATIENT
Start: 2022-08-31 | End: 2023-03-20 | Stop reason: SDUPTHER

## 2022-08-31 RX ORDER — LEVOTHYROXINE SODIUM 0.07 MG/1
75 TABLET ORAL DAILY
Qty: 90 TABLET | Refills: 1 | Status: SHIPPED | OUTPATIENT
Start: 2022-08-31 | End: 2023-02-27

## 2022-09-29 RX ORDER — ROSUVASTATIN CALCIUM 20 MG/1
20 TABLET, COATED ORAL DAILY
Qty: 90 TABLET | Refills: 1 | Status: SHIPPED | OUTPATIENT
Start: 2022-09-29 | End: 2023-02-27

## 2022-10-01 DIAGNOSIS — F41.9 ANXIETY: ICD-10-CM

## 2022-10-03 RX ORDER — ALPRAZOLAM 0.5 MG/1
TABLET ORAL
Qty: 90 TABLET | Refills: 1 | Status: SHIPPED | OUTPATIENT
Start: 2022-10-03 | End: 2022-11-29

## 2022-11-16 DIAGNOSIS — I10 BENIGN ESSENTIAL HTN: ICD-10-CM

## 2022-11-16 RX ORDER — AMLODIPINE BESYLATE 5 MG/1
5 TABLET ORAL DAILY
Qty: 90 TABLET | Refills: 1 | OUTPATIENT
Start: 2022-11-16

## 2022-11-29 DIAGNOSIS — F41.9 ANXIETY: ICD-10-CM

## 2022-11-29 RX ORDER — ALPRAZOLAM 0.5 MG/1
TABLET ORAL
Qty: 90 TABLET | Refills: 0 | Status: SHIPPED | OUTPATIENT
Start: 2022-11-29 | End: 2022-12-22

## 2022-12-14 ENCOUNTER — OFFICE VISIT (OUTPATIENT)
Dept: INTERNAL MEDICINE | Facility: CLINIC | Age: 77
End: 2022-12-14

## 2022-12-14 VITALS
WEIGHT: 258 LBS | SYSTOLIC BLOOD PRESSURE: 124 MMHG | BODY MASS INDEX: 38.21 KG/M2 | HEART RATE: 80 BPM | DIASTOLIC BLOOD PRESSURE: 66 MMHG | HEIGHT: 69 IN

## 2022-12-14 DIAGNOSIS — E03.9 HYPOTHYROIDISM, UNSPECIFIED TYPE: Primary | Chronic | ICD-10-CM

## 2022-12-14 DIAGNOSIS — E87.1 HYPONATREMIA: Chronic | ICD-10-CM

## 2022-12-14 DIAGNOSIS — F41.9 ANXIETY: Chronic | ICD-10-CM

## 2022-12-14 DIAGNOSIS — I10 BENIGN ESSENTIAL HTN: Chronic | ICD-10-CM

## 2022-12-14 PROCEDURE — 99214 OFFICE O/P EST MOD 30 MIN: CPT | Performed by: INTERNAL MEDICINE

## 2022-12-14 NOTE — PROGRESS NOTES
"Chief Complaint  Hypertension    Subjective        Jeremi Ugarte presents to [facility]  History of Present Illness had some BP meds adjusted by nephrology- decreased amlodipine to 5 mg and increased hydralazine to 50 mg TID.  Few BP readings at home have not been very good- as high as 170s/100. No further problems with hyponatremia.  Misunderstanding and he weaned off thyroid replacement.  Has to have patella repaired in January.   Known elevated PSA for years- 2018 was 3.8  He has no urinary symptoms.   He has gained a lot of weight with the recent orthopedic issues.   Using the alprazolam TID most days.     Objective   Vital Signs:  /66   Pulse 80   Ht 175.3 cm (69\")   Wt 117 kg (258 lb)   BMI 38.10 kg/m²   Estimated body mass index is 38.1 kg/m² as calculated from the following:    Height as of this encounter: 175.3 cm (69\").    Weight as of this encounter: 117 kg (258 lb).          Physical Exam  Constitutional:       General: He is not in acute distress.     Appearance: Normal appearance.   Cardiovascular:      Rate and Rhythm: Normal rate and regular rhythm.   Pulmonary:      Effort: Pulmonary effort is normal.   Musculoskeletal:      Right lower leg: No edema.      Left lower leg: No edema.        Result Review :  The following data was reviewed by: Charity Kearney MD on 12/14/2022:  Common labs    Common Labs 5/20/22 5/20/22 5/21/22 5/21/22 5/21/22 12/7/22 12/7/22 12/7/22 12/7/22    0750 2005 0348 0348 0747 0849 0849 0849 0849   Glucose 117 (A)   105 (A)   95     BUN 19   19   20     Creatinine 0.65 (A)   0.78   1.07     Sodium 127 (A) 128 (A)  132 (A) 132 (A)  136     Potassium 4.0   4.2   4.6     Chloride 94 (A)   96 (A)   100     Calcium 8.4 (A)   9.4   9.2     Total Protein       6.3     Albumin 3.50   3.50   4.50     Total Bilirubin       0.4     Alkaline Phosphatase       77     AST (SGOT)       17     ALT (SGPT)       13     WBC   9.05   6.59      Hemoglobin "   9.8 (A)   14.8      Hematocrit   28.4 (A)   41.9      Platelets   355   266      Total Cholesterol        156    Triglycerides        116    HDL Cholesterol        45    LDL Cholesterol         90    PSA         4.070 (A)   (A) Abnormal value       Comments are available for some flowsheets but are not being displayed.           Data reviewed: nephrology and recent hospital          Assessment and Plan   Diagnoses and all orders for this visit:    1. Hypothyroidism, unspecified type (Primary)  Comments:  resume levothyroxine 75 mcg daily- not sure why he d/c'd.  Orders:  -     TSH; Future    2. Benign essential HTN  Comments:  certainly much better here than at home- will check machine, get checked at [facility] a few times to be sure- call if remains elevated.     3. Hyponatremia  Comments:  improved with some medication changes.     4. Anxiety  Comments:  stable use of alprazolam and refills             Follow Up   Return in about 3 months (around 3/14/2023) for Medicare Wellness, Lab Before FUP.  Patient was given instructions and counseling regarding his condition or for health maintenance advice. Please see specific information pulled into the AVS if appropriate.

## 2022-12-15 PROBLEM — S76.111A QUADRICEPS TENDON RUPTURE, RIGHT, INITIAL ENCOUNTER: Status: RESOLVED | Noted: 2022-03-25 | Resolved: 2022-12-15

## 2022-12-22 DIAGNOSIS — F41.9 ANXIETY: ICD-10-CM

## 2022-12-22 RX ORDER — ALPRAZOLAM 0.5 MG/1
TABLET ORAL
Qty: 90 TABLET | Refills: 0 | Status: SHIPPED | OUTPATIENT
Start: 2022-12-22 | End: 2023-01-30

## 2023-01-05 ENCOUNTER — PREP FOR SURGERY (OUTPATIENT)
Dept: OTHER | Facility: HOSPITAL | Age: 78
End: 2023-01-05
Payer: MEDICARE

## 2023-01-05 DIAGNOSIS — T84.023A INSTABILITY OF INTERNAL LEFT KNEE PROSTHESIS, INITIAL ENCOUNTER: ICD-10-CM

## 2023-01-05 DIAGNOSIS — S83.005D CLOSED DISLOCATION OF PATELLA, LEFT, SUBSEQUENT ENCOUNTER: Primary | ICD-10-CM

## 2023-01-05 RX ORDER — CEFAZOLIN SODIUM 2 G/100ML
2 INJECTION, SOLUTION INTRAVENOUS ONCE
Status: CANCELLED | OUTPATIENT
Start: 2023-01-16 | End: 2023-01-05

## 2023-01-05 RX ORDER — CHLORHEXIDINE GLUCONATE 500 MG/1
CLOTH TOPICAL ONCE
Status: CANCELLED | OUTPATIENT
Start: 2023-01-16 | End: 2023-01-05

## 2023-01-05 RX ORDER — CHLORHEXIDINE GLUCONATE 500 MG/1
CLOTH TOPICAL 2 TIMES DAILY
Status: CANCELLED | OUTPATIENT
Start: 2023-01-05

## 2023-01-05 RX ORDER — PREGABALIN 75 MG/1
75 CAPSULE ORAL ONCE
Status: CANCELLED | OUTPATIENT
Start: 2023-01-16 | End: 2023-01-05

## 2023-01-05 RX ORDER — ACETAMINOPHEN 500 MG
1000 TABLET ORAL ONCE
Status: CANCELLED | OUTPATIENT
Start: 2023-01-16 | End: 2023-01-05

## 2023-01-05 NOTE — H&P
Chief Complaint  Left follow-up  Followup: History of revision of right total knee arthroplasty  Patient's Care Team  Primary Care Provider: ROMANA MERAZ MD: [address], [place] [address], Ph [phone], Fax [phone] NPI: [id number]   (Worker's Comp): [employer]: Ph [phone]  Patient's Pharmacies  [id number] ([place]): [address] [place] [address], Ph [phone], Fax [phone]  Vitals  None recorded.  Allergies  Allergies not reviewed (last reviewed 11/02/2022)  MORPHINE: - Critical  Category: Drug   MORPHINE: - Critical  Category: Drug   PERCOCET: - Critical  Category: Drug   Uncoded Allergies  NO KNOWN ALLERGIES (Moderate) 07/22/2020 (Inactive) Stop Date: 07/22/2020 Stop Reason: Other  Medications  Medications not reviewed (last reviewed 11/02/2022)  acetaminophen 300 mg-codeine 30 mg tablet  TAKE 1 TO 2 TABLETS BY MOUTH EVERY 6 HOURS AS NEEDED  01/11/22   filled surescripts  ALPRAZolam 0.5 mg tablet  TAKE 1 TABLET BY MOUTH THREE TIMES DAILY AS NEEDED FOR ANXIETY  12/22/22   filled surescripts  amLODIPine 10 mg tablet  TAKE 1 TABLET BY MOUTH EVERY MORNING  12/01/22   filled surescripts  amoxicillin 500 mg tablet  TAKE 4 TABLETS BY MOUTH ONE HOUR PRIOR TO DENTAL VISIT  02/01/22   filled surescripts  carvediloL 12.5 mg tablet  TAKE 1 TABLET BY MOUTH EVERY MORNING AND EVERY EVENING WITH MEALS  11/29/22   filled surescripts  DULoxetine 30 mg capsule,delayed release  TAKE 1 CAPSULE BY MOUTH DAILY  09/01/21   filled surescripts  hydrALAZINE 25 mg tablet  TAKE 3 TABLETS BY MOUTH EVERY 8 HOURS  12/16/22   filled surescripts  hydroCHLOROthiazide 25 mg tablet  TAKE 1 TABLET BY MOUTH DAILY  03/18/22   filled surescripts  HYDROcodone 5 mg-acetaminophen 325 mg tablet  04/13/22   filled surescripts  levothyroxine 75 mcg tablet  TAKE 1 TABLET BY MOUTH DAILY  12/01/22   filled surescripts  OLANZapine 5 mg tablet  TAKE 1 TABLET BY MOUTH EVERY  NIGHT  09/23/21   filled surescripts  ondansetron HCL 4 mg tablet  11/17/21   filled surescripts  OXcarbazepine 300 mg tablet  TAKE 1 TABLET BY MOUTH THREE TIMES DAILY  12/22/22   filled surescripts  rosuvastatin 20 mg tablet  TAKE 1 TABLET BY MOUTH DAILY  12/16/22   filled surescripts  triamcinolone acetonide 0.025 % topical cream  APPLY TOPICALLY TO THE AFFECTED AREA TWICE DAILY AS DIRECTED  06/08/22   filled surescripts  urea  05/31/22   entered [facility] Luna  Vaccines  Vaccines not reviewed (last reviewed 11/02/2022)  Vaccine Type Date Amt. Route Site NDC Lot # Mfr. Exp.  Date VIS VIS  Given Vaccinator  COVID-19  COVID-19, mRNA, LNP-S, PF, 30 mcg/0.3 mL dose ([employer]) 02/10/21            Influenza  influenza, injectable, quadrivalent 09/01/21            Problems  Reviewed Problems  Lateral patellofemoral dislocation - Onset: 01/04/2023, Left  Aftercare - Onset: 07/06/2022  History of revision of right total knee arthroplasty - Onset: 07/06/2022  Family History  Family History not reviewed (last reviewed 11/02/2022)  Mother - Family history of Hypertension    - Heart disease  Social History  Social History not reviewed (last reviewed 11/02/2022)  Substance Use  Do you or have you ever smoked tobacco?: Never smoker  Do you or have you ever used any other forms of tobacco or nicotine?: No  Has tobacco cessation counseling been provided?: No  What is your level of alcohol consumption?: None  Do you use any illicit or recreational drugs?: No  Public Health and Travel  Have you recently traveled abroad?: No  Have you been to an area known to be high risk for COVID-19?: No  In the 14 days before symptom onset, have you had close contact with a laboratory-confirmed COVID-19 while that case was ill?: No  In the 14 days before symptom onset, have you had close contact with a person who is under investigation for COVID-19 while that person was ill?: No  Advance Directive  Do you have an advance directive?:  Yes  Do you have a medical power of ?: Yes  Surgical History  Surgical History not reviewed (last reviewed 11/02/2022)  Procedure on brain  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  Right knee  He had a revision right total knee arthroplasty with extensor mechanism allograft on 5/17/22  He is now about 7-1/2 months postop. He has been in physical therapy.  He is doing well. He denies pain in the knee. He has not been taking any medication for pain.  He has been weightbearing as tolerated with the hinged knee brace unlocked and has been walking with a walker  He states that his right leg is getting stronger.  He is accompanied in the office by his wife.    Left knee  He is known to me from a revision left total knee replacement. While at physical therapy he was trying to work out his left knee and started noticing that it has been weaker for him. He feels that the left knee has not been able to regain his strength. At times he feels like the left knee wants to buckle. He has also been noticing that the kneecap appears to be on the side of his knee.    He feels that the left knee is limiting his ability to rehabilitate.  He continues to use ramps at home to get in and out of the house but he feels this is mostly for his left knee now.  ROS  ROS as noted in the HPI  Physical Exam  Right KNEE    There is a mature midline anterior scar.  No signs of infection.  Neutral alignment.  There is no atrophy.  There is no effusion.  No warmth. No erythema.  Range of motion of the  knee is 5 to 120 degrees of flexion. He is able to do an active extension. He does have some extensor lag in the terminal degrees of 5 degrees and also mild atrophy of the right quadriceps and decreased quadricep strength.  There is no tenderness in the knee.  Patellar tracking is normal. Patellar crepitation is not present.  Posterior drawer grade 0. Valgus grade 0. Varus grade 0.    Left KNEE    There is a mature midline anterior scar.  No signs of infection.  Neutral alignment.  There is no atrophy.  There is no effusion.  No warmth. No erythema.  Range of motion of the knee is 10 to 120 degrees of flexion.  There is no tenderness in the knee.  Patellar tracking is abnormal. Patella is subluxed/dislocated laterally  Posterior drawer grade 0. Valgus grade 0. Varus grade 0.

## 2023-01-05 NOTE — H&P (VIEW-ONLY)
Chief Complaint  Left follow-up  Followup: History of revision of right total knee arthroplasty  Patient's Care Team  Primary Care Provider: ROMANA MERAZ MD: [address], [place] [address], Ph [phone], Fax [phone] NPI: [id number]   (Worker's Comp): [employer]: Ph [phone]  Patient's Pharmacies  [id number] ([place]): [address] [place] [address], Ph [phone], Fax [phone]  Vitals  None recorded.  Allergies  Allergies not reviewed (last reviewed 11/02/2022)  MORPHINE: - Critical  Category: Drug   MORPHINE: - Critical  Category: Drug   PERCOCET: - Critical  Category: Drug   Uncoded Allergies  NO KNOWN ALLERGIES (Moderate) 07/22/2020 (Inactive) Stop Date: 07/22/2020 Stop Reason: Other  Medications  Medications not reviewed (last reviewed 11/02/2022)  acetaminophen 300 mg-codeine 30 mg tablet  TAKE 1 TO 2 TABLETS BY MOUTH EVERY 6 HOURS AS NEEDED  01/11/22   filled surescripts  ALPRAZolam 0.5 mg tablet  TAKE 1 TABLET BY MOUTH THREE TIMES DAILY AS NEEDED FOR ANXIETY  12/22/22   filled surescripts  amLODIPine 10 mg tablet  TAKE 1 TABLET BY MOUTH EVERY MORNING  12/01/22   filled surescripts  amoxicillin 500 mg tablet  TAKE 4 TABLETS BY MOUTH ONE HOUR PRIOR TO DENTAL VISIT  02/01/22   filled surescripts  carvediloL 12.5 mg tablet  TAKE 1 TABLET BY MOUTH EVERY MORNING AND EVERY EVENING WITH MEALS  11/29/22   filled surescripts  DULoxetine 30 mg capsule,delayed release  TAKE 1 CAPSULE BY MOUTH DAILY  09/01/21   filled surescripts  hydrALAZINE 25 mg tablet  TAKE 3 TABLETS BY MOUTH EVERY 8 HOURS  12/16/22   filled surescripts  hydroCHLOROthiazide 25 mg tablet  TAKE 1 TABLET BY MOUTH DAILY  03/18/22   filled surescripts  HYDROcodone 5 mg-acetaminophen 325 mg tablet  04/13/22   filled surescripts  levothyroxine 75 mcg tablet  TAKE 1 TABLET BY MOUTH DAILY  12/01/22   filled surescripts  OLANZapine 5 mg tablet  TAKE 1 TABLET BY MOUTH EVERY  NIGHT  09/23/21   filled surescripts  ondansetron HCL 4 mg tablet  11/17/21   filled surescripts  OXcarbazepine 300 mg tablet  TAKE 1 TABLET BY MOUTH THREE TIMES DAILY  12/22/22   filled surescripts  rosuvastatin 20 mg tablet  TAKE 1 TABLET BY MOUTH DAILY  12/16/22   filled surescripts  triamcinolone acetonide 0.025 % topical cream  APPLY TOPICALLY TO THE AFFECTED AREA TWICE DAILY AS DIRECTED  06/08/22   filled surescripts  urea  05/31/22   entered Nano Luna  Vaccines  Vaccines not reviewed (last reviewed 11/02/2022)  Vaccine Type Date Amt. Route Site NDC Lot # Mfr. Exp.  Date VIS VIS  Given Vaccinator  COVID-19  COVID-19, mRNA, LNP-S, PF, 30 mcg/0.3 mL dose ([employer]) 02/10/21            Influenza  influenza, injectable, quadrivalent 09/01/21            Problems  Reviewed Problems  Lateral patellofemoral dislocation - Onset: 01/04/2023, Left  Aftercare - Onset: 07/06/2022  History of revision of right total knee arthroplasty - Onset: 07/06/2022  Family History  Family History not reviewed (last reviewed 11/02/2022)  Mother - Family history of Hypertension    - Heart disease  Social History  Social History not reviewed (last reviewed 11/02/2022)  Substance Use  Do you or have you ever smoked tobacco?: Never smoker  Do you or have you ever used any other forms of tobacco or nicotine?: No  Has tobacco cessation counseling been provided?: No  What is your level of alcohol consumption?: None  Do you use any illicit or recreational drugs?: No  Public Health and Travel  Have you recently traveled abroad?: No  Have you been to an area known to be high risk for COVID-19?: No  In the 14 days before symptom onset, have you had close contact with a laboratory-confirmed COVID-19 while that case was ill?: No  In the 14 days before symptom onset, have you had close contact with a person who is under investigation for COVID-19 while that person was ill?: No  Advance Directive  Do you have an advance directive?:  Yes  Do you have a medical power of ?: Yes  Surgical History  Surgical History not reviewed (last reviewed 11/02/2022)  Procedure on brain  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  Right knee  He had a revision right total knee arthroplasty with extensor mechanism allograft on 5/17/22  He is now about 7-1/2 months postop. He has been in physical therapy.  He is doing well. He denies pain in the knee. He has not been taking any medication for pain.  He has been weightbearing as tolerated with the hinged knee brace unlocked and has been walking with a walker  He states that his right leg is getting stronger.  He is accompanied in the office by his wife.    Left knee  He is known to me from a revision left total knee replacement. While at physical therapy he was trying to work out his left knee and started noticing that it has been weaker for him. He feels that the left knee has not been able to regain his strength. At times he feels like the left knee wants to buckle. He has also been noticing that the kneecap appears to be on the side of his knee.    He feels that the left knee is limiting his ability to rehabilitate.  He continues to use ramps at home to get in and out of the house but he feels this is mostly for his left knee now.  ROS  ROS as noted in the HPI  Physical Exam  Right KNEE    There is a mature midline anterior scar.  No signs of infection.  Neutral alignment.  There is no atrophy.  There is no effusion.  No warmth. No erythema.  Range of motion of the  knee is 5 to 120 degrees of flexion. He is able to do an active extension. He does have some extensor lag in the terminal degrees of 5 degrees and also mild atrophy of the right quadriceps and decreased quadricep strength.  There is no tenderness in the knee.  Patellar tracking is normal. Patellar crepitation is not present.  Posterior drawer grade 0. Valgus grade 0. Varus grade 0.    Left KNEE    There is a mature midline anterior scar.  No signs of infection.  Neutral alignment.  There is no atrophy.  There is no effusion.  No warmth. No erythema.  Range of motion of the knee is 10 to 120 degrees of flexion.  There is no tenderness in the knee.  Patellar tracking is abnormal. Patella is subluxed/dislocated laterally  Posterior drawer grade 0. Valgus grade 0. Varus grade 0.

## 2023-01-12 ENCOUNTER — PRE-ADMISSION TESTING (OUTPATIENT)
Dept: PREADMISSION TESTING | Facility: HOSPITAL | Age: 78
End: 2023-01-12
Payer: OTHER MISCELLANEOUS

## 2023-01-12 VITALS
BODY MASS INDEX: 38.3 KG/M2 | HEIGHT: 69 IN | WEIGHT: 258.6 LBS | RESPIRATION RATE: 20 BRPM | SYSTOLIC BLOOD PRESSURE: 132 MMHG | HEART RATE: 78 BPM | OXYGEN SATURATION: 95 % | TEMPERATURE: 98.1 F | DIASTOLIC BLOOD PRESSURE: 80 MMHG

## 2023-01-12 LAB
ANION GAP SERPL CALCULATED.3IONS-SCNC: 14 MMOL/L (ref 5–15)
BUN SERPL-MCNC: <2 MG/DL (ref 8–23)
BUN/CREAT SERPL: ABNORMAL
CALCIUM SPEC-SCNC: 8.8 MG/DL (ref 8.6–10.5)
CHLORIDE SERPL-SCNC: 106 MMOL/L (ref 98–107)
CO2 SERPL-SCNC: 21 MMOL/L (ref 22–29)
CREAT SERPL-MCNC: 1.2 MG/DL (ref 0.76–1.27)
DEPRECATED RDW RBC AUTO: 43.4 FL (ref 37–54)
EGFRCR SERPLBLD CKD-EPI 2021: 62.3 ML/MIN/1.73
ERYTHROCYTE [DISTWIDTH] IN BLOOD BY AUTOMATED COUNT: 12.1 % (ref 12.3–15.4)
GLUCOSE SERPL-MCNC: 122 MG/DL (ref 65–99)
HCT VFR BLD AUTO: 44.3 % (ref 37.5–51)
HGB BLD-MCNC: 14.8 G/DL (ref 13–17.7)
MCH RBC QN AUTO: 32.8 PG (ref 26.6–33)
MCHC RBC AUTO-ENTMCNC: 33.4 G/DL (ref 31.5–35.7)
MCV RBC AUTO: 98.2 FL (ref 79–97)
PLATELET # BLD AUTO: 286 10*3/MM3 (ref 140–450)
PMV BLD AUTO: 10.7 FL (ref 6–12)
POTASSIUM SERPL-SCNC: 4.3 MMOL/L (ref 3.5–5.2)
QT INTERVAL: 471 MS
RBC # BLD AUTO: 4.51 10*6/MM3 (ref 4.14–5.8)
SODIUM SERPL-SCNC: 141 MMOL/L (ref 136–145)
WBC NRBC COR # BLD: 8.88 10*3/MM3 (ref 3.4–10.8)

## 2023-01-12 PROCEDURE — 85027 COMPLETE CBC AUTOMATED: CPT

## 2023-01-12 PROCEDURE — 80048 BASIC METABOLIC PNL TOTAL CA: CPT

## 2023-01-12 PROCEDURE — 93005 ELECTROCARDIOGRAM TRACING: CPT

## 2023-01-12 PROCEDURE — 36415 COLL VENOUS BLD VENIPUNCTURE: CPT

## 2023-01-12 PROCEDURE — 93010 ELECTROCARDIOGRAM REPORT: CPT | Performed by: INTERNAL MEDICINE

## 2023-01-12 NOTE — DISCHARGE INSTRUCTIONS
Take the following medications the morning of surgery:  AMLODIPINE,CARVEDILOL,LEVOTHYROXINE,OXCARBAZEPINE AND HYDRALAZINE    XANAX IF NEEDED    If you are on prescription narcotic pain medication to control your pain you may also take that medication the morning of surgery.    General Instructions:  Do not eat solid food after midnight the night before surgery.  You may drink clear liquids day of surgery but must stop at least one hour before your hospital arrival time.   8:00 AM  It is beneficial for you to have a clear drink that contains carbohydrates the day of surgery.  We suggest a 12 to 20 ounce bottle of Gatorade or Powerade for non-diabetic patients or a 12 to 20 ounce bottle of G2 or Powerade Zero for diabetic patients. (Pediatric patients, are not advised to drink a 12 to 20 ounce carbohydrate drink)    Clear liquids are liquids you can see through.  Nothing red in color.     Plain water                               Sports drinks  Sodas                                   Gelatin (Jell-O)  Fruit juices without pulp such as white grape juice and apple juice  Popsicles that contain no fruit or yogurt  Tea or coffee (no cream or milk added)  Gatorade / Powerade  G2 / Powerade Zero    Infants may have breast milk up to four hours before surgery.  Infants drinking formula may drink formula up to six hours before surgery.   Patients who avoid smoking, chewing tobacco and alcohol for 4 weeks prior to surgery have a reduced risk of post-operative complications.  Quit smoking as many days before surgery as you can.  Do not smoke, use chewing tobacco or drink alcohol the day of surgery.   If applicable bring your C-PAP/ BI-PAP machine.  Bring any papers given to you in the doctor’s office.  Wear clean comfortable clothes.  Do not wear contact lenses, false eyelashes or make-up.  Bring a case for your glasses.   Bring crutches or walker if applicable.  Remove all piercings.  Leave jewelry and any other valuables at  home.  Hair extensions with metal clips must be removed prior to surgery.  The Pre-Admission Testing nurse will instruct you to bring medications if unable to obtain an accurate list in Pre-Admission Testing.        If you were given a blood bank ID arm band remember to bring it with you the day of surgery.    Preventing a Surgical Site Infection:  For 2 to 3 days before surgery, avoid shaving with a razor because the razor can irritate skin and make it easier to develop an infection.    Any areas of open skin can increase the risk of a post-operative wound infection by allowing bacteria to enter and travel throughout the body.  Notify your surgeon if you have any skin wounds / rashes even if it is not near the expected surgical site.  The area will need assessed to determine if surgery should be delayed until it is healed.  The night prior to surgery shower using a fresh bar of anti-bacterial soap (such as Dial) and clean washcloth.  Sleep in a clean bed with clean clothing.  Do not allow pets to sleep with you.  Shower on the morning of surgery using a fresh bar of anti-bacterial soap (such as Dial) and clean washcloth.  Dry with a clean towel and dress in clean clothing.  Ask your surgeon if you will be receiving antibiotics prior to surgery.  Make sure you, your family, and all healthcare providers clean their hands with soap and water or an alcohol based hand  before caring for you or your wound.    Day of surgery:1/16/2023 ARRIVAL TIME 9:00 AM  Your arrival time is approximately two hours before your scheduled surgery time.  Upon arrival, a Pre-op nurse and Anesthesiologist will review your health history, obtain vital signs, and answer questions you may have.  The only belongings needed at this time will be a list of your home medications and if applicable your C-PAP/BI-PAP machine.  A Pre-op nurse will start an IV and you may receive medication in preparation for surgery, including something to help  you relax.     Please be aware that surgery does come with discomfort.  We want to make every effort to control your discomfort so please discuss any uncontrolled symptoms with your nurse.   Your doctor will most likely have prescribed pain medications.      If you are going home after surgery you will receive individualized written care instructions before being discharged.  A responsible adult must drive you to and from the hospital on the day of your surgery and stay with you for 24 hours.  Discharge prescriptions can be filled by the hospital pharmacy during regular pharmacy hours.  If you are having surgery late in the day/evening your prescription may be e-prescribed to your pharmacy.  Please verify your pharmacy hours or chose a 24 hour pharmacy to avoid not having access to your prescription because your pharmacy has closed for the day.    If you are staying overnight following surgery, you will be transported to your hospital room following the recovery period.  [facility] has all private rooms.    If you have any questions please call Pre-Admission Testing at [phone].  Deductibles and co-payments are collected on the day of service. Please be prepared to pay the required co-pay, deductible or deposit on the day of service as defined by your plan.    Call your surgeon immediately if you experience any of the following symptoms:  Sore Throat  Shortness of Breath or difficulty breathing  Cough  Chills  Body soreness or muscle pain  Headache  Fever  New loss of taste or smell  Do not arrive for your surgery ill.  Your procedure will need to be rescheduled to another time.  You will need to call your physician before the day of surgery to avoid any unnecessary exposure to hospital staff as well as other patients.   CHLORHEXIDINE CLOTH INSTRUCTIONS  The morning of surgery follow these instructions using the Chlorhexidine cloths you've been given.  These steps reduce bacteria on the body.  Do  not use the cloths near your eyes, ears mouth, genitalia or on open wounds.  Throw the cloths away after use but do not try to flush them down a toilet.      Open and remove one cloth at a time from the package.    Leave the cloth unfolded and begin the bathing.  Massage the skin with the cloths using gentle pressure to remove bacteria.  Do not scrub harshly.   Follow the steps below with one 2% CHG cloth per area (6 total cloths).  One cloth for neck, shoulders and chest.  One cloth for both arms, hands, fingers and underarms (do underarms last).  One cloth for the abdomen followed by groin.  One cloth for right leg and foot including between the toes.  One cloth for left leg and foot including between the toes.  The last cloth is to be used for the back of the neck, back and buttocks.    Allow the CHG to air dry 3 minutes on the skin which will give it time to work and decrease the chance of irritation.  The skin may feel sticky until it is dry.  Do not rinse with water or any other liquid or you will lose the beneficial effects of the CHG.  If mild skin irritation occurs, do rinse the skin to remove the CHG.  Report this to the nurse at time of admission.  Do not apply lotions, creams, ointments, deodorants or perfumes after using the clothes. Dress in clean clothes before coming to the hospital.

## 2023-01-16 ENCOUNTER — APPOINTMENT (OUTPATIENT)
Dept: GENERAL RADIOLOGY | Facility: HOSPITAL | Age: 78
DRG: 468 | End: 2023-01-16
Payer: OTHER MISCELLANEOUS

## 2023-01-16 ENCOUNTER — ANESTHESIA EVENT (OUTPATIENT)
Dept: PERIOP | Facility: HOSPITAL | Age: 78
DRG: 468 | End: 2023-01-16
Payer: OTHER MISCELLANEOUS

## 2023-01-16 ENCOUNTER — ANESTHESIA (OUTPATIENT)
Dept: PERIOP | Facility: HOSPITAL | Age: 78
DRG: 468 | End: 2023-01-16
Payer: OTHER MISCELLANEOUS

## 2023-01-16 ENCOUNTER — HOSPITAL ENCOUNTER (INPATIENT)
Facility: HOSPITAL | Age: 78
LOS: 1 days | Discharge: HOME | DRG: 468 | End: 2023-01-17
Attending: ORTHOPAEDIC SURGERY | Admitting: ORTHOPAEDIC SURGERY
Payer: OTHER MISCELLANEOUS

## 2023-01-16 DIAGNOSIS — Z96.652 STATUS POST REVISION OF TOTAL REPLACEMENT OF LEFT KNEE: Primary | ICD-10-CM

## 2023-01-16 DIAGNOSIS — T84.023A INSTABILITY OF INTERNAL LEFT KNEE PROSTHESIS, INITIAL ENCOUNTER: ICD-10-CM

## 2023-01-16 DIAGNOSIS — S83.005D CLOSED DISLOCATION OF PATELLA, LEFT, SUBSEQUENT ENCOUNTER: ICD-10-CM

## 2023-01-16 PROCEDURE — 0 BUPIVACAINE LIPOSOME 1.3 % SUSPENSION 20 ML VIAL: Performed by: ORTHOPAEDIC SURGERY

## 2023-01-16 PROCEDURE — C1713 ANCHOR/SCREW BN/BN,TIS/BN: HCPCS | Performed by: ORTHOPAEDIC SURGERY

## 2023-01-16 PROCEDURE — 25010000002 ROPIVACAINE PER 1 MG: Performed by: ANESTHESIOLOGY

## 2023-01-16 PROCEDURE — 25010000002 VANCOMYCIN PER 500 MG: Performed by: ORTHOPAEDIC SURGERY

## 2023-01-16 PROCEDURE — 25010000002 NEOSTIGMINE 5 MG/10ML SOLUTION: Performed by: NURSE ANESTHETIST, CERTIFIED REGISTERED

## 2023-01-16 PROCEDURE — 0SRD0J9 REPLACEMENT OF LEFT KNEE JOINT WITH SYNTHETIC SUBSTITUTE, CEMENTED, OPEN APPROACH: ICD-10-PCS | Performed by: ORTHOPAEDIC SURGERY

## 2023-01-16 PROCEDURE — C9290 INJ, BUPIVACAINE LIPOSOME: HCPCS | Performed by: ORTHOPAEDIC SURGERY

## 2023-01-16 PROCEDURE — 25010000002 HYDROMORPHONE 1 MG/ML SOLUTION: Performed by: NURSE ANESTHETIST, CERTIFIED REGISTERED

## 2023-01-16 PROCEDURE — 73560 X-RAY EXAM OF KNEE 1 OR 2: CPT

## 2023-01-16 PROCEDURE — 0MNP0ZZ RELEASE LEFT KNEE BURSA AND LIGAMENT, OPEN APPROACH: ICD-10-PCS | Performed by: ORTHOPAEDIC SURGERY

## 2023-01-16 PROCEDURE — 25010000002 MIDAZOLAM PER 1 MG: Performed by: STUDENT IN AN ORGANIZED HEALTH CARE EDUCATION/TRAINING PROGRAM

## 2023-01-16 PROCEDURE — L1830 KO IMMOB CANVAS LONG PRE OTS: HCPCS | Performed by: ORTHOPAEDIC SURGERY

## 2023-01-16 PROCEDURE — C1776 JOINT DEVICE (IMPLANTABLE): HCPCS | Performed by: ORTHOPAEDIC SURGERY

## 2023-01-16 PROCEDURE — 25010000002 ONDANSETRON PER 1 MG: Performed by: NURSE ANESTHETIST, CERTIFIED REGISTERED

## 2023-01-16 PROCEDURE — 25010000002 FENTANYL CITRATE (PF) 100 MCG/2ML SOLUTION: Performed by: NURSE ANESTHETIST, CERTIFIED REGISTERED

## 2023-01-16 PROCEDURE — 0SPD0JZ REMOVAL OF SYNTHETIC SUBSTITUTE FROM LEFT KNEE JOINT, OPEN APPROACH: ICD-10-PCS | Performed by: ORTHOPAEDIC SURGERY

## 2023-01-16 PROCEDURE — 63710000001 DIPHENHYDRAMINE PER 50 MG: Performed by: ORTHOPAEDIC SURGERY

## 2023-01-16 PROCEDURE — 25010000002 DEXAMETHASONE PER 1 MG: Performed by: ANESTHESIOLOGY

## 2023-01-16 PROCEDURE — 25010000002 PROPOFOL 500 MG/50ML EMULSION: Performed by: NURSE ANESTHETIST, CERTIFIED REGISTERED

## 2023-01-16 PROCEDURE — 25010000002 CEFAZOLIN IN DEXTROSE 2-4 GM/100ML-% SOLUTION: Performed by: ORTHOPAEDIC SURGERY

## 2023-01-16 PROCEDURE — 25010000002 DIPHENHYDRAMINE PER 50 MG: Performed by: ANESTHESIOLOGY

## 2023-01-16 DEVICE — FW,BPB #2 SUTR,BLU W/NDL
Type: IMPLANTABLE DEVICE | Site: KNEE | Status: FUNCTIONAL
Brand: ARTHREX®

## 2023-01-16 DEVICE — IMPLANTABLE DEVICE: Type: IMPLANTABLE DEVICE | Site: KNEE | Status: FUNCTIONAL

## 2023-01-16 DEVICE — CMT BONE SIMPLEX/P 1/2DOSE 10PK: Type: IMPLANTABLE DEVICE | Site: KNEE | Status: FUNCTIONAL

## 2023-01-16 DEVICE — IMPLANTABLE DEVICE
Type: IMPLANTABLE DEVICE | Site: KNEE | Status: FUNCTIONAL
Brand: ALLTHREAD TITANIUM

## 2023-01-16 DEVICE — VIOLET ANTIBACTERIAL POLYDIOXANONE, KNOTLESS TISSUE CONTROL DEVICE
Type: IMPLANTABLE DEVICE | Site: KNEE | Status: FUNCTIONAL
Brand: STRATAFIX

## 2023-01-16 DEVICE — IMPLANTABLE DEVICE
Type: IMPLANTABLE DEVICE | Site: KNEE | Status: FUNCTIONAL
Brand: VANGUARD® KNEE SYSTEM

## 2023-01-16 RX ORDER — GLYCOPYRROLATE 0.2 MG/ML
INJECTION INTRAMUSCULAR; INTRAVENOUS AS NEEDED
Status: DISCONTINUED | OUTPATIENT
Start: 2023-01-16 | End: 2023-01-16 | Stop reason: SURG

## 2023-01-16 RX ORDER — AMLODIPINE BESYLATE 5 MG/1
5 TABLET ORAL EVERY MORNING
Status: DISCONTINUED | OUTPATIENT
Start: 2023-01-17 | End: 2023-01-17 | Stop reason: HOSPADM

## 2023-01-16 RX ORDER — SODIUM CHLORIDE 0.9 % (FLUSH) 0.9 %
3-10 SYRINGE (ML) INJECTION AS NEEDED
Status: DISCONTINUED | OUTPATIENT
Start: 2023-01-16 | End: 2023-01-16 | Stop reason: HOSPADM

## 2023-01-16 RX ORDER — DOCUSATE SODIUM 100 MG/1
100 CAPSULE, LIQUID FILLED ORAL 2 TIMES DAILY
Status: DISCONTINUED | OUTPATIENT
Start: 2023-01-16 | End: 2023-01-17 | Stop reason: HOSPADM

## 2023-01-16 RX ORDER — UREA 10 %
1 LOTION (ML) TOPICAL NIGHTLY PRN
Status: DISCONTINUED | OUTPATIENT
Start: 2023-01-16 | End: 2023-01-17 | Stop reason: HOSPADM

## 2023-01-16 RX ORDER — ONDANSETRON 2 MG/ML
4 INJECTION INTRAMUSCULAR; INTRAVENOUS ONCE AS NEEDED
Status: DISCONTINUED | OUTPATIENT
Start: 2023-01-16 | End: 2023-01-16 | Stop reason: HOSPADM

## 2023-01-16 RX ORDER — OXCARBAZEPINE 300 MG/1
300 TABLET, FILM COATED ORAL EVERY 8 HOURS
Status: DISCONTINUED | OUTPATIENT
Start: 2023-01-16 | End: 2023-01-17 | Stop reason: HOSPADM

## 2023-01-16 RX ORDER — FENTANYL CITRATE 50 UG/ML
50 INJECTION, SOLUTION INTRAMUSCULAR; INTRAVENOUS
Status: DISCONTINUED | OUTPATIENT
Start: 2023-01-16 | End: 2023-01-16 | Stop reason: HOSPADM

## 2023-01-16 RX ORDER — NALOXONE HCL 0.4 MG/ML
0.2 VIAL (ML) INJECTION AS NEEDED
Status: DISCONTINUED | OUTPATIENT
Start: 2023-01-16 | End: 2023-01-16 | Stop reason: HOSPADM

## 2023-01-16 RX ORDER — CEFAZOLIN SODIUM 2 G/100ML
2 INJECTION, SOLUTION INTRAVENOUS ONCE
Status: COMPLETED | OUTPATIENT
Start: 2023-01-16 | End: 2023-01-16

## 2023-01-16 RX ORDER — HYDROMORPHONE HYDROCHLORIDE 1 MG/ML
0.5 INJECTION, SOLUTION INTRAMUSCULAR; INTRAVENOUS; SUBCUTANEOUS
Status: DISCONTINUED | OUTPATIENT
Start: 2023-01-16 | End: 2023-01-16 | Stop reason: HOSPADM

## 2023-01-16 RX ORDER — LABETALOL HYDROCHLORIDE 5 MG/ML
5 INJECTION, SOLUTION INTRAVENOUS
Status: DISCONTINUED | OUTPATIENT
Start: 2023-01-16 | End: 2023-01-16 | Stop reason: HOSPADM

## 2023-01-16 RX ORDER — CHLORHEXIDINE GLUCONATE 500 MG/1
CLOTH TOPICAL ONCE
Status: DISCONTINUED | OUTPATIENT
Start: 2023-01-16 | End: 2023-01-16 | Stop reason: HOSPADM

## 2023-01-16 RX ORDER — CEFAZOLIN SODIUM 2 G/100ML
2 INJECTION, SOLUTION INTRAVENOUS EVERY 8 HOURS
Status: COMPLETED | OUTPATIENT
Start: 2023-01-16 | End: 2023-01-17

## 2023-01-16 RX ORDER — ROCURONIUM BROMIDE 10 MG/ML
INJECTION, SOLUTION INTRAVENOUS AS NEEDED
Status: DISCONTINUED | OUTPATIENT
Start: 2023-01-16 | End: 2023-01-16 | Stop reason: SURG

## 2023-01-16 RX ORDER — EPHEDRINE SULFATE 50 MG/ML
5 INJECTION, SOLUTION INTRAVENOUS ONCE AS NEEDED
Status: DISCONTINUED | OUTPATIENT
Start: 2023-01-16 | End: 2023-01-16 | Stop reason: HOSPADM

## 2023-01-16 RX ORDER — ACETAMINOPHEN 325 MG/1
325 TABLET ORAL EVERY 4 HOURS PRN
Status: DISCONTINUED | OUTPATIENT
Start: 2023-01-16 | End: 2023-01-17 | Stop reason: HOSPADM

## 2023-01-16 RX ORDER — HYDRALAZINE HYDROCHLORIDE 20 MG/ML
5 INJECTION INTRAMUSCULAR; INTRAVENOUS
Status: DISCONTINUED | OUTPATIENT
Start: 2023-01-16 | End: 2023-01-16 | Stop reason: HOSPADM

## 2023-01-16 RX ORDER — PROPOFOL 10 MG/ML
INJECTION, EMULSION INTRAVENOUS AS NEEDED
Status: DISCONTINUED | OUTPATIENT
Start: 2023-01-16 | End: 2023-01-16 | Stop reason: SURG

## 2023-01-16 RX ORDER — VANCOMYCIN HYDROCHLORIDE 1 G/200ML
1 INJECTION, SOLUTION INTRAVENOUS ONCE
Status: DISCONTINUED | OUTPATIENT
Start: 2023-01-16 | End: 2023-01-16

## 2023-01-16 RX ORDER — ONDANSETRON 2 MG/ML
4 INJECTION INTRAMUSCULAR; INTRAVENOUS EVERY 6 HOURS PRN
Status: DISCONTINUED | OUTPATIENT
Start: 2023-01-16 | End: 2023-01-17 | Stop reason: HOSPADM

## 2023-01-16 RX ORDER — ASPIRIN 81 MG/1
81 TABLET ORAL EVERY 12 HOURS SCHEDULED
Status: DISCONTINUED | OUTPATIENT
Start: 2023-01-16 | End: 2023-01-17 | Stop reason: HOSPADM

## 2023-01-16 RX ORDER — DIPHENHYDRAMINE HYDROCHLORIDE 50 MG/ML
25 INJECTION INTRAMUSCULAR; INTRAVENOUS ONCE
Status: COMPLETED | OUTPATIENT
Start: 2023-01-16 | End: 2023-01-16

## 2023-01-16 RX ORDER — ONDANSETRON 2 MG/ML
INJECTION INTRAMUSCULAR; INTRAVENOUS AS NEEDED
Status: DISCONTINUED | OUTPATIENT
Start: 2023-01-16 | End: 2023-01-16 | Stop reason: SURG

## 2023-01-16 RX ORDER — MIDAZOLAM HYDROCHLORIDE 1 MG/ML
0.5 INJECTION INTRAMUSCULAR; INTRAVENOUS
Status: DISCONTINUED | OUTPATIENT
Start: 2023-01-16 | End: 2023-01-16 | Stop reason: HOSPADM

## 2023-01-16 RX ORDER — FENTANYL CITRATE 50 UG/ML
INJECTION, SOLUTION INTRAMUSCULAR; INTRAVENOUS AS NEEDED
Status: DISCONTINUED | OUTPATIENT
Start: 2023-01-16 | End: 2023-01-16 | Stop reason: SURG

## 2023-01-16 RX ORDER — OXCARBAZEPINE 300 MG/1
300 TABLET, FILM COATED ORAL EVERY 12 HOURS SCHEDULED
Status: DISCONTINUED | OUTPATIENT
Start: 2023-01-16 | End: 2023-01-16

## 2023-01-16 RX ORDER — DIPHENHYDRAMINE HYDROCHLORIDE 50 MG/ML
12.5 INJECTION INTRAMUSCULAR; INTRAVENOUS
Status: DISCONTINUED | OUTPATIENT
Start: 2023-01-16 | End: 2023-01-16 | Stop reason: HOSPADM

## 2023-01-16 RX ORDER — TRANEXAMIC ACID 100 MG/ML
INJECTION, SOLUTION INTRAVENOUS AS NEEDED
Status: DISCONTINUED | OUTPATIENT
Start: 2023-01-16 | End: 2023-01-16 | Stop reason: SURG

## 2023-01-16 RX ORDER — FAMOTIDINE 10 MG/ML
20 INJECTION, SOLUTION INTRAVENOUS 2 TIMES DAILY
Status: DISCONTINUED | OUTPATIENT
Start: 2023-01-16 | End: 2023-01-16 | Stop reason: HOSPADM

## 2023-01-16 RX ORDER — SODIUM CHLORIDE, SODIUM LACTATE, POTASSIUM CHLORIDE, CALCIUM CHLORIDE 600; 310; 30; 20 MG/100ML; MG/100ML; MG/100ML; MG/100ML
9 INJECTION, SOLUTION INTRAVENOUS CONTINUOUS
Status: DISCONTINUED | OUTPATIENT
Start: 2023-01-16 | End: 2023-01-16

## 2023-01-16 RX ORDER — LIDOCAINE HYDROCHLORIDE 20 MG/ML
INJECTION, SOLUTION INTRAVENOUS AS NEEDED
Status: DISCONTINUED | OUTPATIENT
Start: 2023-01-16 | End: 2023-01-16 | Stop reason: SURG

## 2023-01-16 RX ORDER — BISACODYL 5 MG/1
10 TABLET, DELAYED RELEASE ORAL DAILY PRN
Status: DISCONTINUED | OUTPATIENT
Start: 2023-01-17 | End: 2023-01-17 | Stop reason: HOSPADM

## 2023-01-16 RX ORDER — ROPIVACAINE HYDROCHLORIDE 5 MG/ML
INJECTION, SOLUTION EPIDURAL; INFILTRATION; PERINEURAL
Status: COMPLETED | OUTPATIENT
Start: 2023-01-16 | End: 2023-01-16

## 2023-01-16 RX ORDER — HYDRALAZINE HYDROCHLORIDE 25 MG/1
25 TABLET, FILM COATED ORAL EVERY 8 HOURS SCHEDULED
Status: DISCONTINUED | OUTPATIENT
Start: 2023-01-16 | End: 2023-01-17 | Stop reason: HOSPADM

## 2023-01-16 RX ORDER — LEVOTHYROXINE SODIUM 0.07 MG/1
75 TABLET ORAL DAILY
Status: DISCONTINUED | OUTPATIENT
Start: 2023-01-16 | End: 2023-01-17 | Stop reason: HOSPADM

## 2023-01-16 RX ORDER — PREGABALIN 75 MG/1
75 CAPSULE ORAL ONCE
Status: COMPLETED | OUTPATIENT
Start: 2023-01-16 | End: 2023-01-16

## 2023-01-16 RX ORDER — FAMOTIDINE 10 MG/ML
20 INJECTION, SOLUTION INTRAVENOUS 2 TIMES DAILY
Status: DISCONTINUED | OUTPATIENT
Start: 2023-01-16 | End: 2023-01-16

## 2023-01-16 RX ORDER — CARVEDILOL 12.5 MG/1
12.5 TABLET ORAL 2 TIMES DAILY WITH MEALS
Status: DISCONTINUED | OUTPATIENT
Start: 2023-01-16 | End: 2023-01-17 | Stop reason: HOSPADM

## 2023-01-16 RX ORDER — ALPRAZOLAM 0.5 MG/1
0.5 TABLET ORAL 3 TIMES DAILY PRN
Status: DISCONTINUED | OUTPATIENT
Start: 2023-01-16 | End: 2023-01-17 | Stop reason: HOSPADM

## 2023-01-16 RX ORDER — VANCOMYCIN HYDROCHLORIDE 1 G/20ML
1 INJECTION, POWDER, LYOPHILIZED, FOR SOLUTION INTRAVENOUS ONCE
Status: DISCONTINUED | OUTPATIENT
Start: 2023-01-16 | End: 2023-01-16

## 2023-01-16 RX ORDER — HYDROCODONE BITARTRATE AND ACETAMINOPHEN 7.5; 325 MG/1; MG/1
1 TABLET ORAL ONCE AS NEEDED
Status: COMPLETED | OUTPATIENT
Start: 2023-01-16 | End: 2023-01-16

## 2023-01-16 RX ORDER — DEXAMETHASONE SODIUM PHOSPHATE 4 MG/ML
INJECTION, SOLUTION INTRA-ARTICULAR; INTRALESIONAL; INTRAMUSCULAR; INTRAVENOUS; SOFT TISSUE
Status: COMPLETED | OUTPATIENT
Start: 2023-01-16 | End: 2023-01-16

## 2023-01-16 RX ORDER — FLUMAZENIL 0.1 MG/ML
0.2 INJECTION INTRAVENOUS AS NEEDED
Status: DISCONTINUED | OUTPATIENT
Start: 2023-01-16 | End: 2023-01-16 | Stop reason: HOSPADM

## 2023-01-16 RX ORDER — HYDROCODONE BITARTRATE AND ACETAMINOPHEN 7.5; 325 MG/1; MG/1
2 TABLET ORAL EVERY 4 HOURS PRN
Status: DISCONTINUED | OUTPATIENT
Start: 2023-01-16 | End: 2023-01-17 | Stop reason: HOSPADM

## 2023-01-16 RX ORDER — MAGNESIUM HYDROXIDE 1200 MG/15ML
LIQUID ORAL AS NEEDED
Status: DISCONTINUED | OUTPATIENT
Start: 2023-01-16 | End: 2023-01-16 | Stop reason: HOSPADM

## 2023-01-16 RX ORDER — HYDROCODONE BITARTRATE AND ACETAMINOPHEN 7.5; 325 MG/1; MG/1
1 TABLET ORAL EVERY 4 HOURS PRN
Status: DISCONTINUED | OUTPATIENT
Start: 2023-01-16 | End: 2023-01-17 | Stop reason: HOSPADM

## 2023-01-16 RX ORDER — NEOSTIGMINE METHYLSULFATE 0.5 MG/ML
INJECTION, SOLUTION INTRAVENOUS AS NEEDED
Status: DISCONTINUED | OUTPATIENT
Start: 2023-01-16 | End: 2023-01-16 | Stop reason: SURG

## 2023-01-16 RX ORDER — DIPHENHYDRAMINE HCL 25 MG
25 CAPSULE ORAL
Status: DISCONTINUED | OUTPATIENT
Start: 2023-01-16 | End: 2023-01-16 | Stop reason: HOSPADM

## 2023-01-16 RX ORDER — PROMETHAZINE HYDROCHLORIDE 25 MG/1
25 SUPPOSITORY RECTAL ONCE AS NEEDED
Status: DISCONTINUED | OUTPATIENT
Start: 2023-01-16 | End: 2023-01-16 | Stop reason: HOSPADM

## 2023-01-16 RX ORDER — SODIUM CHLORIDE 0.9 % (FLUSH) 0.9 %
3 SYRINGE (ML) INJECTION EVERY 12 HOURS SCHEDULED
Status: DISCONTINUED | OUTPATIENT
Start: 2023-01-16 | End: 2023-01-16 | Stop reason: HOSPADM

## 2023-01-16 RX ORDER — PROMETHAZINE HYDROCHLORIDE 25 MG/1
25 TABLET ORAL ONCE AS NEEDED
Status: DISCONTINUED | OUTPATIENT
Start: 2023-01-16 | End: 2023-01-16 | Stop reason: HOSPADM

## 2023-01-16 RX ORDER — LIDOCAINE HYDROCHLORIDE 10 MG/ML
0.5 INJECTION, SOLUTION EPIDURAL; INFILTRATION; INTRACAUDAL; PERINEURAL ONCE AS NEEDED
Status: DISCONTINUED | OUTPATIENT
Start: 2023-01-16 | End: 2023-01-16 | Stop reason: HOSPADM

## 2023-01-16 RX ORDER — HYDROMORPHONE HYDROCHLORIDE 1 MG/ML
0.5 INJECTION, SOLUTION INTRAMUSCULAR; INTRAVENOUS; SUBCUTANEOUS
Status: DISCONTINUED | OUTPATIENT
Start: 2023-01-16 | End: 2023-01-17 | Stop reason: HOSPADM

## 2023-01-16 RX ORDER — NALOXONE HCL 0.4 MG/ML
0.1 VIAL (ML) INJECTION
Status: DISCONTINUED | OUTPATIENT
Start: 2023-01-16 | End: 2023-01-17 | Stop reason: HOSPADM

## 2023-01-16 RX ORDER — DIPHENHYDRAMINE HCL 25 MG
25 CAPSULE ORAL ONCE
Status: COMPLETED | OUTPATIENT
Start: 2023-01-16 | End: 2023-01-16

## 2023-01-16 RX ORDER — SODIUM CHLORIDE 9 MG/ML
100 INJECTION, SOLUTION INTRAVENOUS CONTINUOUS
Status: DISCONTINUED | OUTPATIENT
Start: 2023-01-16 | End: 2023-01-17 | Stop reason: HOSPADM

## 2023-01-16 RX ORDER — ONDANSETRON 4 MG/1
4 TABLET, FILM COATED ORAL EVERY 6 HOURS PRN
Status: DISCONTINUED | OUTPATIENT
Start: 2023-01-16 | End: 2023-01-17 | Stop reason: HOSPADM

## 2023-01-16 RX ORDER — ACETAMINOPHEN 500 MG
1000 TABLET ORAL ONCE
Status: COMPLETED | OUTPATIENT
Start: 2023-01-16 | End: 2023-01-16

## 2023-01-16 RX ADMIN — DIPHENHYDRAMINE HYDROCHLORIDE 25 MG: 25 CAPSULE ORAL at 09:27

## 2023-01-16 RX ADMIN — HYDROMORPHONE HYDROCHLORIDE 0.5 MG: 1 INJECTION, SOLUTION INTRAMUSCULAR; INTRAVENOUS; SUBCUTANEOUS at 14:21

## 2023-01-16 RX ADMIN — GLYCOPYRROLATE 0.6 MCG: 1 INJECTION INTRAMUSCULAR; INTRAVENOUS at 13:50

## 2023-01-16 RX ADMIN — FENTANYL CITRATE 25 MCG: 50 INJECTION, SOLUTION INTRAMUSCULAR; INTRAVENOUS at 14:01

## 2023-01-16 RX ADMIN — ROCURONIUM BROMIDE 50 MG: 10 INJECTION INTRAVENOUS at 11:39

## 2023-01-16 RX ADMIN — HYDROCODONE BITARTRATE AND ACETAMINOPHEN 1 TABLET: 7.5; 325 TABLET ORAL at 14:58

## 2023-01-16 RX ADMIN — HYDROCODONE BITARTRATE AND ACETAMINOPHEN 2 TABLET: 7.5; 325 TABLET ORAL at 22:26

## 2023-01-16 RX ADMIN — FENTANYL CITRATE 50 MCG: 50 INJECTION, SOLUTION INTRAMUSCULAR; INTRAVENOUS at 13:55

## 2023-01-16 RX ADMIN — ASPIRIN 81 MG: 81 TABLET, COATED ORAL at 20:38

## 2023-01-16 RX ADMIN — TRANEXAMIC ACID 1000 MG: 1 INJECTION, SOLUTION INTRAVENOUS at 12:11

## 2023-01-16 RX ADMIN — ACETAMINOPHEN 1000 MG: 500 TABLET, FILM COATED ORAL at 09:23

## 2023-01-16 RX ADMIN — DOCUSATE SODIUM 100 MG: 100 CAPSULE, LIQUID FILLED ORAL at 20:39

## 2023-01-16 RX ADMIN — ROCURONIUM BROMIDE 10 MG: 10 INJECTION INTRAVENOUS at 12:21

## 2023-01-16 RX ADMIN — ONDANSETRON 4 MG: 2 INJECTION INTRAMUSCULAR; INTRAVENOUS at 13:50

## 2023-01-16 RX ADMIN — ROCURONIUM BROMIDE 10 MG: 10 INJECTION INTRAVENOUS at 13:03

## 2023-01-16 RX ADMIN — OXCARBAZEPINE 300 MG: 300 TABLET, FILM COATED ORAL at 17:48

## 2023-01-16 RX ADMIN — FAMOTIDINE 20 MG: 10 INJECTION INTRAVENOUS at 10:27

## 2023-01-16 RX ADMIN — HYDROCODONE BITARTRATE AND ACETAMINOPHEN 2 TABLET: 7.5; 325 TABLET ORAL at 18:35

## 2023-01-16 RX ADMIN — ALPRAZOLAM 0.5 MG: 0.5 TABLET ORAL at 21:43

## 2023-01-16 RX ADMIN — DEXAMETHASONE SODIUM PHOSPHATE 8 MG: 4 INJECTION, SOLUTION INTRAMUSCULAR; INTRAVENOUS at 11:46

## 2023-01-16 RX ADMIN — CEFAZOLIN SODIUM 2 G: 2 INJECTION, SOLUTION INTRAVENOUS at 18:35

## 2023-01-16 RX ADMIN — DEXAMETHASONE SODIUM PHOSPHATE 4 MG: 4 INJECTION, SOLUTION INTRAMUSCULAR; INTRAVENOUS at 10:41

## 2023-01-16 RX ADMIN — MIDAZOLAM 0.5 MG: 1 INJECTION INTRAMUSCULAR; INTRAVENOUS at 10:35

## 2023-01-16 RX ADMIN — SODIUM CHLORIDE, POTASSIUM CHLORIDE, SODIUM LACTATE AND CALCIUM CHLORIDE 9 ML/HR: 600; 310; 30; 20 INJECTION, SOLUTION INTRAVENOUS at 10:17

## 2023-01-16 RX ADMIN — FENTANYL CITRATE 25 MCG: 50 INJECTION, SOLUTION INTRAMUSCULAR; INTRAVENOUS at 13:02

## 2023-01-16 RX ADMIN — CEFAZOLIN SODIUM 2 G: 2 INJECTION, SOLUTION INTRAVENOUS at 11:24

## 2023-01-16 RX ADMIN — LIDOCAINE HYDROCHLORIDE 100 MG: 20 INJECTION, SOLUTION INTRAVENOUS at 11:39

## 2023-01-16 RX ADMIN — NEOSTIGMINE METHYLSULFATE 3 MG: 0.5 INJECTION INTRAVENOUS at 13:50

## 2023-01-16 RX ADMIN — VANCOMYCIN HYDROCHLORIDE 1 G: 1 INJECTION, SOLUTION INTRAVENOUS at 10:08

## 2023-01-16 RX ADMIN — DIPHENHYDRAMINE HYDROCHLORIDE 25 MG: 50 INJECTION, SOLUTION INTRAMUSCULAR; INTRAVENOUS at 10:31

## 2023-01-16 RX ADMIN — ROPIVACAINE HYDROCHLORIDE 20 ML: 5 INJECTION EPIDURAL; INFILTRATION; PERINEURAL at 10:41

## 2023-01-16 RX ADMIN — SODIUM CHLORIDE, POTASSIUM CHLORIDE, SODIUM LACTATE AND CALCIUM CHLORIDE: 600; 310; 30; 20 INJECTION, SOLUTION INTRAVENOUS at 12:44

## 2023-01-16 RX ADMIN — CARVEDILOL 12.5 MG: 12.5 TABLET, FILM COATED ORAL at 17:49

## 2023-01-16 RX ADMIN — Medication 3 ML: at 10:17

## 2023-01-16 RX ADMIN — PROPOFOL 200 MG: 10 INJECTION, EMULSION INTRAVENOUS at 11:39

## 2023-01-16 RX ADMIN — SODIUM CHLORIDE 100 ML/HR: 9 INJECTION, SOLUTION INTRAVENOUS at 18:35

## 2023-01-16 RX ADMIN — PREGABALIN 75 MG: 75 CAPSULE ORAL at 09:22

## 2023-01-16 RX ADMIN — HYDRALAZINE HYDROCHLORIDE 25 MG: 25 TABLET, FILM COATED ORAL at 21:43

## 2023-01-16 NOTE — NURSING NOTE
Pt's chart stated that pt had itching with vanc dose in past, but could tolerate with benadryl. Clarified with  Dr. Caldera at bedside and order recd for benadryl PO to be given with vanc dose. IV vanc started at 1008 am at rate of 100 ml/Hr after benadryl dose at 0927.   At 1015 pt stated itching on lower abdomen, Vancomycin d/c immediately. Local Itching spread to generalized itching. Dr. Mendosa at bedside for anesthesia eval and gave orders per mar. Pt also recd 500 mL bolus of Lactated ringers.     Pt given meds for reaction per mar. Hives began to appear at approximately 10:25 am. Itching began to subside at approximately 10:40. See flowsheet for vitals. Dr. Mendosa and Tameka Carpenter RN and myself present for reaction and subsequentcare. Pt resting at this time with no complaints, vitals stable.   Dr Caldera notified by phone at time of reaction.

## 2023-01-16 NOTE — ANESTHESIA PREPROCEDURE EVALUATION
Anesthesia Evaluation     Patient summary reviewed and Nursing notes reviewed   history of anesthetic complications: PONV  NPO Solid Status: > 8 hours  NPO Liquid Status: > 2 hours           Airway   Mallampati: II  TM distance: >3 FB  Neck ROM: full  Possible difficult intubation and Large neck circumference  Dental          Pulmonary      PE comment: Shallow TVs    Cardiovascular - normal exam    ECG reviewed    (+) hypertension, dysrhythmias (RBBB), hyperlipidemia,     ROS comment: EKG: RBBB    Neuro/Psych  (+) headaches, numbness (R trigeminal neuralgia), psychiatric history Anxiety,    GI/Hepatic/Renal/Endo    (+) obesity,   renal disease CRI, thyroid problem hypothyroidism    Musculoskeletal     Abdominal   (+) obese,    Substance History      OB/GYN          Other   arthritis,                    Anesthesia Plan    ASA 3     general     (Plan to avoid scope patch given age. Subhypnotic propofol infusion for PONV ppx. PNB for POPC PSR      I have reviewed the patient's history and chart with the patient, including all pertinent laboratory results and imaging. I have explained the risks of anesthesia including but not limited to dental damage, corneal abrasion, nerve injury, MI, stroke, aspiration, and death. Patient has agreed to proceed.     Risks of peripheral nerve block were discussed with patient including but not limited to: inadequate block, bleeding, infection, persistent numbness or weakness, nerve damage, painful dysasthesia and need to protect limb while numb.    /73   Pulse 72   Resp 18   SpO2 94%   )  intravenous induction     Anesthetic plan, risks, benefits, and alternatives have been provided, discussed and informed consent has been obtained with: patient.        CODE STATUS:

## 2023-01-16 NOTE — ANESTHESIA PROCEDURE NOTES
Adductor canal block      Patient reassessed immediately prior to procedure    Patient location during procedure: pre-op  Start time: 1/16/2023 10:41 AM  Stop time: 1/16/2023 10:46 AM  Reason for block: at surgeon's request and post-op pain management  Performed by  Anesthesiologist: Leslie Mendosa MD  Preanesthetic Checklist  Completed: patient identified, IV checked, site marked, risks and benefits discussed, surgical consent, monitors and equipment checked, pre-op evaluation and timeout performed  Prep:  Pt Position: supine  Sterile barriers:cap, gloves and mask  Prep: ChloraPrep  Patient monitoring: blood pressure monitoring, continuous pulse oximetry and EKG  Procedure    Sedation: yes  Performed under: local infiltration  Guidance:ultrasound guided    ULTRASOUND INTERPRETATION.  Using ultrasound guidance a 21 G gauge needle was placed in close proximity to the nerve, at which point, under ultrasound guidance anesthetic was injected in the area of the nerve and spread of the anesthesia was seen on ultrasound in close proximity thereto.  There were no abnormalities seen on ultrasound; a digital image was taken; and the patient tolerated the procedure with no complications. Images:still images obtained, printed/placed on chart    Laterality:left  Block Type:adductor canal block  Injection Technique:single-shot  Needle Type:short-bevel and echogenic  Needle Gauge:21 G  Resistance on Injection: none    Medications Used: ropivacaine (NAROPIN) 0.5 % injection - Injection   20 mL - 1/16/2023 10:41:00 AM  dexamethasone (DECADRON) injection - Injection   4 mg - 1/16/2023 10:41:00 AM      Medications  Comment:Ultrasound Interpretation:  Using ultrasound guidance the needle was placed in close proximity to the target nerve and anesthetic was injected in the area of the target nerve and/or bundles, and spread of the anesthetic was seen on ultrasound in close proximity thereto.  There were no abnormalities seen on  ultrasound; a digital image was taken; and the patient tolerated the procedure with no complications.    Block placed for postoperative pain control per surgeon request.       Post Assessment  Injection Assessment: negative aspiration for heme, no paresthesia on injection and incremental injection  Patient Tolerance:comfortable throughout block  Complications:no

## 2023-01-16 NOTE — ANESTHESIA POSTPROCEDURE EVALUATION
Patient: Jeremi Ugarte    Procedure Summary     Date: 01/16/23 Room / Location:  [facility]  /  [facility]    Anesthesia Start: 1131 Anesthesia Stop: 1430    Procedure: TOTAL KNEE ARTHROPLASTY REVISION repair extensor mechanism  (Left: Knee) Diagnosis:       Closed dislocation of patella, left, subsequent encounter      Instability of internal left knee prosthesis, initial encounter ([facility])      (Closed dislocation of patella, left, subsequent encounter [S83.005D])      (Instability of internal left knee prosthesis, initial encounter ([facility]) [T84.023A])    Surgeons: Patricia Caldera MD Provider: Jasper Solis MD    Anesthesia Type: general ASA Status: 3          Anesthesia Type: general    Vitals  Vitals Value Taken Time   /71 01/16/23 1515   Temp 36.8 °C (98.2 °F) 01/16/23 1433   Pulse 68 01/16/23 1518   Resp 14 01/16/23 1510   SpO2 92 % 01/16/23 1518   Vitals shown include unvalidated device data.        Post Anesthesia Care and Evaluation    Patient location during evaluation: bedside  Patient participation: complete - patient participated  Level of consciousness: awake and alert  Pain management: adequate    Airway patency: patent  Anesthetic complications: No anesthetic complications  PONV Status: controlled  Cardiovascular status: acceptable  Respiratory status: acceptable  Hydration status: acceptable

## 2023-01-16 NOTE — ANESTHESIA PROCEDURE NOTES
Peripheral IV    Patient location during procedure: pre-op  Start time: 1/16/2023 10:31 AM  End time: 1/16/2023 10:33 AM  Line placed for Fluids/Medication Admin, Difficult Access and Sedation.  Performed By   Anesthesiologist: Leslie Mendosa MD  Preanesthetic Checklist  Completed: patient identified, IV checked, site marked, risks and benefits discussed, surgical consent, monitors and equipment checked, pre-op evaluation and timeout performed  Peripheral IV Prep   Patient position: supine   Prep: ChloraPrep  Patient monitoring: cardiac monitor, continuous pulse ox and heart rate  Peripheral IV Procedure   Laterality:right  Location:  Forearm  Catheter size: 20 G  Guidance: ultrasound guided          Post Assessment   Dressing Type: tape and transparent.    IV Dressing/Site: clean, dry and intact  Additional Notes  Ultrasound Interpretation:  Using ultrasound guidance the potential vascular sites for insertion of the catheter were visualized to determine the patency of the vessel to be used for vascular access.  After selecting the appropriate site for insertion, the needle was visualized under ultrasound being inserted into the vessel, followed by ultrasound confirmation of catheter placement.  There were no abnormalities seen on ultrasound; an image was taken and the patient tolerated the procedure with no complications.     Ultrasound photo taken and placed on chart.    Attempts x 1 by me

## 2023-01-16 NOTE — OP NOTE
ORTHOPAEDIC OPERATIVE NOTE    Patient: Jeremi Ugarte  YOB: 1945    Medical Record Number: [id number]    Attending Physician: Patricia Caldera,*    Primary Care Physician: Charity Kearney MD    DATE OF PROCEDURE: 1/16/2023    SURGEON: Patricia Caldera MD        Pre-op Diagnosis:   Closed dislocation of patella, left, subsequent encounter [S83.005D]  Instability of internal left knee prosthesis, initial encounter ([facility]) [T84.023A]    Post-op Diagnosis:   Post-Op Diagnosis Codes:     * Closed dislocation of patella, left, subsequent encounter [S83.005D]     * Instability of internal left knee prosthesis, initial encounter ([facility]) [T84.023A]    PROCEDURE PERFORMED: LEFT  KNEE ARTHROPLASTY REVISION repair extensor mechanism     Implant Name Type Inv. Item Serial No.  Lot No. LRB No. Used Action   DEV CONTRL TISS STRATAFIX PDS PLS OS6 REV SZ1 18IN 45CM - [id number] Implant DEV CONTRL TISS STRATAFIX PDS PLS OS6 REV SZ1 18IN 45CM  [employer]  [id number] Left 1 Implanted   CMT BONE SIMPLEX/P 1/2DOSE 10PK - [id number] Implant CMT BONE SIMPLEX/P 1/2DOSE 10PK  JUDE DANI[employer] [id number] Left 1 Implanted   SUT FW #2 W/TPR NDL 1/2 CIR 38IN 97CM 26.5MM CHAYA - [id number] Implant SUT FW #2 W/TPR NDL 1/2 CIR 38IN 97CM 26.5MM CHAYA  ARTH[employer]X [id number] Left 3 Implanted   ART/SRF KN NEXGEN LCCK SZEF 17MM YEL/STRP - [id number] Implant ART/SRF KN NEXGEN LCCK SZEF 17MM YEL/STRP  [employer] [id number] Left 1 Implanted   SCRW TI 5.0MM W ANCHR NO2 - [id number] Implant SCRW TI 5.0MM W ANCHR NO2  [employer] [id number] Left 1 Implanted   PAT 3PEG THN 34X7.8 34MM - [id number] Implant PAT 3PEG THN 34X7.8 34MM  [employer] [id number] Left 1 Implanted       SURGEON: Patricia Caldera MD     ASSISTANT: Paul Fowler MD, Fellow    [place]orneo Uriarte, [facility]      The services of a skilled  first assist were necessary for performing the procedure safely and expeditiously.   The first assist was present for the entire duration of the case and helped with positioning, retraction and closure of the incision.      ANESTHESIA: General with Block  Anesthesiologist: Jasper Solis MD  CRNA: David Harrison CRNA     Staff:   Circulator: Zandra Alvares RN; Karen Zuniga, REZA; Peyton West RN  Scrub Person: Ariella Cervantes  Vendor Representative: Tim Osman    Estimated Blood Loss: 100ml    Specimens: * No orders in the log *    COMPLICATIONS: Nil.     DRAINS:    Closed/Suction Drain 1 Left;Anterior Knee Accordion 10 Fr. (Active)   .     INDICATIONS:   OTTO is a 77-year-old male who comes into the office today for follow-up of his right knee and also with new complaints of left knee problem.  Right knee  He had a revision right total knee arthroplasty with extensor mechanism allograft on 5/17/22  He is now about 7-1/2 months postop. He has been in physical therapy.  He is doing well. He denies pain in the knee. He has not been taking any medication for pain.  He has been weightbearing as tolerated with the hinged knee brace unlocked and has been walking with a walker  He states that his right leg is getting stronger.  He is accompanied in the office by his wife.     Left knee  He is known to me from a revision left total knee replacement. While at physical therapy he was trying to work out his left knee and started noticing that it has been weaker for him. He feels that the left knee has not been able to regain his strength. At times he feels like the left knee wants to buckle. He has also been noticing that the kneecap appears to be on the side of his knee.     He feels that the left knee is limiting his ability to rehabilitate.  He continues to use ramps at home to get in and out of the house but he feels this is mostly for his left knee now.    Evaluation of his x-rays and clinical examination in the office confirmed presence of subluxation/dislocation of his  patella.    Treatment options and alternatives were discussed in detail with the patient who is indicated for a revision of his left knee replacement with possible tibial tubercle osteotomy for repair of extensor mechanism. Likely risks and benefits of the procedure, including but not limited to   infection, DVT, pulmonary embolism,, stiffness of the knee, persistent instability, dislocation of the patella, malunion, nonunion, possibility of injury to tendons, ligaments, nerves or vessels and risk for mortality and morbidity have been discussed in detail. Despite the risks involved, the patient elected to proceed and informed consent was obtained and the patient was scheduled for surgery. The patient was seen in the preoperative holding area and the operative site was marked.     DESCRIPTION OF PROCEDURE:   The patient was transferred to [facility] operating room. Preoperative antibiotics in the form of Kefzol  intravenously was infused prior to the incision and prior to the tourniquet placement according to the SCIP protocol.  I did try to give him some vancomycin but he had rash and itching which subsided with Benadryl.  Vancomycin was discontinued.  A surgical time out was done with the team and the correct patient, surgical side and site were identified.     After achieving adequate general anesthesia the operative extremity was prepped and draped in the usual sterile fashion.  A well-padded tourniquet was placed prior to the prep. The tourniquet was  elevated to a pressure of 250 mmHg.  A skin incision was made incorporating the previous surgical scar.  The patella was found to be dislocated laterally.  The patella implant showed significant amount of wear both on the medial and lateral aspects.    The revision knee implant however was stable and the components were found to be in good position with adequate external rotation of the tibial implant and adequate external rotation of the femoral  implant.  I elected to evaluate the stability further with a liner change.  I remove the constrained CCK liner that was 17 mm height.  I trialed with a 14 mm and a 20 mm but these were too loose/too stiff for the knee.  I elected to replace the 17 mm constrained liner.  This was seated into position.    Next I evaluated the patella.  Secondary to the significant wear I elected to revise the patellar implant.  Oscillating saw was utilized and the previous patellar implant was removed.  There was some amount of osteolysis around the bony margins on the circumference but overall patella bone stock was well preserved.  I was able to remove the previous plastic logs and read read to them and elected to resurface the patella with a cemented implant.  Surface was prepared for cementation and I seated a thin patella 8 mm thick 34 mm diameter.  Cement was allowed to set and excess cement was removed.  I reevaluated the tracking.  The patella was tracking better after resurfacing.  In view of this I have elected not to proceed with a tibial tubercle osteotomy but to do a lateral patellar retinacular release and imbrication of the medial soft tissue.  I have done lateral release taking care to preserve the synovial lining and not to make a rent through and through on the lateral aspect.  After this the medial tissue was imbricated with FiberWire sutures.  Patella was tracking well.  Knee was stable for range of motion 0 to 90 degrees.    Tourniquet was deflated and soft tissue hemostasis was secured.  10 Niuean Hemovac drain was placed.    The incision was thoroughly irrigated with saline and closed in layers.  Sterile dressings were placed. The patient tolerated the procedure well.  There were no complications.  The patient had good distal pulses and adequate capillary refill.    Plan to start aspirin in a.m. for DVT prophylaxis.  The patient will receive Kefzol intravenously every 8 for 2 more doses according to skip  protocol.  Plan to mobilize in a.m. with physical therapy  Weightbearing as tolerated.  I will avoid any range of motion for about 2 to 3 weeks.  He will be allowed to ambulate with knee immobilizer.    I discussed the satisfactory performance of the procedure with the patient's family and discussed with them The postoperative management.       Patricia Caldera M.D.    1/16/2023    CC: Charity Kearney MD; MD Verenice Peter Madhusudhan R,*

## 2023-01-16 NOTE — ANESTHESIA PROCEDURE NOTES
Airway  Urgency: elective    Date/Time: 1/16/2023 11:42 AM  Airway not difficult    General Information and Staff    Patient location during procedure: OR  Anesthesiologist: Leslie Mendosa MD  CRNA/CAA: David Harrison CRNA    Indications and Patient Condition  Indications for airway management: airway protection    Preoxygenated: yes  MILS maintained throughout  Mask difficulty assessment: 2 - vent by mask + OA or adjuvant +/- NMBA    Final Airway Details  Final airway type: endotracheal airway      Successful airway: ETT  Cuffed: yes   Successful intubation technique: direct laryngoscopy  Facilitating devices/methods: intubating stylet  Endotracheal tube insertion site: oral  Blade: An  Blade size: 2  ETT size (mm): 7.5  Cormack-Lehane Classification: grade I - full view of glottis  Placement verified by: chest auscultation and capnometry   Cuff volume (mL): 8  Measured from: lips  ETT/EBT  to lips (cm): 21  Number of attempts at approach: 1  Assessment: lips, teeth, and gum same as pre-op and atraumatic intubation

## 2023-01-17 ENCOUNTER — READMISSION MANAGEMENT (OUTPATIENT)
Dept: CALL CENTER | Facility: HOSPITAL | Age: 78
End: 2023-01-17
Payer: MEDICARE

## 2023-01-17 VITALS
SYSTOLIC BLOOD PRESSURE: 137 MMHG | OXYGEN SATURATION: 91 % | BODY MASS INDEX: 38.3 KG/M2 | RESPIRATION RATE: 16 BRPM | DIASTOLIC BLOOD PRESSURE: 79 MMHG | TEMPERATURE: 97.6 F | HEART RATE: 79 BPM | HEIGHT: 69 IN | WEIGHT: 258.6 LBS

## 2023-01-17 LAB
ANION GAP SERPL CALCULATED.3IONS-SCNC: 14 MMOL/L (ref 5–15)
BASOPHILS # BLD AUTO: 0.02 10*3/MM3 (ref 0–0.2)
BASOPHILS NFR BLD AUTO: 0.2 % (ref 0–1.5)
BUN SERPL-MCNC: 20 MG/DL (ref 8–23)
BUN/CREAT SERPL: 16.9 (ref 7–25)
CALCIUM SPEC-SCNC: 8.3 MG/DL (ref 8.6–10.5)
CHLORIDE SERPL-SCNC: 105 MMOL/L (ref 98–107)
CO2 SERPL-SCNC: 19 MMOL/L (ref 22–29)
CREAT SERPL-MCNC: 1.18 MG/DL (ref 0.76–1.27)
DEPRECATED RDW RBC AUTO: 42.4 FL (ref 37–54)
EGFRCR SERPLBLD CKD-EPI 2021: 63.6 ML/MIN/1.73
EOSINOPHIL # BLD AUTO: 0 10*3/MM3 (ref 0–0.4)
EOSINOPHIL NFR BLD AUTO: 0 % (ref 0.3–6.2)
ERYTHROCYTE [DISTWIDTH] IN BLOOD BY AUTOMATED COUNT: 11.8 % (ref 12.3–15.4)
GLUCOSE SERPL-MCNC: 120 MG/DL (ref 65–99)
HCT VFR BLD AUTO: 39 % (ref 37.5–51)
HGB BLD-MCNC: 13.1 G/DL (ref 13–17.7)
LYMPHOCYTES # BLD AUTO: 1.64 10*3/MM3 (ref 0.7–3.1)
LYMPHOCYTES NFR BLD AUTO: 14 % (ref 19.6–45.3)
MCH RBC QN AUTO: 33 PG (ref 26.6–33)
MCHC RBC AUTO-ENTMCNC: 33.6 G/DL (ref 31.5–35.7)
MCV RBC AUTO: 98.2 FL (ref 79–97)
MONOCYTES # BLD AUTO: 1.23 10*3/MM3 (ref 0.1–0.9)
MONOCYTES NFR BLD AUTO: 10.5 % (ref 5–12)
NEUTROPHILS NFR BLD AUTO: 75 % (ref 42.7–76)
NEUTROPHILS NFR BLD AUTO: 8.81 10*3/MM3 (ref 1.7–7)
PLATELET # BLD AUTO: 246 10*3/MM3 (ref 140–450)
PMV BLD AUTO: 9.9 FL (ref 6–12)
POTASSIUM SERPL-SCNC: 5 MMOL/L (ref 3.5–5.2)
RBC # BLD AUTO: 3.97 10*6/MM3 (ref 4.14–5.8)
SODIUM SERPL-SCNC: 138 MMOL/L (ref 136–145)
WBC NRBC COR # BLD: 11.73 10*3/MM3 (ref 3.4–10.8)

## 2023-01-17 PROCEDURE — 97535 SELF CARE MNGMENT TRAINING: CPT | Performed by: OCCUPATIONAL THERAPIST

## 2023-01-17 PROCEDURE — 85025 COMPLETE CBC W/AUTO DIFF WBC: CPT | Performed by: ORTHOPAEDIC SURGERY

## 2023-01-17 PROCEDURE — 80048 BASIC METABOLIC PNL TOTAL CA: CPT | Performed by: ORTHOPAEDIC SURGERY

## 2023-01-17 PROCEDURE — 97161 PT EVAL LOW COMPLEX 20 MIN: CPT | Performed by: PHYSICAL THERAPIST

## 2023-01-17 PROCEDURE — 25010000002 CEFAZOLIN IN DEXTROSE 2-4 GM/100ML-% SOLUTION: Performed by: ORTHOPAEDIC SURGERY

## 2023-01-17 PROCEDURE — 97116 GAIT TRAINING THERAPY: CPT | Performed by: PHYSICAL THERAPIST

## 2023-01-17 PROCEDURE — 97165 OT EVAL LOW COMPLEX 30 MIN: CPT | Performed by: OCCUPATIONAL THERAPIST

## 2023-01-17 RX ORDER — ASPIRIN 81 MG/1
81 TABLET ORAL EVERY 12 HOURS SCHEDULED
Qty: 60 TABLET | Refills: 0 | Status: SHIPPED | OUTPATIENT
Start: 2023-01-17 | End: 2023-02-16

## 2023-01-17 RX ORDER — PSEUDOEPHEDRINE HCL 30 MG
100 TABLET ORAL 2 TIMES DAILY
Qty: 30 CAPSULE | Refills: 0 | Status: SHIPPED | OUTPATIENT
Start: 2023-01-17 | End: 2023-02-01

## 2023-01-17 RX ORDER — HYDROCODONE BITARTRATE AND ACETAMINOPHEN 7.5; 325 MG/1; MG/1
1 TABLET ORAL EVERY 4 HOURS PRN
Qty: 24 TABLET | Refills: 0 | Status: SHIPPED | OUTPATIENT
Start: 2023-01-17 | End: 2023-01-26

## 2023-01-17 RX ORDER — BISACODYL 5 MG/1
10 TABLET, DELAYED RELEASE ORAL DAILY PRN
Qty: 10 TABLET | Refills: 0 | Status: SHIPPED | OUTPATIENT
Start: 2023-01-17 | End: 2023-01-26

## 2023-01-17 RX ADMIN — DOCUSATE SODIUM 100 MG: 100 CAPSULE, LIQUID FILLED ORAL at 08:59

## 2023-01-17 RX ADMIN — AMLODIPINE BESYLATE 5 MG: 5 TABLET ORAL at 06:15

## 2023-01-17 RX ADMIN — HYDRALAZINE HYDROCHLORIDE 25 MG: 25 TABLET, FILM COATED ORAL at 05:50

## 2023-01-17 RX ADMIN — OXCARBAZEPINE 300 MG: 300 TABLET, FILM COATED ORAL at 09:58

## 2023-01-17 RX ADMIN — ASPIRIN 81 MG: 81 TABLET, COATED ORAL at 08:59

## 2023-01-17 RX ADMIN — CARVEDILOL 12.5 MG: 12.5 TABLET, FILM COATED ORAL at 11:16

## 2023-01-17 RX ADMIN — HYDROCODONE BITARTRATE AND ACETAMINOPHEN 1 TABLET: 7.5; 325 TABLET ORAL at 09:58

## 2023-01-17 RX ADMIN — OXCARBAZEPINE 300 MG: 300 TABLET, FILM COATED ORAL at 01:31

## 2023-01-17 RX ADMIN — LEVOTHYROXINE SODIUM 75 MCG: 0.07 TABLET ORAL at 11:16

## 2023-01-17 RX ADMIN — HYDROCODONE BITARTRATE AND ACETAMINOPHEN 1 TABLET: 7.5; 325 TABLET ORAL at 02:15

## 2023-01-17 RX ADMIN — ALPRAZOLAM 0.5 MG: 0.5 TABLET ORAL at 05:49

## 2023-01-17 RX ADMIN — CEFAZOLIN SODIUM 2 G: 2 INJECTION, SOLUTION INTRAVENOUS at 02:15

## 2023-01-17 RX ADMIN — HYDROCODONE BITARTRATE AND ACETAMINOPHEN 1 TABLET: 7.5; 325 TABLET ORAL at 05:50

## 2023-01-17 NOTE — OUTREACH NOTE
Prep Survey    Flowsheet Row Responses   [facility] facility patient discharged from? [facility]   Is LACE score < 7 ? Yes   Eligibility [facility]   Date of Admission 01/16/23   Date of Discharge 01/17/23   Discharge Disposition Home-Health Care Sv   Discharge diagnosis Total knee arthro   Does the patient have one of the following disease processes/diagnoses(primary or secondary)? Total Joint Replacement   Does the patient have Home health ordered? Yes   What is the Home health agency?  [facility]    Is there a DME ordered? No   Prep survey completed? Yes          LILIANA PHELPS - Registered Nurse

## 2023-01-17 NOTE — THERAPY DISCHARGE NOTE
"Patient Name: Jeremi Ugarte  : 1945    MRN: [id number]                              Today's Date: 2023       Admit Date: 2023    Visit Dx:     ICD-10-CM ICD-9-CM   1. Status post revision of total replacement of left knee  Z96.652 V43.65   2. Instability of internal left knee prosthesis, initial encounter ([facility])  T84.023A 996.42   3. Closed dislocation of patella, left, subsequent encounter  S83.005D V54.89     836.3     Patient Active Problem List   Diagnosis   • Trigeminal neuralgia of right side of face   • Bifascicular block   • Dyslipidemia   • Benign essential HTN   • Anxiety   • Status post revision of total replacement of right knee   • Cluster headaches   • Status post revision of total replacement of left knee   • Hypercholesterolemia   • Sprain of right knee   • Hyponatremia   • Quadriceps tendon rupture, right, sequela   • Hypothyroid   • Closed dislocation of patella, left, subsequent encounter   • Instability of internal left knee prosthesis ([facility])   • Trigeminal neuralgia     Past Medical History:   Diagnosis Date   • Ankle arthritis    • Anxiety    • Arthritis    • Chronic headaches    • Dislocation of the knee cap     LEFT   • GCA (giant cell arteritis) ([facility])     \"PT STATES MISDIAGNOSED\"   • Gout of foot    • High risk medication use    • History of infection     AFTER KNEE REPLACEMENT PT NOT SURE WHICH ONE   • Hyperlipidemia    • Hypertension    • Hyponatremia     HX   • Hypothyroid    • Left knee pain    • Macrocytosis    • Myositis     \"PT STATES MISDIAGNOSED\"   • Polymyalgia rheumatica ([facility])    • PONV (postoperative nausea and vomiting)    • Renal insufficiency    • Right bundle branch block (RBBB)    • S/p bilateral revision of total hip replacement    • S/P foot surgery    • S/P revision of total knee, left    • S/P revision of total knee, right    • Trigeminal neuralgia      Past Surgical History:   Procedure Laterality Date   • ANKLE ARTHROPLASTY Left 2018   • BACK SURGERY "      lower back surg   • BRAIN SURGERY     • CATARACT EXTRACTION     • COLONOSCOPY     • CRANIOTOMY Right 02/14/2019    Dr. Aldridge, for trigeminal neuralgia   • EXCISION BAKERS CYST KNEE  2008   • HAND SURGERY Left 1971    Left hand and wrist 1971   • HIP SURGERY Right 12/08/2016    total right hip replacement   • KNEE ARTHROPLASTY Bilateral     LEFT - 1996  -  RIGHT -  1999    • QUADRICEPS TENDON REPAIR Right 03/26/2022    Procedure: RIGHT QUADRICEPS TENDON REPAIR;  Surgeon: Keaton Martinez II, MD;  Location: [facility] OR;  Service: Orthopedics;  Laterality: Right;   • SHOULDER SURGERY     • TOTAL HIP ARTHROPLASTY Left 2017   • TOTAL KNEE ARTHROPLASTY REVISION  2009    left   • TOTAL KNEE ARTHROPLASTY REVISION Right 09/15/2020    Procedure: RIGHT TOTAL KNEE ARTHROPLASTY REVISION;  Surgeon: Patricia Caldera MD;  Location: [facility] MAIN OR;  Service: Orthopedics;  Laterality: Right;   • TOTAL KNEE ARTHROPLASTY REVISION Left 11/16/2021    Procedure: TOTAL KNEE ARTHROPLASTY REVISION;  Surgeon: Patricia Caldera MD;  Location: [facility] OR;  Service: Orthopedics;  Laterality: Left;   • TOTAL KNEE ARTHROPLASTY REVISION Right 05/17/2022    Procedure: REVISION RIGHT TOTAL KNEE ARTHROPLASTY WITH EXTENSOR MECHANISM ALLOGRAFT/;  Surgeon: Patricia Caldera MD;  Location: [facility] OR;  Service: Orthopedics;  Laterality: Right;   • TOTAL KNEE ARTHROPLASTY REVISION Left 1/16/2023    Procedure: TOTAL KNEE ARTHROPLASTY REVISION repair extensor mechanism ;  Surgeon: Patricia Caldera MD;  Location: [facility] [place];  Service: Orthopedics;  Laterality: Left;   • WRIST SURGERY        General Information     Row Name 01/17/23 1331          Physical Therapy Time and Intention    Document Type discharge evaluation/summary  -     Mode of Treatment individual therapy;physical therapy  -     Row Name 01/17/23 1331          General Information    Patient Profile Reviewed yes  -KH     Existing  Precautions/Restrictions fall  -     Barriers to Rehab none identified  -     Row Name 01/17/23 1331          Living Environment    People in Home spouse  -     Row Name 01/17/23 1331          Home Main Entrance    Number of Stairs, Main Entrance none  -     Row Name 01/17/23 1331          Cognition    Orientation Status (Cognition) oriented x 4  -           User Key  (r) = Recorded By, (t) = Taken By, (c) = Cosigned By    Initials Name Provider Type    Becky Willis, LEAH Physical Therapist               Mobility     Row Name 01/17/23 1332          Bed Mobility    Comment, (Bed Mobility) UIC  -     Row Name 01/17/23 1332          Sit-Stand Transfer    Sit-Stand [place] (Transfers) standby assist  -     Assistive Device (Sit-Stand Transfers) walker, front-wheeled  -     Row Name 01/17/23 1332          Gait/Stairs (Locomotion)    [place] Level (Gait) standby assist  -     Assistive Device (Gait) walker, front-wheeled  -     Distance in Feet (Gait) 200 ft  -     Deviations/Abnormal Patterns (Gait) gait speed decreased  -           User Key  (r) = Recorded By, (t) = Taken By, (c) = Cosigned By    Initials Name Provider Type    Becky Willis PT Physical Therapist               Obj/Interventions     Row Name 01/17/23 1334          Range of Motion Comprehensive    Comment, General Range of Motion WNL except L knee  -[facility] Name 01/17/23 1334          Strength Comprehensive (MMT)    Comment, General Manual Muscle Testing (MMT) Assessment WFL  -     Row Name 01/17/23 1334          Motor Skills    Therapeutic Exercise --  quad set, SLR x 5  -           User Key  (r) = Recorded By, (t) = Taken By, (c) = Cosigned By    Initials Name Provider Type    Becky Willis PT Physical Therapist               Goals/Plan    No documentation.                Clinical Impression     Row Name 01/17/23 1335          Pain    Pretreatment Pain Rating 0/10 - no pain   -     Posttreatment Pain Rating 2/10  -     Pain Location - Side/Orientation Left  -     Pain Location - knee  -     Pain Intervention(s) Repositioned;Ambulation/increased activity  -     Row Name 01/17/23 5579          Plan of Care Review    Outcome Evaluation pt is s/p L TKA revision with extensor mechanism repair. Pt is WBAT. Pt reports KI was removed yesterday as he can now SLR. Pt ambualted 200 ft with SBA and Rwx. Educated on no ROM exercises, quad strengthening only per order. Pt has Rwx and BSc at home and no stairs to enter. Pt is safe to d/c home when medically stable. PT will sign off.  -     Row Name 01/17/23 3598          Therapy Assessment/Plan (PT)    Patient/Family Therapy Goals Statement (PT) return home to [facility]  -     Criteria for Skilled Interventions Met (PT) no problems identified which require skilled intervention  -     Therapy Frequency (PT) evaluation only  -     Row Name 01/17/23 3229          Positioning and Restraints    Pre-Treatment Position sitting in chair/recliner  -     Post Treatment Position chair  -     In Chair reclined;call light within reach;encouraged to call for assist;exit alarm on;with family/caregiver;notified [facility]           User Key  (r) = Recorded By, (t) = Taken By, (c) = Cosigned By    Initials Name Provider Type    Becky Willis, PT Physical Therapist               Outcome Measures     Row Name 01/17/23 1337 01/17/23 0830       How much help from another person do you currently need...    Turning from your back to your side while in flat bed without using bedrails? 4  - 3  -BB    Moving from lying on back to sitting on the side of a flat bed without bedrails? 4  -KH 3  -BB    Moving to and from a bed to a chair (including a wheelchair)? 4  -KH 3  -BB    Standing up from a chair using your arms (e.g., wheelchair, bedside chair)? 4  -KH 3  -BB    Climbing 3-5 steps with a railing? 3  -KH 3  -BB    To walk in hospital room? 4   -KH 3  -BB    AM-PAC 6 Clicks Score (PT) 23  -KH 18  -BB    Highest level of mobility 7 --> Walked 25 feet or more  -KH 6 --> Walked 10 steps or more  -BB    Row Name 01/17/23 1337 01/17/23 1259       Functional Assessment    Outcome Measure Options AM-PAC 6 Clicks Basic Mobility (PT)  -KH AM-PAC 6 Clicks Daily Activity (OT)  -          User Key  (r) = Recorded By, (t) = Taken By, (c) = Cosigned By    Initials Name Provider Type    Becky Willis, PT Physical Therapist    Leah Nowak, OTR Occupational Therapist    Jacklyn Antunez, RN Registered Nurse              Physical Therapy Education     Title: PT OT SLP Therapies (In Progress)     Topic: Physical Therapy (Not Started)     Point: Mobility training (Not Started)     Learner Progress:  Not documented in this visit.          Point: Home exercise program (Not Started)     Learner Progress:  Not documented in this visit.          Point: Body mechanics (Not Started)     Learner Progress:  Not documented in this visit.          Point: Precautions (Not Started)     Learner Progress:  Not documented in this visit.                          PT Recommendation and Plan     Outcome Evaluation: pt is s/p L TKA revision with extensor mechanism repair. Pt is WBAT. Pt reports KI was removed yesterday as he can now SLR. Pt ambualted 200 ft with SBA and Rwx. Educated on no ROM exercises, quad strengthening only per order. Pt has Rwx and BSc at home and no stairs to enter. Pt is safe to d/c home when medically stable. PT will sign off.     Time Calculation:    PT Charges     Row Name 01/17/23 1338             Time Calculation    Start Time 1100  -KH      Stop Time 1116  -KH      Time Calculation (min) 16 min  -KH         Timed Charges    39644 - Gait Training Minutes  8  -KH         Untimed Charges    PT Eval/Re-eval Minutes 8  -KH         Total Minutes    Timed Charges Total Minutes 8  -KH      Untimed Charges Total Minutes 8  -KH       Total  Minutes 16  -KH            User Key  (r) = Recorded By, (t) = Taken By, (c) = Cosigned By    Initials Name Provider Type    Becky Willis, PT Physical Therapist              Therapy Charges for Today     Code Description Service Date Service Provider Modifiers Qty    15352349434 HC GAIT TRAINING EA 15 MIN 1/17/2023 Becky Pena, PT GP 1    51702033746 HC PT EVAL LOW COMPLEXITY 1 1/17/2023 Becky Pena, PT GP 1          PT G-Codes  Outcome Measure Options: AM-PAC 6 Clicks Basic Mobility (PT)  AM-PAC 6 Clicks Score (PT): 23  AM-PAC 6 Clicks Score (OT): 22    PT Discharge Summary  Anticipated Discharge Disposition (PT): home with assist    Becky Pena, PT  1/17/2023

## 2023-01-17 NOTE — PLAN OF CARE
Goal Outcome Evaluation:  Plan of Care Reviewed With: patient        Progress: improving  Outcome Evaluation: patient ambulating with assistance to bathroom and in hallway, voiding per urinal, PO medication given for pain, educated on b/p monitoring

## 2023-01-17 NOTE — CASE MANAGEMENT/SOCIAL WORK
Case Management Discharge Note      Final Note: Patient dc'd home with [facility] outreach to follow. Liyah STILL RN         Selected Continued Care - Discharged on 1/17/2023 Admission date: 1/16/2023 - Discharge disposition: Home-Health Care [facility]    Destination    No services have been selected for the patient.              Durable Medical Equipment    No services have been selected for the patient.              Dialysis/Infusion    No services have been selected for the patient.              Home Medical Care Coordination complete.    Service Provider Selected Services Address Phone Fax Patient Preferred    [facility] [address] [place] [address] [phone] --          Therapy    No services have been selected for the patient.              Community Resources    No services have been selected for the patient.              Community & DME    No services have been selected for the patient.                  Transportation Services  Private: Car    Final Discharge Disposition Code: 01 - home or self-care

## 2023-01-17 NOTE — PLAN OF CARE
Goal Outcome Evaluation:  Plan of Care Reviewed With: patient           Outcome Evaluation: Pt remains stable this shift, AOx4, voiding intact, VSS, hemovac drain removed, pt discharged aggie

## 2023-01-17 NOTE — THERAPY DISCHARGE NOTE
"Acute Care - Occupational Therapy Discharge  [facility]    Patient Name: Jeremi Ugarte  : 1945    MRN: [id number]                              Today's Date: 2023       Admit Date: 2023    Visit Dx:     ICD-10-CM ICD-9-CM   1. Status post revision of total replacement of left knee  Z96.652 V43.65   2. Instability of internal left knee prosthesis, initial encounter ([facility])  T84.023A 996.42   3. Closed dislocation of patella, left, subsequent encounter  S83.005D V54.89     836.3     Patient Active Problem List   Diagnosis   • Trigeminal neuralgia of right side of face   • Bifascicular block   • Dyslipidemia   • Benign essential HTN   • Anxiety   • Status post revision of total replacement of right knee   • Cluster headaches   • Status post revision of total replacement of left knee   • Hypercholesterolemia   • Sprain of right knee   • Hyponatremia   • Quadriceps tendon rupture, right, sequela   • Hypothyroid   • Closed dislocation of patella, left, subsequent encounter   • Instability of internal left knee prosthesis ([facility])   • Trigeminal neuralgia     Past Medical History:   Diagnosis Date   • Ankle arthritis    • Anxiety    • Arthritis    • Chronic headaches    • Dislocation of the knee cap     LEFT   • GCA (giant cell arteritis) ([facility])     \"PT STATES MISDIAGNOSED\"   • Gout of foot    • High risk medication use    • History of infection     AFTER KNEE REPLACEMENT PT NOT SURE WHICH ONE   • Hyperlipidemia    • Hypertension    • Hyponatremia     HX   • Hypothyroid    • Left knee pain    • Macrocytosis    • Myositis     \"PT STATES MISDIAGNOSED\"   • Polymyalgia rheumatica ([facility])    • PONV (postoperative nausea and vomiting)    • Renal insufficiency    • Right bundle branch block (RBBB)    • S/p bilateral revision of total hip replacement    • S/P foot surgery    • S/P revision of total knee, left    • S/P revision of total knee, right    • Trigeminal neuralgia      Past Surgical History:   Procedure " Laterality Date   • ANKLE ARTHROPLASTY Left 2018   • BACK SURGERY      lower back surg   • BRAIN SURGERY     • CATARACT EXTRACTION     • COLONOSCOPY     • CRANIOTOMY Right 02/14/2019    Dr. Aldridge, for trigeminal neuralgia   • EXCISION BAKERS CYST KNEE  2008   • HAND SURGERY Left 1971    Left hand and wrist 1971   • HIP SURGERY Right 12/08/2016    total right hip replacement   • KNEE ARTHROPLASTY Bilateral     LEFT - 1996  -  RIGHT -  1999    • QUADRICEPS TENDON REPAIR Right 03/26/2022    Procedure: RIGHT QUADRICEPS TENDON REPAIR;  Surgeon: Keaton Martinez II, MD;  Location: [facility] OR;  Service: Orthopedics;  Laterality: Right;   • SHOULDER SURGERY     • TOTAL HIP ARTHROPLASTY Left 2017   • TOTAL KNEE ARTHROPLASTY REVISION  2009    left   • TOTAL KNEE ARTHROPLASTY REVISION Right 09/15/2020    Procedure: RIGHT TOTAL KNEE ARTHROPLASTY REVISION;  Surgeon: Patricia Caldera MD;  Location: [facility] OR;  Service: Orthopedics;  Laterality: Right;   • TOTAL KNEE ARTHROPLASTY REVISION Left 11/16/2021    Procedure: TOTAL KNEE ARTHROPLASTY REVISION;  Surgeon: Patricia Caldera MD;  Location: [facility] OR;  Service: Orthopedics;  Laterality: Left;   • TOTAL KNEE ARTHROPLASTY REVISION Right 05/17/2022    Procedure: REVISION RIGHT TOTAL KNEE ARTHROPLASTY WITH EXTENSOR MECHANISM ALLOGRAFT/;  Surgeon: Patricia Caldera MD;  Location: [facility] OR;  Service: Orthopedics;  Laterality: Right;   • TOTAL KNEE ARTHROPLASTY REVISION Left 1/16/2023    Procedure: TOTAL KNEE ARTHROPLASTY REVISION repair extensor mechanism ;  Surgeon: Patricia Caldera MD;  Location: [facility];  Service: Orthopedics;  Laterality: Left;   • WRIST SURGERY        General Information     Row Name 01/17/23 1250          OT Time and Intention    Document Type discharge evaluation/summary  -     Mode of Treatment individual therapy;occupational therapy  -     Row Name 01/17/23 1250          General Information     Patient Profile Reviewed yes  -     Prior Level of Function independent:;all household mobility;community mobility;gait;transfer;ADL's;bed mobility  -     Existing Precautions/Restrictions fall  -     Barriers to Rehab none identified  -     Row Name 01/17/23 1250          Living Environment    People in Home spouse  -     Row Name 01/17/23 1250          Cognition    Orientation Status (Cognition) oriented x 4  -KP           User Key  (r) = Recorded By, (t) = Taken By, (c) = Cosigned By    Initials Name Provider Type     Leah Alcaraz, OTR Occupational Therapist               Mobility/ADL's     Row Name 01/17/23 1251          Bed Mobility    Bed Mobility supine-sit  -     Supine-Sit Greer (Bed Mobility) set up;standby assist  -[facility] Name 01/17/23 1251          Transfers    Transfers sit-stand transfer;stand-sit transfer;bed-chair transfer  -[facility] Name 01/17/23 1251          Bed-Chair Transfer    Bed-Chair Greer (Transfers) set up;standby assist;contact guard  [facility]     Assistive Device (Bed-Chair Transfers) walker, front-wheeled  [facility] Name 01/17/23 1251          Sit-Stand Transfer    Sit-Stand Greer (Transfers) set up;contact guard  -     Assistive Device (Sit-Stand Transfers) walker, front-wheeled  [facility] Name 01/17/23 1251          Stand-Sit Transfer    Stand-Sit Greer (Transfers) set up;verbal cues;contact guard  -     Assistive Device (Stand-Sit Transfers) walker, front-wheeled  [facility]     Row Name 01/17/23 1251          Functional Mobility    Functional Mobility- Ind. Level set up required;contact guard assist  [facility]     Functional Mobility- Device walker, front-wheeled  [facility]     Row Name 01/17/23 1251          Activities of Daily Living    BADL Assessment/Intervention toileting;grooming  ed on LBD technique  -     Row Name 01/17/23 1251          Toileting Assessment/Training    Greer Level (Toileting) toileting skills;perform  perineal hygiene;standby assist  -     Position (Toileting) supported standing  -     Comment, (Toileting) standing at toilet  -     Row Name 01/17/23 1251          Grooming Assessment/Training    Welches Level (Grooming) grooming skills;wash face, hands;set up  -KP     Position (Grooming) supported sitting  -KP           User Key  (r) = Recorded By, (t) = Taken By, (c) = Cosigned By    Initials Name Provider Type    Leah Nowak OTR Occupational Therapist               Obj/Interventions     Row Name 01/17/23 1254          Range of Motion Comprehensive    General Range of Motion bilateral upper extremity ROM WNL  -     Row Name 01/17/23 1254          Strength Comprehensive (MMT)    General Manual Muscle Testing (MMT) Assessment no strength deficits identified  -     Comment, General Manual Muscle Testing (MMT) Assessment B UE 8/8  -     Row Name 01/17/23 1254          Motor Skills    Motor Skills coordination;functional endurance  -     Coordination WFL  -     Row Name 01/17/23 1254          Balance    Balance Assessment sitting static balance;standing static balance  -KP     Static Sitting Balance set-up;standby assist  -KP     Static Standing Balance set-up;standby assist  -KP     Balance Interventions sitting;standing;sit to stand;supported;dynamic;static;occupation based/functional task  -           User Key  (r) = Recorded By, (t) = Taken By, (c) = Cosigned By    Initials Name Provider Type    Leah Nowak OTR Occupational Therapist               Goals/Plan     Row Name 01/17/23 1251          Transfer Goal 1 (OT)    Activity/Assistive Device (Transfer Goal 1, OT) bed-to-chair/chair-to-bed;sit-to-stand/stand-to-sit  -     Welches Level/Cues Needed (Transfer Goal 1, OT) contact guard required  -     Time Frame (Transfer Goal 1, OT) short term goal (STG);1 day  -     Progress/Outcome (Transfer Goal 1, OT) goal met  -     Row Name 01/17/23 1250           Problem Specific Goal 1 (OT)    Problem Specific Goal 1 (OT) ed on safety w ADL and technique for LBD  -KP     Time Frame (Problem Specific Goal 1, OT) short term goal (STG);1 day  -     Progress/Outcome (Problem Specific Goal 1, OT) goal met  -           User Key  (r) = Recorded By, (t) = Taken By, (c) = Cosigned By    Initials Name Provider Type     Leah Alcaraz, OTR Occupational Therapist               Clinical Impression     Row Name 01/17/23 1257          Pain Assessment    Pretreatment Pain Rating 0/10 - no pain  -     Posttreatment Pain Rating 3/10  -     Pain Location - Side/Orientation Left  -     Pain Location lower  -     Pain Location - knee  -     Pain Intervention(s) Repositioned;Medication (See MAR)  -     Row Name 01/17/23 1257          Plan of Care Review    Plan of Care Reviewed With patient;spouse  -     Progress improving  -     Outcome Evaluation pt worked w OT this date and completed evaluation. pt was SBA w sup to sit. sat EOB w no A. pt completed sit to stand w CGA and walker, walked to BR and completed toileting in standing position w CGA and hygiene w SBA. pt completed grooming skills w SBA sitting in the chair. pt walked in the hallway and down to the window w CGA and walker. pt reports he has a BSC he can use over his toilet and is working on getting a walk in shower installed. pt plans to dc home a family and family to A w LBD. pt and family ed on LBD technique.  -     Row Name 01/17/23 1257          Therapy Assessment/Plan (OT)    Criteria for Skilled Therapeutic Interventions Met (OT) no problems identified which require skilled intervention  -     Therapy Frequency (OT) evaluation only  -     Row Name 01/17/23 1257          Therapy Plan Review/Discharge Plan (OT)    Anticipated Discharge Disposition (OT) home with assist  -     Row Name 01/17/23 1257          Positioning and Restraints    Pre-Treatment Position in bed  -     Post Treatment  Position chair  -KP     In Chair reclined;with family/caregiver;call light within reach;encouraged to call for assist;exit alarm on  -KP           User Key  (r) = Recorded By, (t) = Taken By, (c) = Cosigned By    Initials Name Provider Type    Leah Nowak OTR Occupational Therapist               Outcome Measures     Row Name 01/17/23 1259          How much help from another is currently needed...    Putting on and taking off regular lower body clothing? 3  -KP     Bathing (including washing, rinsing, and drying) 3  -KP     Toileting (which includes using toilet bed pan or urinal) 4  -KP     Putting on and taking off regular upper body clothing 4  -KP     Taking care of personal grooming (such as brushing teeth) 4  -KP     Eating meals 4  -KP     AM-PAC 6 Clicks Score (OT) 22  -KP     Row Name 01/17/23 0830          How much help from another person do you currently need...    Turning from your back to your side while in flat bed without using bedrails? 3  -BB     Moving from lying on back to sitting on the side of a flat bed without bedrails? 3  -BB     Moving to and from a bed to a chair (including a wheelchair)? 3  -BB     Standing up from a chair using your arms (e.g., wheelchair, bedside chair)? 3  -BB     Climbing 3-5 steps with a railing? 3  -BB     To walk in hospital room? 3  -BB     AM-PAC 6 Clicks Score (PT) 18  -BB     Highest level of mobility 6 --> Walked 10 steps or more  -BB     Row Name 01/17/23 1252          Functional Assessment    Outcome Measure Options AM-PAC 6 Clicks Daily Activity (OT)  -KP           User Key  (r) = Recorded By, (t) = Taken By, (c) = Cosigned By    Initials Name Provider Type    Leah Nowak OTR Occupational Therapist    Jacklyn Antunez, RN Registered Nurse              Occupational Therapy Education     Title: PT OT SLP Therapies (In Progress)     Topic: Occupational Therapy (Done)     Point: ADL training (Done)     Description:   Instruct  learner(s) on proper safety adaptation and remediation techniques during self care or transfers.   Instruct in proper use of assistive devices.              Learning Progress Summary           Patient Acceptance, E,TB, VU,DU by  at 1/17/2023 1303    Comment: ed pt and family on role of OT. benefit of therapy, POC w. OT. ed on safety w ADL and tsf. pt demo w CGA tsf and SBA grooming.   Family Acceptance, E,TB, VU,DU by  at 1/17/2023 1303    Comment: ed pt and family on role of OT. benefit of therapy, POC w. OT. ed on safety w ADL and tsf. pt demo w CGA tsf and SBA grooming.                   Point: Precautions (Done)     Description:   Instruct learner(s) on prescribed precautions during self-care and functional transfers.              Learning Progress Summary           Patient Acceptance, E,TB, VU,DU by  at 1/17/2023 1303    Comment: ed pt and family on role of OT. benefit of therapy, POC w. OT. ed on safety w ADL and tsf. pt demo w CGA tsf and SBA grooming.   Family Acceptance, E,TB, VU,DU by  at 1/17/2023 1303    Comment: ed pt and family on role of OT. benefit of therapy, POC w. OT. ed on safety w ADL and tsf. pt demo w CGA tsf and SBA grooming.                   Point: Body mechanics (Done)     Description:   Instruct learner(s) on proper positioning and spine alignment during self-care, functional mobility activities and/or exercises.              Learning Progress Summary           Patient Acceptance, E,TB, VU,DU by  at 1/17/2023 1303    Comment: ed pt and family on role of OT. benefit of therapy, POC w. OT. ed on safety w ADL and tsf. pt demo w CGA tsf and SBA grooming.   Family Acceptance, E,TB, VU,DU by  at 1/17/2023 1303    Comment: ed pt and family on role of OT. benefit of therapy, POC w. OT. ed on safety w ADL and tsf. pt demo w CGA tsf and SBA grooming.                               User Key     Initials Effective Dates Name Provider Type Discipline     06/16/21 -  Leah Alcaraz  JONATHAN HaddadR Occupational Therapist OT              OT Recommendation and Plan  Therapy Frequency (OT): evaluation only  Plan of Care Review  Plan of Care Reviewed With: patient, spouse  Progress: improving  Outcome Evaluation: pt worked w OT this date and completed evaluation. pt was SBA w sup to sit. sat EOB w no A. pt completed sit to stand w CGA and walker, walked to BR and completed toileting in standing position w CGA and hygiene w SBA. pt completed grooming skills w SBA sitting in the chair. pt walked in the hallway and down to the window w CGA and walker. pt reports he has a BSC he can use over his toilet and is working on getting a walk in shower installed. pt plans to dc home a family and family to A w LBD. pt and family ed on LBD technique.  Plan of Care Reviewed With: patient, spouse  Outcome Evaluation: pt worked w OT this date and completed evaluation. pt was SBA w sup to sit. sat EOB w no A. pt completed sit to stand w CGA and walker, walked to BR and completed toileting in standing position w CGA and hygiene w SBA. pt completed grooming skills w SBA sitting in the chair. pt walked in the hallway and down to the window w CGA and walker. pt reports he has a BSC he can use over his toilet and is working on getting a walk in shower installed. pt plans to dc home a family and family to A w LBD. pt and family ed on LBD technique.     Time Calculation:    Time Calculation- OT     Row Name 01/17/23 1304             Time Calculation- OT    OT Start Time 0951  -KP      OT Stop Time 1017  -KP      OT Time Calculation (min) 26 min  -KP      Total Timed Code Minutes- OT 10 minute(s)  -KP      OT Received On 01/17/23  -         Timed Charges    92136 - OT Self Care/Mgmt Minutes 10  -KP         Untimed Charges    OT Eval/Re-eval Minutes 16  -KP         Total Minutes    Timed Charges Total Minutes 10  -KP      Untimed Charges Total Minutes 16  -KP       Total Minutes 26  -KP            User Key  (r) = Recorded By,  (t) = Taken By, (c) = Cosigned By    Initials Name Provider Type    Leah Nowak, MICHAEL Occupational Therapist              Therapy Charges for Today     Code Description Service Date Service Provider Modifiers Qty    [id number]  OT SELF CARE/MGMT/TRAIN EA 15 MIN 1/17/2023 Leah Alcaraz OTR GO 1    [id number]  OT EVAL LOW COMPLEXITY 2 1/17/2023 Leah Alcaraz OTR GO 1             OT Discharge Summary  Anticipated Discharge Disposition (OT): home with assist  Reason for Discharge: All goals achieved, Discharge from facility  Discharge Destination: Home with assist    MICHAEL Turner  1/17/2023

## 2023-01-17 NOTE — PROGRESS NOTES
Name: Jeremi Ugarte ADMIT: 2023   : 1945  PCP: Charity Kearney MD    MRN: [id number] LOS: 1 days   AGE/SEX: 77 y.o. male  ROOM: [facility]     Subjective   Subjective   Pain well ctrl. Motivated for dc. He has ambulated about the nursing unit    Review of Systems     Objective   Objective   Vital Signs  Temp:  [97.4 °F (36.3 °C)-97.8 °F (36.6 °C)] 97.6 °F (36.4 °C)  Heart Rate:  [79-88] 79  Resp:  [16] 16  BP: (137-157)/(79-87) 137/79  SpO2:  [91 %-93 %] 91 %  on  Flow (L/min):  [2] 2;   Device (Oxygen Therapy): nasal cannula  Body mass index is 38.17 kg/m².  Physical Exam  Vitals reviewed.   Constitutional:       Appearance: He is obese. He is not ill-appearing.   Cardiovascular:      Rate and Rhythm: Normal rate and regular rhythm.   Pulmonary:      Effort: Pulmonary effort is normal. No respiratory distress.      Breath sounds: Normal breath sounds.   Musculoskeletal:      Right lower leg: No edema.      Left lower leg: No edema.   Neurological:      Mental Status: He is alert and oriented to person, place, and time.   Psychiatric:         Mood and Affect: Mood normal.         Thought Content: Thought content normal.       Results Review     I reviewed the patient's new clinical results.  Results from last 7 days   Lab Units 23  0613 23  0759   WBC 10*3/mm3 11.73* 8.88   HEMOGLOBIN g/dL 13.1 14.8   PLATELETS 10*3/mm3 246 286     Results from last 7 days   Lab Units 23  0423 23  0759   SODIUM mmol/L 138 141   POTASSIUM mmol/L 5.0 4.3   CHLORIDE mmol/L 105 106   CO2 mmol/L 19.0* 21.0*   BUN mg/dL 20 <2*   CREATININE mg/dL 1.18 1.20   GLUCOSE mg/dL 120* 122*   EGFR mL/min/1.73 63.6 62.3       Results from last 7 days   Lab Units 23  0423 23  0759   CALCIUM mg/dL 8.3* 8.8       No results found for: HGBA1C, POCGLU    No radiology results for the last day  Scheduled Medications  Infusions  No current facility-administered medications for this encounter.  Diet  No  diet orders on file       Assessment/Plan     Active Hospital Problems    Diagnosis  POA   • **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]  Unknown   • Trigeminal neuralgia [G50.0]  Yes   • Closed dislocation of patella, left, subsequent encounter [S83.005D]  Not Applicable   • Hypothyroid [E03.9]  Yes   • Status post revision of total replacement of left knee [Z96.652]  Not Applicable   • Benign essential HTN [I10]  Yes   • Anxiety [F41.9]  Yes      Resolved Hospital Problems   No resolved problems to display.       77 y.o. male admitted with Instability of internal left knee prosthesis (HCC), s/p revision TKR    Doing great post-op. Pain well ctrl and without new complaint.     Home HTN and hypothyroid meds continued and appear stable. No lab findings of significance this morning. OK for dc from medical standpoint.    ASA BID ordered for DVT prophy  Agree with bowel regimen for dc.      Flex Dennis MD  [facility]  01/17/23  18:50 EST

## 2023-01-17 NOTE — PLAN OF CARE
Goal Outcome Evaluation:         Patient arrived to floor VSS and pain controlled. DTV at 2030. Dressing clean dry and intact.

## 2023-01-17 NOTE — CONSULTS
"Internal Medicine Consult  INTERNAL MEDICINE   [facility]       Patient Identification:  Name: Jeremi Ugarte  Age: 77 y.o.  Sex: male  :  1945  MRN: [id number]                   Primary Care Physician: Charity Kearney MD                               Requesting physician: Dr. Caldera  Date of consultation: 2023    Reason for consultation: Postoperative medical management    History of Present Illness:   Patient is a 77-year-old male who has complicated past medical history including hypertension, history of hyponatremia, history of trigeminal neuralgia, chronic headache and history of multiple orthopedic surgeries was brought in today for elective left knee arthroplasty revision and repair of extensor mechanism for loosening of previously placed left knee prosthetic joint and dislocation of patella.  Patient denies any physical symptoms such as chest pain shortness of breath nausea vomiting diarrhea or abdominal pain prior to surgery and since surgery has been doing well.  He was concerned about the frequency of his Trileptal for trigeminal neuralgia which is corrected.  Patient at present denies any nausea vomiting diarrhea shortness of breath fever or chills.      Past Medical History:  Past Medical History:   Diagnosis Date   • Ankle arthritis    • Anxiety    • Arthritis    • Chronic headaches    • Dislocation of the knee cap     LEFT   • GCA (giant cell arteritis) (HCC)     \"PT STATES MISDIAGNOSED\"   • Gout of foot    • High risk medication use    • History of infection     AFTER KNEE REPLACEMENT PT NOT SURE WHICH ONE   • Hyperlipidemia    • Hypertension    • Hyponatremia     HX   • Hypothyroid    • Left knee pain    • Macrocytosis    • Myositis     \"PT STATES MISDIAGNOSED\"   • Polymyalgia rheumatica (HCC)    • PONV (postoperative nausea and vomiting)    • Renal insufficiency    • Right bundle branch block (RBBB)    • S/p bilateral revision of total hip replacement    • S/P " foot surgery    • S/P revision of total knee, left    • S/P revision of total knee, right    • Trigeminal neuralgia      Past Surgical History:  Past Surgical History:   Procedure Laterality Date   • ANKLE ARTHROPLASTY Left 2018   • BACK SURGERY      lower back surg   • BRAIN SURGERY     • CATARACT EXTRACTION     • COLONOSCOPY     • CRANIOTOMY Right 02/14/2019    Dr. Aldridge, for trigeminal neuralgia   • EXCISION BAKERS CYST KNEE  2008   • HAND SURGERY Left 1971    Left hand and wrist 1971   • HIP SURGERY Right 12/08/2016    total right hip replacement   • KNEE ARTHROPLASTY Bilateral     LEFT - 1996  -  RIGHT -  1999    • QUADRICEPS TENDON REPAIR Right 03/26/2022    Procedure: RIGHT QUADRICEPS TENDON REPAIR;  Surgeon: Keaton Martinez II, MD;  Location: [facility] OR;  Service: Orthopedics;  Laterality: Right;   • SHOULDER SURGERY     • TOTAL HIP ARTHROPLASTY Left 2017   • TOTAL KNEE ARTHROPLASTY REVISION  2009    left   • TOTAL KNEE ARTHROPLASTY REVISION Right 09/15/2020    Procedure: RIGHT TOTAL KNEE ARTHROPLASTY REVISION;  Surgeon: Patricia Caldera MD;  Location: [facility] OR;  Service: Orthopedics;  Laterality: Right;   • TOTAL KNEE ARTHROPLASTY REVISION Left 11/16/2021    Procedure: TOTAL KNEE ARTHROPLASTY REVISION;  Surgeon: Patricia Caldera MD;  Location: [facility] OR;  Service: Orthopedics;  Laterality: Left;   • TOTAL KNEE ARTHROPLASTY REVISION Right 05/17/2022    Procedure: REVISION RIGHT TOTAL KNEE ARTHROPLASTY WITH EXTENSOR MECHANISM ALLOGRAFT/;  Surgeon: Patricia Caldera MD;  Location: [facility] OR;  Service: Orthopedics;  Laterality: Right;   • WRIST SURGERY        Home Meds:  Medications Prior to Admission   Medication Sig Dispense Refill Last Dose   • ALPRAZolam (XANAX) 0.5 MG tablet TAKE 1 TABLET BY MOUTH THREE TIMES DAILY AS NEEDED FOR ANXIETY 90 tablet 0 1/16/2023   • amLODIPine (NORVASC) 10 MG tablet TAKE 1 TABLET BY MOUTH EVERY MORNING (Patient taking  differently: Take 5 mg by mouth Every Morning.) 90 tablet 1 1/16/2023   • Calcium Carbonate-Vit D-Min (CALCIUM 1200 PO) Take 1,000 mg by mouth Every Morning. HOLD PRIOR TO SURG   1/15/2023   • carvedilol (COREG) 12.5 MG tablet Take 1 tablet by mouth 2 (Two) Times a Day With Meals.   1/16/2023   • hydrALAZINE (APRESOLINE) 25 MG tablet Take 3 tablets by mouth Every 8 (Eight) Hours. (Patient taking differently: Take  by mouth. 50 MG IN AM,75 AT NOON,50 MG AT HS)   1/16/2023   • levothyroxine (SYNTHROID, LEVOTHROID) 75 MCG tablet TAKE 1 TABLET BY MOUTH DAILY 90 tablet 1 1/15/2023   • Multiple Vitamins-Minerals (MULTIVITAMIN ADULTS 50+ PO) Take 1 tablet by mouth Every Morning. HOLD PRIOR TO SURG   Past Week   • NON FORMULARY Take 1 tablet by mouth Every Morning. Ocuvite Extra - eye supplement/HOLD FOR SURGERY   Past Week   • OXcarbazepine (TRILEPTAL) 300 MG tablet Take 300 mg by mouth 3 (Three) Times a Day As Needed (FOR HEADACHE, has been taking twice daily).  1 1/16/2023   • rosuvastatin (CRESTOR) 20 MG tablet TAKE 1 TABLET BY MOUTH DAILY 90 tablet 1 1/15/2023   • vitamin B-12 (CYANOCOBALAMIN) 1000 MCG tablet Take 1,000 mcg by mouth Daily. HOLD PRIOR TO SURG   Past Week     Current Meds:     Current Facility-Administered Medications:   •  acetaminophen (TYLENOL) tablet 325 mg, 325 mg, Oral, Q4H PRN, Patricia Caldera MD  •  ALPRAZolam (XANAX) tablet 0.5 mg, 0.5 mg, Oral, TID PRN, Patricia Caldera MD  •  [START ON 1/17/2023] amLODIPine (NORVASC) tablet 5 mg, 5 mg, Oral, QAM, Patricia Caldera MD  •  aspirin EC tablet 81 mg, 81 mg, Oral, Q12H, Patricia Caldera MD  •  [START ON 1/17/2023] bisacodyl (DULCOLAX) EC tablet 10 mg, 10 mg, Oral, Daily PRN, Patricia Caldera MD  •  carvedilol (COREG) tablet 12.5 mg, 12.5 mg, Oral, BID With Meals, Patricia Caldera MD, 12.5 mg at 01/16/23 1749  •  ceFAZolin in dextrose (ANCEF) IVPB solution 2 g, 2 g, Intravenous, Q8H, Verenice  Patricia NIELSON MD, 2 g at 01/16/23 1835  •  docusate sodium (COLACE) capsule 100 mg, 100 mg, Oral, BID, Patricia Caldera MD  •  hydrALAZINE (APRESOLINE) tablet 25 mg, 25 mg, Oral, Q8H, Patricia Caldera MD  •  HYDROcodone-acetaminophen (NORCO) 7.5-325 MG per tablet 1 tablet, 1 tablet, Oral, Q4H PRN, Patricia Caldera MD  •  HYDROcodone-acetaminophen (NORCO) 7.5-325 MG per tablet 2 tablet, 2 tablet, Oral, Q4H PRN, Patricia Caldera MD, 2 tablet at 01/16/23 1835  •  HYDROmorphone (DILAUDID) injection 0.5 mg, 0.5 mg, Intravenous, Q2H PRN **AND** naloxone (NARCAN) injection 0.1 mg, 0.1 mg, Intravenous, Q5 Min PRN, Patricia Caldera MD  •  lactated ringers infusion, 9 mL/hr, Intravenous, Continuous, Sudhakar Roche MD, Last Rate: 9 mL/hr at 01/16/23 1057, New Bag at 01/16/23 1244  •  levothyroxine (SYNTHROID, LEVOTHROID) tablet 75 mcg, 75 mcg, Oral, Daily, Patricia Caldera MD  •  melatonin tablet 1 mg, 1 mg, Oral, Nightly PRN, Patricia Caldera MD  •  ondansetron (ZOFRAN) tablet 4 mg, 4 mg, Oral, Q6H PRN **OR** ondansetron (ZOFRAN) injection 4 mg, 4 mg, Intravenous, Q6H PRN, Patricia Caldera MD  •  OXcarbazepine (TRILEPTAL) tablet 300 mg, 300 mg, Oral, Q8H, Suly Krishnamurthy MD, 300 mg at 01/16/23 1748  •  sodium chloride 0.9 % infusion, 100 mL/hr, Intravenous, Continuous, Patricia Caldera MD, Last Rate: 100 mL/hr at 01/16/23 1835, 100 mL/hr at 01/16/23 1835    Facility-Administered Medications Ordered in Other Encounters:   •  Chlorhexidine Gluconate Cloth 2 % pads, , Apply externally, BID, Patricia Caldera MD  Allergies:  Allergies   Allergen Reactions   • Vancomycin Hives, Itching and Anxiety     Pt did not tolerate Vancomycin after benadryl on visit on 1/16/23.   • Morphine Hallucinations     N & V   • Oxycodone-Acetaminophen Nausea And Vomiting     Pt states he can take hydrocodone and tylenol     • Dilaudid [Hydromorphone Hcl] Other (See  Comments)     Pt's wife thinks that pt had gi intolerance to this med last time in hospital      Social History:   Social History     Tobacco Use   • Smoking status: Former     Types: Cigars   • Smokeless tobacco: Former     Types: Chew   • Tobacco comments:     FOR A SHORT TIME AS A YOUNG ADULT   Substance Use Topics   • Alcohol use: Yes     Comment: RARE USE      Family History:  Family History   Problem Relation Age of Onset   • Cardiomyopathy Mother    • Hypertension Mother    • Hyperlipidemia Sister    • Hypertension Sister    • Coronary artery disease Brother    • Hyperlipidemia Brother    • Hypertension Brother    • No Known Problems Father    • Malig Hyperthermia Neg Hx           Review of Systems  See history of present illness and past medical history.   Remainder of ROS is negative.      Vitals:   /71 (BP Location: Left arm, Patient Position: Lying)   Pulse 70   Temp 99.5 °F (37.5 °C) (Oral)   Resp 18   SpO2 94%   I/O:     Intake/Output Summary (Last 24 hours) at 1/16/2023 2007  Last data filed at 1/16/2023 1244  Gross per 24 hour   Intake 1500 ml   Output --   Net 1500 ml     Exam:  Patient is examined using the personal protective equipment as per guidelines from infection control for this particular patient as enacted.  Hand washing was performed before and after patient interaction.  General Appearance:   Alert cooperative pleasant male who does not appear to be in any acute distress except for episodes of discomfort in his left knee.   Head:    Normocephalic, without obvious abnormality, atraumatic   Eyes:    PERRL, conjunctiva/corneas clear, EOM's intact, both eyes   Ears:    Normal external ear canals, both ears   Nose:   Nares normal, septum midline, mucosa normal, no drainage    or sinus tenderness   Throat:   Lips, tongue, gums normal; oral mucosa pink and moist   Neck:   Supple, symmetrical, trachea midline, no adenopathy;     thyroid:  no enlargement/tenderness/nodules; no carotid     bruit or JVD   Back:     Symmetric, no curvature, ROM normal, no CVA tenderness   Lungs:     Clear to auscultation bilaterally, respirations unlabored   Chest Wall:    No tenderness or deformity    Heart:   S1-S2 regular   Abdomen:    Soft nontender   Extremities:  Left knee surgical site dressed   Pulses:   Pulses palpable in all extremities; symmetric all extremities   Skin:   Skin color normal, Skin is warm and dry,  no rashes or palpable lesions   Neurologic:  Alert and oriented and grossly nonfocal       Data Review:      I reviewed the patient's new clinical results.  Results from last 7 days   Lab Units 01/12/23  0759   WBC 10*3/mm3 8.88   HEMOGLOBIN g/dL 14.8   PLATELETS 10*3/mm3 286     Results from last 7 days   Lab Units 01/12/23  0759   SODIUM mmol/L 141   POTASSIUM mmol/L 4.3   CHLORIDE mmol/L 106   CO2 mmol/L 21.0*   BUN mg/dL <2*   CREATININE mg/dL 1.20   CALCIUM mg/dL 8.8   GLUCOSE mg/dL 122*     No radiology results for the last 30 days.    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  Continue with incentive spirometry and continuous pulse ox monitoring.  · Hypertension-continue antihypertensive regimen and avoid hypotensive episodes.  · Hypothyroidism continue with thyroid replacement therapy.  · Anxiety and trigeminal neuralgia-continue his home  regimen.    Suly Krishnamurthy MD   1/16/2023  20:07 EST    Parts of this note may be an electronic transcription/translation of spoken language to printed text using the Dragon dictation system.

## 2023-01-17 NOTE — PLAN OF CARE
Goal Outcome Evaluation:  Plan of Care Reviewed With: patient, spouse        Progress: improving  Outcome Evaluation: pt worked w OT this date and completed evaluation. pt was SBA w sup to sit. sat EOB w no A. pt completed sit to stand w CGA and walker, walked to BR and completed toileting in standing position w CGA and hygiene w SBA. pt completed grooming skills w SBA sitting in the chair. pt walked in the hallway and down to the window w CGA and walker. pt reports he has a BSC he can use over his toilet and is working on getting a walk in shower installed. pt plans to dc home a family and family to A w LBD. pt and family ed on LBD technique.

## 2023-01-17 NOTE — PROGRESS NOTES
"      Patient: Jeremi Ugarte  YOB: 1945     Date of Admission: 1/16/2023  8:49 AM Medical Record Number: [id number]     Attending Physician: Patricia Caldera,Sukhjinder    Procedure(s):  TOTAL KNEE ARTHROPLASTY REVISION repair extensor mechanism  Post Operative Day Number: 1    Subjective : No new orthopaedic complaints     Pain Relief: some relief with present medication.     Systemic Complaints: No Complaints  Vitals:    01/16/23 2142 01/17/23 0057 01/17/23 0130 01/17/23 0508   BP: 153/87  157/84 137/79   BP Location: Right arm  Left arm Left arm   Patient Position: Sitting  Lying Lying   Pulse: 88  82 79   Resp: 16  16 16   Temp: 97.4 °F (36.3 °C)  97.8 °F (36.6 °C) 97.6 °F (36.4 °C)   TempSrc: Oral  Oral Oral   SpO2: 91%  93% 91%   Weight:  117 kg (258 lb 9.6 oz)     Height:  175.3 cm (69.02\")         Physical Exam: 77 y.o. male    General Appearance:       Alert, cooperative, in no acute distress                  Extremities:    Dressing Clean, Dry and Intact         Incision healthy without signs or symptoms of infections         No clinical sign of DVT        Able to do good movements of digits    Pulses:   Pulses palpable and equal bilaterally           Diagnostic Tests:     Results from last 7 days   Lab Units 01/17/23  0613 01/12/23  0759   WBC 10*3/mm3 11.73* 8.88   HEMOGLOBIN g/dL 13.1 14.8   HEMATOCRIT % 39.0 44.3   PLATELETS 10*3/mm3 246 286     Results from last 7 days   Lab Units 01/17/23  0423 01/12/23  0759   SODIUM mmol/L 138 141   POTASSIUM mmol/L 5.0 4.3   CHLORIDE mmol/L 105 106   CO2 mmol/L 19.0* 21.0*   BUN mg/dL 20 <2*   CREATININE mg/dL 1.18 1.20   GLUCOSE mg/dL 120* 122*   CALCIUM mg/dL 8.3* 8.8         Lab Results   Component Value Date    CRP 0.75 (H) 05/05/2022     Lab Results   Component Value Date    SEDRATE 5 05/05/2022     No results found for: URICACID  No results found for: CRYSTAL  Microbiology Results (last 10 days)     ** No results found for the last 240 hours. " **        No radiology results for the last 7 days          Current Medications:  Scheduled Meds:amLODIPine, 5 mg, Oral, QAM  aspirin, 81 mg, Oral, Q12H  carvedilol, 12.5 mg, Oral, BID With Meals  docusate sodium, 100 mg, Oral, BID  hydrALAZINE, 25 mg, Oral, Q8H  levothyroxine, 75 mcg, Oral, Daily  OXcarbazepine, 300 mg, Oral, Q8H      Continuous Infusions:sodium chloride, 100 mL/hr, Last Rate: 100 mL/hr (01/16/23 0416)      PRN Meds:.•  acetaminophen  •  ALPRAZolam  •  bisacodyl  •  HYDROcodone-acetaminophen  •  HYDROcodone-acetaminophen  •  HYDROmorphone **AND** naloxone  •  melatonin  •  ondansetron **OR** ondansetron    Assessment:    Procedure(s):  TOTAL KNEE ARTHROPLASTY REVISION repair extensor mechanism     Patient Active Problem List   Diagnosis   • Trigeminal neuralgia of right side of face   • Bifascicular block   • Dyslipidemia   • Benign essential HTN   • Anxiety   • Status post revision of total replacement of right knee   • Cluster headaches   • Status post revision of total replacement of left knee   • Hypercholesterolemia   • Sprain of right knee   • Hyponatremia   • Quadriceps tendon rupture, right, sequela   • Hypothyroid   • Closed dislocation of patella, left, subsequent encounter   • 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      Patricia Caldera MD    Date: 1/17/2023    Time: 12:25 EST

## 2023-01-17 NOTE — DISCHARGE PLACEMENT REQUEST
"Jeremi Ugarte (77 y.o. Male)     Date of Birth   1945    Social Security Number       Address   [address] [place] [address]    Home Phone   [phone]    MRN   [id number]       Denominational   Voodoo    Marital Status                               Admission Date   1/16/23    Admission Type   Elective    Admitting Provider   Patricia Caldera MD    Attending Provider   Patricia Caldera MD    Department, Room/Bed   [address], P793/1       Discharge Date       [place] [facility] [place]                               Attending Provider: Patricia Caldera MD    Allergies: Vancomycin, Morphine, Oxycodone-acetaminophen, Dilaudid [Hydromorphone Hcl]    Isolation: None   Infection: None   Code Status: CPR    Ht: 175.3 cm (69.02\")   Wt: 117 kg (258 lb 9.6 oz)    Admission Cmt: None   Principal Problem: 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]                 Active Insurance as of 1/16/2023     Primary Coverage     Payor Plan Insurance Group Employer/Plan Group    MEDICARE MEDICARE A & B      Payor Plan Address Payor Plan Phone Number Payor Plan Fax Number Effective Dates    [address] [phone]  4/1/1992 - None Entered    [facility] [id number]       Subscriber Name Subscriber Birth Date Member ID       JEREMI UGARTE 1945 [id number]           Secondary Coverage     Payor Plan Insurance Group Employer/Plan Group    [employer] SUP AAR HEALTH CARE OPTIONS      Payor Plan Address Payor Plan Phone Number Payor Plan Fax Number Effective Dates    [facility] [phone]  1/1/2022 - None Entered    [address]       [facility] [id number]       Subscriber Name Subscriber Birth Date Member ID       JEREMI UGARTE 1945 [id number]                 Emergency Contacts      (Rel.) Home Phone Work Phone Mobile Phone    Vidya Ugarte (Spouse) [phone] -- --    "

## 2023-01-17 NOTE — CASE MANAGEMENT/SOCIAL WORK
Continued Stay Note  [facility]     Patient Name: Jeremi Ugarte  MRN: [id number]  Today's Date: 1/17/2023    Admit Date: 1/16/2023    Plan: return home with [facility]   Discharge Plan     Row Name 01/17/23 1305       Plan    Plan return home with [facility]    Patient/Family in Agreement with Plan yes    Plan Comments Spoke with patient and wife at bedside.  Introduced self and explained role.  Facesheet verified.  Patient lives in a single story house with a basement.  There is a ramp to enter.  Patient has a cane, walker, w/c and portable commode.  He has used [facility] in the past and is agreeable to using them again.  Referral sent in EPIC.  DC plan is home with wife and [facility]. Liyah STILL RN               Discharge Codes    No documentation.               Expected Discharge Date and Time     Expected Discharge Date Expected Discharge Time    Jan 17, 2023             Liyah Yadav RN

## 2023-01-17 NOTE — DISCHARGE INSTRUCTIONS
Total Knee Joint Replacement Discharge Instructions:     I. ACTIVITIES:  1. Exercises:  Complete exercise program as taught by the hospital physical therapist 2 times per day  Exercise program will be advanced by your home health physical therapist  During the day be up ambulating every 2 hours (while awake) for short distances  Complete the ankle pump exercises at least 10 times per hour (while awake)  Elevate legs most of the day the first week post operatively and thereafter elevate legs when in bed and for at least 30 minutes during the day.   Caution must be taken to avoid pillow placement under the bend of the knee as this can led to flexion contractures of the knee. Pillow placement under the heel is encouraged.  Use cold packs 20-30 minutes approximately 5 times per day. This should be done before and after completing your exercises and at any time you are experiencing pain/ stiffness in your operative extremity.        2. Activities of Daily Living:  No tub baths, hot tubs, or swimming pools for 4 weeks  May shower and let water run over the incision on post-operative day #5 if no drainage. Do not scrub or rub the incision. Simply let the water run over the incision and pat dry.     II. Restrictions  Do not cross legs or kneel  Your surgeon will discuss with you when you will be able to drive again. Usual guidelines are you are to be off pain medications prior to driving.  Weight bearing is as tolerated, LIMIT ROM 0 - 60 degrees  First week stay inside on even terrain. May go up and down stairs one stair at a time utilizing the hand rail.  After one week, you may venture outside.     III. Precautions:  Everyone that comes near you should wash their hands  No elective dental, genital-urinary, or colon procedures or surgical procedures for 12 weeks after surgery unless absolutely necessary.   If dental work or surgical procedure is deemed absolutely necessary, you will need to contact your surgeon as you will  need to take antibiotics 1 hour prior to any dental work (including teeth cleanings).  Please discuss with your surgeon prophylactic antibiotics as the length of time this intervention will be necessary for you varies with each patient’s health history and situation.  Avoid sick people. If you must be around someone who is ill, they should wear a mask.  Avoid visits to the Emergency Room or Urgent Care. If you feel you need to go to the emergency room, please notify your surgeon.    Stockings are to be worn for one week after surgery and are to be placed on in the morning and removed at night. Observe your skin when stocking is removed for any problems. Monitor the stockings to ensure that any swelling is not causing the stockings to become too tight. In this case, remove stockings immediately.     IV. INCISION CARE:  Wash your hands prior to dressing changes  Change the dressing as needed to keep incision clean and dry. Utilize dry gauze and paper tape. Avoid touching the side of the gauze that goes against the incision with your hands.  No creams or ointments to the incision  May remove dressing once the incision is free of drainage  Do not touch or pick at the incision  Check incision every day and notify surgeon immediately if any of the following signs or symptoms are noted:  Increase in redness  Increase in swelling around the incision and of the entire extremity  Increase in pain  Drainage oozing from the incision  Pulling apart of the edges of the incision  Increase in overall body temperature (greater than 100.5 degrees)      You have absorbable sutures with steristrips, please do not remove the steri strips for 14 days, you can shower on them 6 days after surgery.        V. Medications:   1. Anticoagulants: You will be discharged on an anticoagulant. This is a prophylactic medication that helps prevent blood clots during your post-operative period.  You will be on Aspirin  81 mg twice daily for 30 days. If  you were on Aspirin 81 mg prior to surgery you can go back to home dose once the 30 days are completed.      While taking the anticoagulant, you should avoid taking any additional aspirin, ibuprofen (Advil or Motrin), Aleve (Naprosyn) or other non-steroidal anti-inflammatory medications.   Notify surgeon immediately if any sohan bleeding is noted in the urine, stool, emesis, or from the nose or the incision. Blood in the stool will often appear as black rather than red. Blood in urine may appear as pink. Blood in emesis may appear as brown/black like coffee grounds.  You will need to apply pressure for longer periods of time to any cuts or abrasions to stop bleeding  Avoid alcohol while taking anticoagulants     2. Stool Softeners: You will be at greater risk of constipation after surgery due to being less mobile and the pain medications.   Take stool softeners as instructed by your surgeon while on pain medications. Over the counter Colace 100 mg 1-2 capsules twice daily.   If stools become too loose or too frequent, please decreases the dosage or stop the stool softener.  If constipation occurs despite use of stool softeners, you are to continue the stool softeners and add a laxative (Milk of Magnesia 1 ounce daily as needed).  Dulcolax oral tabs or suppository, or a fleets enema can also be utilized for constipation and can be obtained over the counter.   If above interventions are unsuccessful in inducing bowel movements, please contact your surgeon's office / family physician's office.  Drink plenty of fluids, and eat fruits and vegetables during your recovery time     3. Pain Medications utilized after surgery are narcotics and the law requires that the following information be given to all patients that are prescribed narcotics:  CLASSIFICATION: Pain medications are called Opioids and are narcotics  LEGALITIES: It is illegal to share narcotics with others and to drive within 24 hours of taking  narcotics  POTENTIAL SIDE EFFECTS: Potential side effects of opioids include: nausea, vomiting, itching, dizziness, drowsiness, dry mouth, constipation, and difficulty urinating.  POTENTIAL ADVERSE EFFECTS:   Opioid tolerance can develop with use of pain medications and this simply means that it requires more and more of the medication to control pain; however, this is seen more in patients that use opioids for longer periods of time.  Opioid dependence can develop with use of Opioids and this simply means that to stop the medication can cause withdrawal symptoms; however, this is seen with patients that use Opioids for longer periods of time.  Opioid addiction can develop with use of Opioids and the incidence of this is very unlikely in patients who take the medications as ordered and stop the medications as instructed.  Opioid overdose can be dangerous, but is unlikely when the medication is taken as ordered and stopped when ordered. It is important not to mix opioids with alcohol or with and type of sedative such as Benadryl as this can lead to over sedation and respiratory difficulty.  DOSAGE:   Pain medications will need to be taken consistently for the first week to decrease pain and promote adequate pain relief and participation in physical therapy.  After the initial surgical pain begins to resolve, you may begin to decrease the pain medication. By the end of 6 weeks, you should be off of pain medications.  Refills will not be given by the office during evening hours, on weekends, or after 6 weeks post-op.  To seek refills on pain medications during the initial 6 week post-operative period, you must call the office 48 hours in advance to request the refill. The office will then notify you when to  the prescription. DO NOT wait until you are out of the medication to request a refill.     V. FOLLOW-UP VISITS:  You will need to follow up in the office with your surgeon on Feb 7 ,2023.  Please call this  number [phone] to schedule this appointment.  If you have any concerns or suspected complications prior to your follow up visit, please call your surgeons office. Do not wait until your appointment time if you suspect complications. These will need to be addressed in the office promptly.

## 2023-01-17 NOTE — PLAN OF CARE
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 Pt is WBAT. Pt reports KI was removed yesterday as he can now SLR. Pt ambualted 200 ft with SBA and Rwx. Educated on no ROM exercises, quad strengthening only per order. Pt has Rwx and BSc at home and no stairs to enter. Pt is safe to d/c home when medically stable. PT will sign off.

## 2023-01-17 NOTE — DISCHARGE SUMMARY
Orthopedic Discharge Summary      Patient: Jeremi Ugarte   YOB: 1945    Medical Record Number: [id number]    Attending Physician: Patricia Caldera,*    Consulting Physician(s):   Consulting Physician(s)     Provider Relationship Specialty    Flex Dennis MD Consulting Physician Hospitalist          Date of Admission: 1/16/2023  8:49 AM   Date of Discharge:      Admitting Diagnosis: Closed dislocation of patella, left, subsequent encounter [S83.005D]  Instability of internal left knee prosthesis, initial encounter ([facility]) [T84.023A]    Procedures Performed  Procedure(s):  TOTAL KNEE ARTHROPLASTY REVISION repair extensor mechanism        Patient Active Problem List   Diagnosis   • Trigeminal neuralgia of right side of face   • Bifascicular block   • Dyslipidemia   • Benign essential HTN   • Anxiety   • Status post revision of total replacement of right knee   • Cluster headaches   • Status post revision of total replacement of left knee   • Hypercholesterolemia   • Sprain of right knee   • Hyponatremia   • Quadriceps tendon rupture, right, sequela   • Hypothyroid   • Closed dislocation of patella, left, subsequent encounter   • Instability of internal left knee prosthesis ([facility])   • Trigeminal neuralgia          Allergies   Allergen Reactions   • Vancomycin Hives, Itching and Anxiety     Pt did not tolerate Vancomycin after benadryl on visit on 1/16/23.   • Morphine Hallucinations     N & V   • Oxycodone-Acetaminophen Nausea And Vomiting     Pt states he can take hydrocodone and tylenol     • Dilaudid [Hydromorphone Hcl] Other (See Comments)     Pt's wife thinks that pt had gi intolerance to this med last time in hospital           Discharge Medications      New Medications      Instructions Start Date   aspirin 81 MG EC tablet   81 mg, Oral, Every 12 Hours Scheduled      bisacodyl 5 MG EC tablet  Commonly known as: DULCOLAX   10 mg, Oral, Daily PRN      docusate sodium 100 MG  "capsule   100 mg, Oral, 2 Times Daily      HYDROcodone-acetaminophen 7.5-325 MG per tablet  Commonly known as: NORCO   1 tablet, Oral, Every 4 Hours PRN         Continue These Medications      Instructions Start Date   ALPRAZolam 0.5 MG tablet  Commonly known as: XANAX   TAKE 1 TABLET BY MOUTH THREE TIMES DAILY AS NEEDED FOR ANXIETY      CALCIUM 1200 PO   1,000 mg, Oral, Every Morning, HOLD PRIOR TO SURG      carvedilol 12.5 MG tablet  Commonly known as: COREG   12.5 mg, Oral, 2 Times Daily With Meals      levothyroxine 75 MCG tablet  Commonly known as: SYNTHROID, LEVOTHROID   75 mcg, Oral, Daily      multivitamin with minerals tablet tablet   1 tablet, Oral, Every Morning, HOLD PRIOR TO SURG      NON FORMULARY   1 tablet, Oral, Every Morning, Ocuvite Extra - eye supplement/HOLD FOR SURGERY      OXcarbazepine 300 MG tablet  Commonly known as: TRILEPTAL   300 mg, Oral, 3 Times Daily PRN      rosuvastatin 20 MG tablet  Commonly known as: CRESTOR   20 mg, Oral, Daily      vitamin B-12 1000 MCG tablet  Commonly known as: CYANOCOBALAMIN   1,000 mcg, Oral, Daily, HOLD PRIOR TO SURG         ASK your doctor about these medications      Instructions Start Date   amLODIPine 10 MG tablet  Commonly known as: NORVASC   10 mg, Oral, Every Morning      hydrALAZINE 25 MG tablet  Commonly known as: APRESOLINE   75 mg, Oral, Every 8 Hours Scheduled                Past Medical History:   Diagnosis Date   • Ankle arthritis    • Anxiety    • Arthritis    • Chronic headaches    • Dislocation of the knee cap     LEFT   • GCA (giant cell arteritis) (HCC)     \"PT STATES MISDIAGNOSED\"   • Gout of foot    • High risk medication use    • History of infection     AFTER KNEE REPLACEMENT PT NOT SURE WHICH ONE   • Hyperlipidemia    • Hypertension    • Hyponatremia     HX   • Hypothyroid    • Left knee pain    • Macrocytosis    • Myositis     \"PT STATES MISDIAGNOSED\"   • Polymyalgia rheumatica (HCC)    • PONV (postoperative nausea and vomiting)  "   • Renal insufficiency    • Right bundle branch block (RBBB)    • S/p bilateral revision of total hip replacement    • S/P foot surgery    • S/P revision of total knee, left    • S/P revision of total knee, right    • Trigeminal neuralgia         Past Surgical History:   Procedure Laterality Date   • ANKLE ARTHROPLASTY Left 2018   • BACK SURGERY      lower back surg   • BRAIN SURGERY     • CATARACT EXTRACTION     • COLONOSCOPY     • CRANIOTOMY Right 02/14/2019    Dr. Aldridge, for trigeminal neuralgia   • EXCISION BAKERS CYST KNEE  2008   • HAND SURGERY Left 1971    Left hand and wrist 1971   • HIP SURGERY Right 12/08/2016    total right hip replacement   • KNEE ARTHROPLASTY Bilateral     LEFT - 1996  -  RIGHT -  1999    • QUADRICEPS TENDON REPAIR Right 03/26/2022    Procedure: RIGHT QUADRICEPS TENDON REPAIR;  Surgeon: Keaton Martinez II, MD;  Location: [facility];  Service: Orthopedics;  Laterality: Right;   • SHOULDER SURGERY     • TOTAL HIP ARTHROPLASTY Left 2017   • TOTAL KNEE ARTHROPLASTY REVISION  2009    left   • TOTAL KNEE ARTHROPLASTY REVISION Right 09/15/2020    Procedure: RIGHT TOTAL KNEE ARTHROPLASTY REVISION;  Surgeon: Patricia Caldera MD;  Location: [facility] OR;  Service: Orthopedics;  Laterality: Right;   • TOTAL KNEE ARTHROPLASTY REVISION Left 11/16/2021    Procedure: TOTAL KNEE ARTHROPLASTY REVISION;  Surgeon: Patricia Caldera MD;  Location: [facility] OR;  Service: Orthopedics;  Laterality: Left;   • TOTAL KNEE ARTHROPLASTY REVISION Right 05/17/2022    Procedure: REVISION RIGHT TOTAL KNEE ARTHROPLASTY WITH EXTENSOR MECHANISM ALLOGRAFT/;  Surgeon: Patricia Caldera MD;  Location: [facility] OR;  Service: Orthopedics;  Laterality: Right;   • TOTAL KNEE ARTHROPLASTY REVISION Left 1/16/2023    Procedure: TOTAL KNEE ARTHROPLASTY REVISION repair extensor mechanism ;  Surgeon: Patricia Caldera MD;  Location: [facility];  Service: Orthopedics;  Laterality:  "Left;   • WRIST SURGERY          Social History     Occupational History   • Not on file   Tobacco Use   • Smoking status: Former     Types: Cigars   • Smokeless tobacco: Former     Types: Chew   • Tobacco comments:     FOR A SHORT TIME AS A YOUNG ADULT   Vaping Use   • Vaping Use: Never used   Substance and Sexual Activity   • Alcohol use: Yes     Comment: RARE USE   • Drug use: Never   • Sexual activity: Defer      Social History     Social History Narrative   • Not on file        Family History   Problem Relation Age of Onset   • Cardiomyopathy Mother    • Hypertension Mother    • Hyperlipidemia Sister    • Hypertension Sister    • Coronary artery disease Brother    • Hyperlipidemia Brother    • Hypertension Brother    • No Known Problems Father    • Malig Hyperthermia Neg Hx        Physical Exam: 77 y.o. male  General Appearance:    Alert, cooperative, in no acute distress                      Vitals:    01/16/23 2142 01/17/23 0057 01/17/23 0130 01/17/23 0508   BP: 153/87  157/84 137/79   BP Location: Right arm  Left arm Left arm   Patient Position: Sitting  Lying Lying   Pulse: 88  82 79   Resp: 16  16 16   Temp: 97.4 °F (36.3 °C)  97.8 °F (36.6 °C) 97.6 °F (36.4 °C)   TempSrc: Oral  Oral Oral   SpO2: 91%  93% 91%   Weight:  117 kg (258 lb 9.6 oz)     Height:  175.3 cm (69.02\")          Hospital Course:  77 y.o. male admitted to [facility] to services of Patricia Caldera * with dislocation left  knee patella on 1/16/2023 and underwent a revision total knee arthroplasty with extensor mechanism repair Per Patricia Caldera MD. Antibiotic and VTE prophylaxis were per SCIP protocols and included  Kefzol  every 8 hours and Aspirin daily . Post-operatively the patient transferred to the post-operative floor where the patient underwent mobilization therapy that included active as well as passive ROM exercises. Opioids were titrated to achieve appropriate pain management to allow for participation " in mobilization exercises. Vital signs are now stable. The incision is intact without signs or symptoms of infection. Operative extremity neurovascular status remains intact.     Appropriate education re: incision care, activity levels, medications, and follow up visits was completed and all questions were answered. The patient is now deemed stable for discharge.      DISCHARGE DISPOSITION AND PLAN:  The  Patient is being discharged home with home health for PT  2-3 X per week for 2-3 weeks and nursing care as needed.     DIAGNOSTIC TESTS:     Admission on 01/16/2023   Component Date Value Ref Range Status   • Glucose 01/17/2023 120 (H)  65 - 99 mg/dL Final   • BUN 01/17/2023 20  8 - 23 mg/dL Final   • Creatinine 01/17/2023 1.18  0.76 - 1.27 mg/dL Final   • Sodium 01/17/2023 138  136 - 145 mmol/L Final   • Potassium 01/17/2023 5.0  3.5 - 5.2 mmol/L Final    Slight hemolysis detected by analyzer. Results may be affected.   • Chloride 01/17/2023 105  98 - 107 mmol/L Final   • CO2 01/17/2023 19.0 (L)  22.0 - 29.0 mmol/L Final   • Calcium 01/17/2023 8.3 (L)  8.6 - 10.5 mg/dL Final   • BUN/Creatinine Ratio 01/17/2023 16.9  7.0 - 25.0 Final   • Anion Gap 01/17/2023 14.0  5.0 - 15.0 mmol/L Final   • eGFR 01/17/2023 63.6  >60.0 mL/min/1.73 Final    National Kidney Foundation and American Society of Nephrology (ASN) Task Force recommended calculation based on the Chronic Kidney Disease Epidemiology Collaboration (CKD-EPI) equation refit without adjustment for race.   • WBC 01/17/2023 11.73 (H)  3.40 - 10.80 10*3/mm3 Final   • RBC 01/17/2023 3.97 (L)  4.14 - 5.80 10*6/mm3 Final   • Hemoglobin 01/17/2023 13.1  13.0 - 17.7 g/dL Final   • Hematocrit 01/17/2023 39.0  37.5 - 51.0 % Final   • MCV 01/17/2023 98.2 (H)  79.0 - 97.0 fL Final   • MCH 01/17/2023 33.0  26.6 - 33.0 pg Final   • MCHC 01/17/2023 33.6  31.5 - 35.7 g/dL Final   • RDW 01/17/2023 11.8 (L)  12.3 - 15.4 % Final   • RDW-SD 01/17/2023 42.4  37.0 - 54.0 fl Final   •  MPV 01/17/2023 9.9  6.0 - 12.0 fL Final   • Platelets 01/17/2023 246  140 - 450 10*3/mm3 Final   • Neutrophil % 01/17/2023 75.0  42.7 - 76.0 % Final   • Lymphocyte % 01/17/2023 14.0 (L)  19.6 - 45.3 % Final   • Monocyte % 01/17/2023 10.5  5.0 - 12.0 % Final   • Eosinophil % 01/17/2023 0.0 (L)  0.3 - 6.2 % Final   • Basophil % 01/17/2023 0.2  0.0 - 1.5 % Final   • Neutrophils, Absolute 01/17/2023 8.81 (H)  1.70 - 7.00 10*3/mm3 Final   • Lymphocytes, Absolute 01/17/2023 1.64  0.70 - 3.10 10*3/mm3 Final   • Monocytes, Absolute 01/17/2023 1.23 (H)  0.10 - 0.90 10*3/mm3 Final   • Eosinophils, Absolute 01/17/2023 0.00  0.00 - 0.40 10*3/mm3 Final   • Basophils, Absolute 01/17/2023 0.02  0.00 - 0.20 10*3/mm3 Final       No results found for: URICACID  No results found for: CRYSTAL  Microbiology Results (last 10 days)     ** No results found for the last 240 hours. **        No radiology results for the last 7 days    Discharge and Follow up Instructions:     Total Knee Joint Replacement Discharge Instructions:    I. ACTIVITIES:  1. Exercises:  ? Complete exercise program as taught by the hospital physical therapist 2 times per day  ? Exercise program will be advanced by your home health physical therapist  ? During the day be up ambulating every 2 hours (while awake) for short distances  ? Complete the ankle pump exercises at least 10 times per hour (while awake)  ? Elevate legs most of the day the first week post operatively and thereafter elevate legs when in bed and for at least 30 minutes during the day.   ? Caution must be taken to avoid pillow placement under the bend of the knee as this can led to flexion contractures of the knee. Pillow placement under the heel is encouraged.  ? Use cold packs 20-30 minutes approximately 5 times per day. This should be done before and after completing your exercises and at any time you are experiencing pain/ stiffness in your operative extremity.      2. Activities of Daily  Living:  ? No tub baths, hot tubs, or swimming pools for 4 weeks  ? May shower and let water run over the incision on post-operative day #5 if no drainage. Do not scrub or rub the incision. Simply let the water run over the incision and pat dry.    II. Restrictions  ? Do not cross legs or kneel  ? Your surgeon will discuss with you when you will be able to drive again. Usual guidelines are you are to be off pain medications prior to driving.  ? Weight bearing is as tolerated, LIMIT ROM 0 - 60 degrees  ? First week stay inside on even terrain. May go up and down stairs one stair at a time utilizing the hand rail.  ? After one week, you may venture outside.    III. Precautions:  ? Everyone that comes near you should wash their hands  ? No elective dental, genital-urinary, or colon procedures or surgical procedures for 12 weeks after surgery unless absolutely necessary.  ?  If dental work or surgical procedure is deemed absolutely necessary, you will need to contact your surgeon as you will need to take antibiotics 1 hour prior to any dental work (including teeth cleanings).  ? Please discuss with your surgeon prophylactic antibiotics as the length of time this intervention will be necessary for you varies with each patient’s health history and situation.  ? Avoid sick people. If you must be around someone who is ill, they should wear a mask.  ? Avoid visits to the Emergency Room or Urgent Care. If you feel you need to go to the emergency room, please notify your surgeon.    ? Stockings are to be worn for one week after surgery and are to be placed on in the morning and removed at night. Observe your skin when stocking is removed for any problems. Monitor the stockings to ensure that any swelling is not causing the stockings to become too tight. In this case, remove stockings immediately.    IV. INCISION CARE:  ? Wash your hands prior to dressing changes  ? Change the dressing as needed to keep incision clean and dry.  Utilize dry gauze and paper tape. Avoid touching the side of the gauze that goes against the incision with your hands.  ? No creams or ointments to the incision  ? May remove dressing once the incision is free of drainage  ? Do not touch or pick at the incision  ? Check incision every day and notify surgeon immediately if any of the following signs or symptoms are noted:  o Increase in redness  o Increase in swelling around the incision and of the entire extremity  o Increase in pain  o Drainage oozing from the incision  o Pulling apart of the edges of the incision  o Increase in overall body temperature (greater than 100.5 degrees)    ?  You have absorbable sutures with steristrips, please do not remove the steri strips for 14 days, you can shower on them 6 days after surgery.      V. Medications:   1. Anticoagulants: You will be discharged on an anticoagulant. This is a prophylactic medication that helps prevent blood clots during your post-operative period.  You will be on Aspirin  81 mg twice daily for 30 days. If you were on Aspirin 81 mg prior to surgery you can go back to home dose once the 30 days are completed.     ? While taking the anticoagulant, you should avoid taking any additional aspirin, ibuprofen (Advil or Motrin), Aleve (Naprosyn) or other non-steroidal anti-inflammatory medications.   ? Notify surgeon immediately if any sohan bleeding is noted in the urine, stool, emesis, or from the nose or the incision. Blood in the stool will often appear as black rather than red. Blood in urine may appear as pink. Blood in emesis may appear as brown/black like coffee grounds.  ? You will need to apply pressure for longer periods of time to any cuts or abrasions to stop bleeding  ? Avoid alcohol while taking anticoagulants    2. Stool Softeners: You will be at greater risk of constipation after surgery due to being less mobile and the pain medications.   ? Take stool softeners as instructed by your surgeon  while on pain medications. Over the counter Colace 100 mg 1-2 capsules twice daily.   ? If stools become too loose or too frequent, please decreases the dosage or stop the stool softener.  ? If constipation occurs despite use of stool softeners, you are to continue the stool softeners and add a laxative (Milk of Magnesia 1 ounce daily as needed).  ? Dulcolax oral tabs or suppository, or a fleets enema can also be utilized for constipation and can be obtained over the counter.   ? If above interventions are unsuccessful in inducing bowel movements, please contact your surgeon's office / family physician's office.  ? Drink plenty of fluids, and eat fruits and vegetables during your recovery time    3. Pain Medications utilized after surgery are narcotics and the law requires that the following information be given to all patients that are prescribed narcotics:  ? CLASSIFICATION: Pain medications are called Opioids and are narcotics  ? LEGALITIES: It is illegal to share narcotics with others and to drive within 24 hours of taking narcotics  ? POTENTIAL SIDE EFFECTS: Potential side effects of opioids include: nausea, vomiting, itching, dizziness, drowsiness, dry mouth, constipation, and difficulty urinating.  ? POTENTIAL ADVERSE EFFECTS:   o Opioid tolerance can develop with use of pain medications and this simply means that it requires more and more of the medication to control pain; however, this is seen more in patients that use opioids for longer periods of time.  o Opioid dependence can develop with use of Opioids and this simply means that to stop the medication can cause withdrawal symptoms; however, this is seen with patients that use Opioids for longer periods of time.  o Opioid addiction can develop with use of Opioids and the incidence of this is very unlikely in patients who take the medications as ordered and stop the medications as instructed.  o Opioid overdose can be dangerous, but is unlikely when the  medication is taken as ordered and stopped when ordered. It is important not to mix opioids with alcohol or with and type of sedative such as Benadryl as this can lead to over sedation and respiratory difficulty.  ? DOSAGE:   o Pain medications will need to be taken consistently for the first week to decrease pain and promote adequate pain relief and participation in physical therapy.  o After the initial surgical pain begins to resolve, you may begin to decrease the pain medication. By the end of 6 weeks, you should be off of pain medications.  o Refills will not be given by the office during evening hours, on weekends, or after 6 weeks post-op.  o To seek refills on pain medications during the initial 6 week post-operative period, you must call the office 48 hours in advance to request the refill. The office will then notify you when to  the prescription. DO NOT wait until you are out of the medication to request a refill.    V. FOLLOW-UP VISITS:  ? You will need to follow up in the office with your surgeon on Feb 7 ,2023.  Please call this number [phone] to schedule this appointment.  ? If you have any concerns or suspected complications prior to your follow up visit, please call your surgeons office. Do not wait until your appointment time if you suspect complications. These will need to be addressed in the office promptly.      Date:     Patricia Caldera MD    CC: Charity Kearney MD; MD Verenice Peter, Patricia NIELSON,*

## 2023-01-18 ENCOUNTER — TRANSITIONAL CARE MANAGEMENT TELEPHONE ENCOUNTER (OUTPATIENT)
Dept: CALL CENTER | Facility: HOSPITAL | Age: 78
End: 2023-01-18
Payer: MEDICARE

## 2023-01-18 NOTE — OUTREACH NOTE
Call Center TCM Note    Flowsheet Row Responses   [facility] patient discharged from? [place]   Does the patient have one of the following disease processes/diagnoses(primary or secondary)? Total Joint Replacement   Joint surgery performed? Knee   TCM attempt successful? Yes   Call start time 0908   Call end time 0913   Has the patient been back in either the hospital or Emergency Department since discharge? No   Discharge diagnosis Total knee arthro   Person spoke with today (if not patient) and relationship Torito,Vidya Spouse   Does the patient have all medications related to this admission filled (includes all antibiotics, pain medications, etc.) Yes   Is the patient taking all medications as directed (includes completed medication regime)? Yes   Is the patient able to teach back alternate methods of pain control? Ice, Knee-elevation/no pillow under knee   Comments PCP Dr Kearney. No PCP HFU appt in place. Patient to follow up with orthopedic surgeon.   Does the patient have an appointment with their PCP within 7 days of discharge? No   Nursing Interventions Patient desires to follow up with specialty only   What is the Home health agency?  [facility]   Has home health visited the patient within 72 hours of discharge? Call prior to 72 hours   Psychosocial issues? No   Has the patient began therapy sessions (either in the home or as an out patient)? No   If the patient has started attending therapy, what post op day did they begin to attend (either in home or as an out patient)?   Wife reports expecting [facility] either today or tomorrow   Does the patient have a wound vac in place? N/A   Has the patient fallen since discharge? No   Did the patient receive a copy of their discharge instructions? Yes   Nursing interventions Reviewed instructions with patient   What is the patient's perception of their functional status since discharge? Improving   Is the patient able to teach back signs and symptoms of infection?  Temp >100.4 for 24h or longer, Incisional drainage, Increased swelling or redness around incision (not associated with surgical edema)   Is the patient able to teach back how to prevent infection? Shower only as directed by surgeon, No tub baths, hot tub or swimming   If the patient is a current smoker, are they able to teach back resources for cessation? Not a smoker   Is the patient/caregiver able to teach back the hierarchy of who to call/visit for symptoms/problems? PCP, Specialist, Home health nurse, Urgent Care, ED, 911 Yes   TCM call completed? Yes   Wrap up additional comments Denies any questions or needs this am.   Call end time 0913   Would this patient benefit from a Referral to [facility] Social Work? No   Is the patient interested in additional calls from an ambulatory ?  NOTE:  applies to high risk patients requiring additional follow-up. No          Lian Ramirez RN    1/18/2023, 09:14 EST

## 2023-01-23 ENCOUNTER — TELEPHONE (OUTPATIENT)
Dept: ORTHOPEDIC SURGERY | Facility: HOSPITAL | Age: 78
End: 2023-01-23
Payer: MEDICARE

## 2023-01-23 NOTE — TELEPHONE ENCOUNTER
Called and spoke with Ms. Ugarte at this time to see how Mr. Ugarte is doing as he is 1 week SP TK Revision. She said that things are going well. PT came out to see him Saturday and she is waiting on a call to see when they are going to come back and get on a therapy schedule. He is getting up and walking with the walker. Dressing remains in place CDI. BM's are good. Pain is controlled with the medications. They don't have any questions/concerns for me at this time. She was given my contact information should they need anything. Ms. Ugarte voiced understanding and appreciated the call.

## 2023-01-28 DIAGNOSIS — F41.9 ANXIETY: ICD-10-CM

## 2023-01-30 RX ORDER — ALPRAZOLAM 0.5 MG/1
TABLET ORAL
Qty: 90 TABLET | Refills: 0 | Status: SHIPPED | OUTPATIENT
Start: 2023-01-30 | End: 2023-02-27

## 2023-02-27 DIAGNOSIS — F41.9 ANXIETY: ICD-10-CM

## 2023-02-27 DIAGNOSIS — E03.9 HYPOTHYROIDISM, UNSPECIFIED TYPE: ICD-10-CM

## 2023-02-27 RX ORDER — ROSUVASTATIN CALCIUM 20 MG/1
20 TABLET, COATED ORAL DAILY
Qty: 90 TABLET | Refills: 1 | Status: SHIPPED | OUTPATIENT
Start: 2023-02-27

## 2023-02-27 RX ORDER — LEVOTHYROXINE SODIUM 0.07 MG/1
75 TABLET ORAL DAILY
Qty: 90 TABLET | Refills: 1 | Status: SHIPPED | OUTPATIENT
Start: 2023-02-27 | End: 2023-03-31 | Stop reason: SDUPTHER

## 2023-02-27 RX ORDER — ALPRAZOLAM 0.5 MG/1
TABLET ORAL
Qty: 90 TABLET | Refills: 0 | Status: SHIPPED | OUTPATIENT
Start: 2023-02-27 | End: 2023-03-29

## 2023-03-08 DIAGNOSIS — E03.9 HYPOTHYROIDISM, UNSPECIFIED TYPE: Primary | ICD-10-CM

## 2023-03-08 DIAGNOSIS — R73.9 HYPERGLYCEMIA: ICD-10-CM

## 2023-03-13 DIAGNOSIS — I10 BENIGN ESSENTIAL HTN: ICD-10-CM

## 2023-03-13 RX ORDER — AMLODIPINE BESYLATE 5 MG/1
5 TABLET ORAL DAILY
Qty: 90 TABLET | Refills: 1 | OUTPATIENT
Start: 2023-03-13

## 2023-03-16 LAB
HBA1C MFR BLD: 5.5 % (ref 4.8–5.6)
TSH SERPL DL<=0.005 MIU/L-ACNC: 4.71 UIU/ML (ref 0.45–4.5)

## 2023-03-20 ENCOUNTER — OFFICE VISIT (OUTPATIENT)
Dept: CARDIOLOGY | Facility: CLINIC | Age: 78
End: 2023-03-20
Payer: MEDICARE

## 2023-03-20 VITALS
SYSTOLIC BLOOD PRESSURE: 123 MMHG | OXYGEN SATURATION: 98 % | DIASTOLIC BLOOD PRESSURE: 72 MMHG | HEIGHT: 69 IN | HEART RATE: 74 BPM | WEIGHT: 259 LBS | BODY MASS INDEX: 38.36 KG/M2

## 2023-03-20 DIAGNOSIS — E78.5 DYSLIPIDEMIA: ICD-10-CM

## 2023-03-20 DIAGNOSIS — I10 BENIGN ESSENTIAL HTN: Primary | ICD-10-CM

## 2023-03-20 DIAGNOSIS — E03.9 ACQUIRED HYPOTHYROIDISM: ICD-10-CM

## 2023-03-20 PROCEDURE — 3074F SYST BP LT 130 MM HG: CPT | Performed by: NURSE PRACTITIONER

## 2023-03-20 PROCEDURE — 99214 OFFICE O/P EST MOD 30 MIN: CPT | Performed by: NURSE PRACTITIONER

## 2023-03-20 PROCEDURE — 3078F DIAST BP <80 MM HG: CPT | Performed by: NURSE PRACTITIONER

## 2023-03-20 PROCEDURE — 93000 ELECTROCARDIOGRAM COMPLETE: CPT | Performed by: NURSE PRACTITIONER

## 2023-03-20 PROCEDURE — 1160F RVW MEDS BY RX/DR IN RCRD: CPT | Performed by: NURSE PRACTITIONER

## 2023-03-20 PROCEDURE — 1159F MED LIST DOCD IN RCRD: CPT | Performed by: NURSE PRACTITIONER

## 2023-03-20 RX ORDER — CARVEDILOL 12.5 MG/1
12.5 TABLET ORAL 2 TIMES DAILY WITH MEALS
Qty: 180 TABLET | Refills: 3
Start: 2023-03-20

## 2023-03-20 RX ORDER — HYDRALAZINE HYDROCHLORIDE 25 MG/1
75 TABLET, FILM COATED ORAL EVERY 8 HOURS SCHEDULED
Qty: 270 TABLET | Refills: 3
Start: 2023-03-20

## 2023-03-20 RX ORDER — AMLODIPINE BESYLATE 5 MG/1
5 TABLET ORAL EVERY MORNING
Qty: 90 TABLET | Refills: 3 | Status: SHIPPED | OUTPATIENT
Start: 2023-03-20

## 2023-03-20 NOTE — PROGRESS NOTES
"  Date of Office Visit: 2023  Encounter Provider: GREY Andrew  Place of Service: [facility] CARDIOLOGY  Patient Name: Jeremi Ugarte  :1945    Chief Complaint   Patient presents with   • Follow-up   • Hypertension   :     HPI: Jeremi Ugarte is a 77 y.o. male who is a patient of  Dr. Mesa and is new to me today.  He has a history of orthopedic surgery 2021 he had no ER visits.  He is started walking on the treadmill every day.  He has had some high blood pressure in the past for which he sees us.  Her amlodipine in the last year had to be increased to 10 mg by her primary care.  He was in the office about a year ago.  He also has a history of trigeminal neuralgia with history of craniectomy and microvascular decompression.  His symptoms did improve but he had uses oxcarbazepine as needed.    He is doing well.  Blood pressure is well controlled.  He did have a fall off a ladder earlier this year and has had to have 2 procedures on his left knee the last one being patella repair.  He is currently walking with a walker.  He denies any chest pain, pressure or tightness.  Previous testing and notes have been reviewed by me.   Past Medical History:   Diagnosis Date   • Ankle arthritis    • Anxiety    • Arthritis    • Chronic headaches    • Dislocation of the knee cap     LEFT   • GCA (giant cell arteritis) (HCC)     \"PT STATES MISDIAGNOSED\"   • Gout of foot    • High risk medication use    • History of infection     AFTER KNEE REPLACEMENT PT NOT SURE WHICH ONE   • Hyperlipidemia    • Hypertension    • Hyponatremia     HX   • Hypothyroid    • Left knee pain    • Macrocytosis    • Myositis     \"PT STATES MISDIAGNOSED\"   • Polymyalgia rheumatica (HCC)    • PONV (postoperative nausea and vomiting)    • Renal insufficiency    • Right bundle branch block (RBBB)    • S/p bilateral revision of total hip replacement    • S/P foot surgery    • S/P revision of " total knee, left    • S/P revision of total knee, right    • Trigeminal neuralgia        Past Surgical History:   Procedure Laterality Date   • ANKLE ARTHROPLASTY Left 2018   • BACK SURGERY      lower back surg   • BRAIN SURGERY     • CATARACT EXTRACTION     • COLONOSCOPY     • CRANIOTOMY Right 02/14/2019    Dr. Aldridge, for trigeminal neuralgia   • EXCISION BAKERS CYST KNEE  2008   • HAND SURGERY Left 1971    Left hand and wrist 1971   • HIP SURGERY Right 12/08/2016    total right hip replacement   • KNEE ARTHROPLASTY Bilateral     LEFT - 1996  -  RIGHT -  1999    • QUADRICEPS TENDON REPAIR Right 03/26/2022    Procedure: RIGHT QUADRICEPS TENDON REPAIR;  Surgeon: Keaton Martinez II, MD;  Location: [facility] MAIN OR;  Service: Orthopedics;  Laterality: Right;   • SHOULDER SURGERY     • TOTAL HIP ARTHROPLASTY Left 2017   • TOTAL KNEE ARTHROPLASTY REVISION  2009    left   • TOTAL KNEE ARTHROPLASTY REVISION Right 09/15/2020    Procedure: RIGHT TOTAL KNEE ARTHROPLASTY REVISION;  Surgeon: Patricia Caldera MD;  Location: [facility] MAIN OR;  Service: Orthopedics;  Laterality: Right;   • TOTAL KNEE ARTHROPLASTY REVISION Left 11/16/2021    Procedure: TOTAL KNEE ARTHROPLASTY REVISION;  Surgeon: Patricia Caldera MD;  Location: [facility] MAIN OR;  Service: Orthopedics;  Laterality: Left;   • TOTAL KNEE ARTHROPLASTY REVISION Right 05/17/2022    Procedure: REVISION RIGHT TOTAL KNEE ARTHROPLASTY WITH EXTENSOR MECHANISM ALLOGRAFT/;  Surgeon: Patricia Caldera MD;  Location: [facility] OR;  Service: Orthopedics;  Laterality: Right;   • TOTAL KNEE ARTHROPLASTY REVISION Left 1/16/2023    Procedure: TOTAL KNEE ARTHROPLASTY REVISION repair extensor mechanism ;  Surgeon: Patricia Caldera MD;  Location: [facility];  Service: Orthopedics;  Laterality: Left;   • WRIST SURGERY         Social History     Socioeconomic History   • Marital status:    Tobacco Use   • Smoking status: Former     Types: Cigars    • Smokeless tobacco: Former     Types: Chew   • Tobacco comments:     FOR A SHORT TIME AS A YOUNG ADULT   Vaping Use   • Vaping Use: Never used   Substance and Sexual Activity   • Alcohol use: Yes     Comment: RARE USE   • Drug use: Never   • Sexual activity: Defer       Family History   Problem Relation Age of Onset   • Cardiomyopathy Mother    • Hypertension Mother    • Hyperlipidemia Sister    • Hypertension Sister    • Coronary artery disease Brother    • Hyperlipidemia Brother    • Hypertension Brother    • No Known Problems Father    • Malig Hyperthermia Neg Hx        Review of Systems   Constitutional: Negative for diaphoresis and malaise/fatigue.   Cardiovascular: Negative for chest pain, claudication, dyspnea on exertion, irregular heartbeat, leg swelling, near-syncope, orthopnea, palpitations, paroxysmal nocturnal dyspnea and syncope.   Respiratory: Negative for cough, shortness of breath and sleep disturbances due to breathing.    Musculoskeletal: Negative for falls.   Neurological: Negative for dizziness and weakness.   Psychiatric/Behavioral: Negative for altered mental status and substance abuse.       Allergies   Allergen Reactions   • Vancomycin Hives, Itching and Anxiety     Pt did not tolerate Vancomycin after benadryl on visit on 1/16/23.   • Morphine Hallucinations     N & V   • Oxycodone-Acetaminophen Nausea And Vomiting     Pt states he can take hydrocodone and tylenol     • Dilaudid [Hydromorphone Hcl] Other (See Comments)     Pt's wife thinks that pt had gi intolerance to this med last time in hospital          Current Outpatient Medications:   •  ALPRAZolam (XANAX) 0.5 MG tablet, TAKE 1 TABLET BY MOUTH THREE TIMES DAILY AS NEEDED FOR ANXIETY, Disp: 90 tablet, Rfl: 0  •  amLODIPine (NORVASC) 10 MG tablet, TAKE 1 TABLET BY MOUTH EVERY MORNING (Patient taking differently: Take 5 mg by mouth Every Morning.), Disp: 90 tablet, Rfl: 1  •  Calcium Carbonate-Vit D-Min (CALCIUM 1200 PO), Take 1,000  "mg by mouth Every Morning. HOLD PRIOR TO SURG, Disp: , Rfl:   •  carvedilol (COREG) 12.5 MG tablet, Take 1 tablet by mouth 2 (Two) Times a Day With Meals., Disp: , Rfl:   •  hydrALAZINE (APRESOLINE) 25 MG tablet, Take 3 tablets by mouth Every 8 (Eight) Hours. (Patient taking differently: Take  by mouth. 50 MG IN AM,75 AT NOON,50 MG AT HS), Disp: , Rfl:   •  levothyroxine (SYNTHROID, LEVOTHROID) 75 MCG tablet, TAKE 1 TABLET BY MOUTH DAILY, Disp: 90 tablet, Rfl: 1  •  Multiple Vitamins-Minerals (MULTIVITAMIN ADULTS 50+ PO), Take 1 tablet by mouth Every Morning. HOLD PRIOR TO SURG, Disp: , Rfl:   •  NON FORMULARY, Take 1 tablet by mouth Every Morning. Ocuvite Extra - eye supplement/HOLD FOR SURGERY, Disp: , Rfl:   •  OXcarbazepine (TRILEPTAL) 300 MG tablet, Take 1 tablet by mouth 3 (Three) Times a Day As Needed (FOR HEADACHE, has been taking twice daily)., Disp: , Rfl: 1  •  rosuvastatin (CRESTOR) 20 MG tablet, TAKE 1 TABLET BY MOUTH DAILY, Disp: 90 tablet, Rfl: 1  •  vitamin B-12 (CYANOCOBALAMIN) 1000 MCG tablet, Take 1 tablet by mouth Daily. HOLD PRIOR TO SURG, Disp: , Rfl:   No current facility-administered medications for this visit.    Facility-Administered Medications Ordered in Other Visits:   •  Chlorhexidine Gluconate Cloth 2 % pads, , Apply externally, BID, Patricia Caldera MD      Objective:     Vitals:    03/20/23 1157   BP: 123/72   Pulse: 74   SpO2: 98%   Weight: 117 kg (259 lb)   Height: 175.3 cm (69\")     Body mass index is 38.25 kg/m².    PHYSICAL EXAM:    Constitutional:       General: Not in acute distress.     Appearance: Normal appearance. Well-developed.   Eyes:      Pupils: Pupils are equal, round, and reactive to light.   HENT:      Head: Normocephalic.   Neck:      Vascular: No carotid bruit or JVD.   Pulmonary:      Effort: Pulmonary effort is normal. No tachypnea.      Breath sounds: Normal breath sounds. No wheezing. No rales.   Cardiovascular:      Normal rate. Regular rhythm.      " No gallop.   Pulses:     Intact distal pulses.   Abdominal:      General: Bowel sounds are normal.      Palpations: Abdomen is soft.      Tenderness: There is no abdominal tenderness.   Musculoskeletal: Normal range of motion.      Cervical back: Normal range of motion and neck supple. No edema. Skin:     General: Skin is warm and dry.   Neurological:      Mental Status: Alert and oriented to person, place, and time.           ECG 12 Lead    Date/Time: 3/20/2023 12:07 PM  Performed by: Fatemeh Mason APRN  Authorized by: Fatemeh Mason APRN   Comparison: compared with previous ECG from 1/12/2023  Similar to previous ECG  Rhythm: sinus rhythm  Conduction: right bundle branch block and left anterior fascicular block    Clinical impression: abnormal EKG              Assessment:       Diagnosis Plan   1. Benign essential HTN        2. Dyslipidemia        3. Acquired hypothyroidism          No orders of the defined types were placed in this encounter.         Plan:       I am not can make any changes today symptoms are well controlled blood pressures controlled.  Continue with risk factor modification watch his salt intake.  He can follow-up with us in 6 months         Your medication list          Accurate as of March 20, 2023 12:04 PM. If you have any questions, ask your nurse or doctor.            CHANGE how you take these medications      Instructions Last Dose Given Next Dose Due   amLODIPine 10 MG tablet  Commonly known as: NORVASC  What changed: how much to take      TAKE 1 TABLET BY MOUTH EVERY MORNING       hydrALAZINE 25 MG tablet  Commonly known as: APRESOLINE  What changed:   · how much to take  · when to take this  · additional instructions      Take 3 tablets by mouth Every 8 (Eight) Hours.          CONTINUE taking these medications      Instructions Last Dose Given Next Dose Due   ALPRAZolam 0.5 MG tablet  Commonly known as: XANAX      TAKE 1 TABLET BY MOUTH THREE TIMES DAILY AS NEEDED FOR  ANXIETY       CALCIUM 1200 PO      Take 1,000 mg by mouth Every Morning. HOLD PRIOR TO SURG       carvedilol 12.5 MG tablet  Commonly known as: COREG      Take 1 tablet by mouth 2 (Two) Times a Day With Meals.       levothyroxine 75 MCG tablet  Commonly known as: SYNTHROID, LEVOTHROID      TAKE 1 TABLET BY MOUTH DAILY       multivitamin with minerals tablet tablet      Take 1 tablet by mouth Every Morning. HOLD PRIOR TO SURG       NON FORMULARY      Take 1 tablet by mouth Every Morning. Ocuvite Extra - eye supplement/HOLD FOR SURGERY       OXcarbazepine 300 MG tablet  Commonly known as: TRILEPTAL      Take 1 tablet by mouth 3 (Three) Times a Day As Needed (FOR HEADACHE, has been taking twice daily).       rosuvastatin 20 MG tablet  Commonly known as: CRESTOR      TAKE 1 TABLET BY MOUTH DAILY       vitamin B-12 1000 MCG tablet  Commonly known as: CYANOCOBALAMIN      Take 1 tablet by mouth Daily. HOLD PRIOR TO SURG                As always, it has been a pleasure to participate in your patient's care.      Sincerely,     Fatemeh EDMONDS

## 2023-03-29 DIAGNOSIS — F41.9 ANXIETY: ICD-10-CM

## 2023-03-29 RX ORDER — ALPRAZOLAM 0.5 MG/1
TABLET ORAL
Qty: 90 TABLET | Refills: 1 | Status: SHIPPED | OUTPATIENT
Start: 2023-03-29

## 2023-03-31 ENCOUNTER — OFFICE VISIT (OUTPATIENT)
Dept: INTERNAL MEDICINE | Facility: CLINIC | Age: 78
End: 2023-03-31
Payer: MEDICARE

## 2023-03-31 VITALS
SYSTOLIC BLOOD PRESSURE: 130 MMHG | DIASTOLIC BLOOD PRESSURE: 84 MMHG | TEMPERATURE: 98 F | BODY MASS INDEX: 37.92 KG/M2 | HEART RATE: 78 BPM | WEIGHT: 256 LBS | HEIGHT: 69 IN

## 2023-03-31 DIAGNOSIS — E03.9 HYPOTHYROIDISM, UNSPECIFIED TYPE: ICD-10-CM

## 2023-03-31 DIAGNOSIS — F41.9 ANXIETY: ICD-10-CM

## 2023-03-31 DIAGNOSIS — E87.1 HYPONATREMIA: Primary | ICD-10-CM

## 2023-03-31 DIAGNOSIS — I10 BENIGN ESSENTIAL HTN: ICD-10-CM

## 2023-03-31 DIAGNOSIS — Z12.11 SCREEN FOR COLON CANCER: ICD-10-CM

## 2023-03-31 DIAGNOSIS — Z79.899 HIGH RISK MEDICATION USE: ICD-10-CM

## 2023-03-31 DIAGNOSIS — Z00.00 MEDICARE ANNUAL WELLNESS VISIT, SUBSEQUENT: ICD-10-CM

## 2023-03-31 RX ORDER — LEVOTHYROXINE SODIUM 88 UG/1
88 TABLET ORAL DAILY
Qty: 90 TABLET | Refills: 1 | Status: SHIPPED | OUTPATIENT
Start: 2023-03-31

## 2023-03-31 NOTE — PROGRESS NOTES
The ABCs of the Annual Wellness Visit  Subsequent Medicare Wellness Visit    Subjective    Jeremi Ugarte is a 77 y.o. male who presents for a Subsequent Medicare Wellness Visit.    The following portions of the patient's history were reviewed and   updated as appropriate: allergies, current medications, past family history, past medical history, past social history, past surgical history and problem list.    Compared to one year ago, the patient feels his physical   health is better.    Compared to one year ago, the patient feels his mental   health is the same.    Recent Hospitalizations:  This patient has had a  admission record on file within the last 365 days.    Current Medical Providers:  Patient Care Team:  Charity Kearney MD as PCP - General (Internal Medicine)  Aldo Hudson DO as Consulting Physician (Neurology)  Patricia Caldera MD as Consulting Physician (Orthopedic Surgery)  Antonio Mesa MD as Consulting Physician (Cardiology)    Outpatient Medications Prior to Visit   Medication Sig Dispense Refill   • ALPRAZolam (XANAX) 0.5 MG tablet TAKE 1 TABLET BY MOUTH THREE TIMES DAILY AS NEEDED FOR ANXIETY 90 tablet 1   • amLODIPine (NORVASC) 5 MG tablet Take 1 tablet by mouth Every Morning. 90 tablet 3   • Calcium Carbonate-Vit D-Min (CALCIUM 1200 PO) Take 1,000 mg by mouth Every Morning. HOLD PRIOR TO SURG     • carvedilol (COREG) 12.5 MG tablet Take 1 tablet by mouth 2 (Two) Times a Day With Meals. 180 tablet 3   • hydrALAZINE (APRESOLINE) 25 MG tablet Take 3 tablets by mouth Every 8 (Eight) Hours. 270 tablet 3   • Multiple Vitamins-Minerals (MULTIVITAMIN ADULTS 50+ PO) Take 1 tablet by mouth Every Morning. HOLD PRIOR TO SURG     • NON FORMULARY Take 1 tablet by mouth Every Morning. Ocuvite Extra - eye supplement/HOLD FOR SURGERY     • OXcarbazepine (TRILEPTAL) 300 MG tablet Take 1 tablet by mouth 3 (Three) Times a Day As Needed (FOR HEADACHE, has been taking twice  "daily).  1   • rosuvastatin (CRESTOR) 20 MG tablet TAKE 1 TABLET BY MOUTH DAILY 90 tablet 1   • vitamin B-12 (CYANOCOBALAMIN) 1000 MCG tablet Take 1 tablet by mouth Daily. HOLD PRIOR TO SURG     • levothyroxine (SYNTHROID, LEVOTHROID) 75 MCG tablet TAKE 1 TABLET BY MOUTH DAILY 90 tablet 1   • Multiple Vitamins-Minerals (OCUVITE ADULT 50+ PO) Take 1 tablet by mouth Daily.       Facility-Administered Medications Prior to Visit   Medication Dose Route Frequency Provider Last Rate Last Admin   • Chlorhexidine Gluconate Cloth 2 % pads   Apply externally BID Patricia Caldera MD           No opioid medication identified on active medication list. I have reviewed chart for other potential  high risk medication/s and harmful drug interactions in the elderly.          Aspirin is not on active medication list.  Aspirin use is not indicated based on review of current medical condition/s. Risk of harm outweighs potential benefits.  .    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  ACP discussion was held with the patient during this visit. Patient has an advance directive in EMR which is still valid.      Objective    Vitals:    03/31/23 1158   BP: 130/84   Pulse: 78   Temp: 98 °F (36.7 °C)   Weight: 116 kg (256 lb)   Height: 175.3 cm (69.02\")     Estimated body mass index is 37.79 kg/m² as calculated from the following:    Height as of this " "encounter: 175.3 cm (69.02\").    Weight as of this encounter: 116 kg (256 lb).    Class 2 Severe Obesity (BMI >=35 and <=39.9). Obesity-related health conditions include the following: hypertension and dyslipidemias. Obesity is improving with lifestyle modifications. BMI is is above average; BMI management plan is completed. We discussed portion control and increasing exercise.      Does the patient have evidence of cognitive impairment? No    Lab Results   Component Value Date    HGBA1C 5.5 03/15/2023        HEALTH RISK ASSESSMENT    Smoking Status:  Social History     Tobacco Use   Smoking Status Former   • Types: Cigars   Smokeless Tobacco Former   • Types: Chew   Tobacco Comments    FOR A SHORT TIME AS A YOUNG ADULT     Alcohol Consumption:  Social History     Substance and Sexual Activity   Alcohol Use Yes    Comment: RARE USE     Fall Risk Screen:    JOY Fall Risk Assessment was completed, and patient is at LOW risk for falls.Assessment completed on:3/31/2023    Depression Screening:  PHQ-2/PHQ-9 Depression Screening 3/31/2023   Little Interest or Pleasure in Doing Things 0-->not at all   Feeling Down, Depressed or Hopeless 0-->not at all   PHQ-9: Brief Depression Severity Measure Score 0       Health Habits and Functional and Cognitive Screening:  Functional & Cognitive Status 3/31/2023   Do you have difficulty preparing food and eating? No   Do you have difficulty bathing yourself, getting dressed or grooming yourself? No   Do you have difficulty using the toilet? No   Do you have difficulty moving around from place to place? No   Do you have trouble with steps or getting out of a bed or a chair? No   Current Diet Well Balanced Diet   Dental Exam Up to date   Eye Exam Up to date   Exercise (times per week) 0 times per week   Current Exercises Include No Regular Exercise;Weightlifting   Do you need help using the phone?  No   Are you deaf or do you have serious difficulty hearing?  No   Do you need help " with transportation? No   Do you need help shopping? No   Do you need help preparing meals?  No   Do you need help with housework?  No   Do you need help with laundry? No   Do you need help taking your medications? No   Do you need help managing money? No   Do you ever drive or ride in a car without wearing a seat belt? No   Have you felt unusual stress, anger or loneliness in the last month? -   Who do you live with? -   If you need help, do you have trouble finding someone available to you? -   Have you been bothered in the last four weeks by sexual problems? -   Do you have difficulty concentrating, remembering or making decisions? -       Age-appropriate Screening Schedule:  Refer to the list below for future screening recommendations based on patient's age, sex and/or medical conditions. Orders for these recommended tests are listed in the plan section. The patient has been provided with a written plan.    Health Maintenance   Topic Date Due   • TDAP/TD VACCINES (1 - Tdap) Never done   • ZOSTER VACCINE (1 of 2) Never done   • HEPATITIS C SCREENING  Never done   • COLORECTAL CANCER SCREENING  07/09/2019   • ANNUAL WELLNESS VISIT  03/07/2023   • LIPID PANEL  12/07/2023   • COVID-19 Vaccine  Completed   • INFLUENZA VACCINE  Completed   • Pneumococcal Vaccine 65+  Completed                CMS Preventative Services Quick Reference  Risk Factors Identified During Encounter  Immunizations Discussed/Encouraged: Shingrix  The above risks/problems have been discussed with the patient.  Pertinent information has been shared with the patient in the After Visit Summary.  An After Visit Summary and PPPS were made available to the patient.    Follow Up:   Next Medicare Wellness visit to be scheduled in 1 year.       Additional E&M Note during same encounter follows:  Patient has multiple medical problems which are significant and separately identifiable that require additional work above and beyond the Medicare Wellness  "Visit.      Chief Complaint  Medicare Wellness-subsequent    Subjective        HPI  Jeremi Ugarte is also being seen today for hypertension with low sodium. Was in hospital after R quad tendon repair - low sodium noticed.  He also had to have repair of the L patella- he is much better.  Still doing PT.  BP regimen changed due to low sodium- up to date on chart.  He has a hard time staying active.  Headaches are good as long as he stays on oxcarbazepine    Review of Systems   Constitutional: Negative for activity change.   HENT: Negative for hearing loss.    Eyes: Negative for visual disturbance.   Respiratory: Negative for cough and shortness of breath.    Cardiovascular: Negative for chest pain and leg swelling.   Gastrointestinal: Negative for abdominal pain.   Genitourinary: Negative for difficulty urinating.   Musculoskeletal: Negative for arthralgias, back pain and gait problem (getting better with PT).   Skin: Negative for rash.   Neurological: Negative for dizziness.   Psychiatric/Behavioral: Negative for dysphoric mood and sleep disturbance. The patient is nervous/anxious (stable use of nsaid).        Objective   Vital Signs:  /84   Pulse 78   Temp 98 °F (36.7 °C)   Ht 175.3 cm (69.02\")   Wt 116 kg (256 lb)   BMI 37.79 kg/m²     Physical Exam  Constitutional:       Appearance: Normal appearance. He is obese.   Cardiovascular:      Rate and Rhythm: Normal rate and regular rhythm.   Pulmonary:      Effort: Pulmonary effort is normal.   Psychiatric:         Mood and Affect: Mood normal.         Thought Content: Thought content normal.          The following data was reviewed by: Charity Kearney MD on 03/31/2023:  Common labs    Common Labs 1/12/23 1/12/23 1/17/23 1/17/23 3/15/23    0759 [id number] 0423 0613    Glucose  122 (A) 120 (A)     BUN  <2 (A) 20     Creatinine  1.20 1.18     Sodium  141 138     Potassium  4.3 5.0     Chloride  106 105     Calcium  8.8 8.3 (A)     WBC 8.88   11.73 (A)  "   Hemoglobin 14.8   13.1    Hematocrit 44.3   39.0    Platelets 286   246    Hemoglobin A1C     5.5   (A) Abnormal value       Comments are available for some flowsheets but are not being displayed.           Data reviewed: UL           Assessment and Plan   Diagnoses and all orders for this visit:    1. Hyponatremia (Primary)  Comments:  better- agree with medication changes    2. Hypothyroidism, unspecified type  Comments:  new dx.  Will add levothyroxine 75 cg daily and recheck in 3 mnt  reviewed proper way to take it.   Orders:  -     levothyroxine (SYNTHROID, LEVOTHROID) 88 MCG tablet; Take 1 tablet by mouth Daily.  Dispense: 90 tablet; Refill: 1  -     TSH; Future    3. Screen for colon cancer  -     Cologuard - Stool, Per Rectum; Future    4. High risk medication use  Comments:  UDS at f/u  Orders:  -     Compliance Drug Analysis, Ur - Urine, Clean Catch; Future    5. Anxiety  Comments:  stable use of alprazolam    6. Benign essential HTN  Comments:  doing great with current meds.    7. Medicare annual wellness visit, subsequent  Comments:  recommend Shingrix vaccine- he is working on his weight, which helps everything- working with PT to remain active.              Follow Up   Return in about 3 months (around 6/30/2023) for Recheck, Lab Before FUP.  Patient was given instructions and counseling regarding his condition or for health maintenance advice. Please see specific information pulled into the AVS if appropriate.

## 2023-05-26 DIAGNOSIS — F41.9 ANXIETY: ICD-10-CM

## 2023-05-26 RX ORDER — ALPRAZOLAM 0.5 MG/1
TABLET ORAL
Qty: 90 TABLET | Refills: 1 | Status: SHIPPED | OUTPATIENT
Start: 2023-05-26

## 2023-07-28 DIAGNOSIS — F41.9 ANXIETY: ICD-10-CM

## 2023-07-30 RX ORDER — ALPRAZOLAM 0.5 MG/1
TABLET ORAL
Qty: 90 TABLET | Refills: 1 | Status: SHIPPED | OUTPATIENT
Start: 2023-07-30

## 2023-08-14 RX ORDER — ROSUVASTATIN CALCIUM 20 MG/1
20 TABLET, COATED ORAL DAILY
Qty: 90 TABLET | Refills: 1 | Status: SHIPPED | OUTPATIENT
Start: 2023-08-14

## 2023-09-27 ENCOUNTER — OFFICE VISIT (OUTPATIENT)
Dept: CARDIOLOGY | Facility: CLINIC | Age: 78
End: 2023-09-27
Payer: MEDICARE

## 2023-09-27 VITALS
HEART RATE: 64 BPM | DIASTOLIC BLOOD PRESSURE: 92 MMHG | WEIGHT: 245 LBS | BODY MASS INDEX: 36.29 KG/M2 | HEIGHT: 69 IN | OXYGEN SATURATION: 96 % | SYSTOLIC BLOOD PRESSURE: 148 MMHG

## 2023-09-27 DIAGNOSIS — I10 BENIGN ESSENTIAL HTN: Primary | ICD-10-CM

## 2023-09-27 DIAGNOSIS — F41.9 ANXIETY: ICD-10-CM

## 2023-09-27 DIAGNOSIS — R06.02 EXERTIONAL SHORTNESS OF BREATH: ICD-10-CM

## 2023-09-27 DIAGNOSIS — E78.00 HYPERCHOLESTEROLEMIA: ICD-10-CM

## 2023-09-27 DIAGNOSIS — I45.2 BIFASCICULAR BLOCK: ICD-10-CM

## 2023-09-27 PROCEDURE — 3080F DIAST BP >= 90 MM HG: CPT | Performed by: INTERNAL MEDICINE

## 2023-09-27 PROCEDURE — 99214 OFFICE O/P EST MOD 30 MIN: CPT | Performed by: INTERNAL MEDICINE

## 2023-09-27 PROCEDURE — 3077F SYST BP >= 140 MM HG: CPT | Performed by: INTERNAL MEDICINE

## 2023-09-27 RX ORDER — LOSARTAN POTASSIUM 50 MG/1
50 TABLET ORAL DAILY
Qty: 90 TABLET | Refills: 3 | Status: SHIPPED | OUTPATIENT
Start: 2023-09-27

## 2023-09-27 RX ORDER — ALPRAZOLAM 0.5 MG/1
TABLET ORAL
Qty: 90 TABLET | Refills: 3 | Status: SHIPPED | OUTPATIENT
Start: 2023-09-27

## 2023-09-27 RX ORDER — LOSARTAN POTASSIUM 25 MG/1
25 TABLET ORAL DAILY
COMMUNITY
Start: 2023-09-19 | End: 2023-09-27

## 2023-09-27 NOTE — PROGRESS NOTES
"PATIENTINFORMATION    Date of Office Visit: 2023  Encounter Provider: Antonio Mesa MD  Place of Service: [facility] CARDIOLOGY  Patient Name: Jeremi Ugarte  : 1945    Subjective:     Encounter Date:2023      Patient ID: Jeremi Ugarte is a 78 y.o. male.    Chief Complaint   Patient presents with    Hypertension     HPI  Mr. Ugarte is a 78 years old gentleman who came to cardiac clinic for follow-up visit.  He was accompanied by his wife.  He is ambulation is significantly improved since after hip and knee surgery but still has exertional shortness of breath.  Denies any significant exertional chest discomfort.  No orthopnea, PND, palpitations, presyncope or syncope or extremity swelling.  He was switched from loading to losartan because of leg swelling that resolved.  Otherwise compliant with current medications without any significant side effects.      ROS  All systems reviewed and negative except as noted in HPI.    Past Medical History:   Diagnosis Date    Ankle arthritis     Anxiety     Arthritis     Chronic headaches     Dislocation of the knee cap     LEFT    GCA (giant cell arteritis)     \"PT STATES MISDIAGNOSED\"    Gout of foot     High risk medication use     History of infection     AFTER KNEE REPLACEMENT PT NOT SURE WHICH ONE    Hyperlipidemia     Hypertension     Hyponatremia     HX    Hypothyroid     Left knee pain     Macrocytosis     Myositis     \"PT STATES MISDIAGNOSED\"    Polymyalgia rheumatica     PONV (postoperative nausea and vomiting)     Renal insufficiency     Right bundle branch block (RBBB)     S/p bilateral revision of total hip replacement     S/P foot surgery     S/P revision of total knee, left     S/P revision of total knee, right     Trigeminal neuralgia        Past Surgical History:   Procedure Laterality Date    ANKLE ARTHROPLASTY Left 2018    BACK SURGERY      lower back surg    BRAIN SURGERY      CATARACT EXTRACTION      " COLONOSCOPY      CRANIOTOMY Right 02/14/2019    Dr. Aldridge, for trigeminal neuralgia    EXCISION BAKERS CYST KNEE  2008    HAND SURGERY Left 1971    Left hand and wrist 1971    HIP SURGERY Right 12/08/2016    total right hip replacement    KNEE ARTHROPLASTY Bilateral     LEFT - 1996  -  RIGHT -  1999     QUADRICEPS TENDON REPAIR Right 03/26/2022    Procedure: RIGHT QUADRICEPS TENDON REPAIR;  Surgeon: Keaton Martinez II, MD;  Location: [facility] MAIN OR;  Service: Orthopedics;  Laterality: Right;    SHOULDER SURGERY      TOTAL HIP ARTHROPLASTY Left 2017    TOTAL KNEE ARTHROPLASTY REVISION  2009    left    TOTAL KNEE ARTHROPLASTY REVISION Right 09/15/2020    Procedure: RIGHT TOTAL KNEE ARTHROPLASTY REVISION;  Surgeon: Patricia Caldera MD;  Location: [facility] MAIN OR;  Service: Orthopedics;  Laterality: Right;    TOTAL KNEE ARTHROPLASTY REVISION Left 11/16/2021    Procedure: TOTAL KNEE ARTHROPLASTY REVISION;  Surgeon: Patricia Caldera MD;  Location: [facility] MAIN OR;  Service: Orthopedics;  Laterality: Left;    TOTAL KNEE ARTHROPLASTY REVISION Right 05/17/2022    Procedure: REVISION RIGHT TOTAL KNEE ARTHROPLASTY WITH EXTENSOR MECHANISM ALLOGRAFT/;  Surgeon: Patricia Caldera MD;  Location: [facility] MAIN OR;  Service: Orthopedics;  Laterality: Right;    TOTAL KNEE ARTHROPLASTY REVISION Left 1/16/2023    Procedure: TOTAL KNEE ARTHROPLASTY REVISION repair extensor mechanism ;  Surgeon: Patricia Caldera MD;  Location: [facility];  Service: Orthopedics;  Laterality: Left;    WRIST SURGERY         Social History     Socioeconomic History    Marital status:    Tobacco Use    Smoking status: Former     Types: Cigars    Smokeless tobacco: Former     Types: Chew    Tobacco comments:     FOR A SHORT TIME AS A YOUNG ADULT   Vaping Use    Vaping Use: Never used   Substance and Sexual Activity    Alcohol use: Yes     Comment: RARE USE    Drug use: Never    Sexual activity: Defer       Family  "History   Problem Relation Age of Onset    Cardiomyopathy Mother     Hypertension Mother     Hyperlipidemia Sister     Hypertension Sister     Coronary artery disease Brother     Hyperlipidemia Brother     Hypertension Brother     No Known Problems Father     Malig Hyperthermia Neg Hx          Procedures       Objective:     /92 (BP Location: Left arm, Patient Position: Sitting, Cuff Size: Large Adult)   Pulse 64   Ht 175.3 cm (69\")   Wt 111 kg (245 lb)   SpO2 96%   BMI 36.18 kg/m²  Body mass index is 36.18 kg/m².     Constitutional:       General: Not in acute distress.     Appearance: Well-developed. Not diaphoretic.   Eyes:      Pupils: Pupils are equal, round, and reactive to light.   HENT:      Head: Normocephalic and atraumatic.   Neck:      Thyroid: No thyromegaly.   Pulmonary:      Effort: Pulmonary effort is normal. No respiratory distress.      Breath sounds: Normal breath sounds. No wheezing. No rales.   Chest:      Chest wall: Not tender to palpatation.   Cardiovascular:      Normal rate. Regular rhythm.      No gallop.    Pulses:     Intact distal pulses.   Edema:     Peripheral edema absent.   Abdominal:      General: Bowel sounds are normal. There is no distension.      Palpations: Abdomen is soft.      Tenderness: There is no guarding.   Musculoskeletal: Normal range of motion.         General: No deformity.      Cervical back: Normal range of motion and neck supple. Skin:     General: Skin is warm and dry.      Findings: No rash.   Neurological:      Mental Status: Alert and oriented to person, place, and time.      Cranial Nerves: No cranial nerve deficit.      Deep Tendon Reflexes: Reflexes are normal and symmetric.   Psychiatric:         Judgment: Judgment normal.       Review Of Data: I have reviewed pertinent recent labs, images and documents and pertinent findings included in HPI or assessment below.    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  General anxiety disorder on medication  Trigeminal neuralgia-with history craniectomy with  microvascular decompression-uses oxcarbazepine only as needed and following up with neurology.  Chronic degenerative joint disease-multiple hip and knee surgery with last one January 2023.  Exertional shortness of breath with risk factors for CAD.     Blood pressure slightly above goal.  Increase losartan to 50 mg p.o. daily  I will send him for myocardial perfusion study and echocardiogram.           Diagnosis and plan of care discussed with patient and verbalized understanding.            Your medication list            Accurate as of September 27, 2023  4:21 PM. If you have any questions, ask your nurse or doctor.                CHANGE how you take these medications        Instructions Last Dose Given Next Dose Due   losartan 50 MG tablet  Commonly known as: Cozaar  What changed:   medication strength  how much to take  Changed by: Antonio Mesa MD      Take 1 tablet by mouth Daily.              CONTINUE taking these medications        Instructions Last Dose Given Next Dose Due   ALPRAZolam 0.5 MG tablet  Commonly known as: XANAX      TAKE 1 TABLET BY MOUTH THREE TIMES DAILY AS NEEDED FOR ANXIETY       CALCIUM 1200 PO      Take 1,000 mg by mouth Every Morning. HOLD PRIOR TO SURG       carvedilol 12.5 MG tablet  Commonly known as: COREG      Take 1 tablet by mouth 2 (Two) Times a Day With Meals.       hydrALAZINE 25 MG tablet  Commonly known as: APRESOLINE      Take 3 tablets by mouth Every 8 (Eight) Hours.       levothyroxine 112 MCG tablet  Commonly known as: Synthroid      Take 1 tablet by mouth Daily.        multivitamin with minerals tablet tablet      Take 1 tablet by mouth Every Morning. HOLD PRIOR TO SURG       NON FORMULARY      Take 1 tablet by mouth Every Morning. Ocuvite Extra - eye supplement/HOLD FOR SURGERY       OXcarbazepine 300 MG tablet  Commonly known as: TRILEPTAL      Take 1 tablet by mouth 3 (Three) Times a Day As Needed (FOR HEADACHE, has been taking twice daily).       rosuvastatin 20 MG tablet  Commonly known as: CRESTOR      TAKE 1 TABLET BY MOUTH DAILY       vitamin B-12 1000 MCG tablet  Commonly known as: CYANOCOBALAMIN      Take 1 tablet by mouth Daily. HOLD PRIOR TO SURG                 Where to Get Your Medications        These medications were sent to [id number] - [place], [place] - [address] AT [facility] - [phone]  - [phone]   [address] [facility] [address]      Phone: [phone]   ALPRAZolam 0.5 MG tablet  losartan 50 MG tablet             Antonio Mesa MD  09/27/23  16:21 EDT

## 2023-12-06 ENCOUNTER — OFFICE VISIT (OUTPATIENT)
Dept: INTERNAL MEDICINE | Facility: CLINIC | Age: 78
End: 2023-12-06
Payer: COMMERCIAL

## 2023-12-06 ENCOUNTER — TELEPHONE (OUTPATIENT)
Dept: INTERNAL MEDICINE | Facility: CLINIC | Age: 78
End: 2023-12-06

## 2023-12-06 VITALS
HEIGHT: 69 IN | SYSTOLIC BLOOD PRESSURE: 184 MMHG | DIASTOLIC BLOOD PRESSURE: 100 MMHG | HEART RATE: 88 BPM | BODY MASS INDEX: 37.92 KG/M2 | WEIGHT: 256 LBS

## 2023-12-06 DIAGNOSIS — G50.0 TRIGEMINAL NEURALGIA: ICD-10-CM

## 2023-12-06 DIAGNOSIS — I10 BENIGN ESSENTIAL HTN: Primary | ICD-10-CM

## 2023-12-06 PROCEDURE — 99214 OFFICE O/P EST MOD 30 MIN: CPT | Performed by: INTERNAL MEDICINE

## 2023-12-06 NOTE — TELEPHONE ENCOUNTER
Pts wife called -  his b/p is 211/120 wants to know if Dr. Kearney can see him today instead of tomorrow . Please call and let Vidya know if we can do this.

## 2023-12-06 NOTE — PROGRESS NOTES
"Chief Complaint  Hypertension    Subjective        Jeremi Ugarte presents to [facility] PRIMARY CARE  History of Present Illness  A couple of days ago had a terrible headache-  like the headaches he has gotten in the past from trigeminal neuralgia- checked his /120.  Has checked a few times in since- 211/120  He admits to not eating well lately.  Nephrologist increased hydralazine yesterday to 75 mg TID, didn't help so today they increased to 100 mg TID.  He is not having any CP, SOB, edema    Objective   Vital Signs:  BP (!) 184/100   Pulse 88   Ht 175.3 cm (69\")   Wt 116 kg (256 lb)   BMI 37.80 kg/m²   Estimated body mass index is 37.8 kg/m² as calculated from the following:    Height as of this encounter: 175.3 cm (69\").    Weight as of this encounter: 116 kg (256 lb).               Physical Exam  Constitutional:       Appearance: Normal appearance.   Cardiovascular:      Rate and Rhythm: Normal rate and regular rhythm.   Pulmonary:      Effort: Pulmonary effort is normal.   Musculoskeletal:      Right lower leg: No edema.      Left lower leg: No edema.        Result Review :                   Assessment and Plan   Diagnoses and all orders for this visit:    1. Benign essential HTN (Primary)  Comments:  accelerated- increase hydralazine 100 mg TID and losartan 100 mg daily.    2. Trigeminal neuralgia             Follow Up   No follow-ups on file.  Patient was given instructions and counseling regarding his condition or for health maintenance advice. Please see specific information pulled into the AVS if appropriate.         "

## 2023-12-11 ENCOUNTER — OFFICE VISIT (OUTPATIENT)
Dept: INTERNAL MEDICINE | Facility: CLINIC | Age: 78
End: 2023-12-11
Payer: MEDICARE

## 2023-12-11 VITALS
HEART RATE: 70 BPM | BODY MASS INDEX: 37.92 KG/M2 | HEIGHT: 69 IN | DIASTOLIC BLOOD PRESSURE: 78 MMHG | WEIGHT: 256 LBS | SYSTOLIC BLOOD PRESSURE: 136 MMHG

## 2023-12-11 DIAGNOSIS — I10 BENIGN ESSENTIAL HTN: Primary | ICD-10-CM

## 2023-12-11 PROCEDURE — 3078F DIAST BP <80 MM HG: CPT | Performed by: INTERNAL MEDICINE

## 2023-12-11 PROCEDURE — 99213 OFFICE O/P EST LOW 20 MIN: CPT | Performed by: INTERNAL MEDICINE

## 2023-12-11 PROCEDURE — 1159F MED LIST DOCD IN RCRD: CPT | Performed by: INTERNAL MEDICINE

## 2023-12-11 PROCEDURE — 1160F RVW MEDS BY RX/DR IN RCRD: CPT | Performed by: INTERNAL MEDICINE

## 2023-12-11 PROCEDURE — 3075F SYST BP GE 130 - 139MM HG: CPT | Performed by: INTERNAL MEDICINE

## 2023-12-11 RX ORDER — HYDRALAZINE HYDROCHLORIDE 100 MG/1
100 TABLET, FILM COATED ORAL 3 TIMES DAILY
Qty: 90 TABLET | Refills: 1 | Status: SHIPPED | OUTPATIENT
Start: 2023-12-11

## 2023-12-11 NOTE — PROGRESS NOTES
"Chief Complaint  Hypertension    Subjective        Jeremi Ugarte presents to [facility] PRIMARY [facility]ARE  History of Present Illness here for BP f/u- has been doing much better- now on higher dose losartan and hydralazine.  Readings have been 140s/70s consistently.  He has been trying to eat better as well.     Objective   Vital Signs:  /78   Pulse 70   Ht 175.3 cm (69\")   Wt 116 kg (256 lb)   BMI 37.80 kg/m²   Estimated body mass index is 37.8 kg/m² as calculated from the following:    Height as of this encounter: 175.3 cm (69\").    Weight as of this encounter: 116 kg (256 lb).               Physical Exam  Constitutional:       Appearance: Normal appearance.   Cardiovascular:      Rate and Rhythm: Normal rate and regular rhythm.   Musculoskeletal:      Right lower leg: No edema.      Left lower leg: No edema.        Result Review :                   Assessment and Plan   Diagnoses and all orders for this visit:    1. Benign essential HTN (Primary)  Comments:  much improved- will recheck again in 1 months- if cont to work on weight, could decrease hydralazine.  Orders:  -     hydrALAZINE (APRESOLINE) 100 MG tablet; Take 1 tablet by mouth 3 (Three) Times a Day.  Dispense: 90 tablet; Refill: 1             Follow Up   Return for Keep previously scheduled appointment.  Patient was given instructions and counseling regarding his condition or for health maintenance advice. Please see specific information pulled into the AVS if appropriate.         "

## 2023-12-15 DIAGNOSIS — I10 BENIGN ESSENTIAL HTN: Primary | ICD-10-CM

## 2023-12-15 RX ORDER — LOSARTAN POTASSIUM 100 MG/1
100 TABLET ORAL DAILY
Qty: 90 TABLET | Refills: 1 | Status: SHIPPED | OUTPATIENT
Start: 2023-12-15

## 2023-12-18 RX ORDER — LEVOTHYROXINE SODIUM 112 UG/1
112 TABLET ORAL DAILY
Qty: 90 TABLET | Refills: 1 | Status: SHIPPED | OUTPATIENT
Start: 2023-12-18

## 2024-01-15 ENCOUNTER — OFFICE VISIT (OUTPATIENT)
Dept: INTERNAL MEDICINE | Facility: CLINIC | Age: 79
End: 2024-01-15
Payer: MEDICARE

## 2024-01-15 ENCOUNTER — TRANSCRIBE ORDERS (OUTPATIENT)
Dept: ADMINISTRATIVE | Facility: HOSPITAL | Age: 79
End: 2024-01-15
Payer: MEDICARE

## 2024-01-15 VITALS
BODY MASS INDEX: 37.33 KG/M2 | WEIGHT: 252 LBS | SYSTOLIC BLOOD PRESSURE: 132 MMHG | DIASTOLIC BLOOD PRESSURE: 86 MMHG | HEART RATE: 74 BPM | HEIGHT: 69 IN

## 2024-01-15 DIAGNOSIS — E03.9 ACQUIRED HYPOTHYROIDISM: ICD-10-CM

## 2024-01-15 DIAGNOSIS — E87.1 ACUTE HYPONATREMIA: Primary | ICD-10-CM

## 2024-01-15 DIAGNOSIS — F41.9 ANXIETY: Chronic | ICD-10-CM

## 2024-01-15 DIAGNOSIS — E87.1 HYPONATREMIA: ICD-10-CM

## 2024-01-15 DIAGNOSIS — I10 BENIGN ESSENTIAL HTN: Primary | Chronic | ICD-10-CM

## 2024-01-15 DIAGNOSIS — H92.02 DISCOMFORT OF LEFT EAR: ICD-10-CM

## 2024-01-15 DIAGNOSIS — E78.5 DYSLIPIDEMIA: Chronic | ICD-10-CM

## 2024-01-15 DIAGNOSIS — I10 ESSENTIAL HYPERTENSION, MALIGNANT: ICD-10-CM

## 2024-01-15 PROCEDURE — 99214 OFFICE O/P EST MOD 30 MIN: CPT | Performed by: INTERNAL MEDICINE

## 2024-01-15 PROCEDURE — 3075F SYST BP GE 130 - 139MM HG: CPT | Performed by: INTERNAL MEDICINE

## 2024-01-15 PROCEDURE — 3079F DIAST BP 80-89 MM HG: CPT | Performed by: INTERNAL MEDICINE

## 2024-01-15 RX ORDER — TERAZOSIN 2 MG/1
2 CAPSULE ORAL NIGHTLY
COMMUNITY

## 2024-01-15 NOTE — PROGRESS NOTES
"Chief Complaint  Hypertension    Subjective        Jeremi Ugarte presents to [facility] PRIMARY CARE  History of Present Illness here with wife for regular f/u- thinks hearing is getting worse- went to ENT.  BP hasn't been good- saw nephrologist last week- BP up, increased carvedilol to 25 mg BID and add terazosin at night.   Has some irritation in the L ear- wax? Describes hearing something from ear- not tinnitus- was removed in the past and it went away.   He has been riding stationary bicycle at home.     Objective   Vital Signs:  /86   Pulse 74   Ht 175.3 cm (69\")   Wt 114 kg (252 lb)   BMI 37.21 kg/m²   Estimated body mass index is 37.21 kg/m² as calculated from the following:    Height as of this encounter: 175.3 cm (69\").    Weight as of this encounter: 114 kg (252 lb).               Physical Exam  Constitutional:       Appearance: Normal appearance.   HENT:      Right Ear: Tympanic membrane and ear canal normal.      Ears:      Comments: L with cerumen although not occlusive  Cardiovascular:      Rate and Rhythm: Normal rate and regular rhythm.   Musculoskeletal:      Right lower leg: No edema.      Left lower leg: No edema.   Skin:     General: Skin is warm and dry.        Result Review :  The following data was reviewed by: Charity Kearney MD on 01/15/2024:  CMP          1/8/2024    10:17   CMP   Glucose 80    BUN 20    Creatinine 1.21    Sodium 139    Potassium 4.4    Chloride 105    Calcium 8.9    Total Protein 6.3    Albumin 4.4    Globulin 1.9    Total Bilirubin 0.3    Alkaline Phosphatase 76    AST (SGOT) 16    ALT (SGPT) 19    BUN/Creatinine Ratio 16.5      Lipid Panel          1/8/2024    10:17   Lipid Panel   Total Cholesterol 172    Triglycerides 97    HDL Cholesterol 43    VLDL Cholesterol 18    LDL Cholesterol  111      TSH          3/15/2023    11:38 6/27/2023    10:00 1/8/2024    10:17   TSH   TSH 4.710  5.320  3.100                   Assessment and Plan "   Diagnoses and all orders for this visit:    1. Benign essential HTN (Primary)  Comments:  much better control- agree with nephrology getting renal u/s    2. Dyslipidemia  Comments:  at goal    3. Acquired hypothyroidism  Comments:  TSH well replaced    4. Hyponatremia  Comments:  no issue currently    5. Anxiety  Comments:  stable- wife watches medication use.    6. Discomfort of left ear  Comments:  reassured, could use some OTC drops periodically.             Follow Up   Return in about 6 months (around 7/15/2024) for Medicare Wellness, Lab Before FUP.  Patient was given instructions and counseling regarding his condition or for health maintenance advice. Please see specific information pulled into the AVS if appropriate.

## 2024-01-24 ENCOUNTER — HOSPITAL ENCOUNTER (OUTPATIENT)
Dept: CARDIOLOGY | Facility: HOSPITAL | Age: 79
Discharge: HOME | End: 2024-01-24
Admitting: HOSPITALIST
Payer: MEDICARE

## 2024-01-24 DIAGNOSIS — I10 ESSENTIAL HYPERTENSION, MALIGNANT: ICD-10-CM

## 2024-01-24 DIAGNOSIS — E87.1 ACUTE HYPONATREMIA: ICD-10-CM

## 2024-01-24 LAB
BH CV ECHO MEAS - DIST REN A EDV LEFT: 31 CM/S
BH CV ECHO MEAS - DIST REN A PSV LEFT: 100 CM/S
BH CV ECHO MEAS - MID REN A EDV LEFT: 24.6 CM/S
BH CV ECHO MEAS - MID REN A PSV LEFT: 80.3 CM/S
BH CV ECHO MEAS - PROX REN A EDV LEFT: 19.2 CM/S
BH CV ECHO MEAS - PROX REN A PSV LEFT: 119 CM/S
BH CV VAS BP LEFT ARM: NORMAL MMHG
BH CV VAS BP RIGHT ARM: NORMAL MMHG
BH CV VAS KIDNEY HEIGHT LEFT: 5.2 CM
BH CV VAS RENAL AORTIC MID PSV: 61.7 CM/S
BH CV VAS RENAL HILUM LEFT EDV: 13.1 CM/S
BH CV VAS RENAL HILUM LEFT PSV: 49.2 CM/S
BH CV VAS RENAL HILUM RIGHT EDV: 10.7 CM/S
BH CV VAS RENAL HILUM RIGHT PSV: 34.2 CM/S
BH CV XLRA MEAS - KID L LEFT: 13 CM
BH CV XLRA MEAS DIST REN A EDV RIGHT: 27.4 CM/S
BH CV XLRA MEAS DIST REN A PSV RIGHT: 109 CM/S
BH CV XLRA MEAS KID H RIGHT: 4.6 CM
BH CV XLRA MEAS KID L RIGHT: 12.6 CM
BH CV XLRA MEAS MID REN A EDV RIGHT: 16.3 CM/S
BH CV XLRA MEAS MID REN A PSV RIGHT: 60.9 CM/S
BH CV XLRA MEAS PROX REN A EDV RIGHT: 15.1 CM/S
BH CV XLRA MEAS PROX REN A PSV RIGHT: 48.9 CM/S
BH CV XLRA MEAS RAR LEFT: 2.07
BH CV XLRA MEAS RAR RIGHT: 2.69
BH CV XLRA MEAS RENAL A ORG EDV LEFT: 30.2 CM/S
BH CV XLRA MEAS RENAL A ORG EDV RIGHT: 42.8 CM/S
BH CV XLRA MEAS RENAL A ORG PSV LEFT: 128 CM/S
BH CV XLRA MEAS RENAL A ORG PSV RIGHT: 166 CM/S
LEFT RENAL UPPER PARENCHYMA MAX: 13.5 CM/S
LEFT RENAL UPPER PARENCHYMA MIN: 3.89 CM/S
LEFT RENAL UPPER PARENCHYMA RI: 0.71
RIGHT RENAL UPPER PARENCHYMA MAX: 9.26 CM/S
RIGHT RENAL UPPER PARENCHYMA MIN: 4.07 CM/S
RIGHT RENAL UPPER PARENCHYMA RI: 0.56

## 2024-01-24 PROCEDURE — 93975 VASCULAR STUDY: CPT

## 2024-01-26 DIAGNOSIS — F41.9 ANXIETY: ICD-10-CM

## 2024-01-29 RX ORDER — ALPRAZOLAM 0.5 MG/1
TABLET ORAL
Qty: 90 TABLET | Refills: 1 | Status: SHIPPED | OUTPATIENT
Start: 2024-01-29

## 2024-02-05 RX ORDER — ROSUVASTATIN CALCIUM 20 MG/1
20 TABLET, COATED ORAL DAILY
Qty: 90 TABLET | Refills: 1 | Status: SHIPPED | OUTPATIENT
Start: 2024-02-05

## 2024-02-09 DIAGNOSIS — I10 BENIGN ESSENTIAL HTN: ICD-10-CM

## 2024-02-09 RX ORDER — HYDRALAZINE HYDROCHLORIDE 100 MG/1
100 TABLET, FILM COATED ORAL 3 TIMES DAILY
Qty: 90 TABLET | Refills: 1 | Status: SHIPPED | OUTPATIENT
Start: 2024-02-09

## 2024-02-09 RX ORDER — HYDRALAZINE HYDROCHLORIDE 100 MG/1
100 TABLET, FILM COATED ORAL 3 TIMES DAILY
Qty: 270 TABLET | OUTPATIENT
Start: 2024-02-09

## 2024-03-06 RX ORDER — DEXTROMETHORPHAN HYDROBROMIDE AND PROMETHAZINE HYDROCHLORIDE 15; 6.25 MG/5ML; MG/5ML
5 SYRUP ORAL 4 TIMES DAILY PRN
Qty: 180 ML | Refills: 0 | Status: SHIPPED | OUTPATIENT
Start: 2024-03-06

## 2024-03-16 ENCOUNTER — DOCUMENTATION (OUTPATIENT)
Dept: INTERNAL MEDICINE | Facility: CLINIC | Age: 79
End: 2024-03-16
Payer: MEDICARE

## 2024-03-16 RX ORDER — METHYLPREDNISOLONE 4 MG/1
TABLET ORAL
Qty: 21 TABLET | Refills: 0 | Status: ON HOLD | OUTPATIENT
Start: 2024-03-16

## 2024-03-16 RX ORDER — DEXTROMETHORPHAN HYDROBROMIDE AND PROMETHAZINE HYDROCHLORIDE 15; 6.25 MG/5ML; MG/5ML
5 SYRUP ORAL 4 TIMES DAILY PRN
Qty: 180 ML | Refills: 0 | Status: ON HOLD | OUTPATIENT
Start: 2024-03-16

## 2024-03-16 NOTE — PROGRESS NOTES
Returned call from patient daughter discussing ongoing cough and symptoms.  Not in current respiratory distress, but still having significant cough, feeling unwell, is concerned that his symptoms may have been progressing.  Will trial short course of Medrol Dosepak, recommended patient call and schedule appointment to see Dr. Kearney this week, likely Monday or Tuesday.

## 2024-03-17 ENCOUNTER — HOSPITAL ENCOUNTER (INPATIENT)
Facility: HOSPITAL | Age: 79
LOS: 4 days | Discharge: HOME HEALTH | End: 2024-03-21
Attending: EMERGENCY MEDICINE | Admitting: INTERNAL MEDICINE
Payer: MEDICARE

## 2024-03-17 ENCOUNTER — APPOINTMENT (OUTPATIENT)
Dept: GENERAL RADIOLOGY | Facility: HOSPITAL | Age: 79
End: 2024-03-17
Payer: MEDICARE

## 2024-03-17 DIAGNOSIS — A41.9 SEPSIS, DUE TO UNSPECIFIED ORGANISM, UNSPECIFIED WHETHER ACUTE ORGAN DYSFUNCTION PRESENT: ICD-10-CM

## 2024-03-17 DIAGNOSIS — N17.9 AKI (ACUTE KIDNEY INJURY): ICD-10-CM

## 2024-03-17 DIAGNOSIS — J18.9 PNEUMONIA OF BOTH LUNGS DUE TO INFECTIOUS ORGANISM, UNSPECIFIED PART OF LUNG: Primary | ICD-10-CM

## 2024-03-17 LAB
ALBUMIN SERPL-MCNC: 3.4 G/DL (ref 3.5–5.2)
ALBUMIN/GLOB SERPL: 1.1 G/DL
ALP SERPL-CCNC: 130 U/L (ref 39–117)
ALT SERPL W P-5'-P-CCNC: 65 U/L (ref 1–41)
ANION GAP SERPL CALCULATED.3IONS-SCNC: 11.2 MMOL/L (ref 5–15)
AST SERPL-CCNC: 71 U/L (ref 1–40)
B PARAPERT DNA SPEC QL NAA+PROBE: NOT DETECTED
B PERT DNA SPEC QL NAA+PROBE: NOT DETECTED
BASOPHILS # BLD AUTO: 0.02 10*3/MM3 (ref 0–0.2)
BASOPHILS NFR BLD AUTO: 0.1 % (ref 0–1.5)
BILIRUB SERPL-MCNC: 0.4 MG/DL (ref 0–1.2)
BUN SERPL-MCNC: 31 MG/DL (ref 8–23)
BUN/CREAT SERPL: 18.9 (ref 7–25)
C PNEUM DNA NPH QL NAA+NON-PROBE: NOT DETECTED
CALCIUM SPEC-SCNC: 8.2 MG/DL (ref 8.6–10.5)
CHLORIDE SERPL-SCNC: 105 MMOL/L (ref 98–107)
CO2 SERPL-SCNC: 18.8 MMOL/L (ref 22–29)
CREAT SERPL-MCNC: 1.64 MG/DL (ref 0.76–1.27)
D-LACTATE SERPL-SCNC: 0.8 MMOL/L (ref 0.5–2)
DEPRECATED RDW RBC AUTO: 41.2 FL (ref 37–54)
EGFRCR SERPLBLD CKD-EPI 2021: 42.5 ML/MIN/1.73
EOSINOPHIL # BLD AUTO: 0 10*3/MM3 (ref 0–0.4)
EOSINOPHIL NFR BLD AUTO: 0 % (ref 0.3–6.2)
ERYTHROCYTE [DISTWIDTH] IN BLOOD BY AUTOMATED COUNT: 11.9 % (ref 12.3–15.4)
FLUAV SUBTYP SPEC NAA+PROBE: NOT DETECTED
FLUBV RNA ISLT QL NAA+PROBE: NOT DETECTED
GEN 5 2HR TROPONIN T REFLEX: 27 NG/L
GLOBULIN UR ELPH-MCNC: 3.1 GM/DL
GLUCOSE SERPL-MCNC: 128 MG/DL (ref 65–99)
HADV DNA SPEC NAA+PROBE: NOT DETECTED
HCOV 229E RNA SPEC QL NAA+PROBE: NOT DETECTED
HCOV HKU1 RNA SPEC QL NAA+PROBE: NOT DETECTED
HCOV NL63 RNA SPEC QL NAA+PROBE: NOT DETECTED
HCOV OC43 RNA SPEC QL NAA+PROBE: NOT DETECTED
HCT VFR BLD AUTO: 36.8 % (ref 37.5–51)
HGB BLD-MCNC: 12.3 G/DL (ref 13–17.7)
HMPV RNA NPH QL NAA+NON-PROBE: NOT DETECTED
HPIV1 RNA ISLT QL NAA+PROBE: NOT DETECTED
HPIV2 RNA SPEC QL NAA+PROBE: NOT DETECTED
HPIV3 RNA NPH QL NAA+PROBE: NOT DETECTED
HPIV4 P GENE NPH QL NAA+PROBE: NOT DETECTED
IMM GRANULOCYTES # BLD AUTO: 0.12 10*3/MM3 (ref 0–0.05)
IMM GRANULOCYTES NFR BLD AUTO: 0.7 % (ref 0–0.5)
LYMPHOCYTES # BLD AUTO: 1.13 10*3/MM3 (ref 0.7–3.1)
LYMPHOCYTES NFR BLD AUTO: 6.3 % (ref 19.6–45.3)
M PNEUMO IGG SER IA-ACNC: NOT DETECTED
MCH RBC QN AUTO: 31.8 PG (ref 26.6–33)
MCHC RBC AUTO-ENTMCNC: 33.4 G/DL (ref 31.5–35.7)
MCV RBC AUTO: 95.1 FL (ref 79–97)
MONOCYTES # BLD AUTO: 1.16 10*3/MM3 (ref 0.1–0.9)
MONOCYTES NFR BLD AUTO: 6.4 % (ref 5–12)
NEUTROPHILS NFR BLD AUTO: 15.65 10*3/MM3 (ref 1.7–7)
NEUTROPHILS NFR BLD AUTO: 86.5 % (ref 42.7–76)
NRBC BLD AUTO-RTO: 0 /100 WBC (ref 0–0.2)
NT-PROBNP SERPL-MCNC: 1226 PG/ML (ref 0–1800)
PLATELET # BLD AUTO: 346 10*3/MM3 (ref 140–450)
PMV BLD AUTO: 10 FL (ref 6–12)
POTASSIUM SERPL-SCNC: 5 MMOL/L (ref 3.5–5.2)
PROT SERPL-MCNC: 6.5 G/DL (ref 6–8.5)
RBC # BLD AUTO: 3.87 10*6/MM3 (ref 4.14–5.8)
RHINOVIRUS RNA SPEC NAA+PROBE: NOT DETECTED
RSV RNA NPH QL NAA+NON-PROBE: NOT DETECTED
SARS-COV-2 RNA NPH QL NAA+NON-PROBE: NOT DETECTED
SODIUM SERPL-SCNC: 135 MMOL/L (ref 136–145)
TROPONIN T DELTA: -3 NG/L
TROPONIN T SERPL HS-MCNC: 30 NG/L
WBC NRBC COR # BLD AUTO: 18.08 10*3/MM3 (ref 3.4–10.8)

## 2024-03-17 PROCEDURE — 25010000002 CEFTRIAXONE PER 250 MG: Performed by: EMERGENCY MEDICINE

## 2024-03-17 PROCEDURE — 94664 DEMO&/EVAL PT USE INHALER: CPT

## 2024-03-17 PROCEDURE — 84484 ASSAY OF TROPONIN QUANT: CPT | Performed by: EMERGENCY MEDICINE

## 2024-03-17 PROCEDURE — 25810000003 SODIUM CHLORIDE 0.9 % SOLUTION: Performed by: EMERGENCY MEDICINE

## 2024-03-17 PROCEDURE — 87040 BLOOD CULTURE FOR BACTERIA: CPT | Performed by: EMERGENCY MEDICINE

## 2024-03-17 PROCEDURE — 0202U NFCT DS 22 TRGT SARS-COV-2: CPT | Performed by: EMERGENCY MEDICINE

## 2024-03-17 PROCEDURE — 94799 UNLISTED PULMONARY SVC/PX: CPT

## 2024-03-17 PROCEDURE — 85025 COMPLETE CBC W/AUTO DIFF WBC: CPT | Performed by: EMERGENCY MEDICINE

## 2024-03-17 PROCEDURE — 80053 COMPREHEN METABOLIC PANEL: CPT | Performed by: EMERGENCY MEDICINE

## 2024-03-17 PROCEDURE — 25810000003 SODIUM CHLORIDE 0.9 % SOLUTION 250 ML FLEX CONT: Performed by: EMERGENCY MEDICINE

## 2024-03-17 PROCEDURE — 71045 X-RAY EXAM CHEST 1 VIEW: CPT

## 2024-03-17 PROCEDURE — 83880 ASSAY OF NATRIURETIC PEPTIDE: CPT | Performed by: EMERGENCY MEDICINE

## 2024-03-17 PROCEDURE — 83605 ASSAY OF LACTIC ACID: CPT | Performed by: EMERGENCY MEDICINE

## 2024-03-17 PROCEDURE — 94640 AIRWAY INHALATION TREATMENT: CPT

## 2024-03-17 PROCEDURE — 36415 COLL VENOUS BLD VENIPUNCTURE: CPT | Performed by: EMERGENCY MEDICINE

## 2024-03-17 PROCEDURE — 99285 EMERGENCY DEPT VISIT HI MDM: CPT

## 2024-03-17 PROCEDURE — 25010000002 METHYLPREDNISOLONE PER 40 MG: Performed by: INTERNAL MEDICINE

## 2024-03-17 PROCEDURE — 25010000002 CEFTRIAXONE PER 250 MG: Performed by: INTERNAL MEDICINE

## 2024-03-17 PROCEDURE — 25010000002 AZITHROMYCIN PER 500 MG: Performed by: EMERGENCY MEDICINE

## 2024-03-17 RX ORDER — ACETAMINOPHEN 325 MG/1
650 TABLET ORAL EVERY 4 HOURS PRN
Status: DISCONTINUED | OUTPATIENT
Start: 2024-03-17 | End: 2024-03-21 | Stop reason: HOSPADM

## 2024-03-17 RX ORDER — ALBUTEROL SULFATE 2.5 MG/3ML
2.5 SOLUTION RESPIRATORY (INHALATION) EVERY 6 HOURS PRN
Status: DISCONTINUED | OUTPATIENT
Start: 2024-03-17 | End: 2024-03-21 | Stop reason: HOSPADM

## 2024-03-17 RX ORDER — SODIUM CHLORIDE 9 MG/ML
75 INJECTION, SOLUTION INTRAVENOUS CONTINUOUS
Status: DISCONTINUED | OUTPATIENT
Start: 2024-03-17 | End: 2024-03-19

## 2024-03-17 RX ORDER — LOSARTAN POTASSIUM 100 MG/1
100 TABLET ORAL DAILY
Status: DISCONTINUED | OUTPATIENT
Start: 2024-03-17 | End: 2024-03-17

## 2024-03-17 RX ORDER — ROSUVASTATIN CALCIUM 20 MG/1
20 TABLET, COATED ORAL DAILY
Status: DISCONTINUED | OUTPATIENT
Start: 2024-03-17 | End: 2024-03-21 | Stop reason: HOSPADM

## 2024-03-17 RX ORDER — BENZONATATE 100 MG/1
200 CAPSULE ORAL 3 TIMES DAILY PRN
Status: DISCONTINUED | OUTPATIENT
Start: 2024-03-17 | End: 2024-03-21 | Stop reason: HOSPADM

## 2024-03-17 RX ORDER — OXCARBAZEPINE 300 MG/1
300 TABLET, FILM COATED ORAL 4 TIMES DAILY
Status: DISCONTINUED | OUTPATIENT
Start: 2024-03-17 | End: 2024-03-21 | Stop reason: HOSPADM

## 2024-03-17 RX ORDER — ONDANSETRON 4 MG/1
4 TABLET, ORALLY DISINTEGRATING ORAL EVERY 6 HOURS PRN
Status: DISCONTINUED | OUTPATIENT
Start: 2024-03-17 | End: 2024-03-21 | Stop reason: HOSPADM

## 2024-03-17 RX ORDER — ONDANSETRON 2 MG/ML
4 INJECTION INTRAMUSCULAR; INTRAVENOUS EVERY 6 HOURS PRN
Status: DISCONTINUED | OUTPATIENT
Start: 2024-03-17 | End: 2024-03-21 | Stop reason: HOSPADM

## 2024-03-17 RX ORDER — CHOLECALCIFEROL (VITAMIN D3) 125 MCG
1000 CAPSULE ORAL DAILY
Status: DISCONTINUED | OUTPATIENT
Start: 2024-03-17 | End: 2024-03-21 | Stop reason: HOSPADM

## 2024-03-17 RX ORDER — TERAZOSIN 2 MG/1
2 CAPSULE ORAL NIGHTLY
Status: DISCONTINUED | OUTPATIENT
Start: 2024-03-17 | End: 2024-03-21 | Stop reason: HOSPADM

## 2024-03-17 RX ORDER — POLYETHYLENE GLYCOL 3350 17 G/17G
17 POWDER, FOR SOLUTION ORAL DAILY PRN
Status: DISCONTINUED | OUTPATIENT
Start: 2024-03-17 | End: 2024-03-21 | Stop reason: HOSPADM

## 2024-03-17 RX ORDER — MULTIPLE VITAMINS W/ MINERALS TAB 9MG-400MCG
1 TAB ORAL EVERY MORNING
Status: DISCONTINUED | OUTPATIENT
Start: 2024-03-18 | End: 2024-03-21 | Stop reason: HOSPADM

## 2024-03-17 RX ORDER — HYDRALAZINE HYDROCHLORIDE 50 MG/1
100 TABLET, FILM COATED ORAL 3 TIMES DAILY
Status: DISCONTINUED | OUTPATIENT
Start: 2024-03-17 | End: 2024-03-21 | Stop reason: HOSPADM

## 2024-03-17 RX ORDER — CARVEDILOL 25 MG/1
25 TABLET ORAL 2 TIMES DAILY WITH MEALS
Status: DISCONTINUED | OUTPATIENT
Start: 2024-03-17 | End: 2024-03-21 | Stop reason: HOSPADM

## 2024-03-17 RX ORDER — BISACODYL 10 MG
10 SUPPOSITORY, RECTAL RECTAL DAILY PRN
Status: DISCONTINUED | OUTPATIENT
Start: 2024-03-17 | End: 2024-03-21 | Stop reason: HOSPADM

## 2024-03-17 RX ORDER — LEVOTHYROXINE SODIUM 112 UG/1
112 TABLET ORAL DAILY
Status: DISCONTINUED | OUTPATIENT
Start: 2024-03-17 | End: 2024-03-21 | Stop reason: HOSPADM

## 2024-03-17 RX ORDER — SODIUM CHLORIDE 0.9 % (FLUSH) 0.9 %
10 SYRINGE (ML) INJECTION AS NEEDED
Status: DISCONTINUED | OUTPATIENT
Start: 2024-03-17 | End: 2024-03-21 | Stop reason: HOSPADM

## 2024-03-17 RX ORDER — AMOXICILLIN 250 MG
2 CAPSULE ORAL 2 TIMES DAILY PRN
Status: DISCONTINUED | OUTPATIENT
Start: 2024-03-17 | End: 2024-03-21 | Stop reason: HOSPADM

## 2024-03-17 RX ORDER — METHYLPREDNISOLONE SODIUM SUCCINATE 40 MG/ML
40 INJECTION, POWDER, LYOPHILIZED, FOR SOLUTION INTRAMUSCULAR; INTRAVENOUS EVERY 8 HOURS
Status: DISCONTINUED | OUTPATIENT
Start: 2024-03-17 | End: 2024-03-19

## 2024-03-17 RX ORDER — UREA 10 %
3 LOTION (ML) TOPICAL NIGHTLY PRN
Status: DISCONTINUED | OUTPATIENT
Start: 2024-03-17 | End: 2024-03-21 | Stop reason: HOSPADM

## 2024-03-17 RX ORDER — ALPRAZOLAM 0.5 MG/1
0.5 TABLET ORAL 3 TIMES DAILY PRN
Status: DISCONTINUED | OUTPATIENT
Start: 2024-03-17 | End: 2024-03-21 | Stop reason: HOSPADM

## 2024-03-17 RX ORDER — BISACODYL 5 MG/1
5 TABLET, DELAYED RELEASE ORAL DAILY PRN
Status: DISCONTINUED | OUTPATIENT
Start: 2024-03-17 | End: 2024-03-21 | Stop reason: HOSPADM

## 2024-03-17 RX ORDER — IPRATROPIUM BROMIDE AND ALBUTEROL SULFATE 2.5; .5 MG/3ML; MG/3ML
3 SOLUTION RESPIRATORY (INHALATION)
Status: DISCONTINUED | OUTPATIENT
Start: 2024-03-17 | End: 2024-03-21 | Stop reason: HOSPADM

## 2024-03-17 RX ADMIN — METHYLPREDNISOLONE SODIUM SUCCINATE 40 MG: 40 INJECTION, POWDER, LYOPHILIZED, FOR SOLUTION INTRAMUSCULAR; INTRAVENOUS at 21:41

## 2024-03-17 RX ADMIN — SODIUM CHLORIDE 1000 ML: 9 INJECTION, SOLUTION INTRAVENOUS at 16:30

## 2024-03-17 RX ADMIN — Medication 1000 MCG: at 21:32

## 2024-03-17 RX ADMIN — SODIUM CHLORIDE 75 ML/HR: 9 INJECTION, SOLUTION INTRAVENOUS at 20:29

## 2024-03-17 RX ADMIN — CEFTRIAXONE 2000 MG: 2 INJECTION, POWDER, FOR SOLUTION INTRAMUSCULAR; INTRAVENOUS at 17:54

## 2024-03-17 RX ADMIN — CEFTRIAXONE 2000 MG: 2 INJECTION, POWDER, FOR SOLUTION INTRAMUSCULAR; INTRAVENOUS at 23:58

## 2024-03-17 RX ADMIN — IPRATROPIUM BROMIDE AND ALBUTEROL SULFATE 3 ML: 2.5; .5 SOLUTION RESPIRATORY (INHALATION) at 20:34

## 2024-03-17 RX ADMIN — OXCARBAZEPINE 300 MG: 300 TABLET, FILM COATED ORAL at 20:00

## 2024-03-17 RX ADMIN — ALPRAZOLAM 0.5 MG: 0.5 TABLET ORAL at 20:00

## 2024-03-17 RX ADMIN — TERAZOSIN 2 MG: 2 CAPSULE ORAL at 20:00

## 2024-03-17 RX ADMIN — HYDRALAZINE HYDROCHLORIDE 100 MG: 50 TABLET ORAL at 21:32

## 2024-03-17 RX ADMIN — CARVEDILOL 25 MG: 25 TABLET, FILM COATED ORAL at 20:00

## 2024-03-17 RX ADMIN — AZITHROMYCIN MONOHYDRATE 500 MG: 500 INJECTION, POWDER, LYOPHILIZED, FOR SOLUTION INTRAVENOUS at 21:32

## 2024-03-17 NOTE — ED NOTES
Nursing report ED to floor  Jeremi Ugarte  78 y.o.  male    HPI :  HPI (Adult)  Stated Reason for Visit: flu like s/s    Chief Complaint  Chief Complaint   Patient presents with    Flu Symptoms       Admitting doctor:   Daiana Walls MD    Admitting diagnosis:   The primary encounter diagnosis was Pneumonia of both lungs due to infectious organism, unspecified part of lung. Diagnoses of Sepsis, due to unspecified organism, unspecified whether acute organ dysfunction present and LENORA (acute kidney injury) were also pertinent to this visit.    Code status:   Current Code Status       Date Active Code Status Order ID Comments User Context       Prior            Allergies:   Vancomycin, Morphine, Oxycodone-acetaminophen, and Dilaudid [hydromorphone hcl]    Isolation:   No active isolations    Intake and Output  No intake or output data in the 24 hours ending 03/17/24 1651    Weight:   There were no vitals filed for this visit.    Most recent vitals:   Vitals:    03/17/24 1542 03/17/24 1546 03/17/24 1559 03/17/24 1644   BP:  144/76  130/60   Pulse: 74  79    Resp:       Temp:       SpO2: 94%  90%        Active LDAs/IV Access:   Lines, Drains & Airways       Active LDAs       Name Placement date Placement time Site Days    Peripheral IV 03/17/24 1457 Anterior;Right Forearm 03/17/24  1457  Forearm  less than 1    Closed/Suction Drain 1 Left;Anterior Knee Accordion 10 Fr. 01/16/23  1333  Knee  426                    Labs (abnormal labs have a star):   Labs Reviewed   COMPREHENSIVE METABOLIC PANEL - Abnormal; Notable for the following components:       Result Value    Glucose 128 (*)     BUN 31 (*)     Creatinine 1.64 (*)     Sodium 135 (*)     CO2 18.8 (*)     Calcium 8.2 (*)     Albumin 3.4 (*)     ALT (SGPT) 65 (*)     AST (SGOT) 71 (*)     Alkaline Phosphatase 130 (*)     eGFR 42.5 (*)     All other components within normal limits    Narrative:     GFR Normal >60  Chronic Kidney Disease <60  Kidney Failure  <15    The GFR formula is only valid for adults with stable renal function between ages 18 and 70.   TROPONIN - Abnormal; Notable for the following components:    HS Troponin T 30 (*)     All other components within normal limits    Narrative:     High Sensitive Troponin T Reference Range:  <14.0 ng/L- Negative Female for AMI  <22.0 ng/L- Negative Male for AMI  >=14 - Abnormal Female indicating possible myocardial injury.  >=22 - Abnormal Male indicating possible myocardial injury.   Clinicians would have to utilize clinical acumen, EKG, Troponin, and serial changes to determine if it is an Acute Myocardial Infarction or myocardial injury due to an underlying chronic condition.        CBC WITH AUTO DIFFERENTIAL - Abnormal; Notable for the following components:    WBC 18.08 (*)     RBC 3.87 (*)     Hemoglobin 12.3 (*)     Hematocrit 36.8 (*)     RDW 11.9 (*)     Neutrophil % 86.5 (*)     Lymphocyte % 6.3 (*)     Eosinophil % 0.0 (*)     Immature Grans % 0.7 (*)     Neutrophils, Absolute 15.65 (*)     Monocytes, Absolute 1.16 (*)     Immature Grans, Absolute 0.12 (*)     All other components within normal limits   RESPIRATORY PANEL PCR W/ COVID-19 (SARS-COV-2), NP SWAB IN UTM/VTP, 2 HR TAT - Normal    Narrative:     In the setting of a positive respiratory panel with a viral infection PLUS a negative procalcitonin without other underlying concern for bacterial infection, consider observing off antibiotics or discontinuation of antibiotics and continue supportive care. If the respiratory panel is positive for atypical bacterial infection (Bordetella pertussis, Chlamydophila pneumoniae, or Mycoplasma pneumoniae), consider antibiotic de-escalation to target atypical bacterial infection.   BNP (IN-HOUSE) - Normal    Narrative:     This assay is used as an aid in the diagnosis of individuals suspected of having heart failure. It can be used as an aid in the diagnosis of acute decompensated heart failure (ADHF) in patients  presenting with signs and symptoms of ADHF to the emergency department (ED). In addition, NT-proBNP of <300 pg/mL indicates ADHF is not likely.    Age Range Result Interpretation  NT-proBNP Concentration (pg/mL:      <50             Positive            >450                   Gray                 300-450                    Negative             <300    50-75           Positive            >900                  Gray                300-900                  Negative            <300      >75             Positive            >1800                  Gray                300-1800                  Negative            <300   BLOOD CULTURE   BLOOD CULTURE   HIGH SENSITIVITIY TROPONIN T 2HR   LACTIC ACID, PLASMA   CBC AND DIFFERENTIAL    Narrative:     The following orders were created for panel order CBC & Differential.  Procedure                               Abnormality         Status                     ---------                               -----------         ------                     CBC Auto Differential[[id number]]        Abnormal            Final result                 Please view results for these tests on the individual orders.       EKG:   No orders to display       Meds given in ED:   Medications   sodium chloride 0.9 % flush 10 mL (has no administration in time range)   cefTRIAXone (ROCEPHIN) 2,000 mg in sodium chloride 0.9 % 100 mL MBP (has no administration in time range)   azithromycin (ZITHROMAX) 500 mg in sodium chloride 0.9 % 250 mL IVPB-VTB (has no administration in time range)   sodium chloride 0.9 % bolus 1,000 mL (1,000 mL Intravenous New Bag 3/17/24 1630)       Imaging results:  XR Chest 1 View    Result Date: 3/17/2024   1. Poorly defined right lung base and left perihilar opacities. Possible multifocal pneumonia. Follow-up to resolution. 2. Trace fluid in the minor fissure.  This report was finalized on 3/17/2024 3:08 PM by Dr. Denilson Campbell M.D on Workstation: [id number]       Ambulatory status:    - w/ assist    Social issues:   Social History     Socioeconomic History    Marital status:    Tobacco Use    Smoking status: Former     Types: Cigars    Smokeless tobacco: Former     Types: Chew    Tobacco comments:     FOR A SHORT TIME AS A YOUNG ADULT   Vaping Use    Vaping status: Never Used   Substance and Sexual Activity    Alcohol use: Yes     Comment: RARE USE    Drug use: Never    Sexual activity: Defer       Peripheral Neurovascular  Peripheral Neurovascular (Adult)  Peripheral Neurovascular WDL: WDL    Neuro Cognitive  Neuro Cognitive (Adult)  Cognitive/Neuro/Behavioral WDL: WDL    Learning  Learning Assessment (Adult)  Learning Readiness and Ability: no barriers identified  Education Provided  Person Taught: patient, family member/friend  Teaching Method: verbal instruction  Teaching Focus: symptom/problem overview, diagnostic test  Education Outcome Evaluation: verbalizes understanding    Respiratory  Respiratory (Adult)  Airway WDL: WDL  Respiratory WDL  Respiratory WDL: .WDL except, rhythm/pattern, cough  Rhythm/Pattern, Respiratory: shortness of breath (SOA w/ exertion)  Cough Frequency: frequent  Cough Type: productive    Abdominal Pain       Pain Assessments  Pain (Adult)  Preferred Pain Scale: FACES (Acuna-Baker FACES Pain Rating Scale)  FACES Pain Rating: Rest: 4-->hurts little more    NIH Stroke Scale       Mahogany Lara RN  03/17/24 16:51 EDT

## 2024-03-17 NOTE — ED NOTES
Patient to ER via car from home for flu like s/s     Patient wife is currently in the hospital for human meta virus     Patient reports he has been feeling bad all week but is unable to perform ADLs as of today

## 2024-03-17 NOTE — H&P
"HISTORY AND PHYSICAL   [facility]        Date of Admission: 3/17/2024  Patient Identification:  Name: Jeremi Ugatre  Age: 78 y.o.  Sex: male  :  1945  MRN: [id number]                     Primary Care Physician: Charity Kearney MD    Chief Complaint:  78 year old gentleman brought in from home with shortness of breath, cough and wheezing for over a week; his wife is currently hospitalized with pneumonia; he does not smoke and is not on home oxygen; he was hypoxic on presentation and has been wheezing; he gets very short of air with little exertion; has been febrile    History of Present Illness:   As above    Past Medical History:  Past Medical History:   Diagnosis Date    Ankle arthritis     Anxiety     Arthritis     Chronic headaches     Dislocation of the knee cap     LEFT    GCA (giant cell arteritis)     \"PT STATES MISDIAGNOSED\"    Gout of foot     High risk medication use     History of infection     AFTER KNEE REPLACEMENT PT NOT SURE WHICH ONE    Hyperlipidemia     Hypertension     Hyponatremia     HX    Hypothyroid     Left knee pain     Macrocytosis     Myositis     \"PT STATES MISDIAGNOSED\"    Polymyalgia rheumatica     PONV (postoperative nausea and vomiting)     Renal insufficiency     Right bundle branch block (RBBB)     S/p bilateral revision of total hip replacement     S/P foot surgery     S/P revision of total knee, left     S/P revision of total knee, right     Trigeminal neuralgia      Past Surgical History:  Past Surgical History:   Procedure Laterality Date    ANKLE ARTHROPLASTY Left 2018    BACK SURGERY      lower back surg    BRAIN SURGERY      CATARACT EXTRACTION      COLONOSCOPY      CRANIOTOMY Right 2019    Dr. Aldridge, for trigeminal neuralgia    EXCISION BAKERS CYST KNEE  2008    HAND SURGERY Left 1971    Left hand and wrist 1971    HIP SURGERY Right 2016    total right hip replacement    KNEE ARTHROPLASTY Bilateral     LEFT -   -  RIGHT -       " QUADRICEPS TENDON REPAIR Right 03/26/2022    Procedure: RIGHT QUADRICEPS TENDON REPAIR;  Surgeon: Keaton Martinez II, MD;  Location: [facility] MAIN OR;  Service: Orthopedics;  Laterality: Right;    SHOULDER SURGERY      TOTAL HIP ARTHROPLASTY Left 2017    TOTAL KNEE ARTHROPLASTY REVISION  2009    left    TOTAL KNEE ARTHROPLASTY REVISION Right 09/15/2020    Procedure: RIGHT TOTAL KNEE ARTHROPLASTY REVISION;  Surgeon: Patricia Caldera MD;  Location: [facility] MAIN OR;  Service: Orthopedics;  Laterality: Right;    TOTAL KNEE ARTHROPLASTY REVISION Left 11/16/2021    Procedure: TOTAL KNEE ARTHROPLASTY REVISION;  Surgeon: Patricia Caldera MD;  Location: [facility] MAIN OR;  Service: Orthopedics;  Laterality: Left;    TOTAL KNEE ARTHROPLASTY REVISION Right 05/17/2022    Procedure: REVISION RIGHT TOTAL KNEE ARTHROPLASTY WITH EXTENSOR MECHANISM ALLOGRAFT/;  Surgeon: Patricia Caldera MD;  Location: [facility] MAIN OR;  Service: Orthopedics;  Laterality: Right;    TOTAL KNEE ARTHROPLASTY REVISION Left 1/16/2023    Procedure: TOTAL KNEE ARTHROPLASTY REVISION repair extensor mechanism ;  Surgeon: Patricia Caldera MD;  Location: [facility] OR OSC;  Service: Orthopedics;  Laterality: Left;    WRIST SURGERY        Home Meds:  Medications Prior to Admission   Medication Sig Dispense Refill Last Dose    ALPRAZolam (XANAX) 0.5 MG tablet TAKE 1 TABLET BY MOUTH THREE TIMES DAILY AS NEEDED FOR ANXIETY 90 tablet 1 3/17/2024 at 0400    Calcium Carbonate-Vit D-Min (CALCIUM 1200 PO) Take 1,000 mg by mouth Every Morning.   3/16/2024    carvedilol (COREG) 12.5 MG tablet Take 1 tablet by mouth 2 (Two) Times a Day With Meals. (Patient taking differently: Take 2 tablets by mouth 2 (Two) Times a Day With Meals.) 180 tablet 3 3/17/2024 at 0400    hydrALAZINE (APRESOLINE) 100 MG tablet TAKE 1 TABLET BY MOUTH THREE TIMES DAILY 90 tablet 1 3/17/2024 at 0400    levothyroxine (SYNTHROID, LEVOTHROID) 112 MCG tablet TAKE 1 TABLET BY  MOUTH DAILY 90 tablet 1 3/17/2024 at 0400    losartan (Cozaar) 100 MG tablet Take 1 tablet by mouth Daily. 90 tablet 1 3/17/2024 at 0400    methylPREDNISolone (MEDROL) 4 MG dose pack Take as directed on package instructions. 21 tablet 0 3/17/2024 at 1200    Multiple Vitamins-Minerals (MULTIVITAMIN ADULTS 50+ PO) Take 1 tablet by mouth Every Morning.   3/16/2024    NON FORMULARY Take 1 tablet by mouth Every Morning.       OXcarbazepine (TRILEPTAL) 300 MG tablet Take 1 tablet by mouth 4 (Four) Times a Day. Takes doses at 0800, 1400, 2000, 0200. Patient is adamant about taking this medication at scheduled times.  1 3/17/2024 at 1400    promethazine-dextromethorphan (PROMETHAZINE-DM) 6.25-15 MG/5ML syrup Take 5 mL by mouth 4 (Four) Times a Day As Needed for Cough. 180 mL 0 3/17/2024 at 1400    rosuvastatin (CRESTOR) 20 MG tablet TAKE 1 TABLET BY MOUTH DAILY 90 tablet 1 3/17/2024 at 0400    terazosin (HYTRIN) 2 MG capsule Take 1 capsule by mouth Every Night.   3/16/2024    vitamin B-12 (CYANOCOBALAMIN) 1000 MCG tablet Take 1 tablet by mouth Daily.   3/16/2024       Allergies:  Allergies   Allergen Reactions    Vancomycin Hives, Itching and Anxiety     Pt did not tolerate Vancomycin after benadryl on visit on 1/16/23.    Morphine Hallucinations     N & V    Oxycodone-Acetaminophen Nausea And Vomiting     Pt states he can take hydrocodone and tylenol      Dilaudid [Hydromorphone Hcl] Other (See Comments)     Pt's wife thinks that pt had gi intolerance to this med last time in hospital      Immunizations:  Immunization History   Administered Date(s) Administered    COVID-19 (PFIZER) BIVALENT 12+YRS 10/22/2022    COVID-19 (PFIZER) Purple Cap Monovalent 02/10/2021, 03/03/2021, 08/18/2021    COVID-19 (UNSPECIFIED) 08/18/2021    COVID-19 F23 (MODERNA) 12YRS+ (SPIKEVAX) 09/29/2023    Fluad Quad 65+ 10/30/2021    Fluzone High Dose =>65 Years (Vaxcare ONLY) 01/01/2018, 09/02/2018, 10/24/2019, 09/02/2020, 10/22/2022    Fluzone  High-Dose 65+yrs 2020, 10/22/2022, 2023    Fluzone Quad >6mos (Multi-dose) 2020    PEDS-Pneumococcal Conjugate (PCV7) 2022    Pneumococcal Conjugate 13-Valent (PCV13) 10/01/2015    Pneumococcal Conjugate 20-Valent (PCV20) 2022     Social History:   Social History     Social History Narrative    Not on file     Social History     Socioeconomic History    Marital status:    Tobacco Use    Smoking status: Former     Types: Cigars    Smokeless tobacco: Former     Types: Chew    Tobacco comments:     FOR A SHORT TIME AS A YOUNG ADULT   Vaping Use    Vaping status: Never Used   Substance and Sexual Activity    Alcohol use: Yes     Comment: RARE USE    Drug use: Never    Sexual activity: Defer       Family History:  Family History   Problem Relation Age of Onset    Cardiomyopathy Mother     Hypertension Mother     Hyperlipidemia Sister     Hypertension Sister     Coronary artery disease Brother     Hyperlipidemia Brother     Hypertension Brother     No Known Problems Father     Malig Hyperthermia Neg Hx         Review of Systems  See history of present illness and past medical history.  Patient denies headache, dizziness, syncope, falls, trauma, change in vision, change in hearing, change in taste, changes in weight, changes in appetite, focal weakness, numbness, or paresthesia.  Patient denies chest pain, palpitations, dyspnea, orthopnea, PND, cough, sinus pressure, rhinorrhea, epistaxis, hemoptysis, nausea, vomiting,hematemesis, diarrhea, constipation or hematochezia.  Denies cold or heat intolerance, polydipsia, polyuria, polyphagia. Denies hematuria, pyuria, dysuria, hesitancy, frequency or urgency. Denies consumption of raw and under cooked meats foods or change in water source.  Denies fever, chills, sweats, night sweats.  Denies missing any routine medications. Remainder of ROS is negative.    Objective:  T Max 24 hrs: Temp (24hrs), Av.2 °F (37.3 °C), Min:97.9 °F (36.6 °C),  "Max:100.2 °F (37.9 °C)    Vitals Ranges:   Temp:  [97.9 °F (36.6 °C)-100.2 °F (37.9 °C)] 97.9 °F (36.6 °C)  Heart Rate:  [74-84] 84  Resp:  [20] 20  BP: (109-162)/(60-84) 109/84      Exam:  /84 (BP Location: Left arm, Patient Position: Sitting)   Pulse 84   Temp 97.9 °F (36.6 °C) (Oral)   Resp 20   Ht 175.3 cm (69\")   Wt 112 kg (246 lb 4.1 oz)   SpO2 92%   BMI 36.37 kg/m²     General Appearance:    Alert, cooperative, no distress, appears stated age   Head:    Normocephalic, without obvious abnormality, atraumatic   Eyes:    PERRL, conjunctivae/corneas clear, EOM's intact, both eyes   Ears:    Normal external ear canals, both ears   Nose:   Nares normal, septum midline, mucosa normal, no drainage    or sinus tenderness   Throat:   Lips, mucosa, and tongue normal   Neck:   Supple, symmetrical, trachea midline, no adenopathy;     thyroid:  no enlargement/tenderness/nodules; no carotid    bruit or JVD   Back:     Symmetric, no curvature, ROM normal, no CVA tenderness   Lungs:     Wheezes, rhonchi bilaterally, respirations unlabored   Chest Wall:    No tenderness or deformity    Heart:    Regular rate and rhythm, S1 and S2 normal, no murmur, rub   or gallop   Abdomen:     Soft, nontender, bowel sounds active all four quadrants,     no masses, no hepatomegaly, no splenomegaly   Extremities:   Extremities normal, atraumatic, no cyanosis or edema                       .    Data Review:  Labs in chart were reviewed.  WBC   Date Value Ref Range Status   03/17/2024 18.08 (H) 3.40 - 10.80 10*3/mm3 Final     Hemoglobin   Date Value Ref Range Status   03/17/2024 12.3 (L) 13.0 - 17.7 g/dL Final     Hematocrit   Date Value Ref Range Status   03/17/2024 36.8 (L) 37.5 - 51.0 % Final     Platelets   Date Value Ref Range Status   03/17/2024 346 140 - 450 10*3/mm3 Final     Sodium   Date Value Ref Range Status   03/17/2024 135 (L) 136 - 145 mmol/L Final     Potassium   Date Value Ref Range Status   03/17/2024 5.0 3.5 - 5.2 " mmol/L Final     Comment:     Slight hemolysis detected by analyzer. Result may be falsely elevated.     Chloride   Date Value Ref Range Status   03/17/2024 105 98 - 107 mmol/L Final     CO2   Date Value Ref Range Status   03/17/2024 18.8 (L) 22.0 - 29.0 mmol/L Final     BUN   Date Value Ref Range Status   03/17/2024 31 (H) 8 - 23 mg/dL Final     Creatinine   Date Value Ref Range Status   03/17/2024 1.64 (H) 0.76 - 1.27 mg/dL Final     Glucose   Date Value Ref Range Status   03/17/2024 128 (H) 65 - 99 mg/dL Final     Calcium   Date Value Ref Range Status   03/17/2024 8.2 (L) 8.6 - 10.5 mg/dL Final                Imaging Results (All)       Procedure Component Value Units Date/Time    XR Chest 1 View [[id number]] Collected: 03/17/24 1506     Updated: 03/17/24 1511    Narrative:      XR CHEST 1 VW-        INDICATION: Fever, cough     COMPARISON: Chest radiograph May 5, 2022     TECHNIQUE: 1 view chest     FINDINGS:      Poorly defined right lung base opacities. Ill-defined left perihilar  opacities. Trace fluid in the minor fissure. Stable mediastinum.  Elevated right hemidiaphragm.       Impression:         1. Poorly defined right lung base and left perihilar opacities. Possible  multifocal pneumonia. Follow-up to resolution.  2. Trace fluid in the minor fissure.     This report was finalized on 3/17/2024 3:08 PM by Dr. Denilson Campbell M.D on Workstation: [id number]                 Assessment:  Active Hospital Problems    Diagnosis  POA    **Pneumonia [J18.9]  Yes      Resolved Hospital Problems   No resolved problems to display.   Acute hypoxic respiratory failure  Hyperlipidemia  Hyperglycemia  Ckd3  obesity    Plan:  Will continue antibiotics, steroids mininebs  Monitor on telemetry  Wean oxygen as tolerated  Troponin is trending down  Monitor blood sugar  Dw patient, daughter and ed provider    Daiana Walls MD  3/17/2024  19:39 EDT

## 2024-03-18 ENCOUNTER — TELEPHONE (OUTPATIENT)
Dept: INTERNAL MEDICINE | Facility: CLINIC | Age: 79
End: 2024-03-18
Payer: MEDICARE

## 2024-03-18 PROBLEM — N17.9 AKI (ACUTE KIDNEY INJURY): Status: ACTIVE | Noted: 2024-03-18

## 2024-03-18 LAB
ANION GAP SERPL CALCULATED.3IONS-SCNC: 14.4 MMOL/L (ref 5–15)
BUN SERPL-MCNC: 25 MG/DL (ref 8–23)
BUN/CREAT SERPL: 19.1 (ref 7–25)
CALCIUM SPEC-SCNC: 8.2 MG/DL (ref 8.6–10.5)
CHLORIDE SERPL-SCNC: 106 MMOL/L (ref 98–107)
CO2 SERPL-SCNC: 18.6 MMOL/L (ref 22–29)
CREAT SERPL-MCNC: 1.31 MG/DL (ref 0.76–1.27)
DEPRECATED RDW RBC AUTO: 43.1 FL (ref 37–54)
EGFRCR SERPLBLD CKD-EPI 2021: 55.7 ML/MIN/1.73
ERYTHROCYTE [DISTWIDTH] IN BLOOD BY AUTOMATED COUNT: 11.9 % (ref 12.3–15.4)
GLUCOSE SERPL-MCNC: 148 MG/DL (ref 65–99)
HCT VFR BLD AUTO: 37.1 % (ref 37.5–51)
HGB BLD-MCNC: 12.3 G/DL (ref 13–17.7)
L PNEUMO1 AG UR QL IA: NEGATIVE
MCH RBC QN AUTO: 33 PG (ref 26.6–33)
MCHC RBC AUTO-ENTMCNC: 33.2 G/DL (ref 31.5–35.7)
MCV RBC AUTO: 99.5 FL (ref 79–97)
MRSA DNA SPEC QL NAA+PROBE: NORMAL
PLATELET # BLD AUTO: 321 10*3/MM3 (ref 140–450)
PMV BLD AUTO: 10.2 FL (ref 6–12)
POTASSIUM SERPL-SCNC: 4.6 MMOL/L (ref 3.5–5.2)
RBC # BLD AUTO: 3.73 10*6/MM3 (ref 4.14–5.8)
S PNEUM AG SPEC QL LA: NEGATIVE
SODIUM SERPL-SCNC: 139 MMOL/L (ref 136–145)
WBC NRBC COR # BLD AUTO: 20.48 10*3/MM3 (ref 3.4–10.8)

## 2024-03-18 PROCEDURE — 94799 UNLISTED PULMONARY SVC/PX: CPT

## 2024-03-18 PROCEDURE — 25810000003 SODIUM CHLORIDE 0.9 % SOLUTION 250 ML FLEX CONT: Performed by: INTERNAL MEDICINE

## 2024-03-18 PROCEDURE — 87641 MR-STAPH DNA AMP PROBE: CPT | Performed by: STUDENT IN AN ORGANIZED HEALTH CARE EDUCATION/TRAINING PROGRAM

## 2024-03-18 PROCEDURE — 97530 THERAPEUTIC ACTIVITIES: CPT

## 2024-03-18 PROCEDURE — 87449 NOS EACH ORGANISM AG IA: CPT | Performed by: STUDENT IN AN ORGANIZED HEALTH CARE EDUCATION/TRAINING PROGRAM

## 2024-03-18 PROCEDURE — 25010000002 METHYLPREDNISOLONE PER 40 MG: Performed by: INTERNAL MEDICINE

## 2024-03-18 PROCEDURE — 87899 AGENT NOS ASSAY W/OPTIC: CPT | Performed by: STUDENT IN AN ORGANIZED HEALTH CARE EDUCATION/TRAINING PROGRAM

## 2024-03-18 PROCEDURE — 25010000002 AZITHROMYCIN PER 500 MG: Performed by: INTERNAL MEDICINE

## 2024-03-18 PROCEDURE — 94664 DEMO&/EVAL PT USE INHALER: CPT

## 2024-03-18 PROCEDURE — 80048 BASIC METABOLIC PNL TOTAL CA: CPT | Performed by: INTERNAL MEDICINE

## 2024-03-18 PROCEDURE — 94761 N-INVAS EAR/PLS OXIMETRY MLT: CPT

## 2024-03-18 PROCEDURE — 25010000002 ENOXAPARIN PER 10 MG: Performed by: STUDENT IN AN ORGANIZED HEALTH CARE EDUCATION/TRAINING PROGRAM

## 2024-03-18 PROCEDURE — 85027 COMPLETE CBC AUTOMATED: CPT | Performed by: INTERNAL MEDICINE

## 2024-03-18 PROCEDURE — 97162 PT EVAL MOD COMPLEX 30 MIN: CPT

## 2024-03-18 RX ORDER — ENOXAPARIN SODIUM 100 MG/ML
40 INJECTION SUBCUTANEOUS EVERY 24 HOURS
Status: DISCONTINUED | OUTPATIENT
Start: 2024-03-18 | End: 2024-03-21 | Stop reason: HOSPADM

## 2024-03-18 RX ADMIN — OXCARBAZEPINE 300 MG: 300 TABLET, FILM COATED ORAL at 08:00

## 2024-03-18 RX ADMIN — IPRATROPIUM BROMIDE AND ALBUTEROL SULFATE 3 ML: 2.5; .5 SOLUTION RESPIRATORY (INHALATION) at 15:15

## 2024-03-18 RX ADMIN — Medication 1 TABLET: at 08:00

## 2024-03-18 RX ADMIN — HYDRALAZINE HYDROCHLORIDE 100 MG: 50 TABLET ORAL at 17:02

## 2024-03-18 RX ADMIN — ROSUVASTATIN CALCIUM 20 MG: 20 TABLET, FILM COATED ORAL at 08:00

## 2024-03-18 RX ADMIN — TERAZOSIN 2 MG: 2 CAPSULE ORAL at 20:25

## 2024-03-18 RX ADMIN — OXCARBAZEPINE 300 MG: 300 TABLET, FILM COATED ORAL at 14:19

## 2024-03-18 RX ADMIN — OXCARBAZEPINE 300 MG: 300 TABLET, FILM COATED ORAL at 20:25

## 2024-03-18 RX ADMIN — METHYLPREDNISOLONE SODIUM SUCCINATE 40 MG: 40 INJECTION, POWDER, LYOPHILIZED, FOR SOLUTION INTRAMUSCULAR; INTRAVENOUS at 11:54

## 2024-03-18 RX ADMIN — HYDRALAZINE HYDROCHLORIDE 100 MG: 50 TABLET ORAL at 20:25

## 2024-03-18 RX ADMIN — CARVEDILOL 25 MG: 25 TABLET, FILM COATED ORAL at 17:02

## 2024-03-18 RX ADMIN — LEVOTHYROXINE SODIUM 112 MCG: 112 TABLET ORAL at 08:00

## 2024-03-18 RX ADMIN — AZITHROMYCIN MONOHYDRATE 500 MG: 500 INJECTION, POWDER, LYOPHILIZED, FOR SOLUTION INTRAVENOUS at 00:58

## 2024-03-18 RX ADMIN — IPRATROPIUM BROMIDE AND ALBUTEROL SULFATE 3 ML: 2.5; .5 SOLUTION RESPIRATORY (INHALATION) at 19:10

## 2024-03-18 RX ADMIN — OXCARBAZEPINE 300 MG: 300 TABLET, FILM COATED ORAL at 02:02

## 2024-03-18 RX ADMIN — HYDRALAZINE HYDROCHLORIDE 100 MG: 50 TABLET ORAL at 08:00

## 2024-03-18 RX ADMIN — ENOXAPARIN SODIUM 40 MG: 100 INJECTION SUBCUTANEOUS at 11:53

## 2024-03-18 RX ADMIN — METHYLPREDNISOLONE SODIUM SUCCINATE 40 MG: 40 INJECTION, POWDER, LYOPHILIZED, FOR SOLUTION INTRAMUSCULAR; INTRAVENOUS at 20:25

## 2024-03-18 RX ADMIN — Medication 1000 MCG: at 08:00

## 2024-03-18 RX ADMIN — METHYLPREDNISOLONE SODIUM SUCCINATE 40 MG: 40 INJECTION, POWDER, LYOPHILIZED, FOR SOLUTION INTRAMUSCULAR; INTRAVENOUS at 04:52

## 2024-03-18 RX ADMIN — CARVEDILOL 25 MG: 25 TABLET, FILM COATED ORAL at 08:00

## 2024-03-18 NOTE — DISCHARGE PLACEMENT REQUEST
"Jeremi Ugarte (78 y.o. Male)       Date of Birth   1945    Social Security Number       Address   [address] [place] [address]    Home Phone   [phone]    MRN   [id number]       Advent   [place]    Marital Status                               Admission Date   3/17/24    Admission Type   Emergency    Admitting Provider   Daiana Walls MD    Attending Provider   Sandip Staton MD    Department, Room/Bed   [address], [address]541/1       Discharge Date       Discharge Disposition       Discharge Destination                                 Attending Provider: Sandip Staton MD    Allergies: Vancomycin, Morphine, Oxycodone-acetaminophen, Dilaudid [Hydromorphone Hcl]    Isolation: None   Infection: None   Code Status: CPR    Ht: 175.3 cm (69\")   Wt: 112 kg (246 lb 4.1 oz)    Admission Cmt: None   Principal Problem: Pneumonia [J18.9]                   Active Insurance as of 3/17/2024       Primary Coverage       Payor Plan Insurance Group Employer/Plan Group    MEDICARE MEDICARE A & B        Payor Plan Address Payor Plan Phone Number Payor Plan Fax Number Effective Dates    [address] [phone]  4/1/1992 - None Entered    [facility] [id number]         Subscriber Name Subscriber Birth Date Member ID       JEREMI UGARTE 1945 [id number]               Secondary Coverage       Payor Plan Insurance Group Employer/Plan Group    [employer] SUP AAR HEALTH CARE OPTIONS        Payor Plan Address Payor Plan Phone Number Payor Plan Fax Number Effective Dates    [facility] [phone]  1/1/2022 - None Entered    [address]       [facility] [id number]         Subscriber Name Subscriber Birth Date Member ID       JEREMI UGARTE 1945 [id number]                     Emergency Contacts        (Rel.) Home Phone Work Phone Mobile Phone    Vidya Ugarte (Spouse) [phone] -- --    ANDREI KOROMA (Daughter) -- -- [phone]      "

## 2024-03-18 NOTE — CONSULTS
Pt alert, in chair, welcomed visit. Daughter, Jo, at bedside. Pt stated he is feeling much better after being admitted; stated his wife is also patient here for pneumonia (Vidya Ugarte, [address]). Doctor entered for consult; chp excused himself. Chp will attempt follow-up spiritual care visit later today if possible. Pastoral Care Office remains available if requested.    Doxycycline Counseling:  Patient counseled regarding possible photosensitivity and increased risk for sunburn.  Patient instructed to avoid sunlight, if possible.  When exposed to sunlight, patients should wear protective clothing, sunglasses, and sunscreen.  The patient was instructed to call the office immediately if the following severe adverse effects occur:  hearing changes, easy bruising/bleeding, severe headache, or vision changes.  The patient verbalized understanding of the proper use and possible adverse effects of doxycycline.  All of the patient's questions and concerns were addressed.

## 2024-03-18 NOTE — TELEPHONE ENCOUNTER
Caller: ESTEVAN    Relationship: [facility]    Best call back number:     What orders are you requesting (i.e. lab or imaging): SKILLED NURSING AND PHYSICAL THERAPY    In what timeframe would the patient need to come in:     Where will you receive your lab/imaging services:     Additional notes: ESTEVAN WITH [facility] IS CALLING IN STATING THAT THE PATIENT WILL BE RELEASED FROM [facility] ON 03/19/24 OR 03/20/24 AND SHE NEEDS THESE ORDERS FOR HIM.  SHE SAYS THAT HE MAY NEED OXYGEN ALSO.

## 2024-03-18 NOTE — PLAN OF CARE
Goal Outcome Evaluation:  Plan of Care Reviewed With: daughter, patient        Progress: improving  Outcome Evaluation: Pt seen for PT dede this afternoon. He was admitted to [facility] yesterday w PNA. At baseline, pt lives at home w his wife and reports independence w mobility. His wife does assist w some ADLS at times. He has cane and walker if needed. Today, pt doing well. He is sitting up in chair upon entry to room. Pt moving quite well. He is able to stand w supervision/SBA. He ambulated approx 160 ft w SBA/CGA using Rwx. No unsteadiness noted. Pt does exhibit slight forward posture. Pt slightly SOA at end of activity requiring cues for PLB. Pt's O2 sats >89% w ambulation. He returned to mid 90's once seated back in chair.  Pt plans home at [place]. Recommend HHPT and possibly HHOT.  He is currently near baseline and encouraged to ambulate w nsg and family as able. Pt/daughter verbalize understanding. PT will sign off at this time.      Anticipated Discharge Disposition (PT): home with assist, home with home health

## 2024-03-18 NOTE — CASE MANAGEMENT/SOCIAL WORK
Discharge Planning Assessment  [facility]     Patient Name: Jeremi Ugarte  MRN: [id number]  Today's Date: 3/18/2024    Admit Date: 3/17/2024    Plan: Home with wife and family with [facility] HH referral pending   Discharge Needs Assessment       Row Name 03/18/24 1500       Living Environment    People in Home spouse    Current Living Arrangements home    Potentially Unsafe Housing Conditions none    Primary Care Provided by self    Provides Primary Care For no one    Family Caregiver if Needed spouse;child(rich), adult    Quality of Family Relationships helpful;involved;supportive    Able to Return to Prior Arrangements yes       Resource/Environmental Concerns    Resource/Environmental Concerns home accessibility    Home Accessibility Concerns stairs to enter home    Transportation Concerns none       Transition Planning    Patient/Family Anticipates Transition to home with family;home with help/services    Patient/Family Anticipated Services at Transition home health care;durable medical equipment    Transportation Anticipated family or friend will provide       Discharge Needs Assessment    Readmission Within the Last 30 Days no previous admission in last 30 days    Equipment Currently Used at Home other (see comments)    Concerns to be Addressed discharge planning;adjustment to diagnosis/illness    Anticipated Changes Related to Illness inability to care for someone else;other (see comments)    Equipment Needed After Discharge oxygen    Discharge Facility/Level of Care Needs home with home health    Current Discharge Risk chronically ill;lack of support system/caregiver                   Discharge Plan       Row Name 03/18/24 1445       Plan    Plan Home with wife and family with [facility] HH referral pending    Roadmap to Recovery Yes    Patient/Family in Agreement with Plan yes    Plan Comments CCP spoke with pt at bedside, introduced self and explained CCP role. Verified facesheet and confirmed local pharmacy is  [facility] [address]. Pt denies problems with medication costs. Pt states he has POA document but not on file. PCP is Dr. Charity Kearney. Pt lives at home with wife Vidya in a home with 3-4 steps with railings to enter.  Pt states his dtr Jo is in from [facility] to assist them with their care needs. They also have a son that lives in [place], [place]. Prior to admission pt is IADL with mobility and driving. Pt does have 2 canes, walker, wc available at the house if needed. Pt currently on Oxygen but does not use. If needed at dc, he denies DME preference. CCP discussed will follow for walking ox test. Pt has no SNF or HH hx. Discussed dc planning and his wife is being discharged today from hospital and going to have [facility] HH. He states that he and his dtr would also like to have [facility] HH. CCP explained would make referral and follow for additional dc needs. Referral to [facility], follow for home o2. Brian COBB/CCP                  Continued Care and Services - Admitted Since 3/17/2024       Home Medical Care       Service Provider Request Status Selected Services Address Phone Fax Patient Preferred    [facility]  [facility] [address] [facility] [address] [address] [phone] --                  Expected Discharge Date and Time       Expected Discharge Date Expected Discharge Time    Mar 20, 2024            Demographic Summary       Row Name 03/18/24 1458       General Information    Admission Type inpatient    Referral Source admission list    Reason for Consult decision-making;discharge planning    Preferred Language English       Contact Information    Permission Granted to Share Info With facility ;family/designee                   Functional Status       Row Name 03/18/24 1459       Functional Status    Usual Activity Tolerance good    Current Activity Tolerance moderate       Functional Status, IADL    Medications independent    Meal  Preparation independent    Housekeeping independent    Laundry independent    Shopping independent       Mental Status    General Appearance WDL WDL       Mental Status Summary    Recent Changes in Mental Status/Cognitive Functioning no changes       Employment/    Employment Status retired                   Psychosocial    No documentation.                  Abuse/Neglect    No documentation.                  Legal    No documentation.                  Substance Abuse    No documentation.                  Patient Forms    No documentation.                     Becky Fitzgerald RN

## 2024-03-18 NOTE — PLAN OF CARE
Goal Outcome Evaluation:              Outcome Evaluation: Pt aox4, Vss on 2L nc, no c/o pain, will CTM the rest of my shift

## 2024-03-18 NOTE — ED PROVIDER NOTES
EMERGENCY DEPARTMENT ENCOUNTER  Room Number:  29  PCP: Charity Kearney MD  Independent Historians: Patient, family      HPI:  Chief Complaint: had concerns including Flu Symptoms.     A complete HPI/ROS/PMH/PSH/SH/FH are unobtainable due to: Nothing      Context: Jeremi Ugarte is a 78 y.o. male with a medical history of hypertension, hyperlipidemia who presents to the ED c/o acute generalized weakness.  He reports that he has had a cough for the last several days.  He denies fever.  He has had some mild shortness of breath at times.  No nausea or vomiting.  No chest pain.  He called his PCP yesterday who had prescribed him a Medrol Dosepak and cough medicine.  Today he was too weak to even get around his home.  His wife is currently hospitalized due to human metapneumovirus.  He denies home oxygen use or history of COPD.      Review of prior external notes (non-ED) -and- Review of prior external test results outside of this encounter: I reviewed PCP progress note from yesterday patient was prescribed Medrol Dosepak for his cough.        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    Cluster headaches 09/27/2021    Status post revision of total replacement of left knee 11/16/2021    Hypercholesterolemia 03/15/2022    Sprain of right knee 03/22/2022    Hyponatremia 03/22/2022    Quadriceps tendon rupture, right, sequela 05/05/2022    Hypothyroid 06/07/2022    Closed dislocation of patella, left, subsequent encounter 01/05/2023    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    Trigeminal neuralgia      Resolved Ambulatory Problems     Diagnosis Date Noted    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  07/26/2020    Instability of internal right knee prosthesis 07/27/2020    Hypertension 09/27/2021    Mechanical loosening of internal left knee prosthetic joint 11/15/2021    Osteoarthritis of knee 11/15/2021    Quadriceps tendon rupture, right, initial encounter 03/25/2022    Mechanical loosening of internal left knee prosthetic joint 05/03/2022    Osteoarthritis, knee 05/05/2022    Acute posthemorrhagic anemia 05/18/2022    Leukocytosis 05/18/2022     Past Medical History:   Diagnosis Date    Ankle arthritis     Arthritis     Chronic headaches     Dislocation of the knee cap     GCA (giant cell arteritis)     Gout of foot     High risk medication use     History of infection     Hyperlipidemia     Left knee pain     Macrocytosis     Myositis     Polymyalgia rheumatica     PONV (postoperative nausea and vomiting)     Renal insufficiency     Right bundle branch block (RBBB)     S/p bilateral revision of total hip replacement     S/P foot surgery     S/P revision of total knee, left     S/P revision of total knee, right          PAST SURGICAL HISTORY  Past Surgical History:   Procedure Laterality Date    ANKLE ARTHROPLASTY Left 2018    BACK SURGERY      lower back surg    BRAIN SURGERY      CATARACT EXTRACTION      COLONOSCOPY      CRANIOTOMY Right 02/14/2019    Dr. Aldridge, for trigeminal neuralgia    EXCISION BAKERS CYST KNEE  2008    HAND SURGERY Left 1971    Left hand and wrist 1971    HIP SURGERY Right 12/08/2016    total right hip replacement    KNEE ARTHROPLASTY Bilateral     LEFT - 1996  -  RIGHT -  1999     QUADRICEPS TENDON REPAIR Right 03/26/2022    Procedure: RIGHT QUADRICEPS TENDON REPAIR;  Surgeon: Keaton Martinez II, MD;  [facility];  Service: Orthopedics;  Laterality: Right;    SHOULDER SURGERY      TOTAL HIP ARTHROPLASTY Left 2017    TOTAL KNEE ARTHROPLASTY REVISION  2009    left    TOTAL KNEE ARTHROPLASTY REVISION Right 09/15/2020    Procedure: RIGHT TOTAL KNEE ARTHROPLASTY  REVISION;  Surgeon: Patricia Caldera MD;  Location: [facility] OR;  Service: Orthopedics;  Laterality: Right;    TOTAL KNEE ARTHROPLASTY REVISION Left 11/16/2021    Procedure: TOTAL KNEE ARTHROPLASTY REVISION;  Surgeon: Patricia Caldera MD;  Location: [facility] OR;  Service: Orthopedics;  Laterality: Left;    TOTAL KNEE ARTHROPLASTY REVISION Right 05/17/2022    Procedure: REVISION RIGHT TOTAL KNEE ARTHROPLASTY WITH EXTENSOR MECHANISM ALLOGRAFT/;  Surgeon: Patricia Caldera MD;  Location: [facility] OR;  Service: Orthopedics;  Laterality: Right;    TOTAL KNEE ARTHROPLASTY REVISION Left 1/16/2023    Procedure: TOTAL KNEE ARTHROPLASTY REVISION repair extensor mechanism ;  Surgeon: Patricia Caldera MD;  Location: [facility];  Service: Orthopedics;  Laterality: Left;    WRIST SURGERY           FAMILY HISTORY  Family History   Problem Relation Age of Onset    Cardiomyopathy Mother     Hypertension Mother     Hyperlipidemia Sister     Hypertension Sister     Coronary artery disease Brother     Hyperlipidemia Brother     Hypertension Brother     No Known Problems Father     Malig Hyperthermia Neg Hx          SOCIAL HISTORY  Social History     Socioeconomic History    Marital status:    Tobacco Use    Smoking status: Former     Types: Cigars    Smokeless tobacco: Former     Types: Chew    Tobacco comments:     FOR A SHORT TIME AS A YOUNG ADULT   Vaping Use    Vaping status: Never Used   Substance and Sexual Activity    Alcohol use: Yes     Comment: RARE USE    Drug use: Never    Sexual activity: Defer         ALLERGIES  Vancomycin, Morphine, Oxycodone-acetaminophen, and Dilaudid [hydromorphone hcl]      REVIEW OF SYSTEMS  Review of all 14 systems is negative other than stated in the HPI above.      PHYSICAL EXAM    I have reviewed the triage vital signs and nursing notes.    ED Triage Vitals   Temp Heart Rate Resp BP SpO2   03/17/24 1426 03/17/24 1426 03/17/24 1426 03/17/24 1431  03/17/24 1426   99.6 °F (37.6 °C) 79 20 135/68 90 %      Temp src Heart Rate Source Patient Position BP Location FiO2 (%)   03/17/24 1731 03/17/24 1841 03/17/24 1841 03/17/24 1841 --   Tympanic Monitor Sitting Left arm          GENERAL: awake and alert, no acute distress  HENT: Normocephalic, atraumatic  EYES: no scleral icterus  CV: regular rhythm, regular rate  RESPIRATORY: normal effort, mild rhonchi present bilaterally  ABDOMEN: soft, nontender throughout  MUSCULOSKELETAL: no deformity  NEURO: alert, moves all extremities, follows commands, cranial nerves II through XII grossly intact with speech fluent and clear  PSYCH: calm, cooperative  SKIN: Warm, dry          LAB RESULTS  Recent Results (from the past 24 hour(s))   Respiratory Panel PCR w/COVID-19(SARS-CoV-2) GABBY/DEBO/ESTER/PAD/COR/ALMA In-House, NP Swab in UTM/VTM, 2 HR TAT - Swab, Nasopharynx    Collection Time: 03/17/24  2:43 PM    Specimen: Nasopharynx; Swab   Result Value Ref Range    ADENOVIRUS, PCR Not Detected Not Detected    Coronavirus 229E Not Detected Not Detected    Coronavirus HKU1 Not Detected Not Detected    Coronavirus NL63 Not Detected Not Detected    Coronavirus OC43 Not Detected Not Detected    COVID19 Not Detected Not Detected - Ref. Range    Human Metapneumovirus Not Detected Not Detected    Human Rhinovirus/Enterovirus Not Detected Not Detected    Influenza A PCR Not Detected Not Detected    Influenza B PCR Not Detected Not Detected    Parainfluenza Virus 1 Not Detected Not Detected    Parainfluenza Virus 2 Not Detected Not Detected    Parainfluenza Virus 3 Not Detected Not Detected    Parainfluenza Virus 4 Not Detected Not Detected    RSV, PCR Not Detected Not Detected    Bordetella pertussis pcr Not Detected Not Detected    Bordetella parapertussis PCR Not Detected Not Detected    Chlamydophila pneumoniae PCR Not Detected Not Detected    Mycoplasma pneumo by PCR Not Detected Not Detected   Comprehensive Metabolic Panel    Collection  Time: 03/17/24  2:58 PM    Specimen: Blood   Result Value Ref Range    Glucose 128 (H) 65 - 99 mg/dL    BUN 31 (H) 8 - 23 mg/dL    Creatinine 1.64 (H) 0.76 - 1.27 mg/dL    Sodium 135 (L) 136 - 145 mmol/L    Potassium 5.0 3.5 - 5.2 mmol/L    Chloride 105 98 - 107 mmol/L    CO2 18.8 (L) 22.0 - 29.0 mmol/L    Calcium 8.2 (L) 8.6 - 10.5 mg/dL    Total Protein 6.5 6.0 - 8.5 g/dL    Albumin 3.4 (L) 3.5 - 5.2 g/dL    ALT (SGPT) 65 (H) 1 - 41 U/L    AST (SGOT) 71 (H) 1 - 40 U/L    Alkaline Phosphatase 130 (H) 39 - 117 U/L    Total Bilirubin 0.4 0.0 - 1.2 mg/dL    Globulin 3.1 gm/dL    A/G Ratio 1.1 g/dL    BUN/Creatinine Ratio 18.9 7.0 - 25.0    Anion Gap 11.2 5.0 - 15.0 mmol/L    eGFR 42.5 (L) >60.0 mL/min/1.73   BNP    Collection Time: 03/17/24  2:58 PM    Specimen: Blood   Result Value Ref Range    proBNP 1,226.0 0.0 - 1,800.0 pg/mL   High Sensitivity Troponin T    Collection Time: 03/17/24  2:58 PM    Specimen: Blood   Result Value Ref Range    HS Troponin T 30 (H) <22 ng/L   CBC Auto Differential    Collection Time: 03/17/24  2:58 PM    Specimen: Blood   Result Value Ref Range    WBC 18.08 (H) 3.40 - 10.80 10*3/mm3    RBC 3.87 (L) 4.14 - 5.80 10*6/mm3    Hemoglobin 12.3 (L) 13.0 - 17.7 g/dL    Hematocrit 36.8 (L) 37.5 - 51.0 %    MCV 95.1 79.0 - 97.0 fL    MCH 31.8 26.6 - 33.0 pg    MCHC 33.4 31.5 - 35.7 g/dL    RDW 11.9 (L) 12.3 - 15.4 %    RDW-SD 41.2 37.0 - 54.0 fl    MPV 10.0 6.0 - 12.0 fL    Platelets 346 140 - 450 10*3/mm3    Neutrophil % 86.5 (H) 42.7 - 76.0 %    Lymphocyte % 6.3 (L) 19.6 - 45.3 %    Monocyte % 6.4 5.0 - 12.0 %    Eosinophil % 0.0 (L) 0.3 - 6.2 %    Basophil % 0.1 0.0 - 1.5 %    Immature Grans % 0.7 (H) 0.0 - 0.5 %    Neutrophils, Absolute 15.65 (H) 1.70 - 7.00 10*3/mm3    Lymphocytes, Absolute 1.13 0.70 - 3.10 10*3/mm3    Monocytes, Absolute 1.16 (H) 0.10 - 0.90 10*3/mm3    Eosinophils, Absolute 0.00 0.00 - 0.40 10*3/mm3    Basophils, Absolute 0.02 0.00 - 0.20 10*3/mm3    Immature Grans,  Absolute 0.12 (H) 0.00 - 0.05 10*3/mm3    nRBC 0.0 0.0 - 0.2 /100 WBC   High Sensitivity Troponin T 2Hr    Collection Time: 03/17/24  5:45 PM    Specimen: Blood   Result Value Ref Range    HS Troponin T 27 (H) <22 ng/L    Troponin T Delta -3 >=-4 - <+4 ng/L   Lactic Acid, Plasma    Collection Time: 03/17/24  5:45 PM    Specimen: Blood   Result Value Ref Range    Lactate 0.8 0.5 - 2.0 mmol/L       The above labs were ordered by me and independently reviewed by me.     RADIOLOGY  XR Chest 1 View    Result Date: 3/17/2024  XR CHEST 1 VW-   INDICATION: Fever, cough  COMPARISON: Chest radiograph May 5, 2022  TECHNIQUE: 1 view chest  FINDINGS:  Poorly defined right lung base opacities. Ill-defined left perihilar opacities. Trace fluid in the minor fissure. Stable mediastinum. Elevated right hemidiaphragm.       1. Poorly defined right lung base and left perihilar opacities. Possible multifocal pneumonia. Follow-up to resolution. 2. Trace fluid in the minor fissure.  This report was finalized on 3/17/2024 3:08 PM by Dr. Denilson Campbell M.D on Workstation: [id number]       The above radiology studies were ordered by me.  See ED course for independent interpretations.     MEDICATIONS GIVEN IN ER  Medications   sodium chloride 0.9 % flush 10 mL (has no administration in time range)   azithromycin (ZITHROMAX) 500 mg in sodium chloride 0.9 % 250 mL IVPB-VTB (has no administration in time range)   acetaminophen (TYLENOL) tablet 650 mg (has no administration in time range)   ondansetron ODT (ZOFRAN-ODT) disintegrating tablet 4 mg (has no administration in time range)     Or   ondansetron (ZOFRAN) injection 4 mg (has no administration in time range)   melatonin tablet 3 mg (has no administration in time range)   sennosides-docusate (PERICOLACE) 8.6-50 MG per tablet 2 tablet (has no administration in time range)     And   polyethylene glycol (MIRALAX) packet 17 g (has no administration in time range)     And   bisacodyl  (DULCOLAX) EC tablet 5 mg (has no administration in time range)     And   bisacodyl (DULCOLAX) suppository 10 mg (has no administration in time range)   vitamin B-12 (CYANOCOBALAMIN) tablet 1,000 mcg (has no administration in time range)   terazosin (HYTRIN) capsule 2 mg (2 mg Oral Given 3/17/24 2000)   rosuvastatin (CRESTOR) tablet 20 mg (20 mg Oral Not Given 3/17/24 1911)   OXcarbazepine (TRILEPTAL) tablet 300 mg (300 mg Oral Given 3/17/24 2000)   multivitamin with minerals 1 tablet (has no administration in time range)   levothyroxine (SYNTHROID, LEVOTHROID) tablet 112 mcg (112 mcg Oral Not Given 3/17/24 1911)   hydrALAZINE (APRESOLINE) tablet 100 mg (has no administration in time range)   carvedilol (COREG) tablet 25 mg (25 mg Oral Given 3/17/24 2000)   ALPRAZolam (XANAX) tablet 0.5 mg (0.5 mg Oral Given 3/17/24 2000)   methylPREDNISolone sodium succinate (SOLU-Medrol) injection 40 mg (has no administration in time range)   ipratropium-albuterol (DUO-NEB) nebulizer solution 3 mL (has no administration in time range)   albuterol (PROVENTIL) nebulizer solution 0.083% 2.5 mg/3mL (has no administration in time range)   sodium chloride 0.9 % infusion (has no administration in time range)   cefTRIAXone (ROCEPHIN) 2,000 mg in sodium chloride 0.9 % 100 mL MBP (has no administration in time range)   azithromycin (ZITHROMAX) 500 mg in sodium chloride 0.9 % 250 mL IVPB-VTB (has no administration in time range)   benzonatate (TESSALON) capsule 200 mg (has no administration in time range)   miconazole (MICOTIN) 2 % cream 1 Application (has no administration in time range)   cefTRIAXone (ROCEPHIN) 2,000 mg in sodium chloride 0.9 % 100 mL MBP (2,000 mg Intravenous New Bag 3/17/24 1754)   sodium chloride 0.9 % bolus 1,000 mL (1,000 mL Intravenous New Bag 3/17/24 1630)         ORDERS PLACED DURING THIS VISIT:  Orders Placed This Encounter   Procedures    Respiratory Panel PCR w/COVID-19(SARS-CoV-2) GABBY/DEBO/ESTER/PAD/COR/ALMA  In-House, NP Swab in UTM/VTM, 2 HR TAT - Swab, Nasopharynx    Blood Culture - Blood,    Blood Culture - Blood,    XR Chest 1 View    Comprehensive Metabolic Panel    BNP    High Sensitivity Troponin T    CBC Auto Differential    High Sensitivity Troponin T 2Hr    Lactic Acid, Plasma    Basic Metabolic Panel    CBC (No Diff)    Diet: Cardiac; Healthy Heart (2-3 Na+); Fluid Consistency: Thin (IDDSI 0)    Pulse Oximetry, Continuous    Monitor Blood Pressure    Vital Signs    Up with assistance    Daily Weights    Strict Intake & Output    Oral Care    Place Sequential Compression Device    Maintain Sequential Compression Device    Code Status and Medical Interventions:    [facility] (on-call MD unless specified) Details    Inpatient Case Management  Consult    Discontinue Patient Isolation    PT Consult: Eval & Treat Functional Mobility Below Baseline    Insert Peripheral IV    Inpatient Admission    CBC & Differential         OUTPATIENT MEDICATION MANAGEMENT:  Current Facility-Administered Medications Ordered in Epic   Medication Dose Route Frequency Provider Last Rate Last Admin    acetaminophen (TYLENOL) tablet 650 mg  650 mg Oral Q4H PRN Daiana Walls MD        albuterol (PROVENTIL) nebulizer solution 0.083% 2.5 mg/3mL  2.5 mg Nebulization Q6H PRN Daiana Walls MD        ALPRAZolam (XANAX) tablet 0.5 mg  0.5 mg Oral TID PRN Daiana Walls MD   0.5 mg at 03/17/24 2000    azithromycin (ZITHROMAX) 500 mg in sodium chloride 0.9 % 250 mL IVPB-VTB  500 mg Intravenous Once Paul Doshi MD        [START ON 3/18/2024] azithromycin (ZITHROMAX) 500 mg in sodium chloride 0.9 % 250 mL IVPB-VTB  500 mg Intravenous Q24H Daiana Walls MD        benzonatate (TESSALON) capsule 200 mg  200 mg Oral TID PRN Daiana Walls MD        sennosides-docusate (PERICOLACE) 8.6-50 MG per tablet 2 tablet  2 tablet Oral BID PRN Daiana Walls MD        And    polyethylene glycol  (MIRALAX) packet 17 g  17 g Oral Daily PRN Kavita, Daiana Bermeo MD        And    bisacodyl (DULCOLAX) EC tablet 5 mg  5 mg Oral Daily PRN Kavita, Daiana Bermeo MD        And    bisacodyl (DULCOLAX) suppository 10 mg  10 mg Rectal Daily PRN Kavita, Daiana Bermeo MD        carvedilol (COREG) tablet 25 mg  25 mg Oral BID With Meals Stingmariluz, Daiana Bermeo MD   25 mg at 03/17/24 2000    [START ON 3/18/2024] cefTRIAXone (ROCEPHIN) 2,000 mg in sodium chloride 0.9 % 100 mL MBP  2,000 mg Intravenous Q24H Daiana Walls MD        Chlorhexidine Gluconate Cloth 2 % pads   Apply externally BID Patricia Caldera MD        hydrALAZINE (APRESOLINE) tablet 100 mg  100 mg Oral TID Daiana Walls MD        ipratropium-albuterol (DUO-NEB) nebulizer solution 3 mL  3 mL Nebulization Q6H While Awake - RT Daiana Walls MD        levothyroxine (SYNTHROID, LEVOTHROID) tablet 112 mcg  112 mcg Oral Daily StingDaiana mcgraw MD        melatonin tablet 3 mg  3 mg Oral Nightly PRN Daiana Walls MD        methylPREDNISolone sodium succinate (SOLU-Medrol) injection 40 mg  40 mg Intravenous Q8H Daiana Walls MD        miconazole (MICOTIN) 2 % cream 1 Application  1 Application Topical Q12H StingDaiana mcgraw MD        [START ON 3/18/2024] multivitamin with minerals 1 tablet  1 tablet Oral QAM Daiana Walls MD        ondansetron ODT (ZOFRAN-ODT) disintegrating tablet 4 mg  4 mg Oral Q6H PRN Daiana Walls MD        Or    ondansetron (ZOFRAN) injection 4 mg  4 mg Intravenous Q6H PRN Daiana Walls MD        OXcarbazepine (TRILEPTAL) tablet 300 mg  300 mg Oral 4x Daily Daiana Walls MD   300 mg at 03/17/24 2000    rosuvastatin (CRESTOR) tablet 20 mg  20 mg Oral Daily Stingl, Daiana Bermeo MD        sodium chloride 0.9 % flush 10 mL  10 mL Intravenous PRN Paul Doshi MD        sodium chloride 0.9 % infusion  75 mL/hr Intravenous Continuous Stingl,  Daiana Bermeo MD        terazosin (HYTRIN) capsule 2 mg  2 mg Oral Nightly Daiana Walls MD   2 mg at 03/17/24 2000    vitamin B-12 (CYANOCOBALAMIN) tablet 1,000 mcg  1,000 mcg Oral Daily Daiana Walls MD         No current [facility]-ordered outpatient medications on file.         PROCEDURES  Procedures            PROGRESS, DATA ANALYSIS, CONSULTS, AND MEDICAL DECISION MAKING  All labs have been independently interpreted by me.  All radiology studies have been reviewed by me. All EKG's have been independently viewed and interpreted by me.  Discussion below represents my analysis of pertinent findings related to patient's condition, differential diagnosis, treatment plan and final disposition.    Differential diagnosis includes but is not limited to:  Pneumonia  CHF  Viral syndrome      Clinical Scores:                  ED Course as of 03/17/24 2007   Sun Mar 17, 2024   1536 Creatinine(!): 1.64 [JR]   1536 CO2(!): 18.8 [JR]   1536 ALT (SGPT)(!): 65 [JR]   1536 AST (SGOT)(!): 71 [JR]   1536 Alkaline Phosphatase(!): 130 [JR]   1537 WBC(!): 18.08 [JR]   1537 HS Troponin T(!): 30 [JR]   1537 Chest x-ray independently interpreted in PACS.  There appears to be opacity of the right lung base and to a lesser extent right midlung and left midlung.  Findings worrisome for pneumonia. [JR]   1537 Given chest x-ray suggestive of pneumonia with patient's symptoms of cough generalized weakness and mild hypoxia, I will order empiric Rocephin and azithromycin and plan for admission to the hospital.  He also has leukocytosis.  There is minimal elevation of his LFTs.  His wife is hospitalized with human metapneumovirus therefore that is the likely culprit however respiratory viral panel pending at this time. [JR]   1601 Human Metapneumovirus: Not Detected [JR]   1601 COVID19: Not Detected [JR]   1633 Discussed with Dr. Walls, [facility] hospitalist, who agrees to admit. [JR]      ED Course User Index  [JR] Paul Doshi  MD Blas             AS OF 20:07 EDT VITALS:    BP - 109/84  HR - 84  TEMP - 97.9 °F (36.6 °C) (Oral)  O2 SATS - 92%    COMPLEXITY OF CARE  The patient requires admission.      Chronic or social conditions impacting patient care (Social Determinants of Health):     DIAGNOSIS  Final diagnoses:   Pneumonia of both lungs due to infectious organism, unspecified part of lung   Sepsis, due to unspecified organism, unspecified whether acute organ dysfunction present   LENORA (acute kidney injury)           DISPOSITION  Admit      Prescription drug monitoring program review:           Please note that portions of this document were completed with a voice recognition program.    Note Disclaimer: At [facility], we believe that sharing information builds trust and better relationships. You are receiving this note because you recently visited [facility]. It is possible you will see health information before a provider has talked with you about it. This kind of information can be easy to misunderstand. To help you fully understand what it means for your health, we urge you to discuss this note with your provider.         Paul Doshi MD  03/17/24 2009

## 2024-03-18 NOTE — PROGRESS NOTES
"[facility] Clinical Pharmacy Services: Enoxaparin Consult    Jeremi Rodgersnett has a pharmacy consult to dose prophylactic enoxaparin per Dr Staton's request.     Indication: VTE Prophylaxis  Home Anticoagulation: none     Relevant clinical data and objective history reviewed:  78 y.o. male 175.3 cm (69\") 112 kg (246 lb 4.1 oz)   Body mass index is 36.37 kg/m².   Results from last 7 days   Lab Units 03/18/24  0519   PLATELETS 10*3/mm3 321     Estimated Creatinine Clearance: 57.3 mL/min (A) (by C-G formula based on SCr of 1.31 mg/dL (H)).    Assessment/Plan    Will start patient on 40mg subcutaneous every 24 hours, adjusted for renal function. Consult order will be discontinued but pharmacy will continue to follow.     Marshall Long PharmD  Clinical Pharmacist    "

## 2024-03-18 NOTE — PROGRESS NOTES
Name: Jeremi Ugarte ADMIT: 3/17/2024   : 1945  PCP: Charity Kearney MD    MRN: [id number] LOS: 1 days   AGE/SEX: 78 y.o. male  ROOM: [facility]     Subjective   Subjective   He feels much better today. We tried to get him off oxygen, but he desated to 84%. Wbc is up to 20k. Creatinine is improving       Objective   Objective   Vital Signs  Temp:  [97.9 °F (36.6 °C)-100.2 °F (37.9 °C)] 97.9 °F (36.6 °C)  Heart Rate:  [66-84] 66  Resp:  [18-20] 18  BP: (109-162)/(60-84) 137/75  SpO2:  [89 %-94 %] 93 %  on  Flow (L/min):  [2] 2;   Device (Oxygen Therapy): nasal cannula  Body mass index is 36.37 kg/m².  Physical Exam  Constitutional:       General: He is not in acute distress.     Appearance: He is not toxic-appearing.   Cardiovascular:      Rate and Rhythm: Normal rate and regular rhythm.      Heart sounds: Normal heart sounds.   Pulmonary:      Effort: Pulmonary effort is normal.      Breath sounds: Normal breath sounds.   Abdominal:      General: Bowel sounds are normal.      Palpations: Abdomen is soft.   Musculoskeletal:         General: No tenderness.      Right lower leg: No edema.      Left lower leg: No edema.   Neurological:      Mental Status: He is alert.   Psychiatric:         Mood and Affect: Mood normal.         Behavior: Behavior normal.         Results Review     I reviewed the patient's new clinical results.  Results from last 7 days   Lab Units 24  0519 24  1458   WBC 10*3/mm3 20.48* 18.08*   HEMOGLOBIN g/dL 12.3* 12.3*   PLATELETS 10*3/mm3 321 346     Results from last 7 days   Lab Units 24  0810 24  1458   SODIUM mmol/L 139 135*   POTASSIUM mmol/L 4.6 5.0   CHLORIDE mmol/L 106 105   CO2 mmol/L 18.6* 18.8*   BUN mg/dL 25* 31*   CREATININE mg/dL 1.31* 1.64*   GLUCOSE mg/dL 148* 128*   Estimated Creatinine Clearance: 57.3 mL/min (A) (by C-G formula based on SCr of 1.31 mg/dL (H)).  Results from last 7 days   Lab Units 24  1458   ALBUMIN g/dL 3.4*   BILIRUBIN  "mg/dL 0.4   ALK PHOS U/L 130*   AST (SGOT) U/L 71*   ALT (SGPT) U/L 65*     Results from last 7 days   Lab Units 03/18/24  0810 03/17/24  1458   CALCIUM mg/dL 8.2* 8.2*   ALBUMIN g/dL  --  3.4*     Results from last 7 days   Lab Units 03/17/24  1745   LACTATE mmol/L 0.8     COVID19   Date Value Ref Range Status   03/17/2024 Not Detected Not Detected - Ref. Range Final   05/21/2022 Not Detected Not Detected - Ref. Range Final   05/14/2022 Not Detected Not Detected - Ref. Range Final   03/26/2022 Not Detected Not Detected - Ref. Range Final   03/22/2022 Not Detected Not Detected - Ref. Range Final     No results found for: \"HGBA1C\", \"POCGLU\"    XR Chest 1 View  Narrative: XR CHEST 1 VW-        INDICATION: Fever, cough     COMPARISON: Chest radiograph May 5, 2022     TECHNIQUE: 1 view chest     FINDINGS:      Poorly defined right lung base opacities. Ill-defined left perihilar  opacities. Trace fluid in the minor fissure. Stable mediastinum.  Elevated right hemidiaphragm.     Impression:    1. Poorly defined right lung base and left perihilar opacities. Possible  multifocal pneumonia. Follow-up to resolution.  2. Trace fluid in the minor fissure.     This report was finalized on 3/17/2024 3:08 PM by Dr. Denilson Campbell M.D on Workstation: [id number]       Scheduled Medications  azithromycin, 500 mg, Intravenous, Q24H  carvedilol, 25 mg, Oral, BID With Meals  cefTRIAXone, 2,000 mg, Intravenous, Q24H  hydrALAZINE, 100 mg, Oral, TID  ipratropium-albuterol, 3 mL, Nebulization, Q6H While Awake - RT  levothyroxine, 112 mcg, Oral, Daily  methylPREDNISolone sodium succinate, 40 mg, Intravenous, Q8H  miconazole, 1 Application, Topical, Q12H  multivitamin with minerals, 1 tablet, Oral, QAM  OXcarbazepine, 300 mg, Oral, 4x Daily  rosuvastatin, 20 mg, Oral, Daily  terazosin, 2 mg, Oral, Nightly  vitamin B-12, 1,000 mcg, Oral, Daily    Infusions  sodium chloride, 75 mL/hr, Last Rate: 75 mL/hr (03/17/24 2029)    Diet  Diet: " Cardiac; Healthy Heart (2-3 Na+); Fluid Consistency: Thin (IDDSI 0)       Assessment/Plan     Active Hospital Problems    Diagnosis  POA    **Pneumonia [J18.9]  Yes    LENORA (acute kidney injury) [N17.9]  Yes    Hypothyroid [E03.9]  Yes    Dyslipidemia [E78.5]  Yes    Benign essential HTN [I10]  Yes    Anxiety [F41.9]  Yes    Trigeminal neuralgia of right side of face [G50.0]  Yes      Resolved Hospital Problems   No resolved problems to display.       78 y.o. male admitted with Pneumonia.    Pneumonia causing acute respiratory failure with hypoxemia-continue ceftriaxone and azithromycin. Check mrsa pcr, urine antigen, sputum culture. If wbc continues to worsen or other signs of worsening/non-resolving pneumonia ,then would proceed to chest CT  LENORA-improving with IVF. Continue holding nephrotoxic medications  Elevated LFTs-mild. monitor.  Hypertension-holding losartan for lenora. Bp looks good on coreg  Hyperlipidemia-statin.  Hypothyroidism-synthroid  BPH-terazosin   Headaches/possibly trigeminal neuralgia-trileptal   Pharmacy to dose Lovenox for DVT prophylaxis.  Full code.  Discussed with patient, family, and nursing staff.  Anticipate discharge  TBD  in 1-2 days.      Sandip Staton MD  [facility]  03/18/24  10:50 EDT    I wore protective equipment throughout this patient encounter including a face mask, gloves and protective eyewear.  Hand hygiene was performed before donning protective equipment and after removal when leaving the room.

## 2024-03-18 NOTE — PLAN OF CARE
Goal Outcome Evaluation:           Progress: improving          A-Ox4.  VSS on 0.5 liters O2.  Tried taking pt off O2 entirely but dropped down to O2 sat of 84%.  Pt took shower today.  Pt reports feeling better today.  Will CTM.

## 2024-03-18 NOTE — THERAPY EVALUATION
"Patient Name: Jeremi Ugarte  : 1945    MRN: [id number]                              Today's Date: 3/18/2024       Admit Date: 3/17/2024    Visit Dx:     ICD-10-CM ICD-9-CM   1. Pneumonia of both lungs due to infectious organism, unspecified part of lung  J18.9 483.8   2. Sepsis, due to unspecified organism, unspecified whether acute organ dysfunction present  A41.9 038.9     995.91   3. LENORA (acute kidney injury)  N17.9 584.9     Patient Active Problem List   Diagnosis    Trigeminal neuralgia of right side of face    Bifascicular block    Dyslipidemia    Benign essential HTN    Anxiety    Status post revision of total replacement of right knee    Cluster headaches    Status post revision of total replacement of left knee    Hypercholesterolemia    Sprain of right knee    Hyponatremia    Quadriceps tendon rupture, right, sequela    Hypothyroid    Closed dislocation of patella, left, subsequent encounter    Instability of internal left knee prosthesis    Trigeminal neuralgia    Pneumonia    LENORA (acute kidney injury)     Past Medical History:   Diagnosis Date    Ankle arthritis     Anxiety     Arthritis     Chronic headaches     Dislocation of the knee cap     LEFT    GCA (giant cell arteritis)     \"PT STATES MISDIAGNOSED\"    Gout of foot     High risk medication use     History of infection     AFTER KNEE REPLACEMENT PT NOT SURE WHICH ONE    Hyperlipidemia     Hypertension     Hyponatremia     HX    Hypothyroid     Left knee pain     Macrocytosis     Myositis     \"PT STATES MISDIAGNOSED\"    Polymyalgia rheumatica     PONV (postoperative nausea and vomiting)     Renal insufficiency     Right bundle branch block (RBBB)     S/p bilateral revision of total hip replacement     S/P foot surgery     S/P revision of total knee, left     S/P revision of total knee, right     Trigeminal neuralgia      Past Surgical History:   Procedure Laterality Date    ANKLE ARTHROPLASTY Left 2018    BACK SURGERY      lower back " surg    BRAIN SURGERY      CATARACT EXTRACTION      COLONOSCOPY      CRANIOTOMY Right 02/14/2019    Dr. Aldridge, for trigeminal neuralgia    EXCISION BAKERS CYST KNEE  2008    HAND SURGERY Left 1971    Left hand and wrist 1971    HIP SURGERY Right 12/08/2016    total right hip replacement    KNEE ARTHROPLASTY Bilateral     LEFT - 1996  -  RIGHT -  1999     QUADRICEPS TENDON REPAIR Right 03/26/2022    Procedure: RIGHT QUADRICEPS TENDON REPAIR;  Surgeon: Keaton Martinez II, MD;  Location: [facility] MAIN OR;  Service: Orthopedics;  Laterality: Right;    SHOULDER SURGERY      TOTAL HIP ARTHROPLASTY Left 2017    TOTAL KNEE ARTHROPLASTY REVISION  2009    left    TOTAL KNEE ARTHROPLASTY REVISION Right 09/15/2020    Procedure: RIGHT TOTAL KNEE ARTHROPLASTY REVISION;  Surgeon: Patricia Caldera MD;  Location: [facility] MAIN OR;  Service: Orthopedics;  Laterality: Right;    TOTAL KNEE ARTHROPLASTY REVISION Left 11/16/2021    Procedure: TOTAL KNEE ARTHROPLASTY REVISION;  Surgeon: Patricia Caldera MD;  Location: [facility] MAIN OR;  Service: Orthopedics;  Laterality: Left;    TOTAL KNEE ARTHROPLASTY REVISION Right 05/17/2022    Procedure: REVISION RIGHT TOTAL KNEE ARTHROPLASTY WITH EXTENSOR MECHANISM ALLOGRAFT/;  Surgeon: Patricia Caldera MD;  Location: [facility] MAIN OR;  Service: Orthopedics;  Laterality: Right;    TOTAL KNEE ARTHROPLASTY REVISION Left 1/16/2023    Procedure: TOTAL KNEE ARTHROPLASTY REVISION repair extensor mechanism ;  Surgeon: Patricia Caldera MD;  Location: [facility];  Service: Orthopedics;  Laterality: Left;    WRIST SURGERY        General Information       Row Name 03/18/24 1408          Physical Therapy Time and Intention    Document Type evaluation  -EJ     Mode of Treatment physical therapy  -EJ       Row Name 03/18/24 1408          General Information    Patient Profile Reviewed yes  -EJ     Prior Level of Function independent:;all household mobility;community  mobility;ADL's;driving;min assist:  has walker and cane if needed, wife does assist w some ADLs at times  -EJ     Existing Precautions/Restrictions no known precautions/restrictions  -EJ     Barriers to Rehab none identified  -EJ       Row Name 03/18/24 1408          Living Environment    People in Home spouse  -EJ       Row Name 03/18/24 1408          Home Main Entrance    Number of Stairs, Main Entrance four  -EJ     Stair Railings, Main Entrance railings safe and in good condition  -EJ       Row Name 03/18/24 1408          Stairs Within Home, Primary    Number of Stairs, Within Home, Primary none  -EJ       Row Name 03/18/24 1408          Cognition    Orientation Status (Cognition) oriented x 4  -EJ       Row Name 03/18/24 1408          Safety Issues, Functional Mobility    Impairments Affecting Function (Mobility) endurance/activity tolerance;strength  -EJ               User Key  (r) = Recorded By, (t) = Taken By, (c) = Cosigned By      Initials Name Provider Type    EJ Mago Melvin, PT Physical Therapist                   Mobility       Row Name 03/18/24 1408          Bed Mobility    Comment, (Bed Mobility) up in chair  -EJ       Row Name 03/18/24 1408          Sit-Stand Transfer    Sit-Stand Saline (Transfers) verbal cues;standby assist  -     Assistive Device (Sit-Stand Transfers) walker, front-wheeled  -EJ       Row Name 03/18/24 1408          Gait/Stairs (Locomotion)    Saline Level (Gait) standby assist;contact guard  -     Assistive Device (Gait) walker, front-wheeled  -EJ     Distance in Feet (Gait) 160  -EJ     Bilateral Gait Deviations forward flexed posture  -EJ     Gait Assessment/Intervention no unsteadiness noted, cues for PLB  -EJ               User Key  (r) = Recorded By, (t) = Taken By, (c) = Cosigned By      Initials Name Provider Type     Mago Melvin, PT Physical Therapist                   Obj/Interventions       Row Name 03/18/24 1409          Range of Motion  Comprehensive    General Range of Motion no range of motion deficits identified  -EJ       Row Name 03/18/24 1409          Strength Comprehensive (MMT)    Comment, General Manual Muscle Testing (MMT) Assessment generalized weakness  -EJ       Row Name 03/18/24 1409          Balance    Balance Assessment sitting static balance;standing static balance;standing dynamic balance  -EJ     Static Sitting Balance independent  -EJ     Static Standing Balance standby assist;supervision  -EJ     Dynamic Standing Balance contact guard;standby assist  -EJ     Position/Device Used, Standing Balance walker, front-wheeled  -EJ               User Key  (r) = Recorded By, (t) = Taken By, (c) = Cosigned By      Initials Name Provider Type    EJ Mago Melvin, PT Physical Therapist                   Goals/Plan    No documentation.                  Clinical Impression       Row Name 03/18/24 1409          Pain    Pretreatment Pain Rating 0/10 - no pain  -EJ     Posttreatment Pain Rating 0/10 - no pain  -EJ       Row Name 03/18/24 1409          Plan of Care Review    Plan of Care Reviewed With daughter;patient  -EJ     Progress improving  -EJ     Outcome Evaluation Pt seen for PT dede this afternoon. He was admitted to [facility] yesterday w PNA. At baseline, pt lives at home w his wife and reports independence w mobility. His wife does assist w some ADLS at times. He has cane and walker if needed. Today, pt doing well. He is sitting up in chair upon entry to room. Pt moving quite well. He is able to stand w supervision/SBA. He ambulated approx 160 ft w SBA/CGA using Rwx. No unsteadiness noted. Pt does exhibit slight forward posture. Pt slightly SOA at end of activity requiring cues for PLB. Pt's O2 sats >89% w ambulation. He returned to mid 90's once seated back in chair.  Pt plans home at [place]. Recommend HHPT and possibly HHOT.  He is currently near baseline and encouraged to ambulate w nsg and family as able. Pt/daughter verbalize  understanding. PT will sign off at this time.  -EJ       Row Name 03/18/24 1409          Therapy Assessment/Plan (PT)    Criteria for Skilled Interventions Met (PT) no problems identified which require skilled intervention  -EJ     Therapy Frequency (PT) evaluation only  -EJ       Row Name 03/18/24 1409          Positioning and Restraints    Pre-Treatment Position sitting in chair/recliner  -EJ     Post Treatment Position chair  -EJ     In Chair notified nsg;reclined;call light within reach;encouraged to call for assist;with family/caregiver  -EJ               User Key  (r) = Recorded By, (t) = Taken By, (c) = Cosigned By      Initials Name Provider Type    Mago Lujan, PT Physical Therapist                   Outcome Measures       Row Name 03/18/24 1415          How much help from another person do you currently need...    Turning from your back to your side while in flat bed without using bedrails? 4  -EJ     Moving from lying on back to sitting on the side of a flat bed without bedrails? 4  -EJ     Moving to and from a bed to a chair (including a wheelchair)? 4  -EJ     Standing up from a chair using your arms (e.g., wheelchair, bedside chair)? 4  -EJ     Climbing 3-5 steps with a railing? 3  -EJ     To walk in hospital room? 3  -EJ     AM-PAC 6 Clicks Score (PT) 22  -EJ     Highest Level of Mobility Goal 7 --> Walk 25 feet or more  -EJ               User Key  (r) = Recorded By, (t) = Taken By, (c) = Cosigned By      Initials Name Provider Type    Mago Lujan, PT Physical Therapist                                   PT Recommendation and Plan     Plan of Care Reviewed With: daughter, patient  Progress: improving  Outcome Evaluation: Pt seen for PT dede this afternoon. He was admitted to [facility] yesterday w PNA. At baseline, pt lives at home w his wife and reports independence w mobility. His wife does assist w some ADLS at times. He has cane and walker if needed. Today, pt doing well. He is sitting  up in chair upon entry to room. Pt moving quite well. He is able to stand w supervision/SBA. He ambulated approx 160 ft w SBA/CGA using Rwx. No unsteadiness noted. Pt does exhibit slight forward posture. Pt slightly SOA at end of activity requiring cues for PLB. Pt's O2 sats >89% w ambulation. He returned to mid 90's once seated back in chair.  Pt plans home at [place]. Recommend HHPT and possibly HHOT.  He is currently near baseline and encouraged to ambulate w nsg and family as able. Pt/daughter verbalize understanding. PT will sign off at this time.     Time Calculation:         PT Charges       Row Name 03/18/24 1418             Time Calculation    Start Time 1347  -EJ      Stop Time 1406  -EJ      Time Calculation (min) 19 min  -EJ      PT Received On 03/18/24  -EJ         Time Calculation- PT    Total Timed Code Minutes- PT 15 minute(s)  -EJ                User Key  (r) = Recorded By, (t) = Taken By, (c) = Cosigned By      Initials Name Provider Type    EJ Mago Melvin, PT Physical Therapist                  Therapy Charges for Today       Code Description Service Date Service Provider Modifiers Qty    67260432429  PT EVAL MOD COMPLEXITY 3 3/18/2024 Mago Melvin, PT GP 1    45974473886 HC PT THERAPEUTIC ACT EA 15 MIN 3/18/2024 Mago Melvin, PT GP 1            PT G-Codes  AM-PAC 6 Clicks Score (PT): 22  PT Discharge Summary  Anticipated Discharge Disposition (PT): home with assist, home with home health    Mago Melvin PT  3/18/2024

## 2024-03-19 ENCOUNTER — APPOINTMENT (OUTPATIENT)
Dept: ULTRASOUND IMAGING | Facility: HOSPITAL | Age: 79
End: 2024-03-19
Payer: MEDICARE

## 2024-03-19 LAB
ALBUMIN SERPL-MCNC: 2.9 G/DL (ref 3.5–5.2)
ALBUMIN/GLOB SERPL: 0.9 G/DL
ALP SERPL-CCNC: 120 U/L (ref 39–117)
ALT SERPL W P-5'-P-CCNC: 283 U/L (ref 1–41)
ANION GAP SERPL CALCULATED.3IONS-SCNC: 12.5 MMOL/L (ref 5–15)
AST SERPL-CCNC: 317 U/L (ref 1–40)
BASOPHILS # BLD AUTO: 0.02 10*3/MM3 (ref 0–0.2)
BASOPHILS NFR BLD AUTO: 0.1 % (ref 0–1.5)
BILIRUB SERPL-MCNC: 0.3 MG/DL (ref 0–1.2)
BUN SERPL-MCNC: 30 MG/DL (ref 8–23)
BUN/CREAT SERPL: 26.1 (ref 7–25)
CALCIUM SPEC-SCNC: 8.1 MG/DL (ref 8.6–10.5)
CHLORIDE SERPL-SCNC: 105 MMOL/L (ref 98–107)
CO2 SERPL-SCNC: 17.5 MMOL/L (ref 22–29)
CREAT SERPL-MCNC: 1.15 MG/DL (ref 0.76–1.27)
DEPRECATED RDW RBC AUTO: 42.9 FL (ref 37–54)
EGFRCR SERPLBLD CKD-EPI 2021: 65.1 ML/MIN/1.73
EOSINOPHIL # BLD AUTO: 0 10*3/MM3 (ref 0–0.4)
EOSINOPHIL NFR BLD AUTO: 0 % (ref 0.3–6.2)
ERYTHROCYTE [DISTWIDTH] IN BLOOD BY AUTOMATED COUNT: 12.1 % (ref 12.3–15.4)
GLOBULIN UR ELPH-MCNC: 3.2 GM/DL
GLUCOSE SERPL-MCNC: 123 MG/DL (ref 65–99)
HAV IGM SERPL QL IA: NORMAL
HBV CORE IGM SERPL QL IA: NORMAL
HBV SURFACE AG SERPL QL IA: NORMAL
HCT VFR BLD AUTO: 34.3 % (ref 37.5–51)
HCV AB SER DONR QL: NORMAL
HGB BLD-MCNC: 11.7 G/DL (ref 13–17.7)
IMM GRANULOCYTES # BLD AUTO: 0.14 10*3/MM3 (ref 0–0.05)
IMM GRANULOCYTES NFR BLD AUTO: 0.9 % (ref 0–0.5)
LYMPHOCYTES # BLD AUTO: 1.36 10*3/MM3 (ref 0.7–3.1)
LYMPHOCYTES NFR BLD AUTO: 9 % (ref 19.6–45.3)
MCH RBC QN AUTO: 33.2 PG (ref 26.6–33)
MCHC RBC AUTO-ENTMCNC: 34.1 G/DL (ref 31.5–35.7)
MCV RBC AUTO: 97.4 FL (ref 79–97)
MONOCYTES # BLD AUTO: 0.55 10*3/MM3 (ref 0.1–0.9)
MONOCYTES NFR BLD AUTO: 3.7 % (ref 5–12)
NEUTROPHILS NFR BLD AUTO: 12.96 10*3/MM3 (ref 1.7–7)
NEUTROPHILS NFR BLD AUTO: 86.3 % (ref 42.7–76)
NRBC BLD AUTO-RTO: 0 /100 WBC (ref 0–0.2)
PLATELET # BLD AUTO: 318 10*3/MM3 (ref 140–450)
PMV BLD AUTO: 10.3 FL (ref 6–12)
POTASSIUM SERPL-SCNC: 4.7 MMOL/L (ref 3.5–5.2)
PROT SERPL-MCNC: 6.1 G/DL (ref 6–8.5)
RBC # BLD AUTO: 3.52 10*6/MM3 (ref 4.14–5.8)
SODIUM SERPL-SCNC: 135 MMOL/L (ref 136–145)
WBC NRBC COR # BLD AUTO: 15.03 10*3/MM3 (ref 3.4–10.8)

## 2024-03-19 PROCEDURE — 94799 UNLISTED PULMONARY SVC/PX: CPT

## 2024-03-19 PROCEDURE — 25010000002 AZITHROMYCIN PER 500 MG: Performed by: INTERNAL MEDICINE

## 2024-03-19 PROCEDURE — 80074 ACUTE HEPATITIS PANEL: CPT | Performed by: STUDENT IN AN ORGANIZED HEALTH CARE EDUCATION/TRAINING PROGRAM

## 2024-03-19 PROCEDURE — 85025 COMPLETE CBC W/AUTO DIFF WBC: CPT | Performed by: STUDENT IN AN ORGANIZED HEALTH CARE EDUCATION/TRAINING PROGRAM

## 2024-03-19 PROCEDURE — 94761 N-INVAS EAR/PLS OXIMETRY MLT: CPT

## 2024-03-19 PROCEDURE — 25810000003 SODIUM CHLORIDE 0.9 % SOLUTION 250 ML FLEX CONT: Performed by: INTERNAL MEDICINE

## 2024-03-19 PROCEDURE — 25010000002 METHYLPREDNISOLONE PER 40 MG: Performed by: INTERNAL MEDICINE

## 2024-03-19 PROCEDURE — 94664 DEMO&/EVAL PT USE INHALER: CPT

## 2024-03-19 PROCEDURE — 25010000002 ENOXAPARIN PER 10 MG: Performed by: STUDENT IN AN ORGANIZED HEALTH CARE EDUCATION/TRAINING PROGRAM

## 2024-03-19 PROCEDURE — 25010000002 CEFTRIAXONE PER 250 MG: Performed by: INTERNAL MEDICINE

## 2024-03-19 PROCEDURE — 76705 ECHO EXAM OF ABDOMEN: CPT

## 2024-03-19 PROCEDURE — 80053 COMPREHEN METABOLIC PANEL: CPT | Performed by: STUDENT IN AN ORGANIZED HEALTH CARE EDUCATION/TRAINING PROGRAM

## 2024-03-19 RX ORDER — LOSARTAN POTASSIUM 100 MG/1
100 TABLET ORAL DAILY
Status: DISCONTINUED | OUTPATIENT
Start: 2024-03-19 | End: 2024-03-21 | Stop reason: HOSPADM

## 2024-03-19 RX ADMIN — IPRATROPIUM BROMIDE AND ALBUTEROL SULFATE 3 ML: 2.5; .5 SOLUTION RESPIRATORY (INHALATION) at 13:20

## 2024-03-19 RX ADMIN — HYDRALAZINE HYDROCHLORIDE 100 MG: 50 TABLET ORAL at 08:10

## 2024-03-19 RX ADMIN — HYDRALAZINE HYDROCHLORIDE 100 MG: 50 TABLET ORAL at 21:41

## 2024-03-19 RX ADMIN — TERAZOSIN 2 MG: 2 CAPSULE ORAL at 21:41

## 2024-03-19 RX ADMIN — CARVEDILOL 25 MG: 25 TABLET, FILM COATED ORAL at 18:01

## 2024-03-19 RX ADMIN — ENOXAPARIN SODIUM 40 MG: 100 INJECTION SUBCUTANEOUS at 11:57

## 2024-03-19 RX ADMIN — IPRATROPIUM BROMIDE AND ALBUTEROL SULFATE 3 ML: 2.5; .5 SOLUTION RESPIRATORY (INHALATION) at 07:31

## 2024-03-19 RX ADMIN — LOSARTAN POTASSIUM 100 MG: 100 TABLET, FILM COATED ORAL at 11:57

## 2024-03-19 RX ADMIN — LEVOTHYROXINE SODIUM 112 MCG: 112 TABLET ORAL at 08:10

## 2024-03-19 RX ADMIN — Medication 1000 MCG: at 08:10

## 2024-03-19 RX ADMIN — METHYLPREDNISOLONE SODIUM SUCCINATE 40 MG: 40 INJECTION, POWDER, LYOPHILIZED, FOR SOLUTION INTRAMUSCULAR; INTRAVENOUS at 03:30

## 2024-03-19 RX ADMIN — OXCARBAZEPINE 300 MG: 300 TABLET, FILM COATED ORAL at 08:10

## 2024-03-19 RX ADMIN — ROSUVASTATIN CALCIUM 20 MG: 20 TABLET, FILM COATED ORAL at 08:11

## 2024-03-19 RX ADMIN — AZITHROMYCIN MONOHYDRATE 500 MG: 500 INJECTION, POWDER, LYOPHILIZED, FOR SOLUTION INTRAVENOUS at 01:50

## 2024-03-19 RX ADMIN — HYDRALAZINE HYDROCHLORIDE 100 MG: 50 TABLET ORAL at 16:22

## 2024-03-19 RX ADMIN — OXCARBAZEPINE 300 MG: 300 TABLET, FILM COATED ORAL at 14:50

## 2024-03-19 RX ADMIN — Medication 1 TABLET: at 08:10

## 2024-03-19 RX ADMIN — IPRATROPIUM BROMIDE AND ALBUTEROL SULFATE 3 ML: 2.5; .5 SOLUTION RESPIRATORY (INHALATION) at 19:11

## 2024-03-19 RX ADMIN — OXCARBAZEPINE 300 MG: 300 TABLET, FILM COATED ORAL at 01:50

## 2024-03-19 RX ADMIN — CARVEDILOL 25 MG: 25 TABLET, FILM COATED ORAL at 08:10

## 2024-03-19 RX ADMIN — OXCARBAZEPINE 300 MG: 300 TABLET, FILM COATED ORAL at 20:06

## 2024-03-19 RX ADMIN — CEFTRIAXONE 2000 MG: 2 INJECTION, POWDER, FOR SOLUTION INTRAMUSCULAR; INTRAVENOUS at 01:03

## 2024-03-19 NOTE — PROGRESS NOTES
"Enter Query Response Below      Query Response: unable to determine             If applicable, please update the problem list.   Patient: Jeremi Ugarte        : 1945  Account: [id number]           Admit Date: 3/17/24        How to Respond to this query:       a. Click New Note     b. Answer query within the yellow box.                c. Update the Problem List, if applicable.      If you have any questions about this query contact me at: [email or web address]     ,  Patient admitted with pneumonia and acute respiratory failure with hypoxemia. During this admission the patients O2 saturations dropped one time to 84% on room air. Remained above 90% on 1-2L oxygen otherwise. RR 18-20. No distress noted other than in the ED noted \"mild shortness of breath at times.\"  No ABG's.     After study, was acute respiratory failure clinically supported during this admission?    -Acute respiratory failure was supported with additional clinical indicators: ____________  -Acute respiratory failure was not supported.  -Other- specify_____________  -Unable to determine          By submitting this query, we are merely seeking further clarification of documentation to accurately reflect all conditions that you are monitoring, evaluating, treating or that extend the hospitalization or utilize additional resources of care. Please utilize your independent clinical judgment when addressing the question(s) above.     This query and your response, once completed, will be entered into the legal medical record.    Sincerely,  Darlene Dorman  Clinical Documentation Integrity Program     "

## 2024-03-19 NOTE — PROGRESS NOTES
Name: Jeremi Ugarte ADMIT: 3/17/2024   : 1945  PCP: Charity Kearney MD    MRN: [id number] LOS: 2 days   AGE/SEX: 78 y.o. male  ROOM: [facility] ([facility]     Subjective   Subjective   No events overnight. On room air. Wbc is down to 15k. His LFTs are up significantly. No abdominal pain or nausea       Objective   Objective   Vital Signs  Temp:  [97.5 °F (36.4 °C)-98.3 °F (36.8 °C)] 98.3 °F (36.8 °C)  Heart Rate:  [66-78] 70  Resp:  [18-20] 20  BP: (111-181)/(61-89) 181/89  SpO2:  [84 %-97 %] 90 %  on  Flow (L/min):  [0.5-1] 0.5;   Device (Oxygen Therapy): room air  Body mass index is 37.77 kg/m².  Physical Exam  Constitutional:       General: He is not in acute distress.     Appearance: He is not toxic-appearing.   Cardiovascular:      Rate and Rhythm: Normal rate and regular rhythm.      Heart sounds: Normal heart sounds.   Pulmonary:      Effort: Pulmonary effort is normal.      Breath sounds: Normal breath sounds.   Abdominal:      General: Bowel sounds are normal. There is no distension.      Palpations: Abdomen is soft.      Tenderness: There is no abdominal tenderness. There is no guarding or rebound.   Musculoskeletal:         General: No tenderness.      Right lower leg: No edema.      Left lower leg: No edema.   Neurological:      Mental Status: He is alert.   Psychiatric:         Mood and Affect: Mood normal.         Behavior: Behavior normal.         Results Review     I reviewed the patient's new clinical results.  Results from last 7 days   Lab Units 24  0556 24  0519 24  1458   WBC 10*3/mm3 15.03* 20.48* 18.08*   HEMOGLOBIN g/dL 11.7* 12.3* 12.3*   PLATELETS 10*3/mm3 318 321 346     Results from last 7 days   Lab Units 24  0556 24  0810 24  1458   SODIUM mmol/L 135* 139 135*   POTASSIUM mmol/L 4.7 4.6 5.0   CHLORIDE mmol/L 105 106 105   CO2 mmol/L 17.5* 18.6* 18.8*   BUN mg/dL 30* 25* 31*   CREATININE mg/dL 1.15 1.31* 1.64*   GLUCOSE mg/dL 123* 148* 128*   Estimated  "Creatinine Clearance: 66.5 mL/min (by C-G formula based on SCr of 1.15 mg/dL).  Results from last 7 days   Lab Units 03/19/24  0556 03/17/24  1458   ALBUMIN g/dL 2.9* 3.4*   BILIRUBIN mg/dL 0.3 0.4   ALK PHOS U/L 120* 130*   AST (SGOT) U/L 317* 71*   ALT (SGPT) U/L 283* 65*     Results from last 7 days   Lab Units 03/19/24  0556 03/18/24  0810 03/17/24  1458   CALCIUM mg/dL 8.1* 8.2* 8.2*   ALBUMIN g/dL 2.9*  --  3.4*     Results from last 7 days   Lab Units 03/17/24  1745   LACTATE mmol/L 0.8     COVID19   Date Value Ref Range Status   03/17/2024 Not Detected Not Detected - Ref. Range Final   05/21/2022 Not Detected Not Detected - Ref. Range Final   05/14/2022 Not Detected Not Detected - Ref. Range Final   03/26/2022 Not Detected Not Detected - Ref. Range Final   03/22/2022 Not Detected Not Detected - Ref. Range Final     No results found for: \"HGBA1C\", \"POCGLU\"    XR Chest 1 View  Narrative: XR CHEST 1 VW-        INDICATION: Fever, cough     COMPARISON: Chest radiograph May 5, 2022     TECHNIQUE: 1 view chest     FINDINGS:      Poorly defined right lung base opacities. Ill-defined left perihilar  opacities. Trace fluid in the minor fissure. Stable mediastinum.  Elevated right hemidiaphragm.     Impression:    1. Poorly defined right lung base and left perihilar opacities. Possible  multifocal pneumonia. Follow-up to resolution.  2. Trace fluid in the minor fissure.     This report was finalized on 3/17/2024 3:08 PM by Dr. Denilson Campbell M.D on Workstation: [id number]       Scheduled Medications  azithromycin, 500 mg, Intravenous, Q24H  carvedilol, 25 mg, Oral, BID With Meals  cefTRIAXone, 2,000 mg, Intravenous, Q24H  enoxaparin, 40 mg, Subcutaneous, Q24H  hydrALAZINE, 100 mg, Oral, TID  ipratropium-albuterol, 3 mL, Nebulization, Q6H While Awake - RT  levothyroxine, 112 mcg, Oral, Daily  methylPREDNISolone sodium succinate, 40 mg, Intravenous, Q8H  miconazole, 1 Application, Topical, Q12H  multivitamin with " minerals, 1 tablet, Oral, QAM  OXcarbazepine, 300 mg, Oral, 4x Daily  rosuvastatin, 20 mg, Oral, Daily  terazosin, 2 mg, Oral, Nightly  vitamin B-12, 1,000 mcg, Oral, Daily    Infusions  sodium chloride, 75 mL/hr, Last Rate: 75 mL/hr (03/17/24 2029)    Diet  Diet: Cardiac; Healthy Heart (2-3 Na+); Fluid Consistency: Thin (IDDSI 0)       Assessment/Plan     Active Hospital Problems    Diagnosis  POA    **Pneumonia [J18.9]  Yes    LENORA (acute kidney injury) [N17.9]  Yes    Hypothyroid [E03.9]  Yes    Dyslipidemia [E78.5]  Yes    Benign essential HTN [I10]  Yes    Anxiety [F41.9]  Yes    Trigeminal neuralgia of right side of face [G50.0]  Yes      Resolved Hospital Problems   No resolved problems to display.       78 y.o. male admitted with Pneumonia.    Pneumonia causing acute respiratory failure with hypoxemia-improving on antibiotics. Ok to stop azithromycin with normal pcr and legionella urine antigen. Continue ceftriaxone.   LENORA-resolved with IVF  Elevated LFTs-worse today. Will check an acute hepatitis panel and liver ultrasound. Continue to monitor.  Hypertension-Bp is on the high side today. Restart losartan  Hyperlipidemia-statin.  Hypothyroidism-synthroid  BPH-terazosin   Headaches/possibly trigeminal neuralgia-trileptal   Lovenox 40 mg SC daily for DVT prophylaxis.  Full code.  Discussed with patient, family, and nursing staff.  Anticipate discharge home with home health timing yet to be determined.       Sandip Staton MD  [facility]  03/19/24  09:58 EDT    I wore protective equipment throughout this patient encounter including a face mask, gloves and protective eyewear.  Hand hygiene was performed before donning protective equipment and after removal when leaving the room.

## 2024-03-19 NOTE — PLAN OF CARE
Goal Outcome Evaluation:  Plan of Care Reviewed With: patient           Outcome Evaluation: No complaints of pain or SOA, IV abx, IV Solumedrol, remains on 0.5 L nc, need sputum, nonproductive dry cough, call light in reach

## 2024-03-20 LAB
ALBUMIN SERPL-MCNC: 3.3 G/DL (ref 3.5–5.2)
ALBUMIN/GLOB SERPL: 1.2 G/DL
ALP SERPL-CCNC: 118 U/L (ref 39–117)
ALT SERPL W P-5'-P-CCNC: 299 U/L (ref 1–41)
ANION GAP SERPL CALCULATED.3IONS-SCNC: 14.1 MMOL/L (ref 5–15)
AST SERPL-CCNC: 173 U/L (ref 1–40)
BILIRUB SERPL-MCNC: 0.3 MG/DL (ref 0–1.2)
BUN SERPL-MCNC: 24 MG/DL (ref 8–23)
BUN/CREAT SERPL: 21.2 (ref 7–25)
CALCIUM SPEC-SCNC: 8.1 MG/DL (ref 8.6–10.5)
CHLORIDE SERPL-SCNC: 108 MMOL/L (ref 98–107)
CO2 SERPL-SCNC: 17.9 MMOL/L (ref 22–29)
CREAT SERPL-MCNC: 1.13 MG/DL (ref 0.76–1.27)
DEPRECATED RDW RBC AUTO: 44.6 FL (ref 37–54)
EGFRCR SERPLBLD CKD-EPI 2021: 66.5 ML/MIN/1.73
ERYTHROCYTE [DISTWIDTH] IN BLOOD BY AUTOMATED COUNT: 12.3 % (ref 12.3–15.4)
GLOBULIN UR ELPH-MCNC: 2.8 GM/DL
GLUCOSE SERPL-MCNC: 89 MG/DL (ref 65–99)
HCT VFR BLD AUTO: 38.8 % (ref 37.5–51)
HGB BLD-MCNC: 12.9 G/DL (ref 13–17.7)
MCH RBC QN AUTO: 32.9 PG (ref 26.6–33)
MCHC RBC AUTO-ENTMCNC: 33.2 G/DL (ref 31.5–35.7)
MCV RBC AUTO: 99 FL (ref 79–97)
PLATELET # BLD AUTO: 336 10*3/MM3 (ref 140–450)
PMV BLD AUTO: 10.4 FL (ref 6–12)
POTASSIUM SERPL-SCNC: 4.2 MMOL/L (ref 3.5–5.2)
PROT SERPL-MCNC: 6.1 G/DL (ref 6–8.5)
RBC # BLD AUTO: 3.92 10*6/MM3 (ref 4.14–5.8)
SODIUM SERPL-SCNC: 140 MMOL/L (ref 136–145)
WBC NRBC COR # BLD AUTO: 9.3 10*3/MM3 (ref 3.4–10.8)

## 2024-03-20 PROCEDURE — 25010000002 CEFTRIAXONE PER 250 MG: Performed by: INTERNAL MEDICINE

## 2024-03-20 PROCEDURE — 94799 UNLISTED PULMONARY SVC/PX: CPT

## 2024-03-20 PROCEDURE — 25010000002 ENOXAPARIN PER 10 MG: Performed by: STUDENT IN AN ORGANIZED HEALTH CARE EDUCATION/TRAINING PROGRAM

## 2024-03-20 PROCEDURE — 80053 COMPREHEN METABOLIC PANEL: CPT | Performed by: STUDENT IN AN ORGANIZED HEALTH CARE EDUCATION/TRAINING PROGRAM

## 2024-03-20 PROCEDURE — 85027 COMPLETE CBC AUTOMATED: CPT | Performed by: STUDENT IN AN ORGANIZED HEALTH CARE EDUCATION/TRAINING PROGRAM

## 2024-03-20 PROCEDURE — 94664 DEMO&/EVAL PT USE INHALER: CPT

## 2024-03-20 PROCEDURE — 94761 N-INVAS EAR/PLS OXIMETRY MLT: CPT

## 2024-03-20 RX ADMIN — HYDRALAZINE HYDROCHLORIDE 100 MG: 50 TABLET ORAL at 20:08

## 2024-03-20 RX ADMIN — Medication 1000 MCG: at 08:12

## 2024-03-20 RX ADMIN — CARVEDILOL 25 MG: 25 TABLET, FILM COATED ORAL at 08:12

## 2024-03-20 RX ADMIN — CARVEDILOL 25 MG: 25 TABLET, FILM COATED ORAL at 18:09

## 2024-03-20 RX ADMIN — MICONAZOLE NITRATE 1 APPLICATION: 20 CREAM TOPICAL at 08:31

## 2024-03-20 RX ADMIN — HYDRALAZINE HYDROCHLORIDE 100 MG: 50 TABLET ORAL at 16:57

## 2024-03-20 RX ADMIN — ENOXAPARIN SODIUM 40 MG: 100 INJECTION SUBCUTANEOUS at 12:28

## 2024-03-20 RX ADMIN — LEVOTHYROXINE SODIUM 112 MCG: 112 TABLET ORAL at 08:12

## 2024-03-20 RX ADMIN — OXCARBAZEPINE 300 MG: 300 TABLET, FILM COATED ORAL at 20:08

## 2024-03-20 RX ADMIN — CEFTRIAXONE 2000 MG: 2 INJECTION, POWDER, FOR SOLUTION INTRAMUSCULAR; INTRAVENOUS at 01:08

## 2024-03-20 RX ADMIN — LOSARTAN POTASSIUM 100 MG: 100 TABLET, FILM COATED ORAL at 08:12

## 2024-03-20 RX ADMIN — IPRATROPIUM BROMIDE AND ALBUTEROL SULFATE 3 ML: 2.5; .5 SOLUTION RESPIRATORY (INHALATION) at 07:26

## 2024-03-20 RX ADMIN — OXCARBAZEPINE 300 MG: 300 TABLET, FILM COATED ORAL at 14:37

## 2024-03-20 RX ADMIN — IPRATROPIUM BROMIDE AND ALBUTEROL SULFATE 3 ML: 2.5; .5 SOLUTION RESPIRATORY (INHALATION) at 12:05

## 2024-03-20 RX ADMIN — Medication 1 TABLET: at 08:15

## 2024-03-20 RX ADMIN — OXCARBAZEPINE 300 MG: 300 TABLET, FILM COATED ORAL at 08:12

## 2024-03-20 RX ADMIN — IPRATROPIUM BROMIDE AND ALBUTEROL SULFATE 3 ML: 2.5; .5 SOLUTION RESPIRATORY (INHALATION) at 19:50

## 2024-03-20 RX ADMIN — OXCARBAZEPINE 300 MG: 300 TABLET, FILM COATED ORAL at 02:15

## 2024-03-20 RX ADMIN — HYDRALAZINE HYDROCHLORIDE 100 MG: 50 TABLET ORAL at 08:12

## 2024-03-20 RX ADMIN — TERAZOSIN 2 MG: 2 CAPSULE ORAL at 20:08

## 2024-03-20 NOTE — PLAN OF CARE
Problem: Adult Inpatient Plan of Care  Goal: Plan of Care Review  Outcome: Ongoing, Progressing  Flowsheets (Taken 3/20/2024 0449)  Progress: no change  Plan of Care Reviewed With: patient  Outcome Evaluation: Pt A&Ox4, VSS on RA. No c/o pain or discomfort. Meds admin without difficulty. No distress noted on assessment. Safety precautions in place, plan of care ongoing.  Goal: Patient-Specific Goal (Individualized)  Outcome: Ongoing, Progressing  Goal: Absence of Hospital-Acquired Illness or Injury  Outcome: Ongoing, Progressing  Intervention: Identify and Manage Fall Risk  Recent Flowsheet Documentation  Taken 3/20/2024 0446 by Isaura Burris RN  Safety Promotion/Fall Prevention: safety round/check completed  Taken 3/20/2024 0215 by Isaura Burris RN  Safety Promotion/Fall Prevention: safety round/check completed  Taken 3/20/2024 0025 by Isaura Burris RN  Safety Promotion/Fall Prevention:   activity supervised   assistive device/personal items within reach   clutter free environment maintained   fall prevention program maintained   lighting adjusted   mobility aid in reach   nonskid shoes/slippers when out of bed   room organization consistent   safety round/check completed  Taken 3/19/2024 2248 by Isaura Burris RN  Safety Promotion/Fall Prevention: safety round/check completed  Taken 3/19/2024 2141 by Isaura Burris RN  Safety Promotion/Fall Prevention:   activity supervised   assistive device/personal items within reach   clutter free environment maintained   fall prevention program maintained   lighting adjusted   mobility aid in reach   nonskid shoes/slippers when out of bed   room organization consistent   safety round/check completed  Intervention: Prevent Skin Injury  Recent Flowsheet Documentation  Taken 3/20/2024 0446 by Isaura Burris RN  Body Position: position changed independently  Taken 3/20/2024 0215 by Isaura Burris RN  Body Position: position changed independently  Taken 3/20/2024 0025 by Fatuma  REZA Delarosa  Body Position: position changed independently  Taken 3/19/2024 2248 by Isaura Burris RN  Body Position: position changed independently  Taken 3/19/2024 2141 by Isaura Burris RN  Body Position: position changed independently  Intervention: Prevent and Manage VTE (Venous Thromboembolism) Risk  Recent Flowsheet Documentation  Taken 3/20/2024 0025 by Isaura Burris RN  Range of Motion: active ROM (range of motion) encouraged  Taken 3/19/2024 2141 by Isaura Burris RN  Range of Motion: active ROM (range of motion) encouraged  Intervention: Prevent Infection  Recent Flowsheet Documentation  Taken 3/20/2024 0025 by Isaura Burris RN  Infection Prevention:   single patient room provided   rest/sleep promoted  Taken 3/19/2024 2141 by Isaura Burris RN  Infection Prevention:   rest/sleep promoted   single patient room provided  Goal: Optimal Comfort and Wellbeing  Outcome: Ongoing, Progressing  Intervention: Provide Person-Centered Care  Recent Flowsheet Documentation  Taken 3/20/2024 0025 by Isaura Burris RN  Trust Relationship/Rapport:   care explained   choices provided   questions answered   reassurance provided  Taken 3/19/2024 2141 by Isaura Burris RN  Trust Relationship/Rapport:   care explained   choices provided   questions answered   reassurance provided  Goal: Readiness for Transition of Care  Outcome: Ongoing, Progressing     Problem: Hypertension Comorbidity  Goal: Blood Pressure in Desired Range  Outcome: Ongoing, Progressing  Intervention: Maintain Blood Pressure Management  Recent Flowsheet Documentation  Taken 3/20/2024 0446 by Isaura Burris RN  Medication Review/Management: medications reviewed  Taken 3/20/2024 0215 by Isaura Burris RN  Medication Review/Management: medications reviewed  Taken 3/20/2024 0025 by Isaura Burris RN  Medication Review/Management: medications reviewed  Taken 3/19/2024 2248 by Isaura Burris RN  Medication Review/Management: medications reviewed  Taken 3/19/2024 2141 by  Isaura Burris RN  Medication Review/Management: medications reviewed     Problem: Pain Chronic (Persistent) (Comorbidity Management)  Goal: Acceptable Pain Control and Functional Ability  Outcome: Ongoing, Progressing  Intervention: Manage Persistent Pain  Recent Flowsheet Documentation  Taken 3/20/2024 0446 by Isaura Burris RN  Medication Review/Management: medications reviewed  Taken 3/20/2024 0215 by Isaura Burris RN  Medication Review/Management: medications reviewed  Taken 3/20/2024 0025 by Isaura Burris RN  Medication Review/Management: medications reviewed  Taken 3/19/2024 2248 by Isaura Burris RN  Medication Review/Management: medications reviewed  Taken 3/19/2024 2141 by Isaura Burris RN  Medication Review/Management: medications reviewed     Problem: Skin Injury Risk Increased  Goal: Skin Health and Integrity  Outcome: Ongoing, Progressing  Intervention: Optimize Skin Protection  Recent Flowsheet Documentation  Taken 3/20/2024 0446 by Isaura Burris RN  Head of Bed (HOB) Positioning: HOB elevated  Taken 3/20/2024 0215 by Isaura Burris RN  Head of Bed (HOB) Positioning: HOB elevated  Taken 3/20/2024 0025 by Isaura Burris RN  Head of Bed (HOB) Positioning: HOB elevated  Taken 3/19/2024 2248 by Isaura Burris RN  Head of Bed (HOB) Positioning: HOB elevated  Taken 3/19/2024 2141 by Isaura Burris RN  Head of Bed (HOB) Positioning: HOB elevated     Problem: Fall Injury Risk  Goal: Absence of Fall and Fall-Related Injury  Outcome: Ongoing, Progressing  Intervention: Identify and Manage Contributors  Recent Flowsheet Documentation  Taken 3/20/2024 0446 by Isaura Burris RN  Medication Review/Management: medications reviewed  Taken 3/20/2024 0215 by Isaura Burris RN  Medication Review/Management: medications reviewed  Taken 3/20/2024 0025 by Isaura Burris RN  Medication Review/Management: medications reviewed  Taken 3/19/2024 2248 by Isaura Burris RN  Medication Review/Management: medications reviewed  Taken 3/19/2024  2141 by Isaura Burris RN  Medication Review/Management: medications reviewed  Intervention: Promote Injury-Free Environment  Recent Flowsheet Documentation  Taken 3/20/2024 0446 by Isaura Burris RN  Safety Promotion/Fall Prevention: safety round/check completed  Taken 3/20/2024 0215 by Isaura Burris RN  Safety Promotion/Fall Prevention: safety round/check completed  Taken 3/20/2024 0025 by Isaura Burris RN  Safety Promotion/Fall Prevention:   activity supervised   assistive device/personal items within reach   clutter free environment maintained   fall prevention program maintained   lighting adjusted   mobility aid in reach   nonskid shoes/slippers when out of bed   room organization consistent   safety round/check completed  Taken 3/19/2024 2248 by Isaura Burris RN  Safety Promotion/Fall Prevention: safety round/check completed  Taken 3/19/2024 2141 by Isaura Burris RN  Safety Promotion/Fall Prevention:   activity supervised   assistive device/personal items within reach   clutter free environment maintained   fall prevention program maintained   lighting adjusted   mobility aid in reach   nonskid shoes/slippers when out of bed   room organization consistent   safety round/check completed   Goal Outcome Evaluation:  Plan of Care Reviewed With: patient        Progress: no change  Outcome Evaluation: Pt A&Ox4, VSS on RA. No c/o pain or discomfort. Meds admin without difficulty. No distress noted on assessment. Safety precautions in place, plan of care ongoing.

## 2024-03-20 NOTE — PLAN OF CARE
Goal Outcome Evaluation:              Outcome Evaluation: Pt vital signs stable, oriented x4, room air. Medications given as scheduled. Will continue to monitor.

## 2024-03-20 NOTE — PROGRESS NOTES
Name: Jeremi Ugarte ADMIT: 3/17/2024   : 1945  PCP: Charity Kearney MD    MRN: [id number] LOS: 3 days   AGE/SEX: 78 y.o. male  ROOM: [facility]     Subjective   Subjective   No events overnight. Leukocytosis has normalized. ALT up slightly, but AST is coming down.        Objective   Objective   Vital Signs  Temp:  [98.1 °F (36.7 °C)-98.4 °F (36.9 °C)] 98.4 °F (36.9 °C)  Heart Rate:  [65-69] 69  Resp:  [18-20] 18  BP: (144-159)/(59-82) 144/59  SpO2:  [91 %] 91 %  on   ;   Device (Oxygen Therapy): room air  Body mass index is 37.8 kg/m².  Physical Exam  Constitutional:       General: He is not in acute distress.     Appearance: He is not toxic-appearing.   Cardiovascular:      Rate and Rhythm: Normal rate and regular rhythm.      Heart sounds: Normal heart sounds.   Pulmonary:      Effort: Pulmonary effort is normal.      Breath sounds: Normal breath sounds.   Abdominal:      General: Bowel sounds are normal. There is no distension.      Palpations: Abdomen is soft.      Tenderness: There is no abdominal tenderness. There is no guarding or rebound.   Musculoskeletal:         General: No tenderness.      Right lower leg: No edema.      Left lower leg: No edema.   Neurological:      Mental Status: He is alert.   Psychiatric:         Mood and Affect: Mood normal.         Behavior: Behavior normal.         Results Review     I reviewed the patient's new clinical results.  Results from last 7 days   Lab Units 24  0821 24  0556 24  0519 24  1458   WBC 10*3/mm3 9.30 15.03* 20.48* 18.08*   HEMOGLOBIN g/dL 12.9* 11.7* 12.3* 12.3*   PLATELETS 10*3/mm3 336 318 321 346     Results from last 7 days   Lab Units 24  0821 24  0556 24  0810 24  1458   SODIUM mmol/L 140 135* 139 135*   POTASSIUM mmol/L 4.2 4.7 4.6 5.0   CHLORIDE mmol/L 108* 105 106 105   CO2 mmol/L 17.9* 17.5* 18.6* 18.8*   BUN mg/dL 24* 30* 25* 31*   CREATININE mg/dL 1.13 1.15 1.31* 1.64*   GLUCOSE mg/dL 89  "123* 148* 128*   Estimated Creatinine Clearance: 67.7 mL/min (by C-G formula based on SCr of 1.13 mg/dL).  Results from last 7 days   Lab Units 03/20/24  0821 03/19/24  0556 03/17/24  1458   ALBUMIN g/dL 3.3* 2.9* 3.4*   BILIRUBIN mg/dL 0.3 0.3 0.4   ALK PHOS U/L 118* 120* 130*   AST (SGOT) U/L 173* 317* 71*   ALT (SGPT) U/L 299* 283* 65*     Results from last 7 days   Lab Units 03/20/24  0821 03/19/24  0556 03/18/24  0810 03/17/24  1458   CALCIUM mg/dL 8.1* 8.1* 8.2* 8.2*   ALBUMIN g/dL 3.3* 2.9*  --  3.4*     Results from last 7 days   Lab Units 03/17/24  1745   LACTATE mmol/L 0.8     COVID19   Date Value Ref Range Status   03/17/2024 Not Detected Not Detected - Ref. Range Final   05/21/2022 Not Detected Not Detected - Ref. Range Final   05/14/2022 Not Detected Not Detected - Ref. Range Final   03/26/2022 Not Detected Not Detected - Ref. Range Final   03/22/2022 Not Detected Not Detected - Ref. Range Final     No results found for: \"HGBA1C\", \"POCGLU\"    US Liver  Narrative: US LIVER-3/19/2024     HISTORY: Elevated liver enzymes.     The gallbladder is well distended with no gallstones, wall thickening or  pericholecystic fluid. The common bile duct is not dilated measuring 5.8  mm.     Pancreas is not well seen. No gross abnormalities are seen in the region  of the pancreas. Liver appears unremarkable. The right kidney measures  12.0 cm in length. There is an approximately 1.8 cm right renal cyst.     Impression: 1. No abnormalities are seen in the gallbladder or liver.  2. Poor visualization of the pancreas.  3. Right renal cyst.        This report was finalized on 3/19/2024 1:53 PM by Dr. Wander Gould M.D on Workstation: [id number]       Scheduled Medications  carvedilol, 25 mg, Oral, BID With Meals  cefTRIAXone, 2,000 mg, Intravenous, Q24H  enoxaparin, 40 mg, Subcutaneous, Q24H  hydrALAZINE, 100 mg, Oral, TID  ipratropium-albuterol, 3 mL, Nebulization, Q6H While Awake - RT  levothyroxine, 112 mcg, Oral, " Daily  losartan, 100 mg, Oral, Daily  miconazole, 1 Application, Topical, Q12H  multivitamin with minerals, 1 tablet, Oral, QAM  OXcarbazepine, 300 mg, Oral, 4x Daily  [Held by provider] rosuvastatin, 20 mg, Oral, Daily  terazosin, 2 mg, Oral, Nightly  vitamin B-12, 1,000 mcg, Oral, Daily    Infusions     Diet  Diet: Cardiac; Healthy Heart (2-3 Na+); Fluid Consistency: Thin (IDDSI 0)       Assessment/Plan     Active Hospital Problems    Diagnosis  POA    **Pneumonia [J18.9]  Yes    LENORA (acute kidney injury) [N17.9]  Yes    Hypothyroid [E03.9]  Yes    Dyslipidemia [E78.5]  Yes    Benign essential HTN [I10]  Yes    Anxiety [F41.9]  Yes    Trigeminal neuralgia of right side of face [G50.0]  Yes      Resolved Hospital Problems   No resolved problems to display.       78 y.o. male admitted with Pneumonia.    Pneumonia causing acute respiratory failure with hypoxemia-continue ceftriaxone to complete a 5 day course of therapy  LENORA-resolved with IVF  Elevated LFTs-I think they've peaked. I told him that if his LFTs are stable to improved tomorrow then he can go home and get a cmp in a week or two with his pcp. Acute hepatitis panel and liver ultrasound were both unremarkable.  Hypertension-adequate control acutely  Hyperlipidemia-statin.  Hypothyroidism-synthroid  BPH-terazosin   Headaches/possibly trigeminal neuralgia-trileptal   Lovenox 40 mg SC daily for DVT prophylaxis.  Full code.  Discussed with patient, family, and nursing staff.  Anticipate discharge home with home health tomorrow. If LFTs are stable or improved       Sandip Staton MD  [facility]  03/20/24  15:55 EDT    I wore protective equipment throughout this patient encounter including a face mask, gloves and protective eyewear.  Hand hygiene was performed before donning protective equipment and after removal when leaving the room.

## 2024-03-20 NOTE — CONSULTS
Chp follow-up visit with pt & daughter (Jo). Pt up for walk with daughter, states he is feeling good, hopeful to be discharged tomorrow. Pt engaged in theological conversation with Chp. Chp prayed with pt and daughter, appreciation expressed. Pastoral Care Office remains available..

## 2024-03-21 ENCOUNTER — HOME HEALTH ADMISSION (OUTPATIENT)
Dept: HOME HEALTH SERVICES | Facility: HOME HEALTHCARE | Age: 79
End: 2024-03-21
Payer: MEDICARE

## 2024-03-21 ENCOUNTER — READMISSION MANAGEMENT (OUTPATIENT)
Dept: CALL CENTER | Facility: HOSPITAL | Age: 79
End: 2024-03-21
Payer: MEDICARE

## 2024-03-21 ENCOUNTER — TRANSCRIBE ORDERS (OUTPATIENT)
Dept: HOME HEALTH SERVICES | Facility: HOME HEALTHCARE | Age: 79
End: 2024-03-21
Payer: MEDICARE

## 2024-03-21 VITALS
BODY MASS INDEX: 37.91 KG/M2 | TEMPERATURE: 97.9 F | HEIGHT: 69 IN | RESPIRATION RATE: 18 BRPM | HEART RATE: 66 BPM | WEIGHT: 255.95 LBS | OXYGEN SATURATION: 95 % | DIASTOLIC BLOOD PRESSURE: 83 MMHG | SYSTOLIC BLOOD PRESSURE: 155 MMHG

## 2024-03-21 DIAGNOSIS — J96.01 ACUTE RESPIRATORY FAILURE WITH HYPOXIA: ICD-10-CM

## 2024-03-21 DIAGNOSIS — J18.9 PNEUMONIA OF BOTH LUNGS DUE TO INFECTIOUS ORGANISM, UNSPECIFIED PART OF LUNG: Primary | ICD-10-CM

## 2024-03-21 DIAGNOSIS — I10 PRIMARY HYPERTENSION: ICD-10-CM

## 2024-03-21 DIAGNOSIS — N17.9 AKI (ACUTE KIDNEY INJURY): ICD-10-CM

## 2024-03-21 DIAGNOSIS — G50.0 TRIGEMINAL NEURALGIA OF RIGHT SIDE OF FACE: ICD-10-CM

## 2024-03-21 LAB
ALBUMIN SERPL-MCNC: 3 G/DL (ref 3.5–5.2)
ALBUMIN/GLOB SERPL: 1.1 G/DL
ALP SERPL-CCNC: 102 U/L (ref 39–117)
ALT SERPL W P-5'-P-CCNC: 186 U/L (ref 1–41)
ANION GAP SERPL CALCULATED.3IONS-SCNC: 14.6 MMOL/L (ref 5–15)
AST SERPL-CCNC: 77 U/L (ref 1–40)
BILIRUB SERPL-MCNC: 0.3 MG/DL (ref 0–1.2)
BUN SERPL-MCNC: 17 MG/DL (ref 8–23)
BUN/CREAT SERPL: 19.3 (ref 7–25)
CALCIUM SPEC-SCNC: 8.8 MG/DL (ref 8.6–10.5)
CHLORIDE SERPL-SCNC: 105 MMOL/L (ref 98–107)
CO2 SERPL-SCNC: 17.4 MMOL/L (ref 22–29)
CREAT SERPL-MCNC: 0.88 MG/DL (ref 0.76–1.27)
EGFRCR SERPLBLD CKD-EPI 2021: 88 ML/MIN/1.73
GLOBULIN UR ELPH-MCNC: 2.7 GM/DL
GLUCOSE SERPL-MCNC: 81 MG/DL (ref 65–99)
POTASSIUM SERPL-SCNC: 4.2 MMOL/L (ref 3.5–5.2)
PROT SERPL-MCNC: 5.7 G/DL (ref 6–8.5)
SODIUM SERPL-SCNC: 137 MMOL/L (ref 136–145)

## 2024-03-21 PROCEDURE — 94664 DEMO&/EVAL PT USE INHALER: CPT

## 2024-03-21 PROCEDURE — 94799 UNLISTED PULMONARY SVC/PX: CPT

## 2024-03-21 PROCEDURE — 25010000002 ENOXAPARIN PER 10 MG: Performed by: STUDENT IN AN ORGANIZED HEALTH CARE EDUCATION/TRAINING PROGRAM

## 2024-03-21 PROCEDURE — 94761 N-INVAS EAR/PLS OXIMETRY MLT: CPT

## 2024-03-21 PROCEDURE — 25010000002 CEFTRIAXONE PER 250 MG: Performed by: INTERNAL MEDICINE

## 2024-03-21 PROCEDURE — 80053 COMPREHEN METABOLIC PANEL: CPT | Performed by: STUDENT IN AN ORGANIZED HEALTH CARE EDUCATION/TRAINING PROGRAM

## 2024-03-21 RX ORDER — CARVEDILOL 25 MG/1
25 TABLET ORAL 2 TIMES DAILY WITH MEALS
Qty: 60 TABLET | Refills: 0 | Status: SHIPPED | OUTPATIENT
Start: 2024-03-21

## 2024-03-21 RX ORDER — CEFDINIR 300 MG/1
300 CAPSULE ORAL 2 TIMES DAILY
Qty: 6 CAPSULE | Refills: 0 | Status: SHIPPED | OUTPATIENT
Start: 2024-03-21 | End: 2024-03-24

## 2024-03-21 RX ADMIN — LOSARTAN POTASSIUM 100 MG: 100 TABLET, FILM COATED ORAL at 09:22

## 2024-03-21 RX ADMIN — ALPRAZOLAM 0.5 MG: 0.5 TABLET ORAL at 00:00

## 2024-03-21 RX ADMIN — IPRATROPIUM BROMIDE AND ALBUTEROL SULFATE 3 ML: 2.5; .5 SOLUTION RESPIRATORY (INHALATION) at 07:51

## 2024-03-21 RX ADMIN — MICONAZOLE NITRATE 1 APPLICATION: 20 CREAM TOPICAL at 00:01

## 2024-03-21 RX ADMIN — Medication 1000 MCG: at 09:22

## 2024-03-21 RX ADMIN — MICONAZOLE NITRATE 1 APPLICATION: 20 CREAM TOPICAL at 09:28

## 2024-03-21 RX ADMIN — OXCARBAZEPINE 300 MG: 300 TABLET, FILM COATED ORAL at 09:21

## 2024-03-21 RX ADMIN — ALPRAZOLAM 0.5 MG: 0.5 TABLET ORAL at 09:27

## 2024-03-21 RX ADMIN — ENOXAPARIN SODIUM 40 MG: 100 INJECTION SUBCUTANEOUS at 11:37

## 2024-03-21 RX ADMIN — CARVEDILOL 25 MG: 25 TABLET, FILM COATED ORAL at 09:22

## 2024-03-21 RX ADMIN — CEFTRIAXONE 2000 MG: 2 INJECTION, POWDER, FOR SOLUTION INTRAMUSCULAR; INTRAVENOUS at 00:00

## 2024-03-21 RX ADMIN — HYDRALAZINE HYDROCHLORIDE 100 MG: 50 TABLET ORAL at 09:21

## 2024-03-21 RX ADMIN — LEVOTHYROXINE SODIUM 112 MCG: 112 TABLET ORAL at 09:23

## 2024-03-21 RX ADMIN — Medication 1 TABLET: at 09:22

## 2024-03-21 RX ADMIN — OXCARBAZEPINE 300 MG: 300 TABLET, FILM COATED ORAL at 01:57

## 2024-03-21 NOTE — OUTREACH NOTE
Prep Survey      Flowsheet Row Responses   [facility] patient discharged from? [facility]   Is LACE score < 7 ? No   Eligibility [facility]   Date of Admission 03/17/24   Date of Discharge 03/21/24   Discharge Disposition Home-Health Care Svc   Discharge diagnosis Pneumonia   Does the patient have one of the following disease processes/diagnoses(primary or secondary)? Pneumonia   Does the patient have Home health ordered? Yes   What is the Home health agency?  [facility]   Prep survey completed? Yes            Leslie JESSICA - Registered Nurse

## 2024-03-21 NOTE — DISCHARGE SUMMARY
Date of Admission: 3/17/2024  Date of Discharge:  3/21/2024  Primary Care Physician: Charity Kearney MD     Discharge Diagnosis:  Active Hospital Problems    Diagnosis  POA    **Pneumonia [J18.9]  Yes    LENORA (acute kidney injury) [N17.9]  Yes    Hypothyroid [E03.9]  Yes    Dyslipidemia [E78.5]  Yes    Benign essential HTN [I10]  Yes    Anxiety [F41.9]  Yes    Trigeminal neuralgia of right side of face [G50.0]  Yes      Resolved Hospital Problems   No resolved problems to display.       Presenting Problem/History of Present Illness from H&P:  Pneumonia [J18.9]  LENORA (acute kidney injury) [N17.9]  Pneumonia of both lungs due to infectious organism, unspecified part of lung [J18.9]  Sepsis, due to unspecified organism, unspecified whether acute organ dysfunction present [A41.9]     78 year old gentleman brought in from home with shortness of breath, cough and wheezing for over a week; his wife is currently hospitalized with pneumonia; he does not smoke and is not on home oxygen; he was hypoxic on presentation and has been wheezing; he gets very short of air with little exertion; has been febrile     Hospital Course:  The patient is a 78 y.o. male who presented with PNA, LENORA and elevated LFT. He has improved with Rocephin and can transition to omnicef to complete antibiotic course for pna. LENORA is resolved. LFT are elevated but now improving. Recommend to stay off of statin for now and repeat LFT in clinic to see if he can resume Crestor.     Exam Today:  Gen aa nad  Heent ncat  Cv rrr  Lung ctab, no wheezes  Abd ndnt    Results:  CXR  1. Poorly defined right lung base and left perihilar opacities. Possible  multifocal pneumonia. Follow-up to resolution.  2. Trace fluid in the minor fissure.    US Liver  1. No abnormalities are seen in the gallbladder or liver.  2. Poor visualization of the pancreas.  3. Right renal cyst.    Procedures Performed:         Consults:   Consults       Date and Time Order Name Status  Description    3/17/2024  4:03 PM [facility] (on-call MD unless specified) Details               Discharge Disposition:  [place]-Health [place] [facility] [place]    Discharge Medications:     Discharge Medications        New Medications        Instructions Start Date   cefdinir 300 MG capsule  Commonly known as: OMNICEF   300 mg, Oral, 2 Times Daily             Changes to Medications        Instructions Start Date   carvedilol 25 MG tablet  Commonly known as: COREG  What changed:   medication strength  how much to take   25 mg, Oral, 2 Times Daily With Meals             Continue These Medications        Instructions Start Date   ALPRAZolam 0.5 MG tablet  Commonly known as: XANAX   TAKE 1 TABLET BY MOUTH THREE TIMES DAILY AS NEEDED FOR ANXIETY      CALCIUM 1200 PO   1,000 mg, Oral, Every Morning      hydrALAZINE 100 MG tablet  Commonly known as: APRESOLINE   100 mg, Oral, 3 Times Daily      levothyroxine 112 MCG tablet  Commonly known as: SYNTHROID, LEVOTHROID   112 mcg, Oral, Daily      losartan 100 MG tablet  Commonly known as: Cozaar   100 mg, Oral, Daily      multivitamin with minerals tablet tablet   1 tablet, Oral, Every Morning      NON FORMULARY   1 tablet, Oral, Every Morning      OXcarbazepine 300 MG tablet  Commonly known as: TRILEPTAL   300 mg, Oral, 4 Times Daily, Takes doses at 0800, 1400, 2000, 0200. Patient is adamant about taking this medication at scheduled times.      promethazine-dextromethorphan 6.25-15 MG/5ML syrup  Commonly known as: PROMETHAZINE-DM   5 mL, Oral, 4 Times Daily PRN      terazosin 2 MG capsule  Commonly known as: HYTRIN   2 mg, Oral, Nightly      vitamin B-12 1000 MCG tablet  Commonly known as: CYANOCOBALAMIN   1,000 mcg, Oral, Daily             Stop These Medications      methylPREDNISolone 4 MG dose pack  Commonly known as: MEDROL     rosuvastatin 20 MG tablet  Commonly known as: CRESTOR              Discharge Diet:   Diet Instructions       Diet: Cardiac Diets; Healthy Heart (2-3 Na+); Thin (IDDSI 0)       Discharge Diet: Cardiac Diets    Cardiac Diet: Healthy Heart (2-3 Na+)    Fluid Consistency: Thin (IDDSI 0)            Activity at Discharge:   Activity Instructions       Activity as Tolerated              Follow-up Appointments:   Contact information for follow-up providers       Charity Kearney MD Follow up.    Specialty: Internal Medicine  Contact information:  [address]  [address]  [facility] [id number] [phone]                       Contact information for after-discharge care       Home Medical Care       [facility] .    Service: Home Health Services  Contact information:  [address]  [facility] [address] [phone]                                   Test Results Pending at Discharge:  Pending Labs       Order Current Status    Blood Culture - Blood, Arm, Right Preliminary result    Blood Culture - Blood, Hand, Right Preliminary result             Jeremiah Birmingham MD  03/21/24  11:52 EDT    Time Spent on Discharge Activities: >30 minutes    Dictated portions using Dragon dictation software.

## 2024-03-21 NOTE — PLAN OF CARE
Goal Outcome Evaluation:   Patient is alert oriented and on room air. Patient is supposed to be discharged home today.                                             85M hx of HTN, remote prostate ca (treated s/p seeds/RT), encephalitis with dysphagia and PEG status, seizure d/o presenting with sepsis.     Dispo: Being evaluated for hospice. Pt will be discharged to subacute rehab for comfort care. Pt's family would like pt to go to [place], pending financial processing. Palliative and hospice teams following.     Acute hypoxic respiratory failure: 2/2 Coronavirus and superimposed multifocal PNA  - s/p five day course of Vanco and meropenem. Abx discontinued on 1/3/20  - continue O2 support  - peg feeds continued , aspiration precautions    DM type 2 with hyperglycemia: HbA1C 8.4%  - continue ISS and lantus 10unit qHS  - monitor fingersticks   - continue tube feeds    Hypokalemia  - continue daily KCL supplementation    Urinary retention   - continue bradley    Moderate protein calorie malnutrition/ hypoalbuminemia  - continue bolus tube feeds  - dietician following     HTN;  - one episode of /59. usually runs in the low 100s  - monitor BP closely and resume home meds if hypertensive    History of encephalitis  - Keppra and Vimpat    DVT prophylaxis  - lovenox

## 2024-03-21 NOTE — PROGRESS NOTES
Enter Query Response Below      Query Response: Bacterial pneumonia unspecified              If applicable, please update the problem list.   Patient: Jeremi Ugarte        : 1945  Account: [id number]           Admit Date: 3/17/24        How to Respond to this query:       a. Click New Note     b. Answer query within the yellow box.                c. Update the Problem List, if applicable.      If you have any questions about this query contact me at: [email or web address]     ,  Patient admitted with pneumonia. The following clinical and treatment during this admission:  -PCR was negative  -IV Rocephin 3/17-3/21  -Azithromycin 3/17-3/19  -Discharge stated: He has improved with Rocephin and can transition to omnicef to complete antibiotic course for pna.     Please clarify the type of pneumonia the patient was treated/monitored for:    Bacterial pneumonia unspecified  Other- specify______  Unable to determine        By submitting this query, we are merely seeking further clarification of documentation to accurately reflect all conditions that you are monitoring, evaluating, treating or that extend the hospitalization or utilize additional resources of care. Please utilize your independent clinical judgment when addressing the question(s) above.     This query and your response, once completed, will be entered into the legal medical record.    Sincerely,  Darlene Dorman  Clinical Documentation Integrity Program

## 2024-03-21 NOTE — PLAN OF CARE
Problem: Adult Inpatient Plan of Care  Goal: Plan of Care Review  Outcome: Ongoing, Progressing  Flowsheets (Taken 3/21/2024 0528)  Progress: no change  Plan of Care Reviewed With: patient  Outcome Evaluation: Pt A&Ox4, VSS on RA. No c/o pain or discomfort. Meds admin without difficulty. PRN Xanax admin upon pt request. No distress noted on assessment. Safety precautions in place, plan of care ongoing.  Goal: Patient-Specific Goal (Individualized)  Outcome: Ongoing, Progressing  Goal: Absence of Hospital-Acquired Illness or Injury  Outcome: Ongoing, Progressing  Intervention: Identify and Manage Fall Risk  Recent Flowsheet Documentation  Taken 3/21/2024 0450 by Isaura Burris RN  Safety Promotion/Fall Prevention: safety round/check completed  Taken 3/21/2024 0202 by Isaura Burris RN  Safety Promotion/Fall Prevention: safety round/check completed  Taken 3/21/2024 0041 by Isaura Burris RN  Safety Promotion/Fall Prevention: safety round/check completed  Taken 3/20/2024 2229 by Isaura Burris RN  Safety Promotion/Fall Prevention: safety round/check completed  Taken 3/20/2024 2008 by Isaura Burris RN  Safety Promotion/Fall Prevention:   activity supervised   assistive device/personal items within reach   clutter free environment maintained   fall prevention program maintained   lighting adjusted   nonskid shoes/slippers when out of bed   room organization consistent   safety round/check completed  Intervention: Prevent Skin Injury  Recent Flowsheet Documentation  Taken 3/21/2024 0450 by Isaura Burris RN  Body Position: position changed independently  Taken 3/21/2024 0202 by Isaura Burris RN  Body Position: position changed independently  Taken 3/21/2024 0041 by Isaura Burris RN  Body Position: position changed independently  Taken 3/20/2024 2229 by Isaura Burris RN  Body Position: position changed independently  Taken 3/20/2024 2008 by Isaura Burris RN  Body Position: position changed independently  Intervention: Prevent  and Manage VTE (Venous Thromboembolism) Risk  Recent Flowsheet Documentation  Taken 3/20/2024 2008 by Isaura Burris RN  Range of Motion: active ROM (range of motion) encouraged  Intervention: Prevent Infection  Recent Flowsheet Documentation  Taken 3/20/2024 2008 by Isaura Burris RN  Infection Prevention:   single patient room provided   rest/sleep promoted   hand hygiene promoted  Goal: Optimal Comfort and Wellbeing  Outcome: Ongoing, Progressing  Intervention: Provide Person-Centered Care  Recent Flowsheet Documentation  Taken 3/21/2024 0041 by Isaura Burris RN  Trust Relationship/Rapport:   care explained   choices provided  Taken 3/20/2024 2008 by Isaura Burris RN  Trust Relationship/Rapport:   care explained   choices provided   questions answered   reassurance provided  Goal: Readiness for Transition of Care  Outcome: Ongoing, Progressing     Problem: Hypertension Comorbidity  Goal: Blood Pressure in Desired Range  Outcome: Ongoing, Progressing  Intervention: Maintain Blood Pressure Management  Recent Flowsheet Documentation  Taken 3/21/2024 0450 by Isaura Burris RN  Medication Review/Management: medications reviewed  Taken 3/21/2024 0202 by Isaura Burris RN  Medication Review/Management: medications reviewed  Taken 3/21/2024 0041 by Isaura Burris RN  Medication Review/Management: medications reviewed  Taken 3/20/2024 2229 by Isaura Burris RN  Medication Review/Management: medications reviewed  Taken 3/20/2024 2008 by Isaura Burris RN  Medication Review/Management: medications reviewed     Problem: Pain Chronic (Persistent) (Comorbidity Management)  Goal: Acceptable Pain Control and Functional Ability  Outcome: Ongoing, Progressing  Intervention: Manage Persistent Pain  Recent Flowsheet Documentation  Taken 3/21/2024 0450 by Isaura Burris RN  Medication Review/Management: medications reviewed  Taken 3/21/2024 0202 by Isaura Burris RN  Medication Review/Management: medications reviewed  Taken 3/21/2024 0041 by  Isaura Burris RN  Medication Review/Management: medications reviewed  Taken 3/20/2024 2229 by Isaura Burris RN  Medication Review/Management: medications reviewed  Taken 3/20/2024 2008 by Isaura Burris RN  Medication Review/Management: medications reviewed     Problem: Skin Injury Risk Increased  Goal: Skin Health and Integrity  Outcome: Ongoing, Progressing  Intervention: Optimize Skin Protection  Recent Flowsheet Documentation  Taken 3/21/2024 0450 by Isaura Burris RN  Head of Bed (HOB) Positioning: HOB elevated  Taken 3/21/2024 0202 by Isaura Burris RN  Head of Bed (HOB) Positioning: HOB elevated  Taken 3/21/2024 0041 by Isaura Burris RN  Head of Bed (HOB) Positioning: HOB elevated  Taken 3/20/2024 2229 by Isaura Burris RN  Head of Bed (HOB) Positioning: HOB elevated  Taken 3/20/2024 2008 by Isaura Burris RN  Head of Bed (HOB) Positioning: HOB elevated     Problem: Fall Injury Risk  Goal: Absence of Fall and Fall-Related Injury  Outcome: Ongoing, Progressing  Intervention: Identify and Manage Contributors  Recent Flowsheet Documentation  Taken 3/21/2024 0450 by Isaura Burris RN  Medication Review/Management: medications reviewed  Taken 3/21/2024 0202 by Isaura Burris RN  Medication Review/Management: medications reviewed  Taken 3/21/2024 0041 by Isaura Burris RN  Medication Review/Management: medications reviewed  Taken 3/20/2024 2229 by Isaura Burris RN  Medication Review/Management: medications reviewed  Taken 3/20/2024 2008 by Isaura Burris RN  Medication Review/Management: medications reviewed  Intervention: Promote Injury-Free Environment  Recent Flowsheet Documentation  Taken 3/21/2024 0450 by Isaura Burris RN  Safety Promotion/Fall Prevention: safety round/check completed  Taken 3/21/2024 0202 by Isaura Burirs RN  Safety Promotion/Fall Prevention: safety round/check completed  Taken 3/21/2024 0041 by Isaura Burris RN  Safety Promotion/Fall Prevention: safety round/check completed  Taken 3/20/2024 2229 by  Isaura Burris, RN  Safety Promotion/Fall Prevention: safety round/check completed  Taken 3/20/2024 2008 by Isaura Burris, RN  Safety Promotion/Fall Prevention:   activity supervised   assistive device/personal items within reach   clutter free environment maintained   fall prevention program maintained   lighting adjusted   nonskid shoes/slippers when out of bed   room organization consistent   safety round/check completed   Goal Outcome Evaluation:  Plan of Care Reviewed With: patient        Progress: no change  Outcome Evaluation: Pt A&Ox4, VSS on RA. No c/o pain or discomfort. Meds admin without difficulty. PRN Xanax admin upon pt request. No distress noted on assessment. Safety precautions in place, plan of care ongoing.

## 2024-03-21 NOTE — PROGRESS NOTES
Enter Query Response Below      Query Response: -Sepsis was not supported              If applicable, please update the problem list.   Patient: Jeremi Ugarte        : 1945  Account: [id number]           Admit Date: 3/17/24        How to Respond to this query:       a. Click New Note     b. Answer query within the yellow box.                c. Update the Problem List, if applicable.      If you have any questions about this query contact me at: [email or web address]     ,  Sepsis was only noted on the ED note and on the discharge summary. Patient was admitted for pneumonia and LENORA.  He was treated with IV Rocephin and Zithromax during this admission.    On admit the following clinical was present:   Lactic 0.8  WBC 18.08  Temp 99.6      After study, was sepsis clinically supported during this admission?    -Sepsis was supported with additional clinical indicators:____________  -Sepsis was not supported  -Other- specify_____________  -Unable to determine        By submitting this query, we are merely seeking further clarification of documentation to accurately reflect all conditions that you are monitoring, evaluating, treating or that extend the hospitalization or utilize additional resources of care. Please utilize your independent clinical judgment when addressing the question(s) above.     This query and your response, once completed, will be entered into the legal medical record.    Sincerely,  Darlene Dorman  Clinical Documentation Integrity Program

## 2024-03-22 ENCOUNTER — TRANSITIONAL CARE MANAGEMENT TELEPHONE ENCOUNTER (OUTPATIENT)
Dept: CALL CENTER | Facility: HOSPITAL | Age: 79
End: 2024-03-22
Payer: MEDICARE

## 2024-03-22 ENCOUNTER — HOME CARE VISIT (OUTPATIENT)
Dept: HOME HEALTH SERVICES | Facility: HOME HEALTHCARE | Age: 79
End: 2024-03-22
Payer: MEDICARE

## 2024-03-22 VITALS
SYSTOLIC BLOOD PRESSURE: 170 MMHG | HEART RATE: 71 BPM | RESPIRATION RATE: 18 BRPM | TEMPERATURE: 97 F | DIASTOLIC BLOOD PRESSURE: 100 MMHG | OXYGEN SATURATION: 93 %

## 2024-03-22 LAB
BACTERIA SPEC AEROBE CULT: NORMAL
BACTERIA SPEC AEROBE CULT: NORMAL

## 2024-03-22 PROCEDURE — G0299 HHS/HOSPICE OF RN EA 15 MIN: HCPCS

## 2024-03-22 NOTE — CASE MANAGEMENT/SOCIAL WORK
Case Management Discharge Note      Final Note: Home W/HH         Selected Continued Care - Discharged on 3/21/2024 Admission date: 3/17/2024 - Discharge disposition: Home-Health Care Svc      Destination    No services have been selected for the patient.                Durable Medical Equipment    No services have been selected for the patient.                Dialysis/Infusion    No services have been selected for the patient.                Home Medical Care       Service Provider Selected Services Address Phone Fax Patient Preferred    [facility] [address] [address] [phone] --              Therapy    No services have been selected for the patient.                Community Resources    No services have been selected for the patient.                Community & DME    No services have been selected for the patient.                         Final Discharge Disposition Code: 06 - home with home health care

## 2024-03-22 NOTE — Clinical Note
Patient is a 78M who presented to the hospital with SOA, cough and wheezing for a week.  Was found to have pneumonia and LENORA, was placed on IV abx and began to see resolution of symptoms.  Patient was transitioned to oral abx to continue at home and was deemed ready for discharge.  Focus of care related to bilateral pneumonia.  Patient up with walker  Lungs diminished and crackles in the bases, BP elevated (but asymptomatic), rest of vitals WNL.  Meds in home, taking as instructed.  Instructed patient to record BP twice daily leading up to appointment with PCP so we have a better idea of where his BP is staying.  See patient for 3 weeks for BP/vital monitoring, lung assessment, medication management and disease process education.

## 2024-03-22 NOTE — OUTREACH NOTE
Call Center TCM Note      Flowsheet Row Responses   [facility] patient discharged from? [place]   Does the patient have one of the following disease processes/diagnoses(primary or secondary)? Pneumonia   TCM attempt successful? Yes   Call end time 1511   Discharge diagnosis Pneumonia   Person spoke with today (if not patient) and relationship Spouse and Daughter- Jo   Meds reviewed with patient/caregiver? Yes   Does the patient have all medications ordered at discharge? Yes   Prescription comments no concerns or questions noted.   Is the patient taking all medications as directed (includes completed medication regime)? Yes   Comments 3/27/2024  8:00 AM Arrive by 7:30 AM HOSPITAL FOLLOW UP 30 min [facility] PRIMARY CARE Charity Kearney MD   Does the patient have an appointment with their PCP within 7-14 days of discharge? No   Nursing Interventions Assisted patient with making appointment per protocol   What is the Home health agency?  [facility]   Has home health visited the patient within 72 hours of discharge? Yes   Psychosocial issues? No   Did the patient receive a copy of their discharge instructions? Yes   Nursing interventions Reviewed instructions with patient   What is the patient's perception of their health status since discharge? Improving   Nursing Interventions Nurse provided patient education   Is the patient/caregiver able to teach back the hierarchy of who to call/visit for symptoms/problems? PCP, Specialist, Home health nurse, Urgent Care, ED, 911 Yes   Is the patient/caregiver able to teach back signs and symptoms of worsening condition: Fever/chills, Shortness of breath, Chest pain   Is the patient/caregiver able to teach back importance of completing antibiotic course of treatment? Yes   TCM call completed? Yes   Wrap up additional comments Per daughter patient doing well. Taking probiotic with antibiotic. Scheduled hospital fu. AUTUMN coming today at 5p. No  other concerns or questions noted.   Call end time 1511   Would this patient benefit from a Referral to [facility] Social Work? No   Is the patient interested in additional calls from an ambulatory ? No            Leslie Tyson RN    3/22/2024, 15:11 EDT

## 2024-03-22 NOTE — Clinical Note
Patient BP elevated at home health admission:    BP: 170/100  HR: 71    Patient is asymptomatic, I have instructed him to record 2x daily on his cuff at home and he has an appointment with you on 3/27/24.

## 2024-03-26 ENCOUNTER — HOME CARE VISIT (OUTPATIENT)
Dept: HOME HEALTH SERVICES | Facility: HOME HEALTHCARE | Age: 79
End: 2024-03-26
Payer: MEDICARE

## 2024-03-26 VITALS
HEART RATE: 70 BPM | RESPIRATION RATE: 18 BRPM | SYSTOLIC BLOOD PRESSURE: 112 MMHG | TEMPERATURE: 96.5 F | OXYGEN SATURATION: 94 % | DIASTOLIC BLOOD PRESSURE: 68 MMHG

## 2024-03-26 VITALS
DIASTOLIC BLOOD PRESSURE: 82 MMHG | SYSTOLIC BLOOD PRESSURE: 140 MMHG | RESPIRATION RATE: 18 BRPM | TEMPERATURE: 97.7 F | HEART RATE: 73 BPM | OXYGEN SATURATION: 93 %

## 2024-03-26 PROCEDURE — G0152 HHCP-SERV OF OT,EA 15 MIN: HCPCS

## 2024-03-26 PROCEDURE — G0493 RN CARE EA 15 MIN HH/HOSPICE: HCPCS

## 2024-03-26 PROCEDURE — G0151 HHCP-SERV OF PT,EA 15 MIN: HCPCS

## 2024-03-26 NOTE — HOME HEALTH
"CURRENT SITUATION: Pt went to [facility] due to SOA, cough and wheezing. He was admitted and hospitalized 03-17-24 to 03-21-24 due to: B-lung Pneumonia, LENORA (acute kidney injury.) He was d/c'd to home w/HH SN, PT, OT.  PMHx: Hypothyroid, dyslipidemia, Benign essential HTN, Anxiety, Trigeminal neuralgia of right side of face.    OT's FOCUS OF CARE: PNA, ADL safety  SUBJECTIVE: \"I can do all that, I don't need help from anyone to do those things.\" [after explaining OT to him.] Agreeable to OT evaluation.  SOCIAL & ENVIRONMENTAL SITUATION: Pt lives w/wife. Adult children present.  PATIENT'S &/OR CAREGIVER'S GOAL: \"Get well.\"  INTERVENTIONS: OT Eval, ADL training, Home Safety, Therapeutic Exercise/Activity, Transfer/Mobility training, Monitor vitals including SPO2 via pulse-oximeter (notifiy MD if resting O2 <90% on room air), Patient/CG education, Falls-risk prevention, Recommendations on AE.  ASSESSMENT: Pt is not in need of skilled OT at this time. He is performing all his own ADLs and has all needs met w/home modifications. He was shown how to use the incentive spirometer and a few arm exercises on this day. No other OT skill warranted. They v/u and are in agreement.    PLAN: OT Eval only."

## 2024-03-26 NOTE — Clinical Note
Home PT eval completed on 3/26.  PT eval only.  He seems to be back at a baseline from the pneumonia.  His preexisting balance and mobility issues remain but he seems functional in his home.

## 2024-03-26 NOTE — HOME HEALTH
Ambulating without assistive device.  VSS.  BP improving and is WNL today.  Will continue to monitor.

## 2024-03-27 VITALS
DIASTOLIC BLOOD PRESSURE: 82 MMHG | OXYGEN SATURATION: 94 % | HEART RATE: 65 BPM | SYSTOLIC BLOOD PRESSURE: 126 MMHG | TEMPERATURE: 97.4 F

## 2024-03-27 NOTE — HOME HEALTH
"Patient in the hospital from 3/17 to 3/21 due to PNA and LENORA.  He and his wife both had a virus that led to the pnuemonia.  His wife was discharged from home PT services today and his evaluation was done today.  He has a long medical/surgerical history including B THRs, B TKRs (with revisions), right quad tendon rupture and replacement, left shoulder surgery, Trigeminal neuralgia with \"brain surgery.\"  He presented today in his home walking with no AD.  He states his his \"energy and strength\" are \"gone.\"  But, as I went further into my evaluation, he states he is basically back to his baseline function and getting around as before.  He has balance issues when walking and uses a cane when leaving the home, but no AD in the home except enviromental support.  I had him use the cane in the home and he used it correctly and it seemed to aid in his balance.  His gait is impulsive and quick.  He is already getting up and walking several laps in his home, several times a day.  I had him exit his home to assess step safety and he did so using the cane and going down backwards using the rail safely.  Since his balance deficits are long standing and he seems to have recovered most of his premorbid function from the pneumonia, PT eval only today."

## 2024-03-28 ENCOUNTER — OFFICE VISIT (OUTPATIENT)
Dept: INTERNAL MEDICINE | Facility: CLINIC | Age: 79
End: 2024-03-28
Payer: MEDICARE

## 2024-03-28 VITALS
HEIGHT: 69 IN | BODY MASS INDEX: 35.99 KG/M2 | WEIGHT: 243 LBS | HEART RATE: 74 BPM | DIASTOLIC BLOOD PRESSURE: 70 MMHG | SYSTOLIC BLOOD PRESSURE: 128 MMHG

## 2024-03-28 DIAGNOSIS — I10 BENIGN ESSENTIAL HTN: Chronic | ICD-10-CM

## 2024-03-28 DIAGNOSIS — R79.89 ELEVATED LIVER FUNCTION TESTS: ICD-10-CM

## 2024-03-28 DIAGNOSIS — F41.9 ANXIETY: ICD-10-CM

## 2024-03-28 DIAGNOSIS — J18.9 PNEUMONIA OF BOTH LUNGS DUE TO INFECTIOUS ORGANISM, UNSPECIFIED PART OF LUNG: Primary | ICD-10-CM

## 2024-03-28 NOTE — PROGRESS NOTES
Transitional Care Follow Up Visit  Subjective     Jeremi Ugarte is a 78 y.o. male who presents for a transitional care management visit.    Within 48 business hours after discharge our office contacted him via telephone to coordinate his care and needs.      I reviewed and discussed the details of that call along with the discharge summary, hospital problems, inpatient lab results, inpatient diagnostic studies, and consultation reports with Jeremi.     Current outpatient and discharge medications have been reconciled for the patient.  Reviewed by: Charity Kearney MD          3/21/2024     6:28 PM   Date of TCM Phone Call   [facility]   Date of Admission 3/17/2024   Date of Discharge 3/21/2024   Discharge Disposition Home-Health Care [facility] [place] [facility]     Risk for Readmission (LACE) Score: 7 (3/21/2024  6:00 AM)      History of Present Illness   Course During Hospital Stay:  admitted with progressive weakness and cough- wife was in hospital with pneumonia-dx multifocal pneumonia. She had metapneumovirus, he was negative. Complicated by elevated transaminases- rosuvastatin was held.  He has not restarted. He did not need oxygen at discharge. Home health has come a couple of times- some PT.  He is walking around the house.   Appetite is decent. He is not coughing. Completed course of abx.      The following portions of the patient's history were reviewed and updated as appropriate: allergies, current medications, past medical history, past social history, and problem list.    Review of Systems   Constitutional:  Positive for fatigue. Negative for appetite change and fever.   Respiratory:  Negative for cough and shortness of breath.    Cardiovascular:  Negative for chest pain and leg swelling.   Gastrointestinal:  Negative for abdominal pain.   Genitourinary:  Negative for difficulty urinating.   Musculoskeletal:  Negative for arthralgias.   Neurological:  Negative for headaches.       Objective   Physical  Exam  Constitutional:       Appearance: Normal appearance.   Cardiovascular:      Rate and Rhythm: Normal rate.   Pulmonary:      Effort: Pulmonary effort is normal.      Breath sounds: Normal breath sounds.   Musculoskeletal:      Right lower leg: No edema.      Left lower leg: No edema.         Assessment & Plan

## 2024-03-29 ENCOUNTER — HOME CARE VISIT (OUTPATIENT)
Dept: HOME HEALTH SERVICES | Facility: HOME HEALTHCARE | Age: 79
End: 2024-03-29
Payer: MEDICARE

## 2024-03-29 VITALS
TEMPERATURE: 95.6 F | RESPIRATION RATE: 18 BRPM | SYSTOLIC BLOOD PRESSURE: 106 MMHG | DIASTOLIC BLOOD PRESSURE: 64 MMHG | OXYGEN SATURATION: 94 % | HEART RATE: 67 BPM

## 2024-03-29 LAB
ALBUMIN SERPL-MCNC: 4.1 G/DL (ref 3.5–5.2)
ALBUMIN/GLOB SERPL: 1.7 G/DL
ALP SERPL-CCNC: 100 U/L (ref 39–117)
ALT SERPL-CCNC: 43 U/L (ref 1–41)
AST SERPL-CCNC: 25 U/L (ref 1–40)
BILIRUB SERPL-MCNC: 0.2 MG/DL (ref 0–1.2)
BUN SERPL-MCNC: 18 MG/DL (ref 8–23)
BUN/CREAT SERPL: 15.3 (ref 7–25)
CALCIUM SERPL-MCNC: 8.8 MG/DL (ref 8.6–10.5)
CHLORIDE SERPL-SCNC: 102 MMOL/L (ref 98–107)
CO2 SERPL-SCNC: 25.2 MMOL/L (ref 22–29)
CREAT SERPL-MCNC: 1.18 MG/DL (ref 0.76–1.27)
EGFRCR SERPLBLD CKD-EPI 2021: 63.2 ML/MIN/1.73
GLOBULIN SER CALC-MCNC: 2.4 GM/DL
GLUCOSE SERPL-MCNC: 89 MG/DL (ref 65–99)
POTASSIUM SERPL-SCNC: 5.1 MMOL/L (ref 3.5–5.2)
PROT SERPL-MCNC: 6.5 G/DL (ref 6–8.5)
SODIUM SERPL-SCNC: 137 MMOL/L (ref 136–145)

## 2024-03-29 PROCEDURE — G0493 RN CARE EA 15 MIN HH/HOSPICE: HCPCS

## 2024-03-30 RX ORDER — ALPRAZOLAM 0.5 MG/1
TABLET ORAL
Qty: 90 TABLET | Refills: 1 | Status: SHIPPED | OUTPATIENT
Start: 2024-03-30

## 2024-03-30 NOTE — HOME HEALTH
Medications reviewed.  VSS.  Lungs clear.  SATS WNL.  Denies pain or SOB.  Will continue to monitor.

## 2024-04-03 ENCOUNTER — HOME CARE VISIT (OUTPATIENT)
Dept: HOME HEALTH SERVICES | Facility: HOME HEALTHCARE | Age: 79
End: 2024-04-03
Payer: MEDICARE

## 2024-04-03 VITALS
HEART RATE: 64 BPM | OXYGEN SATURATION: 95 % | RESPIRATION RATE: 18 BRPM | DIASTOLIC BLOOD PRESSURE: 72 MMHG | SYSTOLIC BLOOD PRESSURE: 118 MMHG | TEMPERATURE: 97.6 F

## 2024-04-03 PROCEDURE — G0493 RN CARE EA 15 MIN HH/HOSPICE: HCPCS

## 2024-05-10 ENCOUNTER — TELEPHONE (OUTPATIENT)
Dept: INTERNAL MEDICINE | Facility: CLINIC | Age: 79
End: 2024-05-10
Payer: MEDICARE

## 2024-05-10 NOTE — TELEPHONE ENCOUNTER
Caller: Vidya Ugarte    Relationship: Emergency Contact    Best call back number: [phone]     What is the best time to reach you: ANY    Who are you requesting to speak with (clinical staff, provider,  specific staff member): CLINICAL STAFF    What was the call regarding: SHOULD PATIENT GO BACK ON ROSUVASTATIN?     Is it okay if the provider responds through MyChart: YES

## 2024-05-13 NOTE — TELEPHONE ENCOUNTER
Yes, we can retry and check liver function tests in the future. We can discuss pita at his appointment this week.

## 2024-05-14 ENCOUNTER — HOSPITAL ENCOUNTER (OUTPATIENT)
Facility: HOSPITAL | Age: 79
Discharge: HOME | End: 2024-05-14
Admitting: INTERNAL MEDICINE
Payer: MEDICARE

## 2024-05-14 DIAGNOSIS — J18.9 PNEUMONIA OF BOTH LUNGS DUE TO INFECTIOUS ORGANISM, UNSPECIFIED PART OF LUNG: ICD-10-CM

## 2024-05-14 PROCEDURE — 71046 X-RAY EXAM CHEST 2 VIEWS: CPT

## 2024-05-28 DIAGNOSIS — F41.9 ANXIETY: ICD-10-CM

## 2024-05-28 RX ORDER — ALPRAZOLAM 0.5 MG/1
TABLET ORAL
Qty: 90 TABLET | Refills: 1 | Status: SHIPPED | OUTPATIENT
Start: 2024-05-28

## 2024-05-28 RX ORDER — ROSUVASTATIN CALCIUM 20 MG/1
1 TABLET, COATED ORAL DAILY
COMMUNITY
Start: 2024-05-09

## 2024-06-18 DIAGNOSIS — I10 BENIGN ESSENTIAL HTN: ICD-10-CM

## 2024-06-18 RX ORDER — LOSARTAN POTASSIUM 100 MG/1
100 TABLET ORAL DAILY
Qty: 90 TABLET | Refills: 1 | Status: SHIPPED | OUTPATIENT
Start: 2024-06-18

## 2024-06-21 RX ORDER — LEVOTHYROXINE SODIUM 112 UG/1
112 TABLET ORAL DAILY
Qty: 90 TABLET | Refills: 1 | Status: SHIPPED | OUTPATIENT
Start: 2024-06-21

## 2024-07-02 DIAGNOSIS — E03.9 ACQUIRED HYPOTHYROIDISM: ICD-10-CM

## 2024-07-02 DIAGNOSIS — I10 BENIGN ESSENTIAL HTN: Primary | ICD-10-CM

## 2024-07-17 ENCOUNTER — OFFICE VISIT (OUTPATIENT)
Dept: INTERNAL MEDICINE | Facility: CLINIC | Age: 79
End: 2024-07-17
Payer: MEDICARE

## 2024-07-17 VITALS
WEIGHT: 256 LBS | HEIGHT: 69 IN | HEART RATE: 80 BPM | SYSTOLIC BLOOD PRESSURE: 134 MMHG | BODY MASS INDEX: 37.92 KG/M2 | DIASTOLIC BLOOD PRESSURE: 80 MMHG

## 2024-07-17 DIAGNOSIS — E03.9 ACQUIRED HYPOTHYROIDISM: Chronic | ICD-10-CM

## 2024-07-17 DIAGNOSIS — Z00.00 MEDICARE ANNUAL WELLNESS VISIT, SUBSEQUENT: ICD-10-CM

## 2024-07-17 DIAGNOSIS — I10 BENIGN ESSENTIAL HTN: Chronic | ICD-10-CM

## 2024-07-17 DIAGNOSIS — G50.0 TRIGEMINAL NEURALGIA OF RIGHT SIDE OF FACE: Chronic | ICD-10-CM

## 2024-07-17 DIAGNOSIS — F41.9 ANXIETY: Primary | Chronic | ICD-10-CM

## 2024-07-17 PROBLEM — N17.9 AKI (ACUTE KIDNEY INJURY): Status: RESOLVED | Noted: 2024-03-18 | Resolved: 2024-07-17

## 2024-07-17 PROBLEM — S83.91XA SPRAIN OF RIGHT KNEE: Status: RESOLVED | Noted: 2022-03-22 | Resolved: 2024-07-17

## 2024-07-17 PROBLEM — J18.9 PNEUMONIA: Status: RESOLVED | Noted: 2024-03-17 | Resolved: 2024-07-17

## 2024-07-17 PROCEDURE — 1170F FXNL STATUS ASSESSED: CPT | Performed by: INTERNAL MEDICINE

## 2024-07-17 PROCEDURE — 3079F DIAST BP 80-89 MM HG: CPT | Performed by: INTERNAL MEDICINE

## 2024-07-17 PROCEDURE — G0439 PPPS, SUBSEQ VISIT: HCPCS | Performed by: INTERNAL MEDICINE

## 2024-07-17 PROCEDURE — 1159F MED LIST DOCD IN RCRD: CPT | Performed by: INTERNAL MEDICINE

## 2024-07-17 PROCEDURE — 1160F RVW MEDS BY RX/DR IN RCRD: CPT | Performed by: INTERNAL MEDICINE

## 2024-07-17 PROCEDURE — 3075F SYST BP GE 130 - 139MM HG: CPT | Performed by: INTERNAL MEDICINE

## 2024-07-17 PROCEDURE — 99213 OFFICE O/P EST LOW 20 MIN: CPT | Performed by: INTERNAL MEDICINE

## 2024-07-17 NOTE — PROGRESS NOTES
Subjective   The ABCs of the Annual Wellness Visit  Medicare Wellness Visit      Jeremi Ugarte is a 79 y.o. patient who presents for a Medicare Wellness Visit.    The following portions of the patient's history were reviewed and   updated as appropriate: allergies, current medications, past family history, past medical history, past social history, past surgical history, and problem list.    Compared to one year ago, the patient's physical   health is better.  Compared to one year ago, the patient's mental   health is the same.    Recent Hospitalizations:  This patient has had a [facility] admission record on file within the last 365 days.  Current Medical Providers:  Patient Care Team:  Charity Kearney MD as PCP - General (Internal Medicine)  Aldo Hudson DO as Consulting Physician (Neurology)  Patricia Caldera MD as Consulting Physician (Orthopedic Surgery)  Antonio Mesa MD as Consulting Physician (Cardiology)    Outpatient Medications Prior to Visit   Medication Sig Dispense Refill    ALPRAZolam (XANAX) 0.5 MG tablet TAKE 1 TABLET BY MOUTH THREE TIMES DAILY AS NEEDED FOR ANXIETY 90 tablet 1    Calcium Carbonate-Vit D-Min (CALCIUM 1200 PO) Take 1,000 mg by mouth Every Morning. Indications: low calcium levels      carvedilol (COREG) 25 MG tablet Take 1 tablet by mouth 2 (Two) Times a Day With Meals. 60 tablet 0    hydrALAZINE (APRESOLINE) 100 MG tablet TAKE 1 TABLET BY MOUTH THREE TIMES DAILY 90 tablet 1    levothyroxine (SYNTHROID, LEVOTHROID) 112 MCG tablet TAKE 1 TABLET BY MOUTH DAILY 90 tablet 1    losartan (COZAAR) 100 MG tablet TAKE 1 TABLET BY MOUTH DAILY 90 tablet 1    Multiple Vitamins-Minerals (MULTIVITAMIN ADULTS 50+ PO) Take 1 tablet by mouth Every Morning. Indications: nutritional support      NON FORMULARY Take 1 tablet by mouth Every Morning. Take Eye Multivitamin daily  Indications: nutritional support      OXcarbazepine (TRILEPTAL) 300 MG tablet Take 1 tablet by  "mouth 4 (Four) Times a Day. Takes doses at 0800, 1400, 2000, 0200. Patient is adamant about taking this medication at scheduled times.  Indications: Trigeminal Nerve Pain  1    terazosin (HYTRIN) 2 MG capsule Take 1 capsule by mouth Every Night. Indications: High Blood Pressure Disorder      vitamin B-12 (CYANOCOBALAMIN) 1000 MCG tablet Take 1 tablet by mouth Daily. Indications: Inadequate Vitamin B12      rosuvastatin (CRESTOR) 20 MG tablet Take 1 tablet by mouth Daily.       Facility-Administered Medications Prior to Visit   Medication Dose Route Frequency Provider Last Rate Last Admin    Chlorhexidine Gluconate Cloth 2 % pads   Apply externally BID Patricia Caldera MD         No opioid medication identified on active medication list. I have reviewed chart for other potential  high risk medication/s and harmful drug interactions in the elderly.      Aspirin is not on active medication list.  Aspirin use is not indicated based on review of current medical condition/s. Risk of harm outweighs potential benefits.  .    Patient Active Problem List   Diagnosis    Trigeminal neuralgia of right side of face    Bifascicular block    Dyslipidemia    Benign essential HTN    Anxiety    Status post revision of total replacement of right knee    Cluster headaches    Status post revision of total replacement of left knee    Hypercholesterolemia    Hyponatremia    Quadriceps tendon rupture, right, sequela    Hypothyroid    Closed dislocation of patella, left, subsequent encounter    Instability of internal left knee prosthesis     Advance Care Planning (Click this link to access ACP Navigator)  Advance Directive is on file.  ACP discussion was held with the patient during this visit. Patient has an advance directive in EMR which is still valid.         Objective   Vitals:    07/17/24 1058   BP: 134/80   Pulse: 80   Weight: 116 kg (256 lb)   Height: 175.3 cm (69\")       Estimated body mass index is 37.8 kg/m² as calculated " "from the following:    Height as of this encounter: 175.3 cm (69\").    Weight as of this encounter: 116 kg (256 lb).    Class 2 Severe Obesity (BMI >=35 and <=39.9). Obesity-related health conditions include the following: hypertension and dyslipidemias. Obesity is unchanged. BMI is is above average; BMI management plan is completed. We discussed portion control and increasing exercise.        Does the patient have evidence of cognitive impairment? No  Lab Results   Component Value Date    CHLPL 152 2024    TRIG 125 2024    HDL 41 2024    LDL 89 2024    VLDL 22 2024                                                                                                Health  Risk Assessment    Smoking Status:  Social History     Tobacco Use   Smoking Status Former    Types: Cigars   Smokeless Tobacco Former    Types: Chew   Tobacco Comments    FOR A SHORT TIME AS A YOUNG ADULT     Alcohol Consumption:  Social History     Substance and Sexual Activity   Alcohol Use Yes    Comment: RARE USE     Fall Risk Screen:  JOY Fall Risk Assessment was completed, and patient is at LOW risk for falls.Assessment completed on:2024    Depression Screenin/17/2024    11:00 AM   PHQ-2/PHQ-9 Depression Screening   Little Interest or Pleasure in Doing Things 0-->not at all   Feeling Down, Depressed or Hopeless 0-->not at all   PHQ-9: Brief Depression Severity Measure Score 0     Health Habits and Functional and Cognitive Screenin/17/2024    10:59 AM   Functional & Cognitive Status   Do you have difficulty preparing food and eating? No   Do you have difficulty bathing yourself, getting dressed or grooming yourself? No   Do you have difficulty using the toilet? No   Do you have difficulty moving around from place to place? No   Do you have trouble with steps or getting out of a bed or a chair? No   Current Diet Limited Junk Food   Dental Exam Up to date   Eye Exam Up to date   Exercise (times " per week) 0 times per week   Current Exercises Include No Regular Exercise   Do you need help using the phone?  No   Are you deaf or do you have serious difficulty hearing?  No   Do you need help to go to places out of walking distance? No   Do you need help shopping? No   Do you need help preparing meals?  No   Do you need help with housework?  No   Do you need help with laundry? No   Do you need help taking your medications? No   Do you need help managing money? No   Do you ever drive or ride in a car without wearing a seat belt? No   Have you felt unusual stress, anger or loneliness in the last month? No   Who do you live with? Spouse   If you need help, do you have trouble finding someone available to you? No   Have you been bothered in the last four weeks by sexual problems? No   Do you have difficulty concentrating, remembering or making decisions? No             Age-appropriate Screening Schedule:  Refer to the list below for future screening recommendations based on patient's age, sex and/or medical conditions. Orders for these recommended tests are listed in the plan section. The patient has been provided with a written plan.    Health Maintenance List  Health Maintenance   Topic Date Due    TDAP/TD VACCINES (1 - Tdap) Never done    ZOSTER VACCINE (1 of 2) Never done    RSV Vaccine - Adults (1 - 1-dose 60+ series) Never done    COVID-19 Vaccine (7 - 2023-24 season) 01/29/2024    COLORECTAL CANCER SCREENING  03/02/2026 (Originally 1945)    INFLUENZA VACCINE  08/01/2024    LIPID PANEL  07/12/2025    ANNUAL WELLNESS VISIT  07/17/2025    BMI FOLLOWUP  07/17/2025    HEPATITIS C SCREENING  Completed    Pneumococcal Vaccine 65+  Completed                                                                                                                                                CMS Preventative Services Quick Reference  Risk Factors Identified During Encounter  Immunizations Discussed/Encouraged: Tdap,  "Shingrix, and RSV (Respiratory Syncytial Virus)    The above risks/problems have been discussed with the patient.  Pertinent information has been shared with the patient in the After Visit Summary.  An After Visit Summary and PPPS were made available to the patient.    Follow Up:   Next Medicare Wellness visit to be scheduled in 1 year.         Additional E&M Note during same encounter follows:  Patient has additional, significant, and separately identifiable condition(s)/problem(s) that require work above and beyond the Medicare Wellness Visit     Chief Complaint  Medicare Wellness-subsequent    Subjective   HPI  Jeremi is also being seen today for additional medical problem/s.  He has consistently been taking the alprazolam TID- some days just twice- no change for years. Cabrera reviewed.   Sometimes feels high BP is up but he relates to his weight and inactivity.       Headache remains controlled with oxcarbazepine- Na has been normal.   Exercise is limited by his tendon repair-       Objective   Vital Signs:  /80   Pulse 80   Ht 175.3 cm (69\")   Wt 116 kg (256 lb)   BMI 37.80 kg/m²   Physical Exam  Constitutional:       Appearance: Normal appearance.   Cardiovascular:      Rate and Rhythm: Normal rate and regular rhythm.   Pulmonary:      Effort: Pulmonary effort is normal.   Musculoskeletal:      Right lower leg: No edema.      Left lower leg: No edema.               Assessment and Plan               Anxiety    Acquired hypothyroidism    Benign essential HTN    Trigeminal neuralgia of right side of face    Medicare annual wellness visit, subsequent    Diagnoses and all orders for this visit:    1. Anxiety (Primary)  Comments:  chronic, stable- cont daily alprazolam use- has been stable for years.    2. Acquired hypothyroidism  Comments:  TSH at goal    3. Benign essential HTN  Comments:  controlled- he states he feels motivated to be more active.  Orders:  -     Basic Metabolic Panel; Future    4. " "Trigeminal neuralgia of right side of face  Comments:  doing great on current regimen. Cont to monitor sodium  Overview:  Overview:   Added automatically from request for surgery [id number]      5. Medicare annual wellness visit, subsequent  Comments:  \"thinks about \" vaccines but then doesn't get them- reminded of several today- he states he joined the [employer] to get more exercise.               Follow Up   Return in about 6 months (around 1/17/2025) for Recheck.  Patient was given instructions and counseling regarding his condition or for health maintenance advice. Please see specific information pulled into the AVS if appropriate.  "

## 2024-07-22 RX ORDER — ROSUVASTATIN CALCIUM 20 MG/1
TABLET, COATED ORAL DAILY
Qty: 90 TABLET | Refills: 2 | Status: SHIPPED | OUTPATIENT
Start: 2024-07-22

## 2024-07-26 DIAGNOSIS — F41.9 ANXIETY: ICD-10-CM

## 2024-07-26 RX ORDER — ALPRAZOLAM 0.5 MG/1
TABLET ORAL
Qty: 90 TABLET | Refills: 1 | Status: SHIPPED | OUTPATIENT
Start: 2024-07-26

## 2024-09-04 ENCOUNTER — HOSPITAL ENCOUNTER (EMERGENCY)
Facility: HOSPITAL | Age: 79
Discharge: HOME | End: 2024-09-04
Attending: EMERGENCY MEDICINE
Payer: MEDICARE

## 2024-09-04 ENCOUNTER — APPOINTMENT (OUTPATIENT)
Dept: CARDIOLOGY | Facility: HOSPITAL | Age: 79
End: 2024-09-04
Payer: MEDICARE

## 2024-09-04 ENCOUNTER — APPOINTMENT (OUTPATIENT)
Dept: GENERAL RADIOLOGY | Facility: HOSPITAL | Age: 79
End: 2024-09-04
Payer: MEDICARE

## 2024-09-04 ENCOUNTER — TELEPHONE (OUTPATIENT)
Dept: CARDIOLOGY | Facility: CLINIC | Age: 79
End: 2024-09-04

## 2024-09-04 VITALS
HEART RATE: 62 BPM | RESPIRATION RATE: 16 BRPM | OXYGEN SATURATION: 93 % | SYSTOLIC BLOOD PRESSURE: 147 MMHG | DIASTOLIC BLOOD PRESSURE: 102 MMHG | TEMPERATURE: 97.6 F

## 2024-09-04 DIAGNOSIS — R00.2 PALPITATIONS: Primary | ICD-10-CM

## 2024-09-04 LAB
ALBUMIN SERPL-MCNC: 4.1 G/DL (ref 3.5–5.2)
ALBUMIN/GLOB SERPL: 1.9 G/DL
ALP SERPL-CCNC: 72 U/L (ref 39–117)
ALT SERPL W P-5'-P-CCNC: 11 U/L (ref 1–41)
ANION GAP SERPL CALCULATED.3IONS-SCNC: 11 MMOL/L (ref 5–15)
AST SERPL-CCNC: 16 U/L (ref 1–40)
BASOPHILS # BLD AUTO: 0.02 10*3/MM3 (ref 0–0.2)
BASOPHILS NFR BLD AUTO: 0.3 % (ref 0–1.5)
BILIRUB SERPL-MCNC: 0.3 MG/DL (ref 0–1.2)
BUN SERPL-MCNC: 19 MG/DL (ref 8–23)
BUN/CREAT SERPL: 18.4 (ref 7–25)
CALCIUM SPEC-SCNC: 8.6 MG/DL (ref 8.6–10.5)
CHLORIDE SERPL-SCNC: 106 MMOL/L (ref 98–107)
CO2 SERPL-SCNC: 25 MMOL/L (ref 22–29)
CREAT SERPL-MCNC: 1.03 MG/DL (ref 0.76–1.27)
DEPRECATED RDW RBC AUTO: 44.2 FL (ref 37–54)
EGFRCR SERPLBLD CKD-EPI 2021: 73.9 ML/MIN/1.73
EOSINOPHIL # BLD AUTO: 0.18 10*3/MM3 (ref 0–0.4)
EOSINOPHIL NFR BLD AUTO: 2.3 % (ref 0.3–6.2)
ERYTHROCYTE [DISTWIDTH] IN BLOOD BY AUTOMATED COUNT: 12.6 % (ref 12.3–15.4)
GEN 5 2HR TROPONIN T REFLEX: 20 NG/L
GLOBULIN UR ELPH-MCNC: 2.2 GM/DL
GLUCOSE SERPL-MCNC: 95 MG/DL (ref 65–99)
HCT VFR BLD AUTO: 40.4 % (ref 37.5–51)
HGB BLD-MCNC: 13.4 G/DL (ref 13–17.7)
HOLD SPECIMEN: NORMAL
HOLD SPECIMEN: NORMAL
IMM GRANULOCYTES # BLD AUTO: 0.02 10*3/MM3 (ref 0–0.05)
IMM GRANULOCYTES NFR BLD AUTO: 0.3 % (ref 0–0.5)
LYMPHOCYTES # BLD AUTO: 1.5 10*3/MM3 (ref 0.7–3.1)
LYMPHOCYTES NFR BLD AUTO: 18.8 % (ref 19.6–45.3)
MAGNESIUM SERPL-MCNC: 2.1 MG/DL (ref 1.6–2.4)
MCH RBC QN AUTO: 32.4 PG (ref 26.6–33)
MCHC RBC AUTO-ENTMCNC: 33.2 G/DL (ref 31.5–35.7)
MCV RBC AUTO: 97.6 FL (ref 79–97)
MONOCYTES # BLD AUTO: 0.7 10*3/MM3 (ref 0.1–0.9)
MONOCYTES NFR BLD AUTO: 8.8 % (ref 5–12)
NEUTROPHILS NFR BLD AUTO: 5.54 10*3/MM3 (ref 1.7–7)
NEUTROPHILS NFR BLD AUTO: 69.5 % (ref 42.7–76)
NRBC BLD AUTO-RTO: 0 /100 WBC (ref 0–0.2)
PLATELET # BLD AUTO: 268 10*3/MM3 (ref 140–450)
PMV BLD AUTO: 9.6 FL (ref 6–12)
POTASSIUM SERPL-SCNC: 4.1 MMOL/L (ref 3.5–5.2)
PROT SERPL-MCNC: 6.3 G/DL (ref 6–8.5)
QT INTERVAL: 475 MS
QTC INTERVAL: 490 MS
RBC # BLD AUTO: 4.14 10*6/MM3 (ref 4.14–5.8)
SODIUM SERPL-SCNC: 142 MMOL/L (ref 136–145)
TROPONIN T DELTA: -3 NG/L
TROPONIN T SERPL HS-MCNC: 23 NG/L
WBC NRBC COR # BLD AUTO: 7.96 10*3/MM3 (ref 3.4–10.8)
WHOLE BLOOD HOLD COAG: NORMAL
WHOLE BLOOD HOLD SPECIMEN: NORMAL

## 2024-09-04 PROCEDURE — 93005 ELECTROCARDIOGRAM TRACING: CPT

## 2024-09-04 PROCEDURE — 71045 X-RAY EXAM CHEST 1 VIEW: CPT

## 2024-09-04 PROCEDURE — 99284 EMERGENCY DEPT VISIT MOD MDM: CPT

## 2024-09-04 PROCEDURE — 93242 EXT ECG>48HR<7D RECORDING: CPT

## 2024-09-04 PROCEDURE — 80053 COMPREHEN METABOLIC PANEL: CPT | Performed by: EMERGENCY MEDICINE

## 2024-09-04 PROCEDURE — 36415 COLL VENOUS BLD VENIPUNCTURE: CPT

## 2024-09-04 PROCEDURE — 83735 ASSAY OF MAGNESIUM: CPT | Performed by: EMERGENCY MEDICINE

## 2024-09-04 PROCEDURE — 84484 ASSAY OF TROPONIN QUANT: CPT | Performed by: EMERGENCY MEDICINE

## 2024-09-04 PROCEDURE — 93010 ELECTROCARDIOGRAM REPORT: CPT | Performed by: STUDENT IN AN ORGANIZED HEALTH CARE EDUCATION/TRAINING PROGRAM

## 2024-09-04 PROCEDURE — 85025 COMPLETE CBC W/AUTO DIFF WBC: CPT | Performed by: EMERGENCY MEDICINE

## 2024-09-04 PROCEDURE — 93005 ELECTROCARDIOGRAM TRACING: CPT | Performed by: EMERGENCY MEDICINE

## 2024-09-04 RX ORDER — ASPIRIN 325 MG
325 TABLET ORAL ONCE
Status: DISCONTINUED | OUTPATIENT
Start: 2024-09-04 | End: 2024-09-04 | Stop reason: HOSPADM

## 2024-09-04 RX ORDER — SODIUM CHLORIDE 0.9 % (FLUSH) 0.9 %
10 SYRINGE (ML) INJECTION AS NEEDED
Status: DISCONTINUED | OUTPATIENT
Start: 2024-09-04 | End: 2024-09-04 | Stop reason: HOSPADM

## 2024-09-04 NOTE — TELEPHONE ENCOUNTER
Caller: Vidya Ugarte    Relationship to patient: Emergency Contact    Best call back number: [phone]    Chief complaint:     Type of visit: FOLLOW UP     Requested date: AS SOON AS POSSIBLE     If rescheduling, when is the original appointment: N/A     Additional notes:[facility] FOR PALPITATIONS AND THEY RECOMMEND THAT PATIENT BE SEEN BY DR. ROWLAND AS SOON AS POSSIBLE. PATIENT ALSO NEEDS TO SCHEDULE ECHO AND NUCLEAR STRESS TEST THAT DR. ROWLAND HAD ORDERED BACK ON 09.27.23.PLEASE CONTACT PATIENT TO SCHEDULE. THANK YOU.

## 2024-09-04 NOTE — ED PROVIDER NOTES
I supervised care provided by the midlevel provider.   We have discussed this patient's history, physical exam, and treatment plan.  I have reviewed the note and personally saw and examined the patient and agree with the plan of care.   I have seen and evaluated this gentleman.  This gentleman this morning at about 3 AM.  He normally is a night owl and stays up throughout the night.  These episodes of irregular heartbeats.  They were incredibly brief they were 1 second or less.  Felt like a skipping of a beat he used the term also a flutter.  But it was not consistent it was very brief as mentioned above.  No chest pain no shortness of breath.  He did not feel his heart was beating fast.  He has had no episodes since early this morning.  He has had no episodes here in the emergency department and has been asymptomatic.  And again during the episode and throughout the night no chest pain or shortness of breath no fevers or chills no coughs or colds.    GENERAL: Pleasant friendly elderly male.  Not distressed.  Vital signs are reviewed.  He is little hypertensive otherwise unremarkable  HENT: nares patent  Head/neck/ face are symmetric without gross deformity or swelling  EYES: no scleral icterus  CV: regular rhythm, regular rate with intact distal pulses  RESPIRATORY: normal effort and no respiratory distress  ABDOMEN: soft and nontender  MUSCULOSKELETAL: no deformity  NEURO: alert and appropriate, moves all extremities, follows commands  SKIN: warm, dry    Vital signs and nursing notes reviewed.    Plan   ED Course as of 09/04/24 1552   Wed Sep 04, 2024   1145 HS Troponin T(!): 23 [MM]   1145 Chest x-ray is unremarkable for no acute disease. [MM]      ED Course User Index  [MM] Darrell Sandoval MD         My own independent interpretation of the EKG that was done at 8:40 AM reveals a rate of 64 it is sinus rhythm does have a right bundle branch block and left anterior fascicular block.  I do not appreciate any acute  injury.  I did compare to the previous EKG on January 12, 2023 I do not see any changes.    This gentleman had episodes of palpitations that were very brief less than a second.  He had no symptoms of chest pain or shortness of breath.  He has no history of heart problems or lung problems.  Initial lab work is unremarkable.  Troponin is mildly elevated which is likely related to his age and his comorbidities.  Will get a recheck a troponin.  I imagine he will remain stable we will discharge him on a Holter monitor.  I discussed this plan with the patient and spouse as well as Catherine Navarro.  All questions answered     Darrell Sandoval MD  09/04/24 [id number]

## 2024-09-04 NOTE — ED NOTES
Ultrasound Inserted IV     Site: R FA     Catheter Length (in): 1.75 20 G     Diameter(cm): 1.2    Depth(cm): 1.0    Vascular Access Score=  4) Vein is poorly palpable and/or poorly visible.      Angiocath visualized and advanced in the venous lumen. Flush visualized superiorly to insertion site in venous lumen. Blood return noted and collected during insertion. No signs of phlebitis noted. Standard IV dressing placed and secured.

## 2024-09-04 NOTE — ED PROVIDER NOTES
EMERGENCY DEPARTMENT ENCOUNTER  Room Number:  06/06  PCP: Charity Kearney MD  Independent Historians: Patient      HPI:  Chief Complaint: had concerns including Irregular Heart Beat.     A complete HPI/ROS/PMH/PSH/SH/FH are unobtainable due to: None    Chronic or social conditions impacting patient care (Social Determinants of Health): None      Context: The patient is a 79 y.o. male with a medical history of trigeminal neuralgia, dyslipidemia, hypertension, anxiety, hyponatremia who presents to the ED c/o acute palpitations.  He states around 3 AM today while lying in bed awake, states he is a night owl and often stays up this late, felt an abnormal sensation of left side of his chest that felt like an abnormal heartbeat or 2 that lasted approximately 1 second.  Nothing precipitated it or made it better.  It has not happened since.  He denies having any chest pain shortness of breath lightheadedness any recent nausea vomiting diarrhea abdominal pain.  He states before and since his episode he has felt that his normal baseline.  His cardiologist is Bonita.    Review of prior external notes (non-ED) -and- Review of prior external test results outside of this encounter:  Patient admitted March 17, 2024 until March 21, 2024 due to pneumonia and sepsis after presenting with shortness of breath cough and wheezing.  He improved with antibiotics, was discharged on Omnicef.        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    Cluster headaches 09/27/2021    Status post revision of total replacement of left knee 11/16/2021    Hypercholesterolemia 03/15/2022    Hyponatremia 03/22/2022    Quadriceps tendon rupture, right, sequela 05/05/2022     Hypothyroid 06/07/2022    Closed dislocation of patella, left, subsequent encounter 01/05/2023    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     Resolved Ambulatory Problems     Diagnosis Date Noted    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 07/26/2020    Instability of internal right knee prosthesis 07/27/2020    Hypertension 09/27/2021    Mechanical loosening of internal left knee prosthetic joint 11/15/2021    Osteoarthritis of knee 11/15/2021    Sprain of right knee 03/22/2022    Quadriceps tendon rupture, right, initial encounter 03/25/2022    Mechanical loosening of internal left knee prosthetic joint 05/03/2022    Osteoarthritis, knee 05/05/2022    Acute posthemorrhagic anemia 05/18/2022    Leukocytosis 05/18/2022    Trigeminal neuralgia     Pneumonia 03/17/2024    LENORA (acute kidney injury) 03/18/2024     Past Medical History:   Diagnosis Date    Ankle arthritis     Arthritis     Chronic headaches     Dislocation of the knee cap     GCA (giant cell arteritis)     Gout of foot     High risk medication use     History of infection     Hyperlipidemia     Left knee pain     Macrocytosis     Myositis     Polymyalgia rheumatica     PONV (postoperative nausea and vomiting)     Renal insufficiency     Right bundle branch block (RBBB)     S/p bilateral revision of total hip replacement     S/P foot surgery     S/P revision of total knee, left     S/P revision of total knee, right          PAST SURGICAL HISTORY  Past Surgical History:   Procedure Laterality Date    ANKLE ARTHROPLASTY Left 2018    BACK SURGERY      lower back surg    BRAIN SURGERY      CATARACT EXTRACTION      COLONOSCOPY      CRANIOTOMY Right 02/14/2019    Dr. Aldridge, for trigeminal neuralgia    EXCISION BAKERS CYST KNEE  2008    HAND SURGERY Left 1971    Left hand and wrist 1971    HIP SURGERY Right 12/08/2016    total right hip replacement    KNEE ARTHROPLASTY Bilateral     LEFT - 1996  -  RIGHT -  1999      QUADRICEPS TENDON REPAIR Right 03/26/2022    Procedure: RIGHT QUADRICEPS TENDON REPAIR;  Surgeon: Keaton Martinez II, MD;  Location: [facility] MAIN OR;  Service: Orthopedics;  Laterality: Right;    SHOULDER SURGERY      TOTAL HIP ARTHROPLASTY Left 2017    TOTAL KNEE ARTHROPLASTY REVISION  2009    left    TOTAL KNEE ARTHROPLASTY REVISION Right 09/15/2020    Procedure: RIGHT TOTAL KNEE ARTHROPLASTY REVISION;  Surgeon: Patricia Caldera MD;  Location: [facility] MAIN OR;  Service: Orthopedics;  Laterality: Right;    TOTAL KNEE ARTHROPLASTY REVISION Left 11/16/2021    Procedure: TOTAL KNEE ARTHROPLASTY REVISION;  Surgeon: Patricia Caldera MD;  Location: [facility] MAIN OR;  Service: Orthopedics;  Laterality: Left;    TOTAL KNEE ARTHROPLASTY REVISION Right 05/17/2022    Procedure: REVISION RIGHT TOTAL KNEE ARTHROPLASTY WITH EXTENSOR MECHANISM ALLOGRAFT/;  Surgeon: Patricia Caldera MD;  Location: [facility] MAIN OR;  Service: Orthopedics;  Laterality: Right;    TOTAL KNEE ARTHROPLASTY REVISION Left 1/16/2023    Procedure: TOTAL KNEE ARTHROPLASTY REVISION repair extensor mechanism ;  Surgeon: Patricia Caldera MD;  Location: [facility] OR OSC;  Service: Orthopedics;  Laterality: Left;    WRIST SURGERY           FAMILY HISTORY  Family History   Problem Relation Age of Onset    Cardiomyopathy Mother     Hypertension Mother     Hyperlipidemia Sister     Hypertension Sister     Coronary artery disease Brother     Hyperlipidemia Brother     Hypertension Brother     No Known Problems Father     Malig Hyperthermia Neg Hx          SOCIAL HISTORY  Social History     Socioeconomic History    Marital status:    Tobacco Use    Smoking status: Former     Types: Cigars    Smokeless tobacco: Former     Types: Chew    Tobacco comments:     FOR A SHORT TIME AS A YOUNG ADULT   Vaping Use    Vaping status: Never Used   Substance and Sexual Activity    Alcohol use: Yes     Comment: RARE USE    Drug use: Never     Sexual activity: Defer         ALLERGIES  Vancomycin, Morphine, Oxycodone-acetaminophen, and Dilaudid [hydromorphone hcl]      REVIEW OF SYSTEMS  Review of Systems  Included in HPI  All systems reviewed and negative except for those discussed in HPI.      PHYSICAL EXAM    I have reviewed the triage vital signs and nursing notes.    ED Triage Vitals   Temp Heart Rate Resp BP SpO2   09/04/24 0828 09/04/24 0828 09/04/24 0828 09/04/24 0836 09/04/24 0828   97.6 °F (36.4 °C) 77 16 (!) 181/88 93 %      Temp src Heart Rate Source Patient Position BP Location FiO2 (%)   09/04/24 0828 09/04/24 0828 -- -- --   Tympanic Monitor          Physical Exam  GENERAL: alert, no acute distress  SKIN: Warm, dry  HENT: Normocephalic, atraumatic  EYES: no scleral icterus  CV: regular rhythm, regular rate, no lower extremity edema  RESPIRATORY: normal effort, lungs clear  ABDOMEN: nondistended soft nontender  MUSCULOSKELETAL: no deformity  NEURO: alert, moves all extremities, follows commands            LAB RESULTS  Recent Results (from the past 24 hour(s))   ECG 12 Lead ED Triage Standing Order; Chest Pain    Collection Time: 09/04/24  8:40 AM   Result Value Ref Range    QT Interval 475 ms    QTC Interval 490 ms   Green Top (Gel)    Collection Time: 09/04/24  9:10 AM   Result Value Ref Range    Extra Tube Hold for add-ons.    Lavender Top    Collection Time: 09/04/24  9:10 AM   Result Value Ref Range    Extra Tube hold for add-on    Gold Top - SST    Collection Time: 09/04/24  9:36 AM   Result Value Ref Range    Extra Tube Hold for add-ons.    Light Blue Top    Collection Time: 09/04/24  9:36 AM   Result Value Ref Range    Extra Tube Hold for add-ons.          RADIOLOGY  XR Chest 1 View    Result Date: 9/4/2024  Portable chest radiograph  HISTORY: Chest pain  TECHNIQUE: Single AP portable radiograph of the chest  COMPARISON: Chest radiograph 5/14/2024      FINDINGS AND IMPRESSION: Lungs are hypoinflated. No pulmonary consolidation, pleural  effusion or pneumothorax is seen. Cardiac silhouette is accentuated by low lung volumes.  This report was finalized on 9/4/2024 9:11 AM by Dr. Ochoa Carrillo M.D on Workstation: [id number]         MEDICATIONS GIVEN IN ER  Medications   sodium chloride 0.9 % flush 10 mL (has no administration in time range)   aspirin tablet 325 mg (has no administration in time range)         ORDERS PLACED DURING THIS VISIT:  Orders Placed This Encounter   Procedures    XR Chest 1 View    Shade Draw    Comprehensive Metabolic Panel    High Sensitivity Troponin T    CBC Auto Differential    Magnesium    NPO Diet NPO Type: Strict NPO    Undress & Gown    Continuous Pulse Oximetry    Oxygen Therapy- Nasal Cannula; Titrate 1-6 LPM Per SpO2; 90 - 95%    ECG 12 Lead ED Triage Standing Order; Chest Pain    ECG 12 Lead ED Triage Standing Order; Chest Pain    Insert Peripheral IV    CBC & Differential    Green Top (Gel)    Lavender Top    Gold Top - SST    Light Blue Top         OUTPATIENT MEDICATION MANAGEMENT:  Current Facility-Administered Medications Ordered in Epic   Medication Dose Route Frequency Provider Last Rate Last Admin    aspirin tablet 325 mg  325 mg Oral Once Darrell Sandoval MD        Chlorhexidine Gluconate Cloth 2 % pads   Apply externally BID Patricia Caldera MD        sodium chloride 0.9 % flush 10 mL  10 mL Intravenous PRN Darrell Sandoval MD         Current Outpatient Medications Ordered in Epic   Medication Sig Dispense Refill    ALPRAZolam (XANAX) 0.5 MG tablet TAKE 1 TABLET BY MOUTH THREE TIMES DAILY AS NEEDED FOR ANXIETY 90 tablet 1    Calcium Carbonate-Vit D-Min (CALCIUM 1200 PO) Take 1,000 mg by mouth Every Morning. Indications: low calcium levels      carvedilol (COREG) 25 MG tablet Take 1 tablet by mouth 2 (Two) Times a Day With Meals. 60 tablet 0    hydrALAZINE (APRESOLINE) 100 MG tablet TAKE 1 TABLET BY MOUTH THREE TIMES DAILY 90 tablet 1    levothyroxine (SYNTHROID, LEVOTHROID) 112 MCG tablet TAKE 1  TABLET BY MOUTH DAILY 90 tablet 1    losartan (COZAAR) 100 MG tablet TAKE 1 TABLET BY MOUTH DAILY 90 tablet 1    Multiple Vitamins-Minerals (MULTIVITAMIN ADULTS 50+ PO) Take 1 tablet by mouth Every Morning. Indications: nutritional support      NON FORMULARY Take 1 tablet by mouth Every Morning. Take Eye Multivitamin daily  Indications: nutritional support      OXcarbazepine (TRILEPTAL) 300 MG tablet Take 1 tablet by mouth 4 (Four) Times a Day. Takes doses at 0800, 1400, 2000, 0200. Patient is adamant about taking this medication at scheduled times.  Indications: Trigeminal Nerve Pain  1    rosuvastatin (CRESTOR) 20 MG tablet TAKE 1 TABLET BY MOUTH DAILY 90 tablet 2    terazosin (HYTRIN) 2 MG capsule Take 1 capsule by mouth Every Night. Indications: High Blood Pressure Disorder      vitamin B-12 (CYANOCOBALAMIN) 1000 MCG tablet Take 1 tablet by mouth Daily. Indications: Inadequate Vitamin B12           PROCEDURES  Procedures            PROGRESS, DATA ANALYSIS, CONSULTS, AND MEDICAL DECISION MAKING  All labs have been independently interpreted by me.  All radiology studies have been reviewed by me. All EKG's have been independently viewed and interpreted by me.  Discussion below represents my analysis of pertinent findings related to patient's condition, differential diagnosis, treatment plan and final disposition.    DIFFERENTIAL    Differential diagnosis includes but is not limited to arrhythmia, PVC, PAC, electrolyte abnormality, dehydration, anemia, anxiety    Clinical Scores:                  ED Course as of 09/05/24 2256   Wed Sep 04, 2024   1145 HS Troponin T(!): 23 [MM]   1145 Chest x-ray is unremarkable for no acute disease. [MM]      ED Course User Index  [MM] Darrell Sandoval MD     Chest x-ray EKG and labs unremarkable including serial troponins which are flat.  He had no chest pain, did have very brief palpitations.  He has been asymptomatic over the course of his emergency department visit, states he  feels normal.  Will discharge with the 48-hour Holter monitor.  He is established with Bonita, recommended close follow-up with him, he will call tomorrow.        AS OF 09:55 EDT VITALS:    BP - (!) 181/88  HR - 61  TEMP - 97.6 °F (36.4 °C) (Tympanic)  O2 SATS - 93%    COMPLEXITY OF CARE  Admission was considered but after careful review of the patient's presentation, physical examination, diagnostic results, and response to treatment the patient may be safely discharged with outpatient follow-up.      DIAGNOSIS  Final diagnoses:   Palpitations         DISPOSITION  ED Disposition       ED Disposition   Discharge    Condition   Good    Comment   --                  FOLLOW UP  Charity Kearney MD  [address]  [place] [id number] [phone]          Antonio Mesa MD  [address]  [place] [id number] [phone]    Schedule an appointment as soon as possible for a visit       [facility] EMERGENCY DEPARTMENT  [address] [address] [phone]    If symptoms worsen or any concerns        Prescribed Medications     Medication List      No changes were made to your prescriptions during this visit.                   Please note that portions of this document were completed with a voice recognition program.    Note Disclaimer: At [facility], we believe that sharing information builds trust and better relationships. You are receiving this note because you recently visited [facility]. It is possible you will see health information before a provider has talked with you about it. This kind of information can be easy to misunderstand. To help you fully understand what it means for your health, we urge you to discuss this note with your provider.         Liyah East PA-C  09/05/24 [id number]

## 2024-09-19 NOTE — PLAN OF CARE
Problem: Adult Inpatient Plan of Care  Goal: Plan of Care Review  Recent Flowsheet Documentation  Taken 9/16/2020 1209 by Sheryl Morgan PTA  Progress: improving  Plan of Care Reviewed With:   patient   spouse  Outcome Summary: Pt doing better today w/ PT. Pt amb 200' req CGA/fww w/ mild unsteadiness noted - no LOB. Pt was educ on and asc/dsc 4 stairs then 2 stairs using fww. Pt would benefit from  PT after dc w/ wife stating she would contact workman's comp rep if she had questions.    Patient was intermittently wearing a face mask during this therapy encounter. Therapist used appropriate personal protective equipment including eye protection, mask, and gloves.  Mask used was standard procedure mask. Appropriate PPE was worn during the entire therapy session. Hand hygiene was completed before and after therapy session. Patient is not in enhanced droplet precautions.        I have personally seen and examined this patient. I fully participated in the care of this patient. I have made amendments to the documentation where appropriate and otherwise agree with the history, physical exam, and plan as documented by the

## 2024-09-27 DIAGNOSIS — F41.9 ANXIETY: ICD-10-CM

## 2024-09-30 RX ORDER — ALPRAZOLAM 0.5 MG
TABLET ORAL
Qty: 90 TABLET | Refills: 1 | Status: SHIPPED | OUTPATIENT
Start: 2024-09-30

## 2024-10-14 ENCOUNTER — TELEPHONE (OUTPATIENT)
Dept: CARDIOLOGY | Facility: CLINIC | Age: 79
End: 2024-10-14
Payer: MEDICARE

## 2024-10-15 ENCOUNTER — HOSPITAL ENCOUNTER (OUTPATIENT)
Dept: CARDIOLOGY | Facility: HOSPITAL | Age: 79
Discharge: HOME | End: 2024-10-15
Payer: MEDICARE

## 2024-10-15 VITALS
OXYGEN SATURATION: 97 % | HEART RATE: 65 BPM | HEIGHT: 69 IN | BODY MASS INDEX: 37.92 KG/M2 | DIASTOLIC BLOOD PRESSURE: 102 MMHG | SYSTOLIC BLOOD PRESSURE: 170 MMHG | WEIGHT: 256 LBS

## 2024-10-15 VITALS — BODY MASS INDEX: 37.88 KG/M2 | WEIGHT: 255.73 LBS | HEIGHT: 69 IN

## 2024-10-15 DIAGNOSIS — R06.02 EXERTIONAL SHORTNESS OF BREATH: ICD-10-CM

## 2024-10-15 LAB
AORTIC ARCH: 2 CM
AORTIC DIMENSIONLESS INDEX: 0.7 (DI)
ASCENDING AORTA: 3.4 CM
BH CV ECHO MEAS - ACS: 2.07 CM
BH CV ECHO MEAS - AO MAX PG: 7.1 MMHG
BH CV ECHO MEAS - AO MEAN PG: 4 MMHG
BH CV ECHO MEAS - AO ROOT AREA (BSA CORRECTED): 1.4 CM2
BH CV ECHO MEAS - AO ROOT DIAM: 3.1 CM
BH CV ECHO MEAS - AO V2 MAX: 133 CM/SEC
BH CV ECHO MEAS - AO V2 VTI: 31.8 CM
BH CV ECHO MEAS - AVA(I,D): 2.7 CM2
BH CV ECHO MEAS - EDV(CUBED): 145.5 ML
BH CV ECHO MEAS - EDV(MOD-SP2): 101 ML
BH CV ECHO MEAS - EDV(MOD-SP4): 103 ML
BH CV ECHO MEAS - EF(MOD-BP): 53.7 %
BH CV ECHO MEAS - EF(MOD-SP2): 55.4 %
BH CV ECHO MEAS - EF(MOD-SP4): 52.4 %
BH CV ECHO MEAS - ESV(CUBED): 51.2 ML
BH CV ECHO MEAS - ESV(MOD-SP2): 45 ML
BH CV ECHO MEAS - ESV(MOD-SP4): 49 ML
BH CV ECHO MEAS - FS: 29.4 %
BH CV ECHO MEAS - IVS/LVPW: 1.17 CM
BH CV ECHO MEAS - IVSD: 1.28 CM
BH CV ECHO MEAS - LAT PEAK E' VEL: 6.9 CM/SEC
BH CV ECHO MEAS - LV DIASTOLIC VOL/BSA (35-75): 45.4 CM2
BH CV ECHO MEAS - LV MASS(C)D: 250.3 GRAMS
BH CV ECHO MEAS - LV MAX PG: 4.5 MMHG
BH CV ECHO MEAS - LV MEAN PG: 2 MMHG
BH CV ECHO MEAS - LV SYSTOLIC VOL/BSA (12-30): 21.6 CM2
BH CV ECHO MEAS - LV V1 MAX: 106 CM/SEC
BH CV ECHO MEAS - LV V1 VTI: 23.2 CM
BH CV ECHO MEAS - LVIDD: 5.3 CM
BH CV ECHO MEAS - LVIDS: 3.7 CM
BH CV ECHO MEAS - LVOT AREA: 3.6 CM2
BH CV ECHO MEAS - LVOT DIAM: 2.15 CM
BH CV ECHO MEAS - LVPWD: 1.1 CM
BH CV ECHO MEAS - MED PEAK E' VEL: 4.4 CM/SEC
BH CV ECHO MEAS - MV A DUR: 0.11 SEC
BH CV ECHO MEAS - MV A MAX VEL: 112.2 CM/SEC
BH CV ECHO MEAS - MV DEC SLOPE: 343.7 CM/SEC2
BH CV ECHO MEAS - MV DEC TIME: 0.16 SEC
BH CV ECHO MEAS - MV E MAX VEL: 95 CM/SEC
BH CV ECHO MEAS - MV E/A: 0.85
BH CV ECHO MEAS - MV MAX PG: 4.4 MMHG
BH CV ECHO MEAS - MV MEAN PG: 2.1 MMHG
BH CV ECHO MEAS - MV P1/2T: 76.8 MSEC
BH CV ECHO MEAS - MV V2 VTI: 34.6 CM
BH CV ECHO MEAS - MVA(P1/2T): 2.9 CM2
BH CV ECHO MEAS - MVA(VTI): 2.44 CM2
BH CV ECHO MEAS - PA ACC TIME: 0.08 SEC
BH CV ECHO MEAS - PA V2 MAX: 79.6 CM/SEC
BH CV ECHO MEAS - PULM A REVS DUR: 0.1 SEC
BH CV ECHO MEAS - PULM A REVS VEL: 33.2 CM/SEC
BH CV ECHO MEAS - PULM DIAS VEL: 44.9 CM/SEC
BH CV ECHO MEAS - PULM S/D: 1.32
BH CV ECHO MEAS - PULM SYS VEL: 59.4 CM/SEC
BH CV ECHO MEAS - QP/QS: 0.52
BH CV ECHO MEAS - RAP SYSTOLE: 3 MMHG
BH CV ECHO MEAS - RV MAX PG: 1.82 MMHG
BH CV ECHO MEAS - RV V1 MAX: 67.4 CM/SEC
BH CV ECHO MEAS - RV V1 VTI: 14.3 CM
BH CV ECHO MEAS - RVOT DIAM: 1.98 CM
BH CV ECHO MEAS - RVSP: 44 MMHG
BH CV ECHO MEAS - SV(LVOT): 84.4 ML
BH CV ECHO MEAS - SV(MOD-SP2): 56 ML
BH CV ECHO MEAS - SV(MOD-SP4): 54 ML
BH CV ECHO MEAS - SV(RVOT): 43.9 ML
BH CV ECHO MEAS - SVI(LVOT): 37.1 ML/M2
BH CV ECHO MEAS - SVI(MOD-SP2): 24.7 ML/M2
BH CV ECHO MEAS - SVI(MOD-SP4): 23.8 ML/M2
BH CV ECHO MEAS - TAPSE (>1.6): 2.12 CM
BH CV ECHO MEAS - TR MAX PG: 41 MMHG
BH CV ECHO MEAS - TR MAX VEL: 320.1 CM/SEC
BH CV ECHO MEASUREMENTS AVERAGE E/E' RATIO: 16.81
BH CV NUCLEAR PRIOR STUDY: 2
BH CV REST NUCLEAR ISOTOPE DOSE: 11.3 MCI
BH CV STRESS BP STAGE 1: NORMAL
BH CV STRESS COMMENTS STAGE 1: NORMAL
BH CV STRESS DOSE REGADENOSON STAGE 1: 0.4
BH CV STRESS DURATION MIN STAGE 1: 0
BH CV STRESS DURATION SEC STAGE 1: 10
BH CV STRESS HR STAGE 1: 80
BH CV STRESS NUCLEAR ISOTOPE DOSE: 35.6 MCI
BH CV STRESS PROTOCOL 1: NORMAL
BH CV STRESS RECOVERY BP: NORMAL MMHG
BH CV STRESS RECOVERY HR: 71 BPM
BH CV STRESS STAGE 1: 1
BH CV XLRA - RV BASE: 3.2 CM
BH CV XLRA - RV LENGTH: 8.9 CM
BH CV XLRA - RV MID: 3.3 CM
BH CV XLRA - TDI S': 9.2 CM/SEC
LEFT ATRIUM VOLUME INDEX: 25.2 ML/M2
LV EF NUC BP: 64 %
MAXIMAL PREDICTED HEART RATE: 141 BPM
PERCENT MAX PREDICTED HR: 56.74 %
SINUS: 2.9 CM
STJ: 2.6 CM
STRESS BASELINE BP: NORMAL MMHG
STRESS BASELINE HR: 63 BPM
STRESS PERCENT HR: 67 %
STRESS POST EXERCISE DUR SEC: 10 SEC
STRESS POST PEAK BP: NORMAL MMHG
STRESS POST PEAK HR: 80 BPM
STRESS TARGET HR: 120 BPM

## 2024-10-15 PROCEDURE — 93306 TTE W/DOPPLER COMPLETE: CPT

## 2024-10-15 PROCEDURE — 93018 CV STRESS TEST I&R ONLY: CPT | Performed by: INTERNAL MEDICINE

## 2024-10-15 PROCEDURE — 93017 CV STRESS TEST TRACING ONLY: CPT

## 2024-10-15 PROCEDURE — 93016 CV STRESS TEST SUPVJ ONLY: CPT | Performed by: INTERNAL MEDICINE

## 2024-10-15 PROCEDURE — 78452 HT MUSCLE IMAGE SPECT MULT: CPT

## 2024-10-15 PROCEDURE — 25010000002 REGADENOSON 0.4 MG/5ML SOLUTION: Performed by: INTERNAL MEDICINE

## 2024-10-15 PROCEDURE — 93306 TTE W/DOPPLER COMPLETE: CPT | Performed by: INTERNAL MEDICINE

## 2024-10-15 PROCEDURE — 0 TECHNETIUM TETROFOSMIN KIT: Performed by: INTERNAL MEDICINE

## 2024-10-15 PROCEDURE — 78452 HT MUSCLE IMAGE SPECT MULT: CPT | Performed by: INTERNAL MEDICINE

## 2024-10-15 PROCEDURE — A9502 TC99M TETROFOSMIN: HCPCS | Performed by: INTERNAL MEDICINE

## 2024-10-15 PROCEDURE — 25510000001 PERFLUTREN 6.52 MG/ML SUSPENSION 2 ML VIAL: Performed by: INTERNAL MEDICINE

## 2024-10-15 RX ORDER — REGADENOSON 0.08 MG/ML
0.4 INJECTION, SOLUTION INTRAVENOUS
Status: COMPLETED | OUTPATIENT
Start: 2024-10-15 | End: 2024-10-15

## 2024-10-15 RX ADMIN — TETROFOSMIN 1 DOSE: 1.38 INJECTION, POWDER, LYOPHILIZED, FOR SOLUTION INTRAVENOUS at 15:15

## 2024-10-15 RX ADMIN — REGADENOSON 0.4 MG: 0.08 INJECTION, SOLUTION INTRAVENOUS at 15:15

## 2024-10-15 RX ADMIN — TETROFOSMIN 1 DOSE: 1.38 INJECTION, POWDER, LYOPHILIZED, FOR SOLUTION INTRAVENOUS at 14:19

## 2024-10-15 RX ADMIN — PERFLUTREN 2 ML: 6.52 INJECTION, SUSPENSION INTRAVENOUS at 14:05

## 2024-10-20 DIAGNOSIS — G47.33 OSA (OBSTRUCTIVE SLEEP APNEA): ICD-10-CM

## 2024-10-20 DIAGNOSIS — R06.02 EXERTIONAL SHORTNESS OF BREATH: Primary | ICD-10-CM

## 2024-10-20 RX ORDER — HYDROCHLOROTHIAZIDE 12.5 MG/1
12.5 CAPSULE ORAL DAILY
Qty: 90 CAPSULE | Refills: 3 | Status: SHIPPED | OUTPATIENT
Start: 2024-10-20

## 2024-10-20 NOTE — PROGRESS NOTES
Please notify Mr. Ugarte that stress test does not show evidence for significant blockages of heart arteries.  Echo shows a normal left ventricular systolic function but abnormal stiffening which could be contributing shortness of breath and this can be treated by optimizing blood pressure control and get home sleep study if he has not already done that.  I am going to add hydrochlorothiazide to current blood pressure regimen.  Check blood pressure twice daily for 2 weeks.  Also go to lab in 2-4 weeks to check electrolyte levels and kidney function after addition of hydrochlorothiazide.  Let me know if he has any questions.  Fu with me in 3 months. Thank you

## 2024-12-02 DIAGNOSIS — F41.9 ANXIETY: ICD-10-CM

## 2024-12-02 RX ORDER — ALPRAZOLAM 0.5 MG
0.5 TABLET ORAL 3 TIMES DAILY PRN
Qty: 90 TABLET | Refills: 1 | Status: SHIPPED | OUTPATIENT
Start: 2024-12-02

## 2024-12-16 DIAGNOSIS — I10 BENIGN ESSENTIAL HTN: ICD-10-CM

## 2024-12-16 RX ORDER — LOSARTAN POTASSIUM 100 MG/1
100 TABLET ORAL DAILY
Qty: 90 TABLET | Refills: 1 | Status: SHIPPED | OUTPATIENT
Start: 2024-12-16

## 2025-01-08 NOTE — TELEPHONE ENCOUNTER
Caller: Vidya Ugarte    Relationship: Emergency Contact    Best call back number: [phone]    What is the best time to reach you: ANY TIME   Who are you requesting to speak with (clinical staff, provider,  specific staff member): CLINICAL     Do you know the name of the person who called: VIDYA WIFE.     What was the call regarding: PATIENT'S WIFE IS CALLING STATING THAT PATIENT HAS POISON IVY ON HIS LEFT LEG AND HANDS.  WIFE HAD AN OLD PRESCRIPTION OF DIFLORASONE THAT SHE SAYS SEEMS TO HAVE BEEN WORKING, BUT THAT IS NOW GONE.  PATIENT'S WIFE WOULD LIKE A MEDICATION TO TREAT HIS POISON VINCE CALLED IN, EITHER THE DIFLORASONE OR WHATEVER DR. MERAZ FEELS WOULD BE BEST.  PATIENT'S WIFE NOTED THAT THE PATIENT CANNOT TAKE ORAL STEROIDS DUE TO A RIGHT LEG SURGERY.  PLEASE CONTACT PATIENT'S WIFE TO ADVISE.      Do you require a callback: YES   
I sent in a lower potency steroid cream    
PT wife notified   
minimum assist (75% patients effort)

## 2025-01-20 ENCOUNTER — OFFICE VISIT (OUTPATIENT)
Dept: INTERNAL MEDICINE | Facility: CLINIC | Age: 80
End: 2025-01-20
Payer: MEDICARE

## 2025-01-20 VITALS
SYSTOLIC BLOOD PRESSURE: 124 MMHG | BODY MASS INDEX: 38.06 KG/M2 | HEIGHT: 69 IN | DIASTOLIC BLOOD PRESSURE: 68 MMHG | WEIGHT: 257 LBS | HEART RATE: 78 BPM

## 2025-01-20 DIAGNOSIS — I10 BENIGN ESSENTIAL HTN: Primary | Chronic | ICD-10-CM

## 2025-01-20 DIAGNOSIS — G50.0 TRIGEMINAL NEURALGIA OF RIGHT SIDE OF FACE: Chronic | ICD-10-CM

## 2025-01-20 DIAGNOSIS — F41.9 ANXIETY: Chronic | ICD-10-CM

## 2025-01-20 DIAGNOSIS — E78.5 DYSLIPIDEMIA: ICD-10-CM

## 2025-01-20 DIAGNOSIS — E22.2 SYNDROME OF INAPPROPRIATE VASOPRESSIN SECRETION: Chronic | ICD-10-CM

## 2025-01-20 PROCEDURE — 3078F DIAST BP <80 MM HG: CPT | Performed by: INTERNAL MEDICINE

## 2025-01-20 PROCEDURE — 1160F RVW MEDS BY RX/DR IN RCRD: CPT | Performed by: INTERNAL MEDICINE

## 2025-01-20 PROCEDURE — 99214 OFFICE O/P EST MOD 30 MIN: CPT | Performed by: INTERNAL MEDICINE

## 2025-01-20 PROCEDURE — G2211 COMPLEX E/M VISIT ADD ON: HCPCS | Performed by: INTERNAL MEDICINE

## 2025-01-20 PROCEDURE — 3074F SYST BP LT 130 MM HG: CPT | Performed by: INTERNAL MEDICINE

## 2025-01-20 PROCEDURE — 1159F MED LIST DOCD IN RCRD: CPT | Performed by: INTERNAL MEDICINE

## 2025-01-20 RX ORDER — TERAZOSIN 2 MG/1
2 CAPSULE ORAL NIGHTLY
Qty: 90 CAPSULE | Refills: 1 | Status: SHIPPED | OUTPATIENT
Start: 2025-01-20

## 2025-01-20 RX ORDER — CARVEDILOL 25 MG/1
25 TABLET ORAL 2 TIMES DAILY WITH MEALS
Qty: 180 TABLET | Refills: 1 | Status: SHIPPED | OUTPATIENT
Start: 2025-01-20

## 2025-01-20 NOTE — PROGRESS NOTES
"Chief Complaint  Hypertension    Subjective        Jeremi Ugarte presents to [facility]  History of Present Illness here for reg f/u- feels like he has been doing well- he is SOB but thinks it's because he is out of shape.  He has not been going to the gym due to weather-   Dr. BHAGAT added HCTZ to his regimen in October- didn't update his list so he hasn't been taking. It was also recommended that he have a sleep study but he doesn't think he has sleep apnea so he doesn't want to have it checked. Wife does not think he has it- no snoring, moving around a lot.     Objective   Vital Signs:  /68   Pulse 78   Ht 175.3 cm (69\")   Wt 117 kg (257 lb)   BMI 37.95 kg/m²   Estimated body mass index is 37.95 kg/m² as calculated from the following:    Height as of this encounter: 175.3 cm (69\").    Weight as of this encounter: 117 kg (257 lb).            Physical Exam  Constitutional:       Appearance: Normal appearance.   Cardiovascular:      Rate and Rhythm: Normal rate.   Musculoskeletal:      Right lower leg: No edema.      Left lower leg: No edema.   Psychiatric:         Mood and Affect: Mood normal.        Result Review :  The following data was reviewed by: Charity Kearney MD on 01/20/2025:  BMP          7/12/2024    10:44 9/4/2024    09:36 1/15/2025    09:20   BMP   BUN 17  19  20    Creatinine 1.09  1.03  1.18    Sodium 142  142  143    Potassium 4.3  4.1  4.6    Chloride 106  106  103    CO2 24.6  25.0  27.7    Calcium 8.9  8.6  9.2      Data reviewed : Cardiology studies stress test and Consultant notes heme           Assessment and Plan   Diagnoses and all orders for this visit:    1. Benign essential HTN (Primary)  Comments:  controlled, hold off on HCTZ  Orders:  -     carvedilol (COREG) 25 MG tablet; Take 1 tablet by mouth 2 (Two) Times a Day With Meals. Indications: High Blood Pressure  Dispense: 180 tablet; Refill: 1  -     terazosin (HYTRIN) 2 MG capsule; Take 1 capsule " by mouth Every Night. Indications: High Blood Pressure  Dispense: 90 capsule; Refill: 1    2. Syndrome of inappropriate vasopressin secretion  Comments:  sodium is now normal- will hold HCTZ to try to keep things stable.    3. Anxiety  Comments:  stable use of alprazolam for years- wife monitors him closely    4. Dyslipidemia  -     Comprehensive Metabolic Panel; Future  -     Lipid Panel With / Chol / HDL Ratio; Future  -     TSH; Future    5. Trigeminal neuralgia of right side of face  Comments:  feels stable now, follows with Dr. Hudson             Follow Up   Return in about 6 months (around 7/20/2025) for Medicare Wellness, Lab Before FUP.  Patient was given instructions and counseling regarding his condition or for health maintenance advice. Please see specific information pulled into the AVS if appropriate.

## 2025-01-24 ENCOUNTER — OFFICE VISIT (OUTPATIENT)
Dept: CARDIOLOGY | Facility: CLINIC | Age: 80
End: 2025-01-24
Payer: MEDICARE

## 2025-01-24 VITALS
BODY MASS INDEX: 38.21 KG/M2 | DIASTOLIC BLOOD PRESSURE: 88 MMHG | HEIGHT: 69 IN | OXYGEN SATURATION: 98 % | WEIGHT: 258 LBS | SYSTOLIC BLOOD PRESSURE: 160 MMHG | HEART RATE: 73 BPM

## 2025-01-24 DIAGNOSIS — I27.20 PULMONARY HYPERTENSION: ICD-10-CM

## 2025-01-24 DIAGNOSIS — I51.89 DIASTOLIC DYSFUNCTION: ICD-10-CM

## 2025-01-24 DIAGNOSIS — I45.2 BIFASCICULAR BLOCK: ICD-10-CM

## 2025-01-24 DIAGNOSIS — I10 BENIGN ESSENTIAL HTN: Primary | ICD-10-CM

## 2025-01-24 DIAGNOSIS — E78.00 HYPERCHOLESTEROLEMIA: ICD-10-CM

## 2025-01-24 NOTE — PROGRESS NOTES
"PATIENTINFORMATION    Date of Office Visit: 2025  Encounter Provider: Antonio Mesa MD  Place of Service: [facility] CARDIOLOGY  Patient Name: Jeremi Ugarte  : 1945    Subjective:     Encounter Date:2025      Patient ID: Jeremi Ugarte is a 79 y.o. male.    Chief Complaint   Patient presents with    Hypertension       HPI  Mr. Ugarte is a pleasant 79 years old gentleman who came to cardiac clinic for follow-up visit.  He was accompanied by his wife.  Blood pressure runs normal during office visits with other providers significant it was elevated this morning during office visit.  He is compliant with current medications without any known side effects.  He denies symptoms of obstructive sleep apnea.  He plans to restart going to the gym.  Denies rest or exertional chest discomfort.  Chronic exertional shortness of breath which is unchanged.  No recent ER visits or hospitalization.      ROS  All systems reviewed and negative except as noted in HPI.    Past Medical History:   Diagnosis Date    Ankle arthritis     Anxiety     Arthritis     Chronic headaches     Dislocation of the knee cap     LEFT    GCA (giant cell arteritis)     \"PT STATES MISDIAGNOSED\"    Gout of foot     High risk medication use     History of infection     AFTER KNEE REPLACEMENT PT NOT SURE WHICH ONE    Hyperlipidemia     Hypertension     Hyponatremia     HX    Hypothyroid     Left knee pain     Macrocytosis     Myositis     \"PT STATES MISDIAGNOSED\"    Polymyalgia rheumatica     PONV (postoperative nausea and vomiting)     Renal insufficiency     Right bundle branch block (RBBB)     S/p bilateral revision of total hip replacement     S/P foot surgery     S/P revision of total knee, left     S/P revision of total knee, right     Trigeminal neuralgia        Past Surgical History:   Procedure Laterality Date    ANKLE ARTHROPLASTY Left 2018    BACK SURGERY      lower back surg    BRAIN SURGERY      " CATARACT EXTRACTION      COLONOSCOPY      CRANIOTOMY Right 02/14/2019    Dr. Aldridge, for trigeminal neuralgia    EXCISION BAKERS CYST KNEE  2008    HAND SURGERY Left 1971    Left hand and wrist 1971    HIP SURGERY Right 12/08/2016    total right hip replacement    KNEE ARTHROPLASTY Bilateral     LEFT - 1996  -  RIGHT -  1999     QUADRICEPS TENDON REPAIR Right 03/26/2022    Procedure: RIGHT QUADRICEPS TENDON REPAIR;  Surgeon: Keaton Martinez II, MD;  Location: [facility] MAIN OR;  Service: Orthopedics;  Laterality: Right;    SHOULDER SURGERY      TOTAL HIP ARTHROPLASTY Left 2017    TOTAL KNEE ARTHROPLASTY REVISION  2009    left    TOTAL KNEE ARTHROPLASTY REVISION Right 09/15/2020    Procedure: RIGHT TOTAL KNEE ARTHROPLASTY REVISION;  Surgeon: Patricia Caldera MD;  Location: [facility] MAIN OR;  Service: Orthopedics;  Laterality: Right;    TOTAL KNEE ARTHROPLASTY REVISION Left 11/16/2021    Procedure: TOTAL KNEE ARTHROPLASTY REVISION;  Surgeon: Patricia Caldera MD;  Location: [facility] MAIN OR;  Service: Orthopedics;  Laterality: Left;    TOTAL KNEE ARTHROPLASTY REVISION Right 05/17/2022    Procedure: REVISION RIGHT TOTAL KNEE ARTHROPLASTY WITH EXTENSOR MECHANISM ALLOGRAFT/;  Surgeon: Patricia Caldera MD;  Location: [facility] MAIN OR;  Service: Orthopedics;  Laterality: Right;    TOTAL KNEE ARTHROPLASTY REVISION Left 1/16/2023    Procedure: TOTAL KNEE ARTHROPLASTY REVISION repair extensor mechanism ;  Surgeon: Patricia Caldera MD;  Location: [facility], [place], operated by [facility];  Service: Orthopedics;  Laterality: Left;    WRIST SURGERY         Social History     Socioeconomic History    Marital status:    Tobacco Use    Smoking status: Former     Types: Cigars    Smokeless tobacco: Former     Types: Chew    Tobacco comments:     FOR A SHORT TIME AS A YOUNG ADULT   Vaping Use    Vaping status: Never Used   Substance and Sexual Activity    Alcohol use: Yes     Comment: RARE USE    Drug use: Never    Sexual  "activity: Defer       Family History   Problem Relation Age of Onset    Cardiomyopathy Mother     Hypertension Mother     Hyperlipidemia Sister     Hypertension Sister     Coronary artery disease Brother     Hyperlipidemia Brother     Hypertension Brother     No Known Problems Father     Malig Hyperthermia Neg Hx          Procedures       Objective:     /88 (BP Location: Right arm, Patient Position: Sitting, Cuff Size: Adult)   Pulse 73   Ht 175.3 cm (69\")   Wt 117 kg (258 lb)   SpO2 98%   BMI 38.10 kg/m²  Body mass index is 38.1 kg/m².     Constitutional:       General: Not in acute distress.     Appearance: Morbidly obese. Not diaphoretic.   Eyes:      Pupils: Pupils are equal, round, and reactive to light.   HENT:      Head: Normocephalic and atraumatic.   Neck:      Thyroid: No thyromegaly.   Pulmonary:      Effort: Pulmonary effort is normal. No respiratory distress.      Breath sounds: Normal breath sounds. No wheezing. No rales.   Chest:      Chest wall: Not tender to palpatation.   Cardiovascular:      Normal rate. Regular rhythm.      No gallop.    Pulses:     Intact distal pulses.   Edema:     Peripheral edema absent.   Abdominal:      General: Bowel sounds are normal. There is no distension.      Palpations: Abdomen is soft.      Tenderness: There is no guarding.   Musculoskeletal: Normal range of motion.         General: No deformity.      Cervical back: Normal range of motion and neck supple. Skin:     General: Skin is warm and dry.      Findings: No rash.   Neurological:      Mental Status: Alert and oriented to person, place, and time.      Cranial Nerves: No cranial nerve deficit.      Deep Tendon Reflexes: Reflexes are normal and symmetric.   Psychiatric:         Judgment: Judgment normal.         Review Of Data: I have reviewed pertinent recent labs, images and documents and pertinent findings included in HPI or assessment below.    Lipid Panel          7/12/2024    10:44   Lipid Panel "   Total Cholesterol 152    Triglycerides 125    HDL Cholesterol 41    VLDL Cholesterol 22    LDL Cholesterol  89      Assessment/Plan:     Bifascicular block with right bundle branch block and left anterior fascicular block-unchanged  Essential hypertension  CKD-stage II  Hyperlipidemia  on statin and last lipid panel at goal.  General anxiety disorder on medication  Trigeminal neuralgia-with history craniectomy with  microvascular decompression-uses oxcarbazepine only as needed and following up with neurology.  Chronic degenerative joint disease-multiple hip and knee surgery with last one January 2023.  Exertional shortness of breath with risk factors for CAD.  Normal Myocard perfusion study in September 2024  Chronic diastolic dysfunction with mild pulmonary hypertension per echo in September 2024    Not overtly volume overloaded.  BP high in office visit today but runs normal during visit with his PCP.  Denies any significant cardiac specific symptoms.  Chronic shortness of breath.  He will restart exercise regularly.  Continue current care  He will follow-up in cardiac clinic in 1 year or sooner with any concerning symptoms.    Diagnosis and plan of care discussed with patient and verbalized understanding.            Your medication list            Accurate as of January 24, 2025 12:21 PM. If you have any questions, ask your nurse or doctor.                CONTINUE taking these medications        Instructions Last Dose Given Next Dose Due   Abrysvo 120 MCG/0.5ML reconstituted solution injection  Generic drug: RSV Pre-Fusion F A&B Vac Rcmb      Inject 0.5 mL into the appropriate muscle as directed by prescriber.       ALPRAZolam 0.5 MG tablet  Commonly known as: XANAX      Take 1 tablet by mouth 3 (Three) Times a Day As Needed for Anxiety. for anxiety  Indications: Feeling Anxious       CALCIUM 1200 PO      Take 1,000 mg by mouth Every Morning. Indications: low calcium levels       carvedilol 25 MG  tablet  Commonly known as: COREG      Take 1 tablet by mouth 2 (Two) Times a Day With Meals. Indications: High Blood Pressure       hydrALAZINE 100 MG tablet  Commonly known as: APRESOLINE      TAKE 1 TABLET BY MOUTH THREE TIMES DAILY       levothyroxine 112 MCG tablet  Commonly known as: SYNTHROID, LEVOTHROID      TAKE 1 TABLET BY MOUTH DAILY       losartan 100 MG tablet  Commonly known as: COZAAR      TAKE 1 TABLET BY MOUTH DAILY       multivitamin with minerals tablet tablet      Take 1 tablet by mouth Every Morning. Indications: nutritional support       NON FORMULARY      Take 1 tablet by mouth Every Morning. Take Eye Multivitamin daily  Indications: nutritional support       OXcarbazepine 300 MG tablet  Commonly known as: TRILEPTAL      Take 1 tablet by mouth 4 (Four) Times a Day. Takes doses at 0800, 1400, 2000, 0200. Patient is adamant about taking this medication at scheduled times.  Indications: Trigeminal Nerve Pain       rosuvastatin 20 MG tablet  Commonly known as: CRESTOR      TAKE 1 TABLET BY MOUTH DAILY       terazosin 2 MG capsule  Commonly known as: HYTRIN      Take 1 capsule by mouth Every Night. Indications: High Blood Pressure       vitamin B-12 1000 MCG tablet  Commonly known as: CYANOCOBALAMIN      Take 1 tablet by mouth Daily. Indications: Inadequate Vitamin B12                    Antonio Mesa MD  01/24/25  12:21 EST

## 2025-01-28 DIAGNOSIS — F41.9 ANXIETY: ICD-10-CM

## 2025-01-28 RX ORDER — ALPRAZOLAM 0.5 MG
0.5 TABLET ORAL 3 TIMES DAILY PRN
Qty: 90 TABLET | Refills: 1 | Status: SHIPPED | OUTPATIENT
Start: 2025-01-28

## 2025-02-25 DIAGNOSIS — I10 BENIGN ESSENTIAL HTN: ICD-10-CM

## 2025-02-25 DIAGNOSIS — I10 BENIGN ESSENTIAL HTN: Chronic | ICD-10-CM

## 2025-02-25 RX ORDER — TERAZOSIN 2 MG/1
2 CAPSULE ORAL NIGHTLY
Qty: 90 CAPSULE | Refills: 1 | Status: SHIPPED | OUTPATIENT
Start: 2025-02-25

## 2025-02-25 RX ORDER — HYDRALAZINE HYDROCHLORIDE 100 MG/1
100 TABLET, FILM COATED ORAL 3 TIMES DAILY
Qty: 90 TABLET | Refills: 1 | Status: SHIPPED | OUTPATIENT
Start: 2025-02-25

## 2025-02-27 RX ORDER — ROSUVASTATIN CALCIUM 20 MG/1
TABLET, COATED ORAL DAILY
Qty: 90 TABLET | Refills: 2 | Status: SHIPPED | OUTPATIENT
Start: 2025-02-27

## 2025-03-14 ENCOUNTER — PATIENT MESSAGE (OUTPATIENT)
Dept: INTERNAL MEDICINE | Facility: CLINIC | Age: 80
End: 2025-03-14
Payer: MEDICARE

## 2025-03-14 DIAGNOSIS — I10 BENIGN ESSENTIAL HTN: ICD-10-CM

## 2025-03-14 RX ORDER — HYDRALAZINE HYDROCHLORIDE 100 MG/1
100 TABLET, FILM COATED ORAL 3 TIMES DAILY
Qty: 270 TABLET | Refills: 1 | Status: SHIPPED | OUTPATIENT
Start: 2025-03-14

## 2025-03-28 DIAGNOSIS — F41.9 ANXIETY: ICD-10-CM

## 2025-03-28 RX ORDER — ALPRAZOLAM 0.5 MG
0.5 TABLET ORAL 3 TIMES DAILY PRN
Qty: 90 TABLET | Refills: 1 | Status: SHIPPED | OUTPATIENT
Start: 2025-03-28

## 2025-04-16 ENCOUNTER — OFFICE VISIT (OUTPATIENT)
Dept: INTERNAL MEDICINE | Facility: CLINIC | Age: 80
End: 2025-04-16
Payer: MEDICARE

## 2025-04-16 VITALS
HEART RATE: 80 BPM | SYSTOLIC BLOOD PRESSURE: 144 MMHG | DIASTOLIC BLOOD PRESSURE: 82 MMHG | BODY MASS INDEX: 37.62 KG/M2 | WEIGHT: 254 LBS | HEIGHT: 69 IN

## 2025-04-16 DIAGNOSIS — F41.9 ANXIETY: Chronic | ICD-10-CM

## 2025-04-16 DIAGNOSIS — I10 BENIGN ESSENTIAL HTN: Chronic | ICD-10-CM

## 2025-04-16 DIAGNOSIS — G50.0 TRIGEMINAL NEURALGIA OF RIGHT SIDE OF FACE: Chronic | ICD-10-CM

## 2025-04-16 DIAGNOSIS — G47.30 OBSERVED SLEEP APNEA: Primary | ICD-10-CM

## 2025-04-16 RX ORDER — HYDROCHLOROTHIAZIDE 12.5 MG/1
12.5 TABLET ORAL DAILY
COMMUNITY
End: 2025-04-16 | Stop reason: SDUPTHER

## 2025-04-16 RX ORDER — HYDROCHLOROTHIAZIDE 12.5 MG/1
12.5 TABLET ORAL DAILY
Qty: 90 TABLET | Refills: 1 | Status: SHIPPED | OUTPATIENT
Start: 2025-04-16

## 2025-04-16 NOTE — PROGRESS NOTES
"Chief Complaint  Hypertension and Fatigue    Subjective        Jeremi Ugarte presents to [facility] PRIMARY CARE  History of Present Illness  Hadn't been feeling well- stopped the HCTZ because he doesn't like the way it makes him feel - readings off of it are elevated and back on it < 141.    Discussed cardiac issues with Dr. BHAGAT- he hasn't wanted sleep study  He increased the  dose of his oxcarbazepine out of fear of getting headaches - makes him sleepy so he knows to only take as prescribed.     Objective   Vital Signs:  /82   Pulse 80   Ht 175.3 cm (69\")   Wt 115 kg (254 lb)   BMI 37.51 kg/m²   Estimated body mass index is 37.51 kg/m² as calculated from the following:    Height as of this encounter: 175.3 cm (69\").    Weight as of this encounter: 115 kg (254 lb).            Physical Exam  Constitutional:       Appearance: Normal appearance. He is obese.   Cardiovascular:      Rate and Rhythm: Normal rate.   Pulmonary:      Effort: Pulmonary effort is normal.        Result Review :                Assessment and Plan   Diagnoses and all orders for this visit:    1. Observed sleep apnea (Primary)  Comments:  agrees to get sleep study-  Orders:  -     Ambulatory Referral to Sleep Medicine    2. Benign essential HTN  Comments:  better with the HCTZ- would like him to continue  Orders:  -     hydroCHLOROthiazide 12.5 MG tablet; Take 1 tablet by mouth Daily.  Dispense: 90 tablet; Refill: 1    3. Anxiety  Comments:  stable, chronic use of benzos. No changes made    4. Trigeminal neuralgia of right side of face  Comments:  stressed importance of only taking meds as prescribed.             Follow Up   No follow-ups on file.  Patient was given instructions and counseling regarding his condition or for health maintenance advice. Please see specific information pulled into the AVS if appropriate.           "

## 2025-04-22 ENCOUNTER — OFFICE VISIT (OUTPATIENT)
Facility: HOSPITAL | Age: 80
End: 2025-04-22
Payer: MEDICARE

## 2025-04-22 VITALS — BODY MASS INDEX: 37.03 KG/M2 | HEIGHT: 69 IN | WEIGHT: 250 LBS | OXYGEN SATURATION: 92 % | HEART RATE: 68 BPM

## 2025-04-22 DIAGNOSIS — I27.20 PULMONARY HYPERTENSION: ICD-10-CM

## 2025-04-22 DIAGNOSIS — G47.30 OBSERVED SLEEP APNEA: Primary | ICD-10-CM

## 2025-04-22 DIAGNOSIS — G50.0 TRIGEMINAL NEURALGIA OF RIGHT SIDE OF FACE: ICD-10-CM

## 2025-04-22 DIAGNOSIS — E78.5 DYSLIPIDEMIA: ICD-10-CM

## 2025-04-22 DIAGNOSIS — I51.89 DIASTOLIC DYSFUNCTION: ICD-10-CM

## 2025-04-22 DIAGNOSIS — I10 BENIGN ESSENTIAL HTN: ICD-10-CM

## 2025-04-22 DIAGNOSIS — I45.2 BIFASCICULAR BLOCK: ICD-10-CM

## 2025-04-22 PROCEDURE — G0463 HOSPITAL OUTPT CLINIC VISIT: HCPCS

## 2025-04-22 NOTE — PROGRESS NOTES
Patient Care Team:  Charity Kearney MD as PCP - General (Internal Medicine)  Aldo Hudson DO as Consulting Physician (Neurology)  Patricia Caldera MD as Consulting Physician (Orthopedic Surgery)  Antonio Mesa MD as Consulting Physician (Cardiology)  Bruce Long MD, MPH as Consulting Physician (Sleep Medicine)        History of Present Illness  The patient is an 79-year-old male who presents for evaluation of sleep apnea.    He was referred to our clinic by his cardiologist for a sleep study, despite having no known history of snoring or apneic episodes. He shares a room with his wife, who reports no observations of him snoring or ceasing to breathe during sleep. He occasionally experiences difficulty initiating sleep, which he attributes to a lack of physical exertion during the day. He does not report any nightmares or sleep disturbances such as fighting or excessive movement. His daily routine includes the consumption of one cup of coffee, and he abstains from alcohol and tobacco use. He is a retired .    He has a history of hypertension and diastolic dysfunction, but his cardiac health is generally stable.    He also has trigeminal neuralgia, for which he takes oxcarbazepine. He experiences headaches from the trigeminal neuralgia, but the medication helps manage the pain. He takes his headache medication four times a day and has set an alarm at 2 AM to take a dose.    Supplemental Information  He has undergone multiple surgeries, including knee replacements, ankle replacement, and a revision surgery on his left knee.    SOCIAL HISTORY  He does not smoke or drink alcohol. He is a retired .    MEDICATIONS  Current: Oxcarbazepine.       [place]: 10    Data Reviewed: Medical chart and sleep questionnaire      PMH:  Past Medical History:   Diagnosis Date    Ankle arthritis     Anxiety     Arthritis     Chronic headaches     Dislocation of the knee cap     LEFT  "   GCA (giant cell arteritis)     \"PT STATES MISDIAGNOSED\"    Gout of foot     High risk medication use     History of infection     AFTER KNEE REPLACEMENT PT NOT SURE WHICH ONE    Hyperlipidemia     Hypertension     Hyponatremia     HX    Hypothyroid     Left knee pain     Macrocytosis     Myositis     \"PT STATES MISDIAGNOSED\"    Observed sleep apnea 4/22/2025    Polymyalgia rheumatica     PONV (postoperative nausea and vomiting)     Renal insufficiency     Right bundle branch block (RBBB)     S/p bilateral revision of total hip replacement     S/P foot surgery     S/P revision of total knee, left     S/P revision of total knee, right     Trigeminal neuralgia           Allergies:  Vancomycin, Morphine, Oxycodone-acetaminophen, and Dilaudid [hydromorphone hcl]     Medication Review:   Current Outpatient Medications on File Prior to Visit   Medication Sig Dispense Refill    ALPRAZolam (XANAX) 0.5 MG tablet Take 1 tablet by mouth 3 (Three) Times a Day As Needed for Anxiety. for anxiety 90 tablet 1    Calcium Carbonate-Vit D-Min (CALCIUM 1200 PO) Take 1,000 mg by mouth Every Morning. Indications: low calcium levels      carvedilol (COREG) 25 MG tablet Take 1 tablet by mouth 2 (Two) Times a Day With Meals. Indications: High Blood Pressure 180 tablet 1    hydrALAZINE (APRESOLINE) 100 MG tablet Take 1 tablet by mouth 3 (Three) Times a Day. Indications: High Blood Pressure 270 tablet 1    hydroCHLOROthiazide 12.5 MG tablet Take 1 tablet by mouth Daily. 90 tablet 1    levothyroxine (SYNTHROID, LEVOTHROID) 112 MCG tablet TAKE 1 TABLET BY MOUTH DAILY 90 tablet 1    losartan (COZAAR) 100 MG tablet TAKE 1 TABLET BY MOUTH DAILY 90 tablet 1    Multiple Vitamins-Minerals (MULTIVITAMIN ADULTS 50+ PO) Take 1 tablet by mouth Every Morning. Indications: nutritional support      NON FORMULARY Take 1 tablet by mouth Every Morning. Take Eye Multivitamin daily  Indications: nutritional support      OXcarbazepine (TRILEPTAL) 300 MG tablet " "Take 1 tablet by mouth 4 (Four) Times a Day. Takes doses at 0800, 1400, 2000, 0200. Patient is adamant about taking this medication at scheduled times.  Indications: Trigeminal Nerve Pain  1    rosuvastatin (CRESTOR) 20 MG tablet TAKE 1 TABLET BY MOUTH DAILY 90 tablet 2    terazosin (HYTRIN) 2 MG capsule Take 1 capsule by mouth Every Night. Indications: High Blood Pressure 90 capsule 1    vitamin B-12 (CYANOCOBALAMIN) 1000 MCG tablet Take 1 tablet by mouth Daily. Indications: Inadequate Vitamin B12       Current Facility-Administered Medications on File Prior to Visit   Medication Dose Route Frequency Provider Last Rate Last Admin    Chlorhexidine Gluconate Cloth 2 % pads   Apply externally BID Patricia Caldera MD             Vital Signs:    Vitals:    04/22/25 1418   Pulse: 68   SpO2: 92%   Weight: 113 kg (250 lb)   Height: 175.3 cm (69\")        Body mass index is 36.92 kg/m².  Neck Circumference: 17.75 inches  .[email or web address]         Physical Exam:    Constitutional:  Well developed 79 y.o. male that appears in no apparent distress.  Awake & oriented times 3.  Normal mood with normal recent and remote memory and normal judgement.  Eyes:  Conjunctivae normal.  Oropharynx: Moist mucous membranes without exudate and Mallampati 4  Neck: Trachea midline  Respiratory: Effort is not labored  Cardiovascular: Radial pulse regular  Musculoskeletal: Gait appears normal, no digital clubbing evident, no pre-tibial edema        Impression:   Encounter Diagnoses   Name Primary?    Observed sleep apnea Yes    Benign essential HTN     Bifascicular block     Diastolic dysfunction     Dyslipidemia     Pulmonary hypertension     Trigeminal neuralgia of right side of face      Patient's BMI is Body mass index is 36.92 kg/m².        Assessment & Plan  1. Suspected sleep apnea.  He presents with a Mallampati class 4 airway, which is a known risk factor for sleep apnea. A home sleep test will be conducted to confirm the presence or " absence of sleep apnea. If the test results indicate sleep apnea, a CPAP machine will be prescribed for treatment.    2. Trigeminal neuralgia.  He is currently taking oxcarbazepine for trigeminal neuralgia, which effectively manages his symptoms. He reports setting an alarm at 2 AM to take his medication to prevent severe headaches.    3. Diastolic dysfunction.  He has been diagnosed with diastolic dysfunction, indicating that his heart does not relax as well as it should. This condition is being monitored, and he has been advised to continue managing his blood pressure.    PROCEDURE  The patient has undergone multiple surgeries, including knee replacements, ankle replacement, and a revision surgery on his left knee.       The patient should practice good sleep hygiene measures.      Weight loss might be beneficial in this patient who has a Body mass index is 36.92 kg/m².      Pathophysiology of ARSALAN described to the patient.  Cardiovascular complications of untreated ARSALAN also reviewed.      The patient was cautioned about the dangers of drowsy driving.    Patient or patient representative verbalized consent for the use of Ambient Listening during the visit with  Best Long MD for chart documentation. 4/22/2025  14:32 EDT     Best Long MD  Sleep Medicine  04/22/25  14:32 EDT

## 2025-05-09 DIAGNOSIS — F41.9 ANXIETY: ICD-10-CM

## 2025-05-09 RX ORDER — ALPRAZOLAM 0.5 MG
0.5 TABLET ORAL 3 TIMES DAILY PRN
Qty: 90 TABLET | Refills: 1 | Status: SHIPPED | OUTPATIENT
Start: 2025-05-09

## 2025-05-12 ENCOUNTER — TELEPHONE (OUTPATIENT)
Dept: INTERNAL MEDICINE | Facility: CLINIC | Age: 80
End: 2025-05-12

## 2025-05-12 NOTE — TELEPHONE ENCOUNTER
Hub staff attempted to follow warm transfer process and was unsuccessful     Caller: Vidya Ugarte    Relationship to patient: Emergency Contact    Best call back number:      Patient is needing: CALL PATIENT TO SCHEDULE LAB VISIT.

## 2025-05-14 DIAGNOSIS — E78.5 DYSLIPIDEMIA: ICD-10-CM

## 2025-05-14 LAB
ALBUMIN SERPL-MCNC: 4.3 G/DL (ref 3.5–5.2)
ALBUMIN/GLOB SERPL: 2 G/DL
ALP SERPL-CCNC: 70 U/L (ref 39–117)
ALT SERPL-CCNC: 16 U/L (ref 1–41)
AST SERPL-CCNC: 18 U/L (ref 1–40)
BILIRUB SERPL-MCNC: 0.3 MG/DL (ref 0–1.2)
BUN SERPL-MCNC: 15 MG/DL (ref 8–23)
BUN/CREAT SERPL: 14.7 (ref 7–25)
CALCIUM SERPL-MCNC: 9.1 MG/DL (ref 8.6–10.5)
CHLORIDE SERPL-SCNC: 102 MMOL/L (ref 98–107)
CHOLEST SERPL-MCNC: 189 MG/DL (ref 0–200)
CHOLEST/HDLC SERPL: 4.61 {RATIO}
CO2 SERPL-SCNC: 23.9 MMOL/L (ref 22–29)
CREAT SERPL-MCNC: 1.02 MG/DL (ref 0.76–1.27)
EGFRCR SERPLBLD CKD-EPI 2021: 74.3 ML/MIN/1.73
GLOBULIN SER CALC-MCNC: 2.2 GM/DL
GLUCOSE SERPL-MCNC: 97 MG/DL (ref 65–99)
HDLC SERPL-MCNC: 41 MG/DL (ref 40–60)
LDLC SERPL CALC-MCNC: 124 MG/DL (ref 0–100)
POTASSIUM SERPL-SCNC: 4.9 MMOL/L (ref 3.5–5.2)
PROT SERPL-MCNC: 6.5 G/DL (ref 6–8.5)
SODIUM SERPL-SCNC: 136 MMOL/L (ref 136–145)
TRIGL SERPL-MCNC: 134 MG/DL (ref 0–150)
TSH SERPL DL<=0.005 MIU/L-ACNC: 3.26 UIU/ML (ref 0.27–4.2)
VLDLC SERPL CALC-MCNC: 24 MG/DL (ref 5–40)

## 2025-05-15 ENCOUNTER — TELEPHONE (OUTPATIENT)
Dept: INTERNAL MEDICINE | Facility: CLINIC | Age: 80
End: 2025-05-15
Payer: MEDICARE

## 2025-05-15 NOTE — TELEPHONE ENCOUNTER
We can send in scopolamine patch - need to warn them that it can cause dry mouth and dizziness- make sure they want to take- another option is to take something as needed like dramamine- available OTC>

## 2025-05-30 DIAGNOSIS — I10 BENIGN ESSENTIAL HTN: ICD-10-CM

## 2025-05-30 RX ORDER — LOSARTAN POTASSIUM 100 MG/1
100 TABLET ORAL DAILY
Qty: 90 TABLET | Refills: 1 | Status: SHIPPED | OUTPATIENT
Start: 2025-05-30

## 2025-07-02 DIAGNOSIS — S09.90XS BALANCE DISTURBANCE DUE TO OLD HEAD INJURY: Primary | ICD-10-CM

## 2025-07-02 DIAGNOSIS — R26.89 BALANCE DISTURBANCE DUE TO OLD HEAD INJURY: Primary | ICD-10-CM

## 2025-07-02 DIAGNOSIS — I10 BENIGN ESSENTIAL HTN: Chronic | ICD-10-CM

## 2025-07-02 RX ORDER — CARVEDILOL 25 MG/1
25 TABLET ORAL 2 TIMES DAILY WITH MEALS
Qty: 180 TABLET | Refills: 1 | Status: SHIPPED | OUTPATIENT
Start: 2025-07-02

## 2025-07-18 RX ORDER — LEVOTHYROXINE SODIUM 112 UG/1
112 TABLET ORAL DAILY
Qty: 90 TABLET | Refills: 1 | Status: SHIPPED | OUTPATIENT
Start: 2025-07-18

## 2025-07-25 DIAGNOSIS — F41.9 ANXIETY: ICD-10-CM

## 2025-07-25 DIAGNOSIS — I10 BENIGN ESSENTIAL HTN: ICD-10-CM

## 2025-07-25 RX ORDER — ALPRAZOLAM 0.5 MG
0.5 TABLET ORAL 3 TIMES DAILY PRN
Qty: 90 TABLET | Refills: 0 | Status: SHIPPED | OUTPATIENT
Start: 2025-07-25

## 2025-07-25 RX ORDER — HYDRALAZINE HYDROCHLORIDE 100 MG/1
TABLET, FILM COATED ORAL
Qty: 270 TABLET | Refills: 1 | Status: SHIPPED | OUTPATIENT
Start: 2025-07-25

## 2025-08-25 DIAGNOSIS — F41.9 ANXIETY: ICD-10-CM

## 2025-08-25 RX ORDER — ALPRAZOLAM 0.5 MG
0.5 TABLET ORAL 3 TIMES DAILY PRN
Qty: 90 TABLET | Refills: 1 | Status: SHIPPED | OUTPATIENT
Start: 2025-08-25

## (undated) DEVICE — PK ORTHO MAJ 40

## (undated) DEVICE — ANTIBACTERIAL UNDYED BRAIDED (POLYGLACTIN 910), SYNTHETIC ABSORBABLE SUTURE: Brand: COATED VICRYL

## (undated) DEVICE — BNDG ELAS ELITE V/CLOSE 6IN 5YD LF STRL

## (undated) DEVICE — DUAL CUT SAGITTAL BLADE

## (undated) DEVICE — PK KN TOTL 40

## (undated) DEVICE — SOL ISO/ALC RUB 70PCT 4OZ

## (undated) DEVICE — OPTIFOAM GENTLE SA, POSTOP, 4X12: Brand: MEDLINE

## (undated) DEVICE — GLV SURG SIGNATURE ESSENTIAL PF LTX SZ8

## (undated) DEVICE — KT DRN EVAC WND PVC PCH WTROC RND 10F400

## (undated) DEVICE — TBG PENCL TELESCP MEGADYNE SMOKE EVAC 10FT

## (undated) DEVICE — SUT ETHIB 0 CT1 CR8 18IN CX21D

## (undated) DEVICE — SKIN PREP TRAY W/CHG: Brand: MEDLINE INDUSTRIES, INC.

## (undated) DEVICE — BRACE KN P/OP TELESCP COOL TROM STD SZ

## (undated) DEVICE — NEEDLE, QUINCKE 22GX3.5": Brand: MEDLINE INDUSTRIES, INC.

## (undated) DEVICE — SYR CONTRL LUERLOK 10CC

## (undated) DEVICE — COAXIAL FEMORAL CANAL TIP

## (undated) DEVICE — PROXIMATE RH ROTATING HEAD SKIN STAPLERS (35 WIDE) CONTAINS 35 STAINLESS STEEL STAPLES: Brand: PROXIMATE

## (undated) DEVICE — DRAPE,U/ SHT,SPLIT,PLAS,STERIL: Brand: MEDLINE

## (undated) DEVICE — IMPLANTABLE DEVICE
Type: IMPLANTABLE DEVICE | Site: KNEE | Status: NON-FUNCTIONAL
Removed: 2021-11-16

## (undated) DEVICE — APPL CHLORAPREP HI/LITE 26ML ORNG

## (undated) DEVICE — PICO 7 10CM X 30CM: Brand: PICO™ 7

## (undated) DEVICE — TRAP FLD MINIVAC MEGADYNE 100ML

## (undated) DEVICE — GLV SURG BIOGEL LTX PF 7

## (undated) DEVICE — BOWL MIXING BONE CEMENT KIT: Brand: MEDLINE INDUSTRIES, INC.

## (undated) DEVICE — CONTAINER,SPECIMEN,O.R.STRL,4.5OZ: Brand: MEDLINE

## (undated) DEVICE — SYR CONTRL PRESS/LO FIX/M/LL W/THMB/RNG 10ML

## (undated) DEVICE — THIN OFFSET (13.0 X 0.38 X 39.0MM)

## (undated) DEVICE — SUT MNCRYL 3/0 PS2 18IN MCP497G

## (undated) DEVICE — DRAPE,REIN 53X77,STERILE: Brand: MEDLINE

## (undated) DEVICE — PATIENT RETURN ELECTRODE, SINGLE-USE, CONTACT QUALITY MONITORING, ADULT, WITH 9FT CORD, FOR PATIENTS WEIGING OVER 33LBS. (15KG): Brand: MEGADYNE

## (undated) DEVICE — IMMOB KN 3PNL DLX CANVS 22IN BLU

## (undated) DEVICE — SUT VIC 0 CT1 36IN J946H

## (undated) DEVICE — PENCL E/S ULTRAVAC TELESCP NOSE HOLSTR 10FT

## (undated) DEVICE — DISPOSABLE TOURNIQUET CUFF SINGLE BLADDER, SINGLE PORT AND QUICK CONNECT CONNECTOR: Brand: COLOR CUFF

## (undated) DEVICE — 2108 SERIES SAGITTAL BLADE, NO OFFSET  (12.4 X 1.19 X 82.1MM)

## (undated) DEVICE — TOWEL,OR,DSP,ST,BLUE,STD,4/PK,20PK/CS: Brand: MEDLINE

## (undated) DEVICE — GLV SURG BIOGEL LTX PF 8

## (undated) DEVICE — THIN OSTEOTOME BLADE 8MM X 3 IN: Brand: RENOVATION

## (undated) DEVICE — GLV SURG SENSICARE PI MIC PF SZ7 LF STRL

## (undated) DEVICE — SYS CLS SKIN PREMIERPRO EXOFINFUSION 22CM

## (undated) DEVICE — GLV SURG PREMIERPRO ORTHO LTX PF SZ8 BRN

## (undated) DEVICE — SIGMA LCS HIGH PERFORMANCE STERILE THREADED HEADED PINS: Brand: SIGMA LCS HIGH PERFORMANCE

## (undated) DEVICE — 4.0MM EGG BUR

## (undated) DEVICE — GW TIB BALL NOSE 3MM 100CM

## (undated) DEVICE — 1.6MM X 5.5MM METAL CUTTING HELIOCOIDAL RASP

## (undated) DEVICE — SPNG LAP 18X18IN LF STRL PK/5

## (undated) DEVICE — SPNG GZ WOVN 4X4IN 12PLY 10/BX STRL

## (undated) DEVICE — PAD,ABDOMINAL,8"X10",ST,LF: Brand: MEDLINE

## (undated) DEVICE — SUT VIC 1 CT 36IN J959H

## (undated) DEVICE — IMMOB KN FELT LG/XL 21IN

## (undated) DEVICE — BNDG ELAS CO-FLEX SLF ADHR 4IN5YD LF STRL

## (undated) DEVICE — UNDERCAST PADDING: Brand: DEROYAL

## (undated) DEVICE — HEWSON SUTURE RETRIEVER: Brand: HEWSON SUTURE RETRIEVER

## (undated) DEVICE — GLV SURG SIGNATURE ESSENTIAL PF LTX SZ8.5

## (undated) DEVICE — DRSNG GZ CURAD XEROFORM NONADHS 5X9IN STRL

## (undated) DEVICE — DRSNG WND GEL FIBR OPTICELL AG PLS W/SLV LF 4X5IN  STRL

## (undated) DEVICE — GLV SURG PREMIERPRO ORTHO LTX PF SZ8.5 BRN

## (undated) DEVICE — BNDG ESMARK STRL 6INX12FT LF

## (undated) DEVICE — UNDYED BRAIDED (POLYGLACTIN 910), SYNTHETIC ABSORBABLE SUTURE: Brand: COATED VICRYL

## (undated) DEVICE — CEMENT MIXING SYSTEM WITH FEMORAL BREAKWAY NOZZLE: Brand: REVOLUTION

## (undated) DEVICE — T-DRAPE,EXTREMITY,STERILE: Brand: MEDLINE

## (undated) DEVICE — OPTIFOAM GENTLE SA, POSTOP, 4X8: Brand: MEDLINE

## (undated) DEVICE — 3M™ IOBAN™ 2 ANTIMICROBIAL INCISE DRAPE 6650EZ: Brand: IOBAN™ 2